# Patient Record
Sex: FEMALE | Race: WHITE | Employment: OTHER | URBAN - METROPOLITAN AREA
[De-identification: names, ages, dates, MRNs, and addresses within clinical notes are randomized per-mention and may not be internally consistent; named-entity substitution may affect disease eponyms.]

---

## 2017-01-09 ENCOUNTER — PROCEDURE VISIT (OUTPATIENT)
Dept: CARDIOLOGY CLINIC | Age: 72
End: 2017-01-09

## 2017-01-09 ENCOUNTER — INITIAL CONSULT (OUTPATIENT)
Dept: CARDIOLOGY CLINIC | Age: 72
End: 2017-01-09

## 2017-01-09 ENCOUNTER — TELEPHONE (OUTPATIENT)
Dept: CARDIOLOGY CLINIC | Age: 72
End: 2017-01-09

## 2017-01-09 VITALS
SYSTOLIC BLOOD PRESSURE: 136 MMHG | BODY MASS INDEX: 32.46 KG/M2 | HEART RATE: 60 BPM | WEIGHT: 161 LBS | HEIGHT: 59 IN | DIASTOLIC BLOOD PRESSURE: 70 MMHG

## 2017-01-09 DIAGNOSIS — R07.9 CHEST PAIN, UNSPECIFIED TYPE: Primary | ICD-10-CM

## 2017-01-09 DIAGNOSIS — I65.29 STENOSIS OF CAROTID ARTERY, UNSPECIFIED LATERALITY: Primary | ICD-10-CM

## 2017-01-09 DIAGNOSIS — I65.23 BILATERAL CAROTID ARTERY STENOSIS: ICD-10-CM

## 2017-01-09 PROCEDURE — 93000 ELECTROCARDIOGRAM COMPLETE: CPT | Performed by: INTERNAL MEDICINE

## 2017-01-09 PROCEDURE — 93880 EXTRACRANIAL BILAT STUDY: CPT | Performed by: INTERNAL MEDICINE

## 2017-01-09 PROCEDURE — 99203 OFFICE O/P NEW LOW 30 MIN: CPT | Performed by: INTERNAL MEDICINE

## 2017-01-24 ENCOUNTER — HOSPITAL ENCOUNTER (OUTPATIENT)
Dept: GENERAL RADIOLOGY | Age: 72
Discharge: HOME | End: 2017-01-24

## 2017-01-24 DIAGNOSIS — Z01.811 PRE-OP CHEST EXAM: ICD-10-CM

## 2017-01-24 LAB
ANION GAP SERPL CALCULATED.3IONS-SCNC: 13 MMOL/L (ref 4–16)
APTT: 33.7 SECONDS (ref 21.2–33)
BUN BLDV-MCNC: 17 MG/DL (ref 6–23)
CALCIUM SERPL-MCNC: 11.5 MG/DL (ref 8.3–10.6)
CHLORIDE BLD-SCNC: 96 MMOL/L (ref 99–110)
CO2: 27 MMOL/L (ref 21–32)
CREAT SERPL-MCNC: 0.9 MG/DL (ref 0.6–1.1)
GFR AFRICAN AMERICAN: >60 ML/MIN/1.73M2
GFR NON-AFRICAN AMERICAN: >60 ML/MIN/1.73M2
GLUCOSE BLD-MCNC: 106 MG/DL (ref 70–140)
HCT VFR BLD CALC: 41.9 % (ref 37–47)
HEMOGLOBIN: 13.7 GM/DL (ref 12.5–16)
INR BLD: 0.97 INDEX
MCH RBC QN AUTO: 32 PG (ref 27–31)
MCHC RBC AUTO-ENTMCNC: 32.7 % (ref 32–36)
MCV RBC AUTO: 97.9 FL (ref 78–100)
PDW BLD-RTO: 13.2 % (ref 11.7–14.9)
PLATELET # BLD: 298 K/CU MM (ref 140–440)
PMV BLD AUTO: 11.5 FL (ref 7.5–11.1)
POTASSIUM SERPL-SCNC: 5 MMOL/L (ref 3.5–5.1)
PROTHROMBIN TIME: 11 SECONDS
RBC # BLD: 4.28 M/CU MM (ref 4.2–5.4)
SODIUM BLD-SCNC: 136 MMOL/L (ref 135–145)
WBC # BLD: 7.8 K/CU MM (ref 4–10.5)

## 2017-01-26 PROBLEM — I65.22 STENOSIS OF LEFT CAROTID ARTERY: Status: ACTIVE | Noted: 2017-01-09

## 2017-01-30 ENCOUNTER — HOSPITAL ENCOUNTER (OUTPATIENT)
Dept: PREADMISSION TESTING | Age: 72
Discharge: HOME | End: 2017-01-30
Attending: SURGERY | Admitting: SURGERY

## 2017-01-30 VITALS
HEIGHT: 59 IN | DIASTOLIC BLOOD PRESSURE: 63 MMHG | SYSTOLIC BLOOD PRESSURE: 135 MMHG | BODY MASS INDEX: 32.68 KG/M2 | OXYGEN SATURATION: 99 % | TEMPERATURE: 97.9 F | RESPIRATION RATE: 16 BRPM | HEART RATE: 58 BPM | WEIGHT: 162.13 LBS

## 2017-01-30 LAB
ANION GAP SERPL CALCULATED.3IONS-SCNC: 10 MMOL/L (ref 4–16)
APTT: 31.4 SECONDS (ref 21.2–33)
BACTERIA: ABNORMAL /HPF
BILIRUBIN URINE: NEGATIVE MG/DL
BLOOD, URINE: NEGATIVE
BUN BLDV-MCNC: 18 MG/DL (ref 6–23)
CHLORIDE BLD-SCNC: 98 MMOL/L (ref 99–110)
CLARITY: CLEAR
CO2: 27 MMOL/L (ref 21–32)
COLOR: ABNORMAL
CREAT SERPL-MCNC: 0.8 MG/DL (ref 0.6–1.1)
GFR AFRICAN AMERICAN: >60 ML/MIN/1.73M2
GFR NON-AFRICAN AMERICAN: >60 ML/MIN/1.73M2
GLUCOSE FASTING: 102 MG/DL (ref 70–99)
GLUCOSE, URINE: NEGATIVE MG/DL
HCT VFR BLD CALC: 41.1 % (ref 37–47)
HEMOGLOBIN: 13.1 GM/DL (ref 12.5–16)
INR BLD: 0.93 INDEX
KETONES, URINE: NEGATIVE MG/DL
LEUKOCYTE ESTERASE, URINE: ABNORMAL
MCH RBC QN AUTO: 31.3 PG (ref 27–31)
MCHC RBC AUTO-ENTMCNC: 31.9 % (ref 32–36)
MCV RBC AUTO: 98.3 FL (ref 78–100)
NITRITE URINE, QUANTITATIVE: NEGATIVE
PDW BLD-RTO: 13.2 % (ref 11.7–14.9)
PH, URINE: 7 (ref 5–8)
PLATELET # BLD: 292 K/CU MM (ref 140–440)
PMV BLD AUTO: 11.2 FL (ref 7.5–11.1)
POTASSIUM SERPL-SCNC: 4.9 MMOL/L (ref 3.5–5.1)
PROTEIN UA: NEGATIVE MG/DL
PROTHROMBIN TIME: 10.6 SECONDS (ref 9.12–12.5)
RBC # BLD: 4.18 M/CU MM (ref 4.2–5.4)
RBC URINE: <1 /HPF (ref 0–6)
SODIUM BLD-SCNC: 135 MMOL/L (ref 135–145)
SPECIFIC GRAVITY UA: 1.01 (ref 1–1.03)
SQUAMOUS EPITHELIAL: 4 /HPF
TRANSITIONAL EPITHELIAL: <1 /HPF
UROBILINOGEN, URINE: NORMAL EU/DL (ref 0.2–1)
WBC # BLD: 7.8 K/CU MM (ref 4–10.5)
WBC UA: 4 /HPF (ref 0–5)

## 2017-01-30 ASSESSMENT — PAIN SCALES - GENERAL: PAINLEVEL_OUTOF10: 0

## 2017-02-10 ENCOUNTER — OFFICE VISIT (OUTPATIENT)
Dept: INTERNAL MEDICINE CLINIC | Age: 72
End: 2017-02-10

## 2017-02-10 VITALS
HEART RATE: 68 BPM | BODY MASS INDEX: 32.24 KG/M2 | WEIGHT: 159.6 LBS | RESPIRATION RATE: 12 BRPM | SYSTOLIC BLOOD PRESSURE: 122 MMHG | OXYGEN SATURATION: 97 % | DIASTOLIC BLOOD PRESSURE: 68 MMHG

## 2017-02-10 DIAGNOSIS — I65.22 STENOSIS OF LEFT CAROTID ARTERY: Primary | ICD-10-CM

## 2017-02-10 DIAGNOSIS — I10 ESSENTIAL HYPERTENSION: ICD-10-CM

## 2017-02-10 DIAGNOSIS — E55.9 VITAMIN D DEFICIENCY: ICD-10-CM

## 2017-02-10 DIAGNOSIS — I25.10 CORONARY ARTERY DISEASE INVOLVING NATIVE CORONARY ARTERY OF NATIVE HEART WITHOUT ANGINA PECTORIS: ICD-10-CM

## 2017-02-10 LAB
A/G RATIO: 2 (ref 1.1–2.2)
ALBUMIN SERPL-MCNC: 4.7 G/DL (ref 3.4–5)
ALP BLD-CCNC: 80 U/L (ref 40–129)
ALT SERPL-CCNC: 20 U/L (ref 10–40)
ANION GAP SERPL CALCULATED.3IONS-SCNC: 14 MMOL/L (ref 3–16)
AST SERPL-CCNC: 15 U/L (ref 15–37)
BASOPHILS ABSOLUTE: 0 K/UL (ref 0–0.2)
BASOPHILS RELATIVE PERCENT: 0.5 %
BILIRUB SERPL-MCNC: 0.3 MG/DL (ref 0–1)
BUN BLDV-MCNC: 20 MG/DL (ref 7–20)
CALCIUM SERPL-MCNC: 10.4 MG/DL (ref 8.3–10.6)
CHLORIDE BLD-SCNC: 94 MMOL/L (ref 99–110)
CHOLESTEROL, TOTAL: 114 MG/DL (ref 0–199)
CO2: 26 MMOL/L (ref 21–32)
CREAT SERPL-MCNC: 0.9 MG/DL (ref 0.6–1.2)
EOSINOPHILS ABSOLUTE: 0.2 K/UL (ref 0–0.6)
EOSINOPHILS RELATIVE PERCENT: 2.3 %
GFR AFRICAN AMERICAN: >60
GFR NON-AFRICAN AMERICAN: >60
GLOBULIN: 2.4 G/DL
GLUCOSE BLD-MCNC: 94 MG/DL (ref 70–99)
HCT VFR BLD CALC: 39.1 % (ref 36–48)
HDLC SERPL-MCNC: 49 MG/DL (ref 40–60)
HEMOGLOBIN: 12.9 G/DL (ref 12–16)
LDL CHOLESTEROL CALCULATED: 46 MG/DL
LYMPHOCYTES ABSOLUTE: 1.4 K/UL (ref 1–5.1)
LYMPHOCYTES RELATIVE PERCENT: 18.7 %
MAGNESIUM: 2 MG/DL (ref 1.8–2.4)
MCH RBC QN AUTO: 31.6 PG (ref 26–34)
MCHC RBC AUTO-ENTMCNC: 33.1 G/DL (ref 31–36)
MCV RBC AUTO: 95.4 FL (ref 80–100)
MONOCYTES ABSOLUTE: 0.9 K/UL (ref 0–1.3)
MONOCYTES RELATIVE PERCENT: 12 %
NEUTROPHILS ABSOLUTE: 4.9 K/UL (ref 1.7–7.7)
NEUTROPHILS RELATIVE PERCENT: 66.5 %
PDW BLD-RTO: 13.9 % (ref 12.4–15.4)
PLATELET # BLD: 225 K/UL (ref 135–450)
PMV BLD AUTO: 9.9 FL (ref 5–10.5)
POTASSIUM SERPL-SCNC: 4.7 MMOL/L (ref 3.5–5.1)
RBC # BLD: 4.09 M/UL (ref 4–5.2)
SODIUM BLD-SCNC: 134 MMOL/L (ref 136–145)
TOTAL PROTEIN: 7.1 G/DL (ref 6.4–8.2)
TRIGL SERPL-MCNC: 94 MG/DL (ref 0–150)
VLDLC SERPL CALC-MCNC: 19 MG/DL
WBC # BLD: 7.4 K/UL (ref 4–11)

## 2017-02-10 PROCEDURE — 99213 OFFICE O/P EST LOW 20 MIN: CPT | Performed by: INTERNAL MEDICINE

## 2017-02-11 LAB — VITAMIN D 25-HYDROXY: 42.9 NG/ML

## 2017-03-15 ENCOUNTER — OFFICE VISIT (OUTPATIENT)
Dept: INTERNAL MEDICINE CLINIC | Age: 72
End: 2017-03-15

## 2017-03-15 ENCOUNTER — HOSPITAL ENCOUNTER (OUTPATIENT)
Dept: GENERAL RADIOLOGY | Age: 72
Discharge: HOME | End: 2017-03-15
Attending: INTERNAL MEDICINE | Admitting: INTERNAL MEDICINE

## 2017-03-15 VITALS
SYSTOLIC BLOOD PRESSURE: 122 MMHG | RESPIRATION RATE: 14 BRPM | WEIGHT: 157 LBS | DIASTOLIC BLOOD PRESSURE: 78 MMHG | HEART RATE: 69 BPM | BODY MASS INDEX: 31.71 KG/M2 | OXYGEN SATURATION: 98 %

## 2017-03-15 DIAGNOSIS — M25.512 ACUTE PAIN OF LEFT SHOULDER: Primary | ICD-10-CM

## 2017-03-15 DIAGNOSIS — M75.82 ROTATOR CUFF TENDINITIS, LEFT: ICD-10-CM

## 2017-03-15 DIAGNOSIS — M25.512 ACUTE PAIN OF LEFT SHOULDER: ICD-10-CM

## 2017-03-15 PROCEDURE — 99213 OFFICE O/P EST LOW 20 MIN: CPT | Performed by: INTERNAL MEDICINE

## 2017-03-15 PROCEDURE — G8510 SCR DEP NEG, NO PLAN REQD: HCPCS | Performed by: INTERNAL MEDICINE

## 2017-03-15 PROCEDURE — 3288F FALL RISK ASSESSMENT DOCD: CPT | Performed by: INTERNAL MEDICINE

## 2017-03-15 RX ORDER — PREDNISONE 1 MG/1
TABLET ORAL
Qty: 36 TABLET | Refills: 0 | Status: SHIPPED | OUTPATIENT
Start: 2017-03-15 | End: 2017-04-05 | Stop reason: ALTCHOICE

## 2017-03-15 ASSESSMENT — PATIENT HEALTH QUESTIONNAIRE - PHQ9
SUM OF ALL RESPONSES TO PHQ9 QUESTIONS 1 & 2: 2
1. LITTLE INTEREST OR PLEASURE IN DOING THINGS: 1
2. FEELING DOWN, DEPRESSED OR HOPELESS: 1
SUM OF ALL RESPONSES TO PHQ QUESTIONS 1-9: 2

## 2017-03-22 ENCOUNTER — OFFICE VISIT (OUTPATIENT)
Dept: ORTHOPEDIC SURGERY | Age: 72
End: 2017-03-22

## 2017-03-22 VITALS — RESPIRATION RATE: 16 BRPM | WEIGHT: 157 LBS | HEIGHT: 59 IN | BODY MASS INDEX: 31.65 KG/M2

## 2017-03-22 DIAGNOSIS — M75.82 ROTATOR CUFF TENDINITIS, LEFT: Primary | ICD-10-CM

## 2017-03-22 DIAGNOSIS — R52 PAIN: ICD-10-CM

## 2017-03-22 PROCEDURE — 99204 OFFICE O/P NEW MOD 45 MIN: CPT | Performed by: ORTHOPAEDIC SURGERY

## 2017-03-22 PROCEDURE — 20610 DRAIN/INJ JOINT/BURSA W/O US: CPT | Performed by: ORTHOPAEDIC SURGERY

## 2017-03-22 ASSESSMENT — ENCOUNTER SYMPTOMS
GASTROINTESTINAL NEGATIVE: 1
RESPIRATORY NEGATIVE: 1
EYES NEGATIVE: 1

## 2017-03-27 ENCOUNTER — HOSPITAL ENCOUNTER (OUTPATIENT)
Dept: PHYSICAL THERAPY | Age: 72
Discharge: HOME | End: 2017-03-31
Attending: ORTHOPAEDIC SURGERY | Admitting: ORTHOPAEDIC SURGERY

## 2017-03-27 ASSESSMENT — PAIN DESCRIPTION - ORIENTATION: ORIENTATION: LEFT

## 2017-03-27 ASSESSMENT — PAIN SCALES - GENERAL: PAINLEVEL_OUTOF10: 0

## 2017-03-27 ASSESSMENT — PAIN DESCRIPTION - FREQUENCY: FREQUENCY: INTERMITTENT

## 2017-03-27 ASSESSMENT — PAIN DESCRIPTION - ONSET: ONSET: GRADUAL

## 2017-03-27 ASSESSMENT — PAIN DESCRIPTION - PAIN TYPE: TYPE: CHRONIC PAIN

## 2017-03-27 ASSESSMENT — PAIN DESCRIPTION - LOCATION: LOCATION: SHOULDER

## 2017-03-27 ASSESSMENT — PAIN DESCRIPTION - DESCRIPTORS: DESCRIPTORS: SHARP;DULL;ACHING

## 2017-03-27 ASSESSMENT — PAIN DESCRIPTION - DIRECTION: RADIATING_TOWARDS: DENIES RADIATING

## 2017-03-27 ASSESSMENT — PAIN DESCRIPTION - PROGRESSION: CLINICAL_PROGRESSION: GRADUALLY IMPROVING

## 2017-04-01 ENCOUNTER — HOSPITAL ENCOUNTER (OUTPATIENT)
Dept: OTHER | Age: 72
Discharge: HOME | End: 2017-04-30
Attending: ORTHOPAEDIC SURGERY | Admitting: ORTHOPAEDIC SURGERY

## 2017-04-05 ENCOUNTER — OFFICE VISIT (OUTPATIENT)
Dept: INTERNAL MEDICINE CLINIC | Age: 72
End: 2017-04-05

## 2017-04-05 VITALS
BODY MASS INDEX: 31.91 KG/M2 | SYSTOLIC BLOOD PRESSURE: 148 MMHG | RESPIRATION RATE: 16 BRPM | OXYGEN SATURATION: 98 % | WEIGHT: 158 LBS | HEART RATE: 63 BPM | DIASTOLIC BLOOD PRESSURE: 80 MMHG

## 2017-04-05 DIAGNOSIS — I10 ESSENTIAL HYPERTENSION: Primary | ICD-10-CM

## 2017-04-05 DIAGNOSIS — F41.9 ANXIETY: ICD-10-CM

## 2017-04-05 DIAGNOSIS — R07.89 ATYPICAL CHEST PAIN: ICD-10-CM

## 2017-04-05 DIAGNOSIS — E87.1 HYPONATREMIA: ICD-10-CM

## 2017-04-05 PROCEDURE — 99213 OFFICE O/P EST LOW 20 MIN: CPT | Performed by: INTERNAL MEDICINE

## 2017-04-05 RX ORDER — HYDRALAZINE HYDROCHLORIDE 10 MG/1
10 TABLET, FILM COATED ORAL 2 TIMES DAILY
Qty: 60 TABLET | Refills: 2 | Status: SHIPPED | OUTPATIENT
Start: 2017-04-05 | End: 2017-04-20 | Stop reason: SDUPTHER

## 2017-04-05 RX ORDER — SPIRONOLACTONE 25 MG/1
12.5 TABLET ORAL DAILY
Qty: 45 TABLET | Refills: 1 | Status: SHIPPED | OUTPATIENT
Start: 2017-04-05 | End: 2017-05-03 | Stop reason: SINTOL

## 2017-04-05 RX ORDER — LOSARTAN POTASSIUM 100 MG/1
100 TABLET ORAL DAILY
Qty: 90 TABLET | Refills: 1 | Status: SHIPPED | OUTPATIENT
Start: 2017-04-05 | End: 2017-04-11 | Stop reason: SDUPTHER

## 2017-04-06 ENCOUNTER — TELEPHONE (OUTPATIENT)
Dept: INTERNAL MEDICINE CLINIC | Age: 72
End: 2017-04-06

## 2017-04-10 ENCOUNTER — HOSPITAL ENCOUNTER (OUTPATIENT)
Dept: GENERAL RADIOLOGY | Age: 72
Discharge: HOME | End: 2017-04-10
Attending: INTERNAL MEDICINE | Admitting: INTERNAL MEDICINE

## 2017-04-10 LAB
ANION GAP SERPL CALCULATED.3IONS-SCNC: 12 MMOL/L (ref 4–16)
BUN BLDV-MCNC: 18 MG/DL (ref 6–23)
CALCIUM SERPL-MCNC: 10.3 MG/DL (ref 8.3–10.6)
CHLORIDE BLD-SCNC: 95 MMOL/L (ref 99–110)
CO2: 27 MMOL/L (ref 21–32)
CREAT SERPL-MCNC: 0.8 MG/DL (ref 0.6–1.1)
GFR AFRICAN AMERICAN: >60 ML/MIN/1.73M2
GFR NON-AFRICAN AMERICAN: >60 ML/MIN/1.73M2
GLUCOSE BLD-MCNC: 88 MG/DL (ref 70–140)
POTASSIUM SERPL-SCNC: 5.1 MMOL/L (ref 3.5–5.1)
SODIUM BLD-SCNC: 134 MMOL/L (ref 135–145)

## 2017-04-11 ENCOUNTER — OFFICE VISIT (OUTPATIENT)
Dept: INTERNAL MEDICINE CLINIC | Age: 72
End: 2017-04-11

## 2017-04-11 VITALS
RESPIRATION RATE: 16 BRPM | DIASTOLIC BLOOD PRESSURE: 60 MMHG | SYSTOLIC BLOOD PRESSURE: 110 MMHG | BODY MASS INDEX: 31.87 KG/M2 | OXYGEN SATURATION: 96 % | WEIGHT: 157.8 LBS | HEART RATE: 69 BPM

## 2017-04-11 DIAGNOSIS — I10 ESSENTIAL HYPERTENSION: Primary | ICD-10-CM

## 2017-04-11 PROCEDURE — 99213 OFFICE O/P EST LOW 20 MIN: CPT | Performed by: INTERNAL MEDICINE

## 2017-04-11 RX ORDER — LOSARTAN POTASSIUM 25 MG/1
75 TABLET ORAL DAILY
Qty: 270 TABLET | Refills: 1 | Status: SHIPPED | OUTPATIENT
Start: 2017-04-11 | End: 2017-05-01

## 2017-04-20 ENCOUNTER — TELEPHONE (OUTPATIENT)
Dept: INTERNAL MEDICINE CLINIC | Age: 72
End: 2017-04-20

## 2017-04-20 DIAGNOSIS — I10 ESSENTIAL HYPERTENSION: ICD-10-CM

## 2017-04-20 RX ORDER — HYDRALAZINE HYDROCHLORIDE 25 MG/1
25 TABLET, FILM COATED ORAL 2 TIMES DAILY
Qty: 60 TABLET | Refills: 0 | Status: SHIPPED | OUTPATIENT
Start: 2017-04-20 | End: 2017-05-17 | Stop reason: SDUPTHER

## 2017-04-21 ENCOUNTER — TELEPHONE (OUTPATIENT)
Dept: INTERNAL MEDICINE CLINIC | Age: 72
End: 2017-04-21

## 2017-05-01 ENCOUNTER — OFFICE VISIT (OUTPATIENT)
Dept: INTERNAL MEDICINE CLINIC | Age: 72
End: 2017-05-01

## 2017-05-01 ENCOUNTER — HOSPITAL ENCOUNTER (OUTPATIENT)
Dept: OTHER | Age: 72
Discharge: HOME | End: 2017-05-31
Attending: ORTHOPAEDIC SURGERY | Admitting: ORTHOPAEDIC SURGERY

## 2017-05-01 VITALS
SYSTOLIC BLOOD PRESSURE: 135 MMHG | BODY MASS INDEX: 32.72 KG/M2 | OXYGEN SATURATION: 99 % | RESPIRATION RATE: 16 BRPM | DIASTOLIC BLOOD PRESSURE: 65 MMHG | HEART RATE: 79 BPM | WEIGHT: 162 LBS

## 2017-05-01 DIAGNOSIS — E83.52 HYPERCALCEMIA: ICD-10-CM

## 2017-05-01 DIAGNOSIS — I10 ESSENTIAL HYPERTENSION: Primary | ICD-10-CM

## 2017-05-01 DIAGNOSIS — I25.10 CORONARY ARTERY DISEASE INVOLVING NATIVE CORONARY ARTERY OF NATIVE HEART WITHOUT ANGINA PECTORIS: ICD-10-CM

## 2017-05-01 DIAGNOSIS — R09.89 LABILE HYPERTENSION: ICD-10-CM

## 2017-05-01 PROCEDURE — G8510 SCR DEP NEG, NO PLAN REQD: HCPCS | Performed by: INTERNAL MEDICINE

## 2017-05-01 PROCEDURE — 3288F FALL RISK ASSESSMENT DOCD: CPT | Performed by: INTERNAL MEDICINE

## 2017-05-01 PROCEDURE — 99214 OFFICE O/P EST MOD 30 MIN: CPT | Performed by: INTERNAL MEDICINE

## 2017-05-01 RX ORDER — AMLODIPINE BESYLATE 10 MG/1
10 TABLET ORAL DAILY
Qty: 90 TABLET | Refills: 1 | Status: SHIPPED | OUTPATIENT
Start: 2017-05-01 | End: 2017-05-03 | Stop reason: DRUGHIGH

## 2017-05-01 RX ORDER — METOPROLOL SUCCINATE 25 MG/1
TABLET, EXTENDED RELEASE ORAL
Qty: 90 TABLET | Refills: 1 | Status: SHIPPED | OUTPATIENT
Start: 2017-05-01 | End: 2017-10-25 | Stop reason: SDUPTHER

## 2017-05-01 RX ORDER — VALSARTAN 320 MG/1
320 TABLET ORAL DAILY
Qty: 90 TABLET | Refills: 1 | Status: SHIPPED | OUTPATIENT
Start: 2017-05-01 | End: 2017-05-02

## 2017-05-01 ASSESSMENT — PATIENT HEALTH QUESTIONNAIRE - PHQ9
SUM OF ALL RESPONSES TO PHQ9 QUESTIONS 1 & 2: 0
1. LITTLE INTEREST OR PLEASURE IN DOING THINGS: 0
2. FEELING DOWN, DEPRESSED OR HOPELESS: 0
SUM OF ALL RESPONSES TO PHQ QUESTIONS 1-9: 0

## 2017-05-02 ENCOUNTER — HOSPITAL ENCOUNTER (OUTPATIENT)
Dept: GENERAL RADIOLOGY | Age: 72
Discharge: HOME | End: 2017-05-02
Attending: INTERNAL MEDICINE | Admitting: INTERNAL MEDICINE

## 2017-05-02 DIAGNOSIS — I10 ESSENTIAL HYPERTENSION: ICD-10-CM

## 2017-05-02 DIAGNOSIS — I10 ESSENTIAL HYPERTENSION: Primary | ICD-10-CM

## 2017-05-02 LAB
ANION GAP SERPL CALCULATED.3IONS-SCNC: 14 MMOL/L (ref 4–16)
BUN BLDV-MCNC: 18 MG/DL (ref 6–23)
CALCIUM IONIZED: 4.84 MG/DL (ref 4.48–5.28)
CALCIUM SERPL-MCNC: 10.4 MG/DL (ref 8.3–10.6)
CHLORIDE BLD-SCNC: 93 MMOL/L (ref 99–110)
CO2: 24 MMOL/L (ref 21–32)
CREAT SERPL-MCNC: 0.9 MG/DL (ref 0.6–1.1)
GFR AFRICAN AMERICAN: >60 ML/MIN/1.73M2
GFR NON-AFRICAN AMERICAN: >60 ML/MIN/1.73M2
GLUCOSE BLD-MCNC: 102 MG/DL (ref 70–140)
IONIZED CA: 1.21 MMOL/L (ref 1.12–1.32)
POTASSIUM SERPL-SCNC: 5.5 MMOL/L (ref 3.5–5.1)
SODIUM BLD-SCNC: 131 MMOL/L (ref 135–145)

## 2017-05-02 RX ORDER — LOSARTAN POTASSIUM 100 MG/1
100 TABLET ORAL DAILY
Qty: 90 TABLET | Refills: 1
Start: 2017-05-02 | End: 2017-05-03

## 2017-05-03 PROBLEM — E87.5 HYPERKALEMIA: Status: ACTIVE | Noted: 2017-05-03

## 2017-05-08 ENCOUNTER — TELEPHONE (OUTPATIENT)
Dept: INTERNAL MEDICINE CLINIC | Age: 72
End: 2017-05-08

## 2017-05-08 DIAGNOSIS — I10 ESSENTIAL HYPERTENSION: ICD-10-CM

## 2017-05-08 DIAGNOSIS — I25.10 CORONARY ARTERY DISEASE INVOLVING NATIVE CORONARY ARTERY OF NATIVE HEART WITHOUT ANGINA PECTORIS: ICD-10-CM

## 2017-05-08 RX ORDER — AMLODIPINE BESYLATE 5 MG/1
5 TABLET ORAL DAILY
Qty: 90 TABLET | Refills: 1 | Status: SHIPPED | OUTPATIENT
Start: 2017-05-08 | End: 2017-05-09 | Stop reason: SDUPTHER

## 2017-05-08 RX ORDER — SIMVASTATIN 20 MG
TABLET ORAL
Qty: 90 TABLET | Refills: 1
Start: 2017-05-08 | End: 2018-11-12 | Stop reason: SDUPTHER

## 2017-05-09 ENCOUNTER — OFFICE VISIT (OUTPATIENT)
Dept: INTERNAL MEDICINE CLINIC | Age: 72
End: 2017-05-09

## 2017-05-09 VITALS
SYSTOLIC BLOOD PRESSURE: 124 MMHG | OXYGEN SATURATION: 98 % | DIASTOLIC BLOOD PRESSURE: 80 MMHG | HEART RATE: 68 BPM | RESPIRATION RATE: 16 BRPM

## 2017-05-09 DIAGNOSIS — I10 ESSENTIAL HYPERTENSION: Primary | ICD-10-CM

## 2017-05-09 PROCEDURE — 99213 OFFICE O/P EST LOW 20 MIN: CPT | Performed by: INTERNAL MEDICINE

## 2017-05-09 RX ORDER — AMLODIPINE BESYLATE 5 MG/1
5 TABLET ORAL DAILY
Qty: 90 TABLET | Refills: 1 | Status: SHIPPED | OUTPATIENT
Start: 2017-05-09 | End: 2017-07-17 | Stop reason: ALTCHOICE

## 2017-05-09 RX ORDER — LOSARTAN POTASSIUM 100 MG/1
100 TABLET ORAL DAILY
Qty: 90 TABLET | Refills: 1 | Status: SHIPPED | OUTPATIENT
Start: 2017-05-09 | End: 2017-10-25 | Stop reason: SDUPTHER

## 2017-05-10 ENCOUNTER — HOSPITAL ENCOUNTER (OUTPATIENT)
Dept: CT IMAGING | Age: 72
Discharge: HOME | End: 2017-06-08
Attending: INTERNAL MEDICINE | Admitting: INTERNAL MEDICINE

## 2017-05-10 ENCOUNTER — HOSPITAL ENCOUNTER (OUTPATIENT)
Dept: INFUSION THERAPY | Age: 72
Discharge: HOME | End: 2017-05-10
Attending: INTERNAL MEDICINE | Admitting: INTERNAL MEDICINE

## 2017-05-10 VITALS
DIASTOLIC BLOOD PRESSURE: 66 MMHG | HEART RATE: 73 BPM | RESPIRATION RATE: 16 BRPM | SYSTOLIC BLOOD PRESSURE: 140 MMHG | OXYGEN SATURATION: 100 %

## 2017-05-10 DIAGNOSIS — E87.5 HYPERKALEMIA: ICD-10-CM

## 2017-05-10 DIAGNOSIS — I10 ESSENTIAL HYPERTENSION: ICD-10-CM

## 2017-05-10 DIAGNOSIS — I25.10 CORONARY ARTERY DISEASE INVOLVING NATIVE CORONARY ARTERY OF NATIVE HEART WITHOUT ANGINA PECTORIS: ICD-10-CM

## 2017-05-10 DIAGNOSIS — E83.52 HYPERCALCEMIA: ICD-10-CM

## 2017-05-10 RX ORDER — SODIUM CHLORIDE 9 MG/ML
INJECTION, SOLUTION INTRAVENOUS ONCE
Status: COMPLETED | OUTPATIENT
Start: 2017-05-10 | End: 2017-05-10

## 2017-05-10 RX ORDER — SODIUM CHLORIDE 0.9 % (FLUSH) 0.9 %
10 SYRINGE (ML) INJECTION PRN
Status: DISCONTINUED | OUTPATIENT
Start: 2017-05-10 | End: 2017-05-11 | Stop reason: HOSPADM

## 2017-05-10 RX ADMIN — SODIUM CHLORIDE: 9 INJECTION, SOLUTION INTRAVENOUS at 10:28

## 2017-05-10 RX ADMIN — SODIUM CHLORIDE: 9 INJECTION, SOLUTION INTRAVENOUS at 12:15

## 2017-05-10 RX ADMIN — Medication 10 ML: at 10:28

## 2017-05-10 ASSESSMENT — PAIN SCALES - GENERAL: PAINLEVEL_OUTOF10: 0

## 2017-05-11 ENCOUNTER — OFFICE VISIT (OUTPATIENT)
Dept: ORTHOPEDIC SURGERY | Age: 72
End: 2017-05-11

## 2017-05-11 VITALS — BODY MASS INDEX: 30.04 KG/M2 | HEIGHT: 59 IN | RESPIRATION RATE: 16 BRPM | WEIGHT: 149 LBS

## 2017-05-11 DIAGNOSIS — M75.82 ROTATOR CUFF TENDINITIS, LEFT: Primary | ICD-10-CM

## 2017-05-11 PROCEDURE — 99213 OFFICE O/P EST LOW 20 MIN: CPT | Performed by: ORTHOPAEDIC SURGERY

## 2017-05-11 ASSESSMENT — ENCOUNTER SYMPTOMS
GASTROINTESTINAL NEGATIVE: 1
EYES NEGATIVE: 1
RESPIRATORY NEGATIVE: 1

## 2017-05-17 DIAGNOSIS — I10 ESSENTIAL HYPERTENSION: ICD-10-CM

## 2017-05-17 RX ORDER — HYDRALAZINE HYDROCHLORIDE 25 MG/1
TABLET, FILM COATED ORAL
Qty: 60 TABLET | Refills: 0 | Status: SHIPPED | OUTPATIENT
Start: 2017-05-17 | End: 2017-05-30 | Stop reason: SDUPTHER

## 2017-05-22 ENCOUNTER — HOSPITAL ENCOUNTER (OUTPATIENT)
Dept: GENERAL RADIOLOGY | Age: 72
Discharge: HOME | End: 2017-05-22
Attending: INTERNAL MEDICINE | Admitting: INTERNAL MEDICINE

## 2017-05-22 LAB
ALBUMIN SERPL-MCNC: 4.2 GM/DL (ref 3.4–5)
ANION GAP SERPL CALCULATED.3IONS-SCNC: 13 MMOL/L (ref 4–16)
BACTERIA: ABNORMAL /HPF
BILIRUBIN URINE: NEGATIVE MG/DL
BLOOD, URINE: NEGATIVE
BUN BLDV-MCNC: 12 MG/DL (ref 6–23)
CALCIUM SERPL-MCNC: 10.1 MG/DL (ref 8.3–10.6)
CHLORIDE BLD-SCNC: 93 MMOL/L (ref 99–110)
CLARITY: CLEAR
CO2: 25 MMOL/L (ref 21–32)
COLOR: ABNORMAL
CREAT SERPL-MCNC: 0.7 MG/DL (ref 0.6–1.1)
GFR AFRICAN AMERICAN: >60 ML/MIN/1.73M2
GFR NON-AFRICAN AMERICAN: >60 ML/MIN/1.73M2
GLUCOSE BLD-MCNC: 95 MG/DL (ref 70–140)
GLUCOSE, URINE: NEGATIVE MG/DL
KETONES, URINE: NEGATIVE MG/DL
LEUKOCYTE ESTERASE, URINE: ABNORMAL
NITRITE URINE, QUANTITATIVE: NEGATIVE
PH, URINE: 6 (ref 5–8)
POTASSIUM SERPL-SCNC: 4.3 MMOL/L (ref 3.5–5.1)
PROTEIN UA: NEGATIVE MG/DL
RBC URINE: <1 /HPF (ref 0–6)
SODIUM BLD-SCNC: 131 MMOL/L (ref 135–145)
SPECIFIC GRAVITY UA: 1.01 (ref 1–1.03)
SQUAMOUS EPITHELIAL: 1 /HPF
TRANSITIONAL EPITHELIAL: <1 /HPF
UROBILINOGEN, URINE: NORMAL MG/DL (ref 0.2–1)
WBC UA: 10 /HPF (ref 0–5)

## 2017-05-26 PROBLEM — F41.9 ANXIETY: Status: ACTIVE | Noted: 2017-05-26

## 2017-06-06 ENCOUNTER — OFFICE VISIT (OUTPATIENT)
Dept: INTERNAL MEDICINE CLINIC | Age: 72
End: 2017-06-06

## 2017-06-06 VITALS
OXYGEN SATURATION: 98 % | SYSTOLIC BLOOD PRESSURE: 112 MMHG | HEART RATE: 66 BPM | DIASTOLIC BLOOD PRESSURE: 62 MMHG | HEIGHT: 59 IN | WEIGHT: 158.6 LBS | BODY MASS INDEX: 31.97 KG/M2

## 2017-06-06 DIAGNOSIS — F41.9 ANXIETY: ICD-10-CM

## 2017-06-06 DIAGNOSIS — I10 ESSENTIAL HYPERTENSION: Primary | ICD-10-CM

## 2017-06-06 PROCEDURE — 99213 OFFICE O/P EST LOW 20 MIN: CPT | Performed by: INTERNAL MEDICINE

## 2017-06-06 RX ORDER — LORAZEPAM 0.5 MG/1
0.5 TABLET ORAL 2 TIMES DAILY PRN
Qty: 30 TABLET | Refills: 1 | Status: SHIPPED | OUTPATIENT
Start: 2017-06-06 | End: 2017-07-06

## 2017-06-06 RX ORDER — SERTRALINE HYDROCHLORIDE 25 MG/1
TABLET, FILM COATED ORAL
Qty: 30 TABLET | Refills: 2 | Status: SHIPPED | OUTPATIENT
Start: 2017-06-06 | End: 2017-07-19 | Stop reason: SDUPTHER

## 2017-06-06 RX ORDER — HYDRALAZINE HYDROCHLORIDE 10 MG/1
20 TABLET, FILM COATED ORAL 2 TIMES DAILY
Qty: 120 TABLET | Refills: 3 | Status: SHIPPED | OUTPATIENT
Start: 2017-06-06 | End: 2017-06-15 | Stop reason: SDUPTHER

## 2017-06-15 ENCOUNTER — OFFICE VISIT (OUTPATIENT)
Dept: INTERNAL MEDICINE CLINIC | Age: 72
End: 2017-06-15

## 2017-06-15 VITALS
HEART RATE: 74 BPM | WEIGHT: 154 LBS | BODY MASS INDEX: 31.04 KG/M2 | DIASTOLIC BLOOD PRESSURE: 62 MMHG | HEIGHT: 59 IN | SYSTOLIC BLOOD PRESSURE: 138 MMHG | OXYGEN SATURATION: 97 %

## 2017-06-15 DIAGNOSIS — I10 ESSENTIAL HYPERTENSION: Primary | ICD-10-CM

## 2017-06-15 PROBLEM — E87.5 HYPERKALEMIA: Status: RESOLVED | Noted: 2017-05-03 | Resolved: 2017-06-15

## 2017-06-15 PROCEDURE — 99213 OFFICE O/P EST LOW 20 MIN: CPT | Performed by: INTERNAL MEDICINE

## 2017-06-15 RX ORDER — HYDROCHLOROTHIAZIDE 12.5 MG/1
12.5 TABLET ORAL DAILY
Qty: 30 TABLET | Refills: 3 | Status: SHIPPED | OUTPATIENT
Start: 2017-06-15 | End: 2017-09-12 | Stop reason: SDUPTHER

## 2017-06-15 RX ORDER — HYDRALAZINE HYDROCHLORIDE 25 MG/1
25 TABLET, FILM COATED ORAL 2 TIMES DAILY
Qty: 60 TABLET | Refills: 3
Start: 2017-06-15 | End: 2017-09-25 | Stop reason: SDUPTHER

## 2017-06-22 ENCOUNTER — HOSPITAL ENCOUNTER (OUTPATIENT)
Dept: GENERAL RADIOLOGY | Age: 72
Discharge: HOME | End: 2017-06-22
Attending: INTERNAL MEDICINE | Admitting: INTERNAL MEDICINE

## 2017-06-22 DIAGNOSIS — I10 ESSENTIAL HYPERTENSION: ICD-10-CM

## 2017-06-22 LAB
ANION GAP SERPL CALCULATED.3IONS-SCNC: 13 MMOL/L (ref 4–16)
BUN BLDV-MCNC: 12 MG/DL (ref 6–23)
CALCIUM SERPL-MCNC: 9.9 MG/DL (ref 8.3–10.6)
CHLORIDE BLD-SCNC: 88 MMOL/L (ref 99–110)
CO2: 27 MMOL/L (ref 21–32)
CREAT SERPL-MCNC: 0.8 MG/DL (ref 0.6–1.1)
GFR AFRICAN AMERICAN: >60 ML/MIN/1.73M2
GFR NON-AFRICAN AMERICAN: >60 ML/MIN/1.73M2
GLUCOSE BLD-MCNC: 92 MG/DL (ref 70–140)
POTASSIUM SERPL-SCNC: 3.8 MMOL/L (ref 3.5–5.1)
SODIUM BLD-SCNC: 128 MMOL/L (ref 135–145)

## 2017-06-26 ENCOUNTER — OFFICE VISIT (OUTPATIENT)
Dept: INTERNAL MEDICINE CLINIC | Age: 72
End: 2017-06-26

## 2017-06-26 VITALS
BODY MASS INDEX: 31.31 KG/M2 | HEART RATE: 59 BPM | WEIGHT: 155 LBS | RESPIRATION RATE: 16 BRPM | DIASTOLIC BLOOD PRESSURE: 79 MMHG | SYSTOLIC BLOOD PRESSURE: 128 MMHG

## 2017-06-26 DIAGNOSIS — E87.1 HYPONATREMIA: Primary | ICD-10-CM

## 2017-06-26 DIAGNOSIS — I10 ESSENTIAL HYPERTENSION: ICD-10-CM

## 2017-06-26 LAB
ANION GAP SERPL CALCULATED.3IONS-SCNC: 14 MMOL/L (ref 3–16)
BUN BLDV-MCNC: 11 MG/DL (ref 7–20)
CALCIUM SERPL-MCNC: 10.3 MG/DL (ref 8.3–10.6)
CHLORIDE BLD-SCNC: 89 MMOL/L (ref 99–110)
CO2: 26 MMOL/L (ref 21–32)
CREAT SERPL-MCNC: 0.6 MG/DL (ref 0.6–1.2)
GFR AFRICAN AMERICAN: >60
GFR NON-AFRICAN AMERICAN: >60
GLUCOSE BLD-MCNC: 105 MG/DL (ref 70–99)
POTASSIUM SERPL-SCNC: 3.8 MMOL/L (ref 3.5–5.1)
SODIUM BLD-SCNC: 129 MMOL/L (ref 136–145)

## 2017-06-26 PROCEDURE — 99213 OFFICE O/P EST LOW 20 MIN: CPT | Performed by: INTERNAL MEDICINE

## 2017-07-17 ENCOUNTER — OFFICE VISIT (OUTPATIENT)
Dept: CARDIOLOGY CLINIC | Age: 72
End: 2017-07-17

## 2017-07-17 VITALS
SYSTOLIC BLOOD PRESSURE: 138 MMHG | BODY MASS INDEX: 31.73 KG/M2 | HEART RATE: 64 BPM | WEIGHT: 157.4 LBS | HEIGHT: 59 IN | DIASTOLIC BLOOD PRESSURE: 68 MMHG

## 2017-07-17 DIAGNOSIS — I65.29 STENOSIS OF CAROTID ARTERY, UNSPECIFIED LATERALITY: Primary | ICD-10-CM

## 2017-07-17 PROCEDURE — 99214 OFFICE O/P EST MOD 30 MIN: CPT | Performed by: INTERNAL MEDICINE

## 2017-07-17 RX ORDER — LORAZEPAM 0.5 MG/1
0.5 TABLET ORAL EVERY 6 HOURS PRN
COMMUNITY
End: 2019-04-23 | Stop reason: ALTCHOICE

## 2017-07-19 ENCOUNTER — OFFICE VISIT (OUTPATIENT)
Dept: INTERNAL MEDICINE CLINIC | Age: 72
End: 2017-07-19

## 2017-07-19 VITALS
HEART RATE: 65 BPM | BODY MASS INDEX: 31.87 KG/M2 | DIASTOLIC BLOOD PRESSURE: 62 MMHG | OXYGEN SATURATION: 97 % | SYSTOLIC BLOOD PRESSURE: 118 MMHG | WEIGHT: 157.8 LBS | RESPIRATION RATE: 16 BRPM

## 2017-07-19 DIAGNOSIS — F41.9 ANXIETY: ICD-10-CM

## 2017-07-19 DIAGNOSIS — I10 ESSENTIAL HYPERTENSION: Primary | ICD-10-CM

## 2017-07-19 PROCEDURE — 99213 OFFICE O/P EST LOW 20 MIN: CPT | Performed by: INTERNAL MEDICINE

## 2017-07-27 DIAGNOSIS — I25.10 CORONARY ARTERY DISEASE INVOLVING NATIVE CORONARY ARTERY OF NATIVE HEART WITHOUT ANGINA PECTORIS: ICD-10-CM

## 2017-07-28 RX ORDER — SIMVASTATIN 40 MG
TABLET ORAL
Qty: 90 TABLET | Refills: 2 | Status: SHIPPED | OUTPATIENT
Start: 2017-07-28 | End: 2018-02-10 | Stop reason: DRUGHIGH

## 2017-09-12 ENCOUNTER — OFFICE VISIT (OUTPATIENT)
Dept: INTERNAL MEDICINE CLINIC | Age: 72
End: 2017-09-12

## 2017-09-12 VITALS
BODY MASS INDEX: 31.35 KG/M2 | RESPIRATION RATE: 16 BRPM | HEART RATE: 67 BPM | DIASTOLIC BLOOD PRESSURE: 86 MMHG | WEIGHT: 155.2 LBS | SYSTOLIC BLOOD PRESSURE: 138 MMHG | OXYGEN SATURATION: 98 %

## 2017-09-12 DIAGNOSIS — E83.52 HYPERCALCEMIA: ICD-10-CM

## 2017-09-12 DIAGNOSIS — N30.90 CYSTITIS: ICD-10-CM

## 2017-09-12 DIAGNOSIS — I10 ESSENTIAL HYPERTENSION: Primary | ICD-10-CM

## 2017-09-12 PROCEDURE — 99213 OFFICE O/P EST LOW 20 MIN: CPT | Performed by: INTERNAL MEDICINE

## 2017-09-12 RX ORDER — HYDROCHLOROTHIAZIDE 25 MG/1
25 TABLET ORAL DAILY
Qty: 90 TABLET | Refills: 1 | Status: SHIPPED | OUTPATIENT
Start: 2017-09-12 | End: 2018-03-23 | Stop reason: SDUPTHER

## 2017-09-18 ENCOUNTER — HOSPITAL ENCOUNTER (OUTPATIENT)
Dept: GENERAL RADIOLOGY | Age: 72
Discharge: HOME | End: 2017-09-18
Attending: INTERNAL MEDICINE | Admitting: INTERNAL MEDICINE

## 2017-09-18 LAB
ALBUMIN SERPL-MCNC: 4.3 GM/DL (ref 3.4–5)
ANION GAP SERPL CALCULATED.3IONS-SCNC: 11 MMOL/L (ref 4–16)
BACTERIA: ABNORMAL /HPF
BILIRUBIN URINE: NEGATIVE MG/DL
BLOOD, URINE: NEGATIVE
BUN BLDV-MCNC: 16 MG/DL (ref 6–23)
CALCIUM SERPL-MCNC: 9.5 MG/DL (ref 8.3–10.6)
CHLORIDE BLD-SCNC: 86 MMOL/L (ref 99–110)
CLARITY: CLEAR
CO2: 29 MMOL/L (ref 21–32)
COLOR: ABNORMAL
CREAT SERPL-MCNC: 0.9 MG/DL (ref 0.6–1.1)
GFR AFRICAN AMERICAN: >60 ML/MIN/1.73M2
GFR NON-AFRICAN AMERICAN: >60 ML/MIN/1.73M2
GLUCOSE BLD-MCNC: 92 MG/DL (ref 70–140)
GLUCOSE, URINE: NEGATIVE MG/DL
KETONES, URINE: NEGATIVE MG/DL
LEUKOCYTE ESTERASE, URINE: ABNORMAL
MAGNESIUM: 2 MG/DL (ref 1.8–2.4)
NITRITE URINE, QUANTITATIVE: NEGATIVE
PH, URINE: 7 (ref 5–8)
POTASSIUM SERPL-SCNC: 3.6 MMOL/L (ref 3.5–5.1)
PROTEIN UA: NEGATIVE MG/DL
RBC URINE: 3 /HPF (ref 0–6)
SODIUM BLD-SCNC: 126 MMOL/L (ref 135–145)
SPECIFIC GRAVITY UA: 1.01 (ref 1–1.03)
SQUAMOUS EPITHELIAL: 2 /HPF
TRICHOMONAS: ABNORMAL /HPF
UROBILINOGEN, URINE: NORMAL MG/DL (ref 0.2–1)
WBC UA: 11 /HPF (ref 0–5)

## 2017-09-19 ENCOUNTER — OFFICE VISIT (OUTPATIENT)
Dept: INTERNAL MEDICINE CLINIC | Age: 72
End: 2017-09-19

## 2017-09-19 VITALS
WEIGHT: 158 LBS | HEART RATE: 64 BPM | OXYGEN SATURATION: 95 % | SYSTOLIC BLOOD PRESSURE: 128 MMHG | BODY MASS INDEX: 31.91 KG/M2 | RESPIRATION RATE: 16 BRPM | DIASTOLIC BLOOD PRESSURE: 65 MMHG

## 2017-09-19 DIAGNOSIS — N30.90 CYSTITIS: ICD-10-CM

## 2017-09-19 DIAGNOSIS — I10 ESSENTIAL HYPERTENSION: Primary | ICD-10-CM

## 2017-09-19 PROCEDURE — 99213 OFFICE O/P EST LOW 20 MIN: CPT | Performed by: INTERNAL MEDICINE

## 2017-09-19 RX ORDER — NITROFURANTOIN 25; 75 MG/1; MG/1
100 CAPSULE ORAL 2 TIMES DAILY
Qty: 14 CAPSULE | Refills: 0 | Status: SHIPPED | OUTPATIENT
Start: 2017-09-19 | End: 2017-09-27 | Stop reason: SDUPTHER

## 2017-09-25 ENCOUNTER — TELEPHONE (OUTPATIENT)
Dept: INTERNAL MEDICINE CLINIC | Age: 72
End: 2017-09-25

## 2017-09-25 DIAGNOSIS — I10 ESSENTIAL HYPERTENSION: ICD-10-CM

## 2017-09-25 DIAGNOSIS — I25.10 CORONARY ARTERY DISEASE INVOLVING NATIVE CORONARY ARTERY OF NATIVE HEART WITHOUT ANGINA PECTORIS: ICD-10-CM

## 2017-09-25 RX ORDER — CLOPIDOGREL BISULFATE 75 MG/1
TABLET ORAL
Qty: 90 TABLET | Refills: 1 | Status: SHIPPED | OUTPATIENT
Start: 2017-09-25 | End: 2018-03-23 | Stop reason: SDUPTHER

## 2017-09-25 RX ORDER — HYDRALAZINE HYDROCHLORIDE 25 MG/1
25 TABLET, FILM COATED ORAL 2 TIMES DAILY
Qty: 60 TABLET | Refills: 3 | Status: SHIPPED | OUTPATIENT
Start: 2017-09-25 | End: 2018-01-22 | Stop reason: SDUPTHER

## 2017-09-26 ENCOUNTER — HOSPITAL ENCOUNTER (OUTPATIENT)
Dept: GENERAL RADIOLOGY | Age: 72
Discharge: HOME | End: 2017-09-26
Attending: INTERNAL MEDICINE | Admitting: INTERNAL MEDICINE

## 2017-09-26 LAB
ANION GAP SERPL CALCULATED.3IONS-SCNC: 12 MMOL/L (ref 4–16)
BACTERIA: NEGATIVE /HPF
BILIRUBIN URINE: NEGATIVE MG/DL
BLOOD, URINE: NEGATIVE
BUN BLDV-MCNC: 16 MG/DL (ref 6–23)
CALCIUM SERPL-MCNC: 10.2 MG/DL (ref 8.3–10.6)
CHLORIDE BLD-SCNC: 89 MMOL/L (ref 99–110)
CLARITY: CLEAR
CO2: 30 MMOL/L (ref 21–32)
COLOR: YELLOW
CREAT SERPL-MCNC: 0.9 MG/DL (ref 0.6–1.1)
GFR AFRICAN AMERICAN: >60 ML/MIN/1.73M2
GFR NON-AFRICAN AMERICAN: >60 ML/MIN/1.73M2
GLUCOSE BLD-MCNC: 98 MG/DL (ref 70–140)
GLUCOSE, URINE: NEGATIVE MG/DL
KETONES, URINE: NEGATIVE MG/DL
LEUKOCYTE ESTERASE, URINE: ABNORMAL
MUCUS: ABNORMAL HPF
NITRITE URINE, QUANTITATIVE: NEGATIVE
PH, URINE: 7 (ref 5–8)
POTASSIUM SERPL-SCNC: 4.2 MMOL/L (ref 3.5–5.1)
PROTEIN UA: NEGATIVE MG/DL
RBC URINE: ABNORMAL /HPF (ref 0–6)
SODIUM BLD-SCNC: 131 MMOL/L (ref 135–145)
SPECIFIC GRAVITY UA: 1.01 (ref 1–1.03)
SQUAMOUS EPITHELIAL: 1 /HPF
TRICHOMONAS: ABNORMAL /HPF
UROBILINOGEN, URINE: NORMAL MG/DL (ref 0.2–1)
WBC UA: 11 /HPF (ref 0–5)

## 2017-09-27 DIAGNOSIS — N30.90 CYSTITIS: ICD-10-CM

## 2017-09-27 DIAGNOSIS — N39.0 URINARY TRACT INFECTION WITHOUT HEMATURIA, SITE UNSPECIFIED: Primary | ICD-10-CM

## 2017-09-27 DIAGNOSIS — I25.10 CORONARY ARTERY DISEASE INVOLVING NATIVE CORONARY ARTERY OF NATIVE HEART WITHOUT ANGINA PECTORIS: ICD-10-CM

## 2017-09-27 RX ORDER — NITROFURANTOIN 25; 75 MG/1; MG/1
100 CAPSULE ORAL 2 TIMES DAILY
Qty: 20 CAPSULE | Refills: 0 | Status: SHIPPED | OUTPATIENT
Start: 2017-09-27 | End: 2017-10-07

## 2017-10-16 ENCOUNTER — HOSPITAL ENCOUNTER (OUTPATIENT)
Dept: GENERAL RADIOLOGY | Age: 72
Discharge: HOME | End: 2017-10-16
Attending: INTERNAL MEDICINE | Admitting: INTERNAL MEDICINE

## 2017-10-16 LAB
BACTERIA: NEGATIVE /HPF
BILIRUBIN URINE: NEGATIVE MG/DL
BLOOD, URINE: NEGATIVE
CLARITY: CLEAR
COLOR: ABNORMAL
GLUCOSE, URINE: NEGATIVE MG/DL
KETONES, URINE: NEGATIVE MG/DL
LEUKOCYTE ESTERASE, URINE: ABNORMAL
NITRITE URINE, QUANTITATIVE: NEGATIVE
PH, URINE: 7 (ref 5–8)
PROTEIN UA: NEGATIVE MG/DL
RBC URINE: ABNORMAL /HPF (ref 0–6)
SPECIFIC GRAVITY UA: 1.01 (ref 1–1.03)
SQUAMOUS EPITHELIAL: 2 /HPF
TRANSITIONAL EPITHELIAL: <1 /HPF
TRICHOMONAS: ABNORMAL /HPF
UROBILINOGEN, URINE: NORMAL MG/DL (ref 0.2–1)
WBC UA: 7 /HPF (ref 0–5)

## 2017-10-17 NOTE — PROGRESS NOTES
Call pt, urine indicates only trace inflammation- bladder infection may be cleared. ADD URINE CULTURE- IF NEGATIVE THEN NO FURTHER ATBS NEEDED. WILL ONLY TREAT IF UCX IS POSITIVE.

## 2017-10-18 LAB
CULTURE: NORMAL
REPORT STATUS: NORMAL
REQUEST PROBLEM: NORMAL
SPECIMEN: NORMAL
TOTAL COLONY COUNT: NORMAL

## 2017-10-18 RX ORDER — AMOXICILLIN 500 MG/1
500 CAPSULE ORAL 2 TIMES DAILY
Qty: 14 CAPSULE | Refills: 0 | Status: SHIPPED | OUTPATIENT
Start: 2017-10-18 | End: 2017-10-25

## 2017-10-25 ENCOUNTER — OFFICE VISIT (OUTPATIENT)
Dept: INTERNAL MEDICINE CLINIC | Age: 72
End: 2017-10-25

## 2017-10-25 VITALS
DIASTOLIC BLOOD PRESSURE: 72 MMHG | WEIGHT: 159 LBS | SYSTOLIC BLOOD PRESSURE: 136 MMHG | HEART RATE: 67 BPM | BODY MASS INDEX: 32.11 KG/M2 | OXYGEN SATURATION: 98 %

## 2017-10-25 DIAGNOSIS — I25.10 CORONARY ARTERY DISEASE INVOLVING NATIVE CORONARY ARTERY OF NATIVE HEART WITHOUT ANGINA PECTORIS: ICD-10-CM

## 2017-10-25 DIAGNOSIS — Z23 NEED FOR PROPHYLACTIC VACCINATION AGAINST STREPTOCOCCUS PNEUMONIAE (PNEUMOCOCCUS): ICD-10-CM

## 2017-10-25 DIAGNOSIS — N30.90 CYSTITIS: ICD-10-CM

## 2017-10-25 DIAGNOSIS — Z12.31 VISIT FOR SCREENING MAMMOGRAM: ICD-10-CM

## 2017-10-25 DIAGNOSIS — I10 ESSENTIAL HYPERTENSION: Primary | ICD-10-CM

## 2017-10-25 DIAGNOSIS — M81.0 AGE-RELATED OSTEOPOROSIS WITHOUT CURRENT PATHOLOGICAL FRACTURE: ICD-10-CM

## 2017-10-25 DIAGNOSIS — Z23 NEED FOR IMMUNIZATION AGAINST INFLUENZA: ICD-10-CM

## 2017-10-25 PROCEDURE — 90688 IIV4 VACCINE SPLT 0.5 ML IM: CPT | Performed by: INTERNAL MEDICINE

## 2017-10-25 PROCEDURE — G0009 ADMIN PNEUMOCOCCAL VACCINE: HCPCS | Performed by: INTERNAL MEDICINE

## 2017-10-25 PROCEDURE — G0008 ADMIN INFLUENZA VIRUS VAC: HCPCS | Performed by: INTERNAL MEDICINE

## 2017-10-25 PROCEDURE — 90670 PCV13 VACCINE IM: CPT | Performed by: INTERNAL MEDICINE

## 2017-10-25 PROCEDURE — 99213 OFFICE O/P EST LOW 20 MIN: CPT | Performed by: INTERNAL MEDICINE

## 2017-10-25 RX ORDER — LOSARTAN POTASSIUM 100 MG/1
100 TABLET ORAL DAILY
Qty: 90 TABLET | Refills: 1 | Status: SHIPPED | OUTPATIENT
Start: 2017-10-25 | End: 2018-04-18 | Stop reason: SDUPTHER

## 2017-10-25 RX ORDER — METOPROLOL SUCCINATE 25 MG/1
TABLET, EXTENDED RELEASE ORAL
Qty: 90 TABLET | Refills: 1 | Status: SHIPPED | OUTPATIENT
Start: 2017-10-25 | End: 2018-05-10 | Stop reason: SDUPTHER

## 2017-10-25 NOTE — PROGRESS NOTES
extended release tablet; TAKE 1 TABLET BY MOUTH EVERY DAY  -     COMPREHENSIVE METABOLIC PANEL  -     Lipid Panel    Need for prophylactic vaccination against Streptococcus pneumoniae (pneumococcus)  -     Pneumococcal conjugate vaccine 13-valent IM (PREVNAR 13)    Need for immunization against influenza  -     INFLUENZA, QUADV, 3 YRS AND OLDER, IM, MDV, 0.5ML ([facility])    Visit for screening mammogram  -     [facility] Screening Bilateral    Age-related osteoporosis without current pathological fracture  -     DEXA Bone Density Axial    Coronary artery disease involving native coronary artery of native heart without angina pectoris- Coronary artery disease stable and asymptomatic, will continue to monitor for any signs of instability. Will periodically monitor lipid profile and liver function, cardiac risk factors. Continue current medical regimen.       -     metoprolol succinate (TOPROL XL) 25 MG extended release tablet; TAKE 1 TABLET BY MOUTH EVERY DAY  -     COMPREHENSIVE METABOLIC PANEL  -     Lipid Panel    Cystitis- for f/u UA to see if clears after amox  -     Urinalysis

## 2017-11-03 ENCOUNTER — HOSPITAL ENCOUNTER (OUTPATIENT)
Dept: WOMENS IMAGING | Age: 72
Discharge: HOME | End: 2017-11-03
Attending: INTERNAL MEDICINE | Admitting: INTERNAL MEDICINE

## 2017-11-03 DIAGNOSIS — Z12.31 VISIT FOR SCREENING MAMMOGRAM: ICD-10-CM

## 2017-11-03 DIAGNOSIS — M81.0 AGE RELATED OSTEOPOROSIS, UNSPECIFIED PATHOLOGICAL FRACTURE PRESENCE: ICD-10-CM

## 2017-11-03 LAB
ALBUMIN SERPL-MCNC: 4.8 GM/DL (ref 3.4–5)
ALP BLD-CCNC: 78 IU/L (ref 40–128)
ALT SERPL-CCNC: 54 U/L (ref 10–40)
ANION GAP SERPL CALCULATED.3IONS-SCNC: 12 MMOL/L (ref 4–16)
AST SERPL-CCNC: 33 IU/L (ref 15–37)
BACTERIA: NEGATIVE /HPF
BILIRUB SERPL-MCNC: 0.3 MG/DL (ref 0–1)
BILIRUBIN URINE: NEGATIVE MG/DL
BLOOD, URINE: NEGATIVE
BUN BLDV-MCNC: 14 MG/DL (ref 6–23)
CALCIUM SERPL-MCNC: 10.1 MG/DL (ref 8.3–10.6)
CHLORIDE BLD-SCNC: 87 MMOL/L (ref 99–110)
CHOLESTEROL: 152 MG/DL
CLARITY: CLEAR
CO2: 28 MMOL/L (ref 21–32)
COLOR: ABNORMAL
CREAT SERPL-MCNC: 0.8 MG/DL (ref 0.6–1.1)
GFR AFRICAN AMERICAN: >60 ML/MIN/1.73M2
GFR NON-AFRICAN AMERICAN: >60 ML/MIN/1.73M2
GLUCOSE FASTING: 100 MG/DL (ref 70–99)
GLUCOSE, URINE: NEGATIVE MG/DL
HDLC SERPL-MCNC: 66 MG/DL
KETONES, URINE: NEGATIVE MG/DL
LDL CHOLESTEROL DIRECT: 72 MG/DL
LEUKOCYTE ESTERASE, URINE: ABNORMAL
MUCUS: ABNORMAL HPF
NITRITE URINE, QUANTITATIVE: NEGATIVE
NON SQUAM EPI CELLS: 1 /HPF
PH, URINE: 7 (ref 5–8)
POTASSIUM SERPL-SCNC: 4.1 MMOL/L (ref 3.5–5.1)
PROTEIN UA: NEGATIVE MG/DL
RBC URINE: <1 /HPF (ref 0–6)
RENAL EPITHELIAL, UA: <1 /HPF
SODIUM BLD-SCNC: 127 MMOL/L (ref 135–145)
SPECIFIC GRAVITY UA: 1.01 (ref 1–1.03)
SQUAMOUS EPITHELIAL: 2 /HPF
TOTAL PROTEIN: 7.6 GM/DL (ref 6.4–8.2)
TRICHOMONAS: ABNORMAL /HPF
TRIGL SERPL-MCNC: 174 MG/DL
UROBILINOGEN, URINE: NORMAL MG/DL (ref 0.2–1)
WBC UA: 5 /HPF (ref 0–5)

## 2017-11-03 NOTE — PROGRESS NOTES
Pls call pt, her bone density indicates mild thinning, osteopenia.   rec should be on a calcium and vit D supplement daily

## 2017-11-06 DIAGNOSIS — E87.1 HYPONATREMIA: Primary | ICD-10-CM

## 2017-11-06 DIAGNOSIS — R74.01 ELEVATED ALT MEASUREMENT: ICD-10-CM

## 2017-11-15 DIAGNOSIS — R92.8 ABNORMAL MAMMOGRAM: Primary | ICD-10-CM

## 2017-11-22 ENCOUNTER — HOSPITAL ENCOUNTER (OUTPATIENT)
Dept: ULTRASOUND IMAGING | Age: 72
Discharge: HOME | End: 2017-11-22
Attending: INTERNAL MEDICINE | Admitting: INTERNAL MEDICINE

## 2017-11-22 DIAGNOSIS — N64.89 BREAST ASYMMETRY: ICD-10-CM

## 2017-11-22 DIAGNOSIS — R92.8 ABNORMAL MAMMOGRAM: ICD-10-CM

## 2017-11-22 NOTE — PROGRESS NOTES
Call pt, mammogram and u/s indicate a cyst which radiology states is probably benign but did rec recheck u/s in 6mo to follow up closely. pls order/schedule L breast u/s now- dx L breast nodule.

## 2017-11-29 ENCOUNTER — OFFICE VISIT (OUTPATIENT)
Dept: INTERNAL MEDICINE CLINIC | Age: 72
End: 2017-11-29

## 2017-11-29 VITALS
DIASTOLIC BLOOD PRESSURE: 80 MMHG | HEART RATE: 65 BPM | SYSTOLIC BLOOD PRESSURE: 140 MMHG | OXYGEN SATURATION: 97 % | RESPIRATION RATE: 16 BRPM

## 2017-11-29 DIAGNOSIS — J01.00 ACUTE NON-RECURRENT MAXILLARY SINUSITIS: Primary | ICD-10-CM

## 2017-11-29 DIAGNOSIS — I10 ESSENTIAL HYPERTENSION: ICD-10-CM

## 2017-11-29 PROCEDURE — 99213 OFFICE O/P EST LOW 20 MIN: CPT | Performed by: INTERNAL MEDICINE

## 2017-11-29 RX ORDER — PREDNISONE 1 MG/1
TABLET ORAL
Qty: 21 TABLET | Refills: 0 | Status: SHIPPED | OUTPATIENT
Start: 2017-11-29 | End: 2018-02-10 | Stop reason: ALTCHOICE

## 2017-11-29 RX ORDER — CODEINE PHOSPHATE AND GUAIFENESIN 10; 100 MG/5ML; MG/5ML
5 SOLUTION ORAL 3 TIMES DAILY PRN
Qty: 150 ML | Refills: 0 | Status: SHIPPED | OUTPATIENT
Start: 2017-11-29 | End: 2018-02-10 | Stop reason: ALTCHOICE

## 2017-11-29 RX ORDER — HYDRALAZINE HYDROCHLORIDE 10 MG/1
10 TABLET, FILM COATED ORAL 2 TIMES DAILY PRN
Qty: 60 TABLET | Refills: 3 | Status: SHIPPED | OUTPATIENT
Start: 2017-11-29 | End: 2018-03-20 | Stop reason: CLARIF

## 2017-11-29 RX ORDER — AZITHROMYCIN 250 MG/1
TABLET, FILM COATED ORAL
Qty: 6 TABLET | Refills: 0 | Status: SHIPPED | OUTPATIENT
Start: 2017-11-29 | End: 2018-02-10 | Stop reason: ALTCHOICE

## 2017-11-29 NOTE — PROGRESS NOTES
LuluRIGID  1945 11/29/17    SUBJECTIVE:    The past week w worsening cough and chest congestion, sinus congestion. Denies fever, chills. Drainage clear. Denies n/v/diarrhea. Sporadic wheezing noted. HTN- bp stable on current meds. Can fluct fr 130, rarely to 180 if stressed and then takes an additional hydralazine- 10mg sporadically. Is on sched 25mg BID. Using ativan prn for stress, Controlled substances monitoring: possible medication side effects, risk of tolerance and/or dependence, and alternative treatments discussed, no signs of potential drug abuse or diversion identified and [place]AR[place] report reviewed today- activity consistent with treatment plan. OBJECTIVE:    BP (!) 140/80   Pulse 65   Resp 16   SpO2 97%     Physical Exam   Constitutional: She appears well-developed and well-nourished. No distress. HENT:   Head: Normocephalic and atraumatic. Nose: Mucosal edema and rhinorrhea present. No nasal deformity. No epistaxis. Mouth/Throat: Oropharynx is clear and moist. No oropharyngeal exudate. Bilateral nasal congestion with clear discharge noted, no bilateral maxillary sinus tenderness   Eyes: Conjunctivae are normal. No scleral icterus. Neck: Neck supple. Cardiovascular: Normal rate, regular rhythm and normal heart sounds. No murmur heard. Pulmonary/Chest: Effort normal and breath sounds normal. No respiratory distress. She has no wheezes. She has no rales. Abdominal: Soft. Bowel sounds are normal. She exhibits no distension. There is no tenderness. Lymphadenopathy:     She has no cervical adenopathy. Vitals reviewed. ASSESSMENT:    1. Acute non-recurrent maxillary sinusitis    2. Essential hypertension        PLAN:    Rosa Guillory was seen today for cough and congestion.     Diagnoses and all orders for this visit:    Acute non-recurrent maxillary sinusitis - infection suspected, rec rx as below and fluids, rest.  Pt to call back one week if not improving.      - predniSONE (DELTASONE) 5 MG tablet; Take 6 tablets by mouth on day 1, 5 on day 2, 4 on day 3, 3 on day 4, 2 on day 5, 1 on day 6.  -     azithromycin (ZITHROMAX Z-NIMISHA) 250 MG tablet; Take two tabs once then one tab daily till completed  -     guaiFENesin-codeine (GUAIFENESIN AC) 100-10 MG/5ML liquid; Take 5 mLs by mouth 3 times daily as needed for Cough . Essential hypertension - BP STABLE BUT W SPORADIC FLARES/LABILITY. VERY RESPONSIVE TO PRN HYDRAL, WILL CONT W RF SENT  -     hydrALAZINE (APRESOLINE) 10 MG tablet;  Take 1 tablet by mouth 2 times daily as needed (if systolic blood pressure >120.)

## 2018-01-18 ENCOUNTER — TELEPHONE (OUTPATIENT)
Dept: INTERNAL MEDICINE CLINIC | Age: 73
End: 2018-01-18

## 2018-01-22 DIAGNOSIS — I10 ESSENTIAL HYPERTENSION: ICD-10-CM

## 2018-01-22 RX ORDER — HYDRALAZINE HYDROCHLORIDE 25 MG/1
TABLET, FILM COATED ORAL
Qty: 240 TABLET | Refills: 10 | Status: SHIPPED | OUTPATIENT
Start: 2018-01-22 | End: 2018-02-10 | Stop reason: SDUPTHER

## 2018-02-10 ENCOUNTER — OFFICE VISIT (OUTPATIENT)
Dept: CARDIOLOGY CLINIC | Age: 73
End: 2018-02-10

## 2018-02-10 VITALS
SYSTOLIC BLOOD PRESSURE: 130 MMHG | DIASTOLIC BLOOD PRESSURE: 80 MMHG | BODY MASS INDEX: 32.7 KG/M2 | HEART RATE: 64 BPM | WEIGHT: 162.2 LBS | HEIGHT: 59 IN

## 2018-02-10 DIAGNOSIS — I65.22 STENOSIS OF LEFT CAROTID ARTERY: ICD-10-CM

## 2018-02-10 DIAGNOSIS — I25.10 CORONARY ARTERY DISEASE INVOLVING NATIVE CORONARY ARTERY OF NATIVE HEART WITHOUT ANGINA PECTORIS: Primary | ICD-10-CM

## 2018-02-10 DIAGNOSIS — I10 ESSENTIAL HYPERTENSION: ICD-10-CM

## 2018-02-10 PROCEDURE — 99214 OFFICE O/P EST MOD 30 MIN: CPT | Performed by: INTERNAL MEDICINE

## 2018-02-10 NOTE — PROGRESS NOTES
Arthritis     \"Hands\"    CAD (coronary artery disease) 2006    Dr Dominic Willis H/O Doppler ultrasound 01/09/2017    carotid doppler - severe degree stenosis LICA    H/O echocardiogram 06/06/2016    ef 56% mild to mod. mr and tr    Hypercalcemia     ? possible hyperparathyroidism/saw Dr Jac Moralez previously    Hyperlipidemia     Hypertension 1978    Insomnia     Menopause     s/p JAMIE    Osteopenia     has hx of rib fractures after a fall    Osteopenia     S/P colonoscopic polypectomy 9/25/13    Dr Petra Awan, hyperplastic, recheck 5 yrs    Shortness of breath on exertion     Vitamin D deficiency      Past Surgical History:   Procedure Laterality Date    APPENDECTOMY      CARDIAC SURGERY      stents x 3 Feb 2007    CAROTID ENDARTERECTOMY Left 02/01/2017    with patch     COLONOSCOPY      CORONARY ANGIOPLASTY WITH STENT PLACEMENT  2006, 2007    X 2 in LAD and one in ? CIRC w Dr Vish Cardona Bilateral 2000's    Cataracts With Lens Implants    HYSTERECTOMY, TOTAL ABDOMINAL  1981    ROTATOR CUFF REPAIR Right 2003    TONSILLECTOMY  1954     Family History   Problem Relation Age of Onset    High Blood Pressure Mother     Migraines Mother     Heart Disease Father     High Blood Pressure Father     Other Father      Ulcer    Cancer Paternal Aunt     Diabetes Brother     Cancer Paternal Aunt     Diabetes Grandchild     Asthma Grandchild     Heart Disease Maternal Aunt     Colon Cancer Maternal Aunt     Diabetes Paternal Grandfather      Social History   Substance Use Topics    Smoking status: Never Smoker    Smokeless tobacco: Never Used    Alcohol use No          Review of systems:  HEENT: Neg  Card:luh   GI;Neg  : Neg  Neuro: Neg  Psych: Neg  Derm: Neg  MS; Neg  All: Documented  Constitutional: Neg    Objective:      Physical Exam:  /80   Pulse 64   Ht 4' 11\" (1.499 m)   Wt 162 lb 3.2 oz (73.6 kg)   BMI 32.76 kg/m²   Wt Readings from Last 3 Encounters:   02/10/18 162 lb 3.2 oz

## 2018-02-15 ENCOUNTER — PROCEDURE VISIT (OUTPATIENT)
Dept: CARDIOLOGY CLINIC | Age: 73
End: 2018-02-15

## 2018-02-15 ENCOUNTER — TELEPHONE (OUTPATIENT)
Dept: CARDIOLOGY CLINIC | Age: 73
End: 2018-02-15

## 2018-02-15 DIAGNOSIS — I25.10 CORONARY ARTERY DISEASE INVOLVING NATIVE CORONARY ARTERY OF NATIVE HEART WITHOUT ANGINA PECTORIS: ICD-10-CM

## 2018-02-15 DIAGNOSIS — I65.22 STENOSIS OF LEFT CAROTID ARTERY: Primary | ICD-10-CM

## 2018-02-15 DIAGNOSIS — I10 ESSENTIAL HYPERTENSION: ICD-10-CM

## 2018-02-15 LAB
LV EF: 60 %
LVEF MODALITY: NORMAL

## 2018-02-15 PROCEDURE — 93880 EXTRACRANIAL BILAT STUDY: CPT | Performed by: INTERNAL MEDICINE

## 2018-02-15 PROCEDURE — A9500 TC99M SESTAMIBI: HCPCS | Performed by: INTERNAL MEDICINE

## 2018-02-15 PROCEDURE — 78452 HT MUSCLE IMAGE SPECT MULT: CPT | Performed by: INTERNAL MEDICINE

## 2018-02-15 PROCEDURE — 93015 CV STRESS TEST SUPVJ I&R: CPT | Performed by: INTERNAL MEDICINE

## 2018-02-16 ENCOUNTER — HOSPITAL ENCOUNTER (OUTPATIENT)
Dept: GENERAL RADIOLOGY | Age: 73
Discharge: HOME | End: 2018-02-16
Attending: INTERNAL MEDICINE | Admitting: INTERNAL MEDICINE

## 2018-02-16 ENCOUNTER — TELEPHONE (OUTPATIENT)
Dept: CARDIOLOGY CLINIC | Age: 73
End: 2018-02-16

## 2018-02-16 DIAGNOSIS — Z01.818 PRE-OP EXAM: ICD-10-CM

## 2018-02-16 LAB
ANION GAP SERPL CALCULATED.3IONS-SCNC: 14 MMOL/L (ref 4–16)
APTT: 36.5 SECONDS (ref 21.2–33)
BUN BLDV-MCNC: 12 MG/DL (ref 6–23)
CALCIUM SERPL-MCNC: 10.1 MG/DL (ref 8.3–10.6)
CHLORIDE BLD-SCNC: 91 MMOL/L (ref 99–110)
CO2: 26 MMOL/L (ref 21–32)
CREAT SERPL-MCNC: 0.7 MG/DL (ref 0.6–1.1)
GFR AFRICAN AMERICAN: >60 ML/MIN/1.73M2
GFR NON-AFRICAN AMERICAN: >60 ML/MIN/1.73M2
GLUCOSE BLD-MCNC: 103 MG/DL (ref 70–99)
HCT VFR BLD CALC: 39 % (ref 37–47)
HEMOGLOBIN: 12.6 GM/DL (ref 12.5–16)
INR BLD: 0.97 INDEX
MCH RBC QN AUTO: 30.6 PG (ref 27–31)
MCHC RBC AUTO-ENTMCNC: 32.3 % (ref 32–36)
MCV RBC AUTO: 94.7 FL (ref 78–100)
PDW BLD-RTO: 13.1 % (ref 11.7–14.9)
PLATELET # BLD: 254 K/CU MM (ref 140–440)
PMV BLD AUTO: 11 FL (ref 7.5–11.1)
POTASSIUM SERPL-SCNC: 4.1 MMOL/L (ref 3.5–5.1)
PROTHROMBIN TIME: 11.2 SECONDS
RBC # BLD: 4.12 M/CU MM (ref 4.2–5.4)
SODIUM BLD-SCNC: 131 MMOL/L (ref 135–145)
WBC # BLD: 7.7 K/CU MM (ref 4–10.5)

## 2018-03-07 ENCOUNTER — OFFICE VISIT (OUTPATIENT)
Dept: CARDIOLOGY CLINIC | Age: 73
End: 2018-03-07

## 2018-03-07 VITALS
BODY MASS INDEX: 33.47 KG/M2 | HEART RATE: 64 BPM | WEIGHT: 166 LBS | DIASTOLIC BLOOD PRESSURE: 70 MMHG | HEIGHT: 59 IN | SYSTOLIC BLOOD PRESSURE: 130 MMHG

## 2018-03-07 DIAGNOSIS — I25.10 CORONARY ARTERY DISEASE INVOLVING NATIVE CORONARY ARTERY OF NATIVE HEART WITHOUT ANGINA PECTORIS: Primary | ICD-10-CM

## 2018-03-07 DIAGNOSIS — I10 ESSENTIAL HYPERTENSION: ICD-10-CM

## 2018-03-07 PROCEDURE — 99214 OFFICE O/P EST MOD 30 MIN: CPT | Performed by: INTERNAL MEDICINE

## 2018-03-07 NOTE — PATIENT INSTRUCTIONS
Please remember to bring all medication bottles or a medication list with you to your appointment. If you have any questions, please call our office at [phone].

## 2018-03-07 NOTE — PROGRESS NOTES
CAD (coronary artery disease) 2006    Dr Jessica Torre H/O cardiovascular stress test 02/15/2018    EF60% mod ischemia ant wall    H/O Doppler ultrasound 01/09/2017    carotid doppler - severe degree stenosis LICA    H/O echocardiogram 06/06/2016    ef 56% mild to mod. mr and tr    History of cardiac cath 02/19/2018    patent stents, nml EF, abn stress sec to jailed diag    Hypercalcemia     ? possible hyperparathyroidism/saw Dr Vin Romeo previously    Hyperlipidemia     Hypertension 1978    Insomnia     Menopause     s/p JAMIE    Osteopenia     has hx of rib fractures after a fall    Osteopenia     S/P colonoscopic polypectomy 9/25/13    Dr Gee Goetz, hyperplastic, recheck 5 yrs    Shortness of breath on exertion     Vitamin D deficiency      Past Surgical History:   Procedure Laterality Date    APPENDECTOMY      CARDIAC SURGERY      stents x 3 Feb 2007    CAROTID ENDARTERECTOMY Left 02/01/2017    with patch     COLONOSCOPY      CORONARY ANGIOPLASTY WITH STENT PLACEMENT  2006, 2007    X 2 in LAD and one in ? CIRC w Dr Andra Villegas Bilateral 2000's    Cataracts With Lens Implants    HYSTERECTOMY, TOTAL ABDOMINAL  1981    ROTATOR CUFF REPAIR Right 2003    TONSILLECTOMY  1954     Family History   Problem Relation Age of Onset    High Blood Pressure Mother     Migraines Mother     Heart Disease Father     High Blood Pressure Father     Other Father      Ulcer    Cancer Paternal Aunt     Diabetes Brother     Cancer Paternal Aunt     Diabetes Grandchild     Asthma Grandchild     Heart Disease Maternal Aunt     Colon Cancer Maternal Aunt     Diabetes Paternal Grandfather      Social History   Substance Use Topics    Smoking status: Never Smoker    Smokeless tobacco: Never Used    Alcohol use No          Review of systems:  HEENT: Neg  Card:luh   GI;Neg  : Neg  Neuro: Neg  Psych: Neg  Derm: Neg  MS; Neg  All: Documented  Constitutional: Neg    Objective:      Physical Exam:  /70 Pulse 64   Ht 4' 11\" (1.499 m)   Wt 166 lb (75.3 kg)   BMI 33.53 kg/m²   Wt Readings from Last 3 Encounters:   03/07/18 166 lb (75.3 kg)   02/19/18 162 lb (73.5 kg)   02/10/18 162 lb 3.2 oz (73.6 kg)     Body mass index is 33.53 kg/m². GENERAL - Alert, oriented, pleasant, in no apparent distress. Head unremarkable  Eyes  Not injected conjunctiva  ENT  normal mucosa  Neck - Supple. No jugular venous distention noted. No carotid bruits. Cardiovascular  Normal S1 and S2 without obvious murmur or gallop. Extremities - No cyanosis, clubbing, or significant edema. Pulmonary  No respiratory distress. No wheezes or rales. Pulses: Bilateral radial and pedal pulses normal  Abdomen  no tenderness  Musculoskeletal  normal strength  Neurologic    There are  no gross focal neurologic abnormalities. Skin-  No rash  Affect; normal mood    DATA:  Lab Results   Component Value Date    CKTOTAL 71 04/02/2017     BNP:  No results found for: BNP  PT/INR:  No results found for: PTINR  No results found for: LABA1C  Lab Results   Component Value Date    CHOL 152 11/03/2017    TRIG 174 (H) 11/03/2017    HDL 66 11/03/2017    LDLCALC 46 02/10/2017    LDLDIRECT 72 11/03/2017     Lab Results   Component Value Date    ALT 54 (H) 11/03/2017    AST 33 11/03/2017     TSH:    Lab Results   Component Value Date    TSH 3.3 11/10/2014         QUALITY MEASURES:  CAD:  Yes   CHOL LOWERING:  Yes- if No Why  ANTIPLATELET:  Yes - if No why  BETA BLOCKER    yes,   IF  NO WHY  SMOKING HISTORY no COUNSELLED no  ATRIAL FIBRILLATIONno ANTICOAG: no    Assessment/ Plan:     Patient seen , interviewed and examined      - [facility] rev with pt, abn stress from jailed diag    -     CORONARY ARTERY DISEASE: Patient is currently  asymptomatic from CAD. - changes in  treatment:   no             -  LIPID MANAGEMENT:  Available lipid  lab data reviewed  and patient was given dietary advice. -   Changes  in medicines made:  No -  Hypertension: Patients blood pressure is stable. Patient is advised about low sodium diet. Present medical regimen will not be changed.    Still concerned about BP   Clonidine patch DW pt  Currently on cozaar, additional hydrallazine     - SP  CEA stable

## 2018-03-13 ENCOUNTER — TELEPHONE (OUTPATIENT)
Dept: CARDIOLOGY CLINIC | Age: 73
End: 2018-03-13

## 2018-04-18 DIAGNOSIS — I10 ESSENTIAL HYPERTENSION: ICD-10-CM

## 2018-04-18 RX ORDER — LOSARTAN POTASSIUM 100 MG/1
100 TABLET ORAL DAILY
Qty: 30 TABLET | Refills: 0 | Status: SHIPPED | OUTPATIENT
Start: 2018-04-18 | End: 2018-05-10 | Stop reason: SDUPTHER

## 2018-05-10 ENCOUNTER — OFFICE VISIT (OUTPATIENT)
Dept: INTERNAL MEDICINE CLINIC | Age: 73
End: 2018-05-10

## 2018-05-10 VITALS
BODY MASS INDEX: 32.32 KG/M2 | SYSTOLIC BLOOD PRESSURE: 112 MMHG | OXYGEN SATURATION: 97 % | RESPIRATION RATE: 16 BRPM | HEART RATE: 64 BPM | DIASTOLIC BLOOD PRESSURE: 68 MMHG | WEIGHT: 160 LBS

## 2018-05-10 DIAGNOSIS — M54.50 LOW BACK PAIN WITHOUT SCIATICA, UNSPECIFIED BACK PAIN LATERALITY, UNSPECIFIED CHRONICITY: ICD-10-CM

## 2018-05-10 DIAGNOSIS — I25.10 CORONARY ARTERY DISEASE INVOLVING NATIVE CORONARY ARTERY OF NATIVE HEART WITHOUT ANGINA PECTORIS: ICD-10-CM

## 2018-05-10 DIAGNOSIS — I10 ESSENTIAL HYPERTENSION: Primary | ICD-10-CM

## 2018-05-10 DIAGNOSIS — M79.10 MYALGIA: ICD-10-CM

## 2018-05-10 DIAGNOSIS — E55.9 VITAMIN D DEFICIENCY: ICD-10-CM

## 2018-05-10 DIAGNOSIS — K63.5 HYPERPLASTIC COLONIC POLYP, UNSPECIFIED PART OF COLON: ICD-10-CM

## 2018-05-10 PROCEDURE — 99214 OFFICE O/P EST MOD 30 MIN: CPT | Performed by: INTERNAL MEDICINE

## 2018-05-10 RX ORDER — LOSARTAN POTASSIUM 100 MG/1
100 TABLET ORAL DAILY
Qty: 90 TABLET | Refills: 1 | Status: SHIPPED | OUTPATIENT
Start: 2018-05-10 | End: 2018-11-12 | Stop reason: SDUPTHER

## 2018-05-10 RX ORDER — CLONIDINE HYDROCHLORIDE 0.1 MG/1
0.1 TABLET ORAL 2 TIMES DAILY PRN
Qty: 60 TABLET | Refills: 1 | Status: SHIPPED | OUTPATIENT
Start: 2018-05-10 | End: 2021-05-20

## 2018-05-10 RX ORDER — HYDROCHLOROTHIAZIDE 25 MG/1
25 TABLET ORAL DAILY
Qty: 90 TABLET | Refills: 1 | Status: SHIPPED | OUTPATIENT
Start: 2018-05-10 | End: 2018-11-12 | Stop reason: SDUPTHER

## 2018-05-10 RX ORDER — CLOPIDOGREL BISULFATE 75 MG/1
TABLET ORAL
Qty: 90 TABLET | Refills: 1 | Status: SHIPPED | OUTPATIENT
Start: 2018-05-10 | End: 2018-11-12 | Stop reason: SDUPTHER

## 2018-05-10 RX ORDER — METOPROLOL SUCCINATE 25 MG/1
TABLET, EXTENDED RELEASE ORAL
Qty: 90 TABLET | Refills: 1 | Status: SHIPPED | OUTPATIENT
Start: 2018-05-10 | End: 2018-11-12 | Stop reason: SDUPTHER

## 2018-05-10 ASSESSMENT — PATIENT HEALTH QUESTIONNAIRE - PHQ9
2. FEELING DOWN, DEPRESSED OR HOPELESS: 0
SUM OF ALL RESPONSES TO PHQ QUESTIONS 1-9: 0
1. LITTLE INTEREST OR PLEASURE IN DOING THINGS: 0
SUM OF ALL RESPONSES TO PHQ9 QUESTIONS 1 & 2: 0

## 2018-05-14 ENCOUNTER — TELEPHONE (OUTPATIENT)
Dept: INTERNAL MEDICINE CLINIC | Age: 73
End: 2018-05-14

## 2018-05-23 ENCOUNTER — HOSPITAL ENCOUNTER (OUTPATIENT)
Dept: ULTRASOUND IMAGING | Age: 73
Discharge: HOME | End: 2018-05-25
Attending: INTERNAL MEDICINE | Admitting: INTERNAL MEDICINE

## 2018-05-23 DIAGNOSIS — R92.8 ABNORMAL ULTRASOUND OF BREAST: ICD-10-CM

## 2018-07-20 ENCOUNTER — TELEPHONE (OUTPATIENT)
Dept: ORTHOPEDIC SURGERY | Age: 73
End: 2018-07-20

## 2018-07-20 NOTE — TELEPHONE ENCOUNTER
Need to see today and get an xray, would prefer if she came to [facility] office by no later than 11:15

## 2018-07-20 NOTE — TELEPHONE ENCOUNTER
That is fine, but lets have her see me Tuesday morning    Continue splint and antibiotics until then

## 2018-07-23 DIAGNOSIS — F41.9 ANXIETY: ICD-10-CM

## 2018-07-24 ENCOUNTER — OFFICE VISIT (OUTPATIENT)
Dept: ORTHOPEDIC SURGERY | Age: 73
End: 2018-07-24

## 2018-07-24 VITALS — RESPIRATION RATE: 14 BRPM | BODY MASS INDEX: 31.85 KG/M2 | HEIGHT: 59 IN | WEIGHT: 158 LBS

## 2018-07-24 DIAGNOSIS — S62.525B OPEN NONDISPLACED FRACTURE OF DISTAL PHALANX OF LEFT THUMB, INITIAL ENCOUNTER: Primary | ICD-10-CM

## 2018-07-24 DIAGNOSIS — R52 PAIN: ICD-10-CM

## 2018-07-24 PROCEDURE — 26750 TREAT FINGER FRACTURE EACH: CPT | Performed by: PHYSICIAN ASSISTANT

## 2018-07-24 PROCEDURE — 99214 OFFICE O/P EST MOD 30 MIN: CPT | Performed by: PHYSICIAN ASSISTANT

## 2018-07-24 RX ORDER — CEPHALEXIN 500 MG/1
CAPSULE ORAL
COMMUNITY
Start: 2018-07-19 | End: 2018-09-04

## 2018-07-24 ASSESSMENT — ENCOUNTER SYMPTOMS
EYES NEGATIVE: 1
GASTROINTESTINAL NEGATIVE: 1
RESPIRATORY NEGATIVE: 1

## 2018-07-24 NOTE — PROGRESS NOTES
Scribe Authentication Statement  Reynaldo Kaye, scribed portions of this documentation for and in the presence of Dr. Jesse Watkins on 7/24/18 at 1:31 PM.  Provider Ingrid Weiss, Dr. Hu Young MD personally performed the services described in this documentation and they were scribed in my presence by the above listed scribe. The documentation is both accurate and complete. Review of Systems   Constitutional: Negative. HENT: Negative. Eyes: Negative. Respiratory: Negative. Cardiovascular: Negative. Gastrointestinal: Negative. Genitourinary: Negative. Musculoskeletal: Positive for joint pain and myalgias. Skin: Negative. Neurological: Negative. Endo/Heme/Allergies: Positive for environmental allergies. Bruises/bleeds easily. Psychiatric/Behavioral: The patient is nervous/anxious and has insomnia. HPI:  Terence Jackson is a 67y.o. year old female who is seen regarding an injury occurring on July 19th. She reports having slammed hand in  door and injuring the Left thumb  . She was seen for Emergency evaluation at [address] [place] [address] and she was treated with wound cleanse, antibiotics, sutures and brace. Symptoms stable  over time. The Left  hand pain assessment is:   Intensity: 6/10   Location:        thumb   Description:    aching, numbing, pressure, sharp and throbbing    She denies any numbness or tingling in the injured extremity. Disorders to the injured extremity prior to this injury: negative for prior surgery, trauma, arthritis or disorders    Referred by Renetta MARTINO for the above. Past Medical History:   Diagnosis Date    Arthritis     \"Hands\"    CAD (coronary artery disease) 2006    Dr Rowdy Lincoln H/O cardiovascular stress test 02/15/2018    EF60% mod ischemia ant wall    H/O Doppler ultrasound 01/09/2017    carotid doppler - severe degree stenosis LICA    H/O echocardiogram 06/06/2016    ef 56% mild to mod.  mr and tr    History by mouth daily 90 tablet 1    losartan (COZAAR) 100 MG tablet Take 1 tablet by mouth daily 90 tablet 1    clopidogrel (PLAVIX) 75 MG tablet TAKE 1 TABLET BY MOUTH DAILY 90 tablet 1    hydrochlorothiazide (HYDRODIURIL) 25 MG tablet Take 1 tablet by mouth daily 90 tablet 1    metoprolol succinate (TOPROL XL) 25 MG extended release tablet TAKE 1 TABLET BY MOUTH EVERY DAY 90 tablet 1    cloNIDine (CATAPRES) 0.1 MG tablet Take 1 tablet by mouth 2 times daily as needed for High Blood Pressure (if systolic BP >711, or diastolic >795.) 60 tablet 1    hydrALAZINE (APRESOLINE) 50 MG tablet Take 1 tablet by mouth 2 times daily 180 tablet 3    Multiple Vitamins-Minerals (THERAPEUTIC MULTIVITAMIN-MINERALS) tablet Take 1 tablet by mouth daily      LORazepam (ATIVAN) 0.5 MG tablet Take 0.5 mg by mouth every 6 hours as needed for Anxiety      simvastatin (ZOCOR) 20 MG tablet TAKE 1 TABLET BY MOUTH NIGHTLY (Patient taking differently: 20 mg TAKE 1 TABLET BY MOUTH NIGHTLY) 90 tablet 1    Acetaminophen (TYLENOL ARTHRITIS PAIN PO) Take by mouth as needed      melatonin 3 MG TABS tablet Take 3 mg by mouth nightly      aspirin 81 MG tablet Take 81 mg by mouth nightly        No current facility-administered medications for this visit. Allergies   Allergen Reactions    Celexa [Citalopram] Nausea And Vomiting    Poison Ivy Extract [Poison Ivy Extract] Rash    Septra [Sulfamethoxazole-Trimethoprim] Nausea And Vomiting    Ultram [Tramadol] Nausea And Vomiting    Valsartan Other (See Comments)     Increased K    Xanax Xr [Alprazolam Er]      NOT ALLERGY, FAMILY STRONGLY OPPOSED TO BENZODIAZEPINES       Review of Systems:  See above      Physical Exam:  Ms. Jacqueline Kelly's most recent vitals:  Vitals  Resp: 14  Height: 4' 11\" (149.9 cm)  Weight: 158 lb (71.7 kg)    Gait is normal    Gen/Psych: Examination reveals a pleasant individual in no acute distress. The patient is oriented to time, place and person.   The patient's mood and affect are appropriate.     Lymph: The lymphatic examination bilaterally reveals all areas to be without enlargement or induration.      Skin: Laceration over the left thumb IP joint is approximated with nylon suture and is healing with no signs of infection at this time. There is a blood-filled blister distal to the laceration repair.     Vascular: There is intact, symmetric circulation in both upper extremities. left Arm exam:   Edema is mild- moderate in the thumb. There is not clinical evidence of skeletal deformity or mal-alignment  Maximal pain is elicited with palpation of the thumb. Sensation is intact to the thumb distally on both the ulnar and radial digital nerves  There is no evidence of gross joint instability. Muscular strength is clinically appropriate, mildly limited by pain in the injured extremity  There is no erythema and no purulence coming from the laceration    Xray review:  taken and reviewed  3 views of the left hand show fracture of Base of the distal phalanx of the left thumb with no intra-articular extension, no significant displacement/angulation      Impression:  left distal phalanx fracture     Plan:  I discussed with the patient the need to continue to monitor the thumb for any signs of infection and call the office with any concerns. I explained that this is a nonsurgical fracture and could be treated in the brace.   Continue taking the antibiotics prescribed from the urgent care  Monitor for any signs of infection  Continue thumb brace   Follow up Monday for suture removal and xrays

## 2018-07-30 ENCOUNTER — OFFICE VISIT (OUTPATIENT)
Dept: ORTHOPEDIC SURGERY | Age: 73
End: 2018-07-30

## 2018-07-30 VITALS — HEIGHT: 59 IN | WEIGHT: 158 LBS | RESPIRATION RATE: 16 BRPM | BODY MASS INDEX: 31.85 KG/M2

## 2018-07-30 DIAGNOSIS — S62.525B OPEN NONDISPLACED FRACTURE OF DISTAL PHALANX OF LEFT THUMB, INITIAL ENCOUNTER: Primary | ICD-10-CM

## 2018-07-30 DIAGNOSIS — R52 PAIN: ICD-10-CM

## 2018-07-30 PROCEDURE — 73130 X-RAY EXAM OF HAND: CPT | Performed by: PHYSICIAN ASSISTANT

## 2018-07-30 PROCEDURE — 99024 POSTOP FOLLOW-UP VISIT: CPT | Performed by: PHYSICIAN ASSISTANT

## 2018-07-30 ASSESSMENT — ENCOUNTER SYMPTOMS
EYES NEGATIVE: 1
RESPIRATORY NEGATIVE: 1
GASTROINTESTINAL NEGATIVE: 1

## 2018-07-30 NOTE — PROGRESS NOTES
Scribe Authentication Statement  Felicita Buchanan, scribed portions of this documentation for and in the presence of Cely Macdonald PA-C on 7/30/18 at 3:39 PM.  Provider Authentication Statement  I, Cely Macdonald PA-C, personally performed the services described in this documentation and they were scribed in my presence by the above listed scribe. The documentation is both accurate and complete. Review of Systems   Constitutional: Negative. HENT: Negative. Eyes: Negative. Respiratory: Negative. Cardiovascular: Negative. Gastrointestinal: Negative. Genitourinary: Negative. Musculoskeletal: Positive for joint pain and myalgias. Skin: Negative. Neurological: Negative. Endo/Heme/Allergies: Positive for environmental allergies. Bruises/bleeds easily. Psychiatric/Behavioral: The patient is nervous/anxious and has insomnia. HPI: [place] Sever is a 74 yo female who is here to follow up on her left thumb fracture and suture removal.  She is having 4/10 pain today. She is still wearing the thumb brace. She has finished her antibiotics. Review of previous history:    HPI:  [place] Sever is a 75y.o. year old female who is seen regarding an injury occurring on July 19th. She reports having slammed hand in  door and injuring the Left thumb  . She was seen for Emergency evaluation at [address] [place] [address] and she was treated with wound cleanse, antibiotics, sutures and brace. Symptoms stable  over time. The Left  hand pain assessment is:   Intensity: 6/10   Location:        thumb   Description:    aching, numbing, pressure, sharp and throbbing    She denies any numbness or tingling in the injured extremity. Disorders to the injured extremity prior to this injury: negative for prior surgery, trauma, arthritis or disorders    Referred by Claude MARTINO for the above.       Past Medical History:   Diagnosis Date    Arthritis     \"Hands\"    CAD (coronary artery disease) 2006    Dr Yessica Zafar recent vitals:  Vitals  Resp: 16  Height: 4' 11\" (149.9 cm)  Weight: 158 lb (71.7 kg)    Gait is normal    Gen/Psych: Examination reveals a pleasant individual in no acute distress. The patient is oriented to time, place and person. The patient's mood and affect are appropriate.     Lymph: The lymphatic examination bilaterally reveals all areas to be without enlargement or induration.      Skin: Laceration over the left thumb IP joint is approximated with nylon suture and is healing with no signs of infection at this time. There is a blood-filled blister distal to the laceration repair.     Vascular: There is intact, symmetric circulation in both upper extremities. left Arm exam:   Edema is mild- moderate in the thumb. There is not clinical evidence of skeletal deformity or mal-alignment  Maximal pain is elicited with palpation of the thumb. Sensation is intact to the thumb distally on both the ulnar and radial digital nerves  There is no evidence of gross joint instability. Muscular strength is clinically appropriate, mildly limited by pain in the injured extremity  There is no erythema and no purulence coming from the laceration    Xray review:  taken and reviewed  3 views of the left hand show fracture of Base of the distal phalanx of the left thumb with no intra-articular extension, no significant displacement/angulation      Impression:  left distal phalanx fracture     Plan:  I discussed with the patient the need to continue to monitor the thumb for any signs of infection and call the office with any concerns.    Sutures removed    Follow up in early September   Remove brace for hygiene  Start gentel ROM out of brace

## 2018-09-05 ENCOUNTER — OFFICE VISIT (OUTPATIENT)
Dept: ORTHOPEDIC SURGERY | Age: 73
End: 2018-09-05

## 2018-09-05 VITALS — WEIGHT: 163 LBS | BODY MASS INDEX: 32.86 KG/M2 | HEIGHT: 59 IN | RESPIRATION RATE: 14 BRPM

## 2018-09-05 DIAGNOSIS — S62.525B OPEN NONDISPLACED FRACTURE OF DISTAL PHALANX OF LEFT THUMB, INITIAL ENCOUNTER: Primary | ICD-10-CM

## 2018-09-05 PROCEDURE — 99024 POSTOP FOLLOW-UP VISIT: CPT | Performed by: PHYSICIAN ASSISTANT

## 2018-09-05 ASSESSMENT — ENCOUNTER SYMPTOMS
GASTROINTESTINAL NEGATIVE: 1
RESPIRATORY NEGATIVE: 1
EYES NEGATIVE: 1

## 2018-09-26 ENCOUNTER — OFFICE VISIT (OUTPATIENT)
Dept: CARDIOLOGY CLINIC | Age: 73
End: 2018-09-26
Payer: COMMERCIAL

## 2018-09-26 VITALS
HEIGHT: 59 IN | DIASTOLIC BLOOD PRESSURE: 76 MMHG | HEART RATE: 68 BPM | WEIGHT: 160 LBS | SYSTOLIC BLOOD PRESSURE: 132 MMHG | BODY MASS INDEX: 32.25 KG/M2

## 2018-09-26 DIAGNOSIS — I25.10 CORONARY ARTERY DISEASE INVOLVING NATIVE CORONARY ARTERY OF NATIVE HEART WITHOUT ANGINA PECTORIS: Primary | ICD-10-CM

## 2018-09-26 DIAGNOSIS — E78.2 MIXED HYPERLIPIDEMIA: ICD-10-CM

## 2018-09-26 PROCEDURE — 99214 OFFICE O/P EST MOD 30 MIN: CPT | Performed by: INTERNAL MEDICINE

## 2018-09-26 NOTE — PROGRESS NOTES
CAROTID ENDARTERECTOMY Left 02/01/2017    with patch     COLONOSCOPY      CORONARY ANGIOPLASTY WITH STENT PLACEMENT  2006, 2007    X 2 in LAD and one in ? CIRC w Dr Karla Aguirre Bilateral 2000's    Cataracts With Lens Implants    HYSTERECTOMY, TOTAL ABDOMINAL  1981    ROTATOR CUFF REPAIR Right 2003   [id number] [facility]      As reviewed   Family History   Problem Relation Age of Onset    High Blood Pressure Mother     Migraines Mother     Heart Disease Father     High Blood Pressure Father     Other Father         Ulcer    Cancer Paternal Aunt     Diabetes Brother     Cancer Paternal Aunt     Diabetes Grandchild     Asthma Grandchild     Heart Disease Maternal Aunt     Colon Cancer Maternal Aunt     Diabetes Paternal Grandfather      Social History   Substance Use Topics    Smoking status: Never Smoker    Smokeless tobacco: Never Used    Alcohol use No      Review of Systems:    Constitutional: Negative for diaphoresis and fatigue  Psychological:Negative for anxiety or depression  HENT: Negative for headaches, nasal congestion, sinus pain or vertigo  Eyes: Negative for visual disturbance.    Endocrine: Negative for polydipsia/polyuria  Respiratory: Negative for shortness of breath  Cardiovascular: Negative for chest pain, dyspnea on exertion, claudication, edema, irregular heartbeat, murmur, palpitations or shortness of breath  Gastrointestinal: Negative for abdominal pain or heartburn  Genito-Urinary: Negative for urinary frequency/urgency  Musculoskeletal: Negative for muscle pain, muscular weakness, negative for pain in arm and leg or swelling in foot and leg  Neurological: Negative for dizziness, headaches, memory loss, numbness/tingling, visual changes, syncope  Dermatological: Negative for rash    Objective:  /76   Pulse 68   Ht 4' 11\" (1.499 m)   Wt 160 lb (72.6 kg)   LMP  (LMP Unknown)   BMI 32.32 kg/m²   Wt Readings from Last 3 Encounters:   09/26/18 160 lb (72.6 kg) Code    Hypertension I10    CAD (coronary artery disease) I25.10    Menopause Z78.0    Osteopenia M85.80    Insomnia G47.00    Vitamin D deficiency E55.9    Hypercalcemia E83.52    Screening for malignant neoplasm of skin Z12.83    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 Management discussed . Testing ordered  no                               -  LIPID MANAGEMENT:  Available lipid  lab data reviewed  and patient was given dietary advice. NCEP- ATP III guidelines reviewed with patient. -   Changes  in medicines made: No                         -  Hypertension: Patients blood pressure is normal. Patient is advised about low sodium diet. Present medical regimen will not be changed.        - CEA  Yearly carotid       - KYMBERLY  Check echo                      Wendy Love MA  [facility] [place]

## 2018-10-17 ENCOUNTER — PROCEDURE VISIT (OUTPATIENT)
Dept: CARDIOLOGY CLINIC | Age: 73
End: 2018-10-17
Payer: COMMERCIAL

## 2018-10-17 DIAGNOSIS — I25.10 CORONARY ARTERY DISEASE INVOLVING NATIVE CORONARY ARTERY OF NATIVE HEART WITHOUT ANGINA PECTORIS: Primary | ICD-10-CM

## 2018-10-17 DIAGNOSIS — E78.2 MIXED HYPERLIPIDEMIA: ICD-10-CM

## 2018-10-17 LAB
LV EF: 58 %
LVEF MODALITY: NORMAL

## 2018-10-17 PROCEDURE — 93306 TTE W/DOPPLER COMPLETE: CPT | Performed by: INTERNAL MEDICINE

## 2018-10-19 ENCOUNTER — TELEPHONE (OUTPATIENT)
Dept: CARDIOLOGY CLINIC | Age: 73
End: 2018-10-19

## 2018-10-19 NOTE — TELEPHONE ENCOUNTER
LV function and size are normal, Ejection Fraction 55-60 %.   Mild asymetric left ventricular hypertrophy.   No regional wall motion abnormalities were detected.  E/A flow reversal noted.  Suggestive of diastolic dysfunction.   All chamber dimensions are within normal limits.   Mildly stenotic aortic valve.   The aortic valve area is 1.82 cm2 with a mean gradient of 16 mmHg.   Mild aortic regurgitation is noted with a pressure half time of 576 msec.   Mild to moderate mitral regurgitation present.   Mild tricuspid regurgitation.   Right ventricular systolic pressure of 35 mm Hg consistent with mild   pulmonary hypertension.   No evidence of pericardial effusion.     Results to patient

## 2018-11-09 LAB
ALBUMIN SERPL-MCNC: 4.5 G/DL
ALP BLD-CCNC: 91 U/L
ALT SERPL-CCNC: 33 U/L
ANION GAP SERPL CALCULATED.3IONS-SCNC: 1.7 MMOL/L
AST SERPL-CCNC: 26 U/L
BASOPHILS ABSOLUTE: 0 /ΜL
BASOPHILS RELATIVE PERCENT: 0.3 %
BILIRUB SERPL-MCNC: 0.4 MG/DL (ref 0.1–1.4)
BUN BLDV-MCNC: 16 MG/DL
CALCIUM SERPL-MCNC: 10.5 MG/DL
CHLORIDE BLD-SCNC: 91 MMOL/L
CHOLESTEROL, TOTAL: 124 MG/DL
CHOLESTEROL/HDL RATIO: NORMAL
CO2: 28 MMOL/L
CREAT SERPL-MCNC: 0.7 MG/DL
EOSINOPHILS ABSOLUTE: 0.1 /ΜL
EOSINOPHILS RELATIVE PERCENT: 1.7 %
GFR CALCULATED: 86
GLUCOSE BLD-MCNC: 104 MG/DL
HCT VFR BLD CALC: 36.6 % (ref 36–46)
HDLC SERPL-MCNC: 47 MG/DL (ref 35–70)
HEMOGLOBIN: 12.4 G/DL (ref 12–16)
LDL CHOLESTEROL CALCULATED: 50 MG/DL (ref 0–160)
LYMPHOCYTES ABSOLUTE: 1.4 /ΜL
LYMPHOCYTES RELATIVE PERCENT: 18.5 %
MCH RBC QN AUTO: 31 PG
MCHC RBC AUTO-ENTMCNC: 34 G/DL
MCV RBC AUTO: 91.3 FL
MONOCYTES ABSOLUTE: 1 /ΜL
MONOCYTES RELATIVE PERCENT: 14.1 %
NEUTROPHILS ABSOLUTE: 4.8 /ΜL
NEUTROPHILS RELATIVE PERCENT: 65.4 %
PDW BLD-RTO: 13.8 %
PLATELET # BLD: 255 K/ΜL
PMV BLD AUTO: NORMAL FL
POTASSIUM SERPL-SCNC: 4.1 MMOL/L
RBC # BLD: 4.01 10^6/ΜL
SODIUM BLD-SCNC: 130 MMOL/L
TOTAL PROTEIN: 7.1
TRIGL SERPL-MCNC: 135 MG/DL
VITAMIN D 25-HYDROXY: 54
VITAMIN D2, 25 HYDROXY: NORMAL
VITAMIN D3,25 HYDROXY: NORMAL
VLDLC SERPL CALC-MCNC: 27 MG/DL
WBC # BLD: 7.3 10^3/ML

## 2018-11-12 ENCOUNTER — OFFICE VISIT (OUTPATIENT)
Dept: INTERNAL MEDICINE CLINIC | Age: 73
End: 2018-11-12
Payer: COMMERCIAL

## 2018-11-12 VITALS
BODY MASS INDEX: 31.25 KG/M2 | DIASTOLIC BLOOD PRESSURE: 68 MMHG | HEIGHT: 59 IN | SYSTOLIC BLOOD PRESSURE: 130 MMHG | WEIGHT: 155 LBS | HEART RATE: 72 BPM | RESPIRATION RATE: 16 BRPM | OXYGEN SATURATION: 97 %

## 2018-11-12 DIAGNOSIS — Z00.00 ROUTINE GENERAL MEDICAL EXAMINATION AT A HEALTH CARE FACILITY: Primary | ICD-10-CM

## 2018-11-12 DIAGNOSIS — I25.10 CORONARY ARTERY DISEASE INVOLVING NATIVE CORONARY ARTERY OF NATIVE HEART WITHOUT ANGINA PECTORIS: ICD-10-CM

## 2018-11-12 DIAGNOSIS — I10 ESSENTIAL HYPERTENSION: ICD-10-CM

## 2018-11-12 DIAGNOSIS — E78.2 HYPERLIPIDEMIA, MIXED: ICD-10-CM

## 2018-11-12 PROCEDURE — G0439 PPPS, SUBSEQ VISIT: HCPCS | Performed by: INTERNAL MEDICINE

## 2018-11-12 RX ORDER — LOSARTAN POTASSIUM 100 MG/1
100 TABLET ORAL DAILY
Qty: 90 TABLET | Refills: 1 | Status: SHIPPED | OUTPATIENT
Start: 2018-11-12 | End: 2019-05-14 | Stop reason: SDUPTHER

## 2018-11-12 RX ORDER — CLOPIDOGREL BISULFATE 75 MG/1
TABLET ORAL
Qty: 90 TABLET | Refills: 1 | Status: SHIPPED | OUTPATIENT
Start: 2018-11-12 | End: 2019-05-14 | Stop reason: SDUPTHER

## 2018-11-12 RX ORDER — METOPROLOL SUCCINATE 25 MG/1
TABLET, EXTENDED RELEASE ORAL
Qty: 90 TABLET | Refills: 1 | Status: SHIPPED | OUTPATIENT
Start: 2018-11-12 | End: 2019-02-12 | Stop reason: SDUPTHER

## 2018-11-12 RX ORDER — HYDROCHLOROTHIAZIDE 25 MG/1
25 TABLET ORAL DAILY
Qty: 90 TABLET | Refills: 1 | Status: SHIPPED | OUTPATIENT
Start: 2018-11-12 | End: 2019-05-14 | Stop reason: SDUPTHER

## 2018-11-12 RX ORDER — SIMVASTATIN 20 MG
TABLET ORAL
Qty: 90 TABLET | Refills: 1 | Status: SHIPPED | OUTPATIENT
Start: 2018-11-12 | End: 2019-02-12 | Stop reason: SDUPTHER

## 2018-11-12 ASSESSMENT — ANXIETY QUESTIONNAIRES: GAD7 TOTAL SCORE: 0

## 2018-11-12 ASSESSMENT — LIFESTYLE VARIABLES: HOW OFTEN DO YOU HAVE A DRINK CONTAINING ALCOHOL: 0

## 2018-11-12 ASSESSMENT — PATIENT HEALTH QUESTIONNAIRE - PHQ9
SUM OF ALL RESPONSES TO PHQ QUESTIONS 1-9: 0
SUM OF ALL RESPONSES TO PHQ QUESTIONS 1-9: 0

## 2018-11-12 NOTE — PROGRESS NOTES
>373, or diastolic >214.) Yes Tha Busby MD   hydrALAZINE (APRESOLINE) 50 MG tablet Take 1 tablet by mouth 2 times daily  Patient taking differently: Take 50 mg by mouth 2 times daily Take 25 mg in am and 50 mg in PM Yes Yared Oconnell MD   Multiple Vitamins-Minerals (THERAPEUTIC MULTIVITAMIN-MINERALS) tablet Take 1 tablet by mouth daily Yes Historical Provider, MD   LORazepam (ATIVAN) 0.5 MG tablet Take 0.5 mg by mouth every 6 hours as needed for Anxiety Yes Historical Provider, MD   simvastatin (ZOCOR) 20 MG tablet TAKE 1 TABLET BY MOUTH NIGHTLY  Patient taking differently: 20 mg TAKE 1 TABLET BY MOUTH NIGHTLY Yes Tha Busby MD   Acetaminophen (TYLENOL ARTHRITIS PAIN PO) Take by mouth as needed Yes Historical Provider, MD   melatonin 3 MG TABS tablet Take 3 mg by mouth nightly Yes Historical Provider, MD   aspirin 81 MG tablet Take 81 mg by mouth nightly  Yes Historical Provider, MD   losartan (COZAAR) 100 MG tablet Take 1 tablet by mouth daily  Tha Busby MD       Past Medical History:   Diagnosis Date    Arthritis     \"Hands\"    CAD (coronary artery disease) 2006    Dr Eugune Schlatter H/O cardiovascular stress test 02/15/2018    EF60% mod ischemia ant wall    H/O Doppler ultrasound 01/09/2017    carotid doppler - severe degree stenosis LICA    H/O echocardiogram 06/06/2016    ef 56% mild to mod. mr and tr    H/O echocardiogram 10/16/2018    EF55-60% mild AS, mild AR, mild-mod MR, mild TR, mild phtn    History of cardiac cath 02/19/2018    patent stents, nml EF, abn stress sec to jailed diag    Hypercalcemia     ? possible hyperparathyroidism/saw Dr Gifty Wharton previously    Hyperlipidemia     Hypertension 1978    Insomnia     Menopause     s/p JAMIE    Osteopenia     has hx of rib fractures after a fall    Osteopenia     S/P colonoscopic polypectomy 07/19/2018    Dr Angie Roger, recheck 5 yrs    Shortness of breath on exertion     Vitamin D deficiency      Past Surgical History:

## 2018-11-27 ENCOUNTER — OFFICE VISIT (OUTPATIENT)
Dept: INTERNAL MEDICINE CLINIC | Age: 73
End: 2018-11-27
Payer: COMMERCIAL

## 2018-11-27 VITALS
DIASTOLIC BLOOD PRESSURE: 60 MMHG | OXYGEN SATURATION: 98 % | SYSTOLIC BLOOD PRESSURE: 123 MMHG | TEMPERATURE: 98.7 F | HEART RATE: 72 BPM | RESPIRATION RATE: 15 BRPM

## 2018-11-27 DIAGNOSIS — J01.00 ACUTE NON-RECURRENT MAXILLARY SINUSITIS: Primary | ICD-10-CM

## 2018-11-27 PROCEDURE — 99213 OFFICE O/P EST LOW 20 MIN: CPT | Performed by: INTERNAL MEDICINE

## 2018-11-27 RX ORDER — PREDNISONE 1 MG/1
TABLET ORAL
Qty: 21 TABLET | Refills: 0 | Status: SHIPPED | OUTPATIENT
Start: 2018-11-27 | End: 2018-12-19

## 2018-11-27 RX ORDER — GUAIFENESIN AND CODEINE PHOSPHATE 100; 10 MG/5ML; MG/5ML
5 SOLUTION ORAL 3 TIMES DAILY PRN
Qty: 120 ML | Refills: 0 | Status: SHIPPED | OUTPATIENT
Start: 2018-11-27 | End: 2018-12-04

## 2018-12-19 PROBLEM — E87.1 HYPONATREMIA: Status: ACTIVE | Noted: 2018-12-19

## 2019-02-12 ENCOUNTER — OFFICE VISIT (OUTPATIENT)
Dept: INTERNAL MEDICINE CLINIC | Age: 74
End: 2019-02-12
Payer: COMMERCIAL

## 2019-02-12 VITALS
HEART RATE: 71 BPM | WEIGHT: 155 LBS | BODY MASS INDEX: 31.31 KG/M2 | OXYGEN SATURATION: 98 % | RESPIRATION RATE: 15 BRPM | DIASTOLIC BLOOD PRESSURE: 83 MMHG | SYSTOLIC BLOOD PRESSURE: 139 MMHG

## 2019-02-12 DIAGNOSIS — I25.10 CORONARY ARTERY DISEASE INVOLVING NATIVE CORONARY ARTERY OF NATIVE HEART WITHOUT ANGINA PECTORIS: ICD-10-CM

## 2019-02-12 DIAGNOSIS — I10 ESSENTIAL HYPERTENSION: ICD-10-CM

## 2019-02-12 DIAGNOSIS — J01.00 ACUTE NON-RECURRENT MAXILLARY SINUSITIS: ICD-10-CM

## 2019-02-12 DIAGNOSIS — J11.1 INFLUENZA: Primary | ICD-10-CM

## 2019-02-12 PROCEDURE — 99213 OFFICE O/P EST LOW 20 MIN: CPT | Performed by: INTERNAL MEDICINE

## 2019-02-12 RX ORDER — METOPROLOL SUCCINATE 25 MG/1
TABLET, EXTENDED RELEASE ORAL
Qty: 90 TABLET | Refills: 1 | Status: SHIPPED | OUTPATIENT
Start: 2019-02-12 | End: 2019-08-09 | Stop reason: SDUPTHER

## 2019-02-12 RX ORDER — DEXTROMETHORPHAN HBR AND PYRILAMINE MALEATE 7.5; 7.5 MG/5ML; MG/5ML
LIQUID ORAL
Refills: 0 | COMMUNITY
Start: 2019-02-10 | End: 2019-04-23 | Stop reason: ALTCHOICE

## 2019-02-12 RX ORDER — METHYLPREDNISOLONE 4 MG/1
TABLET ORAL
Refills: 0 | COMMUNITY
Start: 2019-02-10 | End: 2019-04-23 | Stop reason: ALTCHOICE

## 2019-02-12 RX ORDER — GUAIFENESIN AND CODEINE PHOSPHATE 100; 10 MG/5ML; MG/5ML
5 SOLUTION ORAL 4 TIMES DAILY PRN
Qty: 120 ML | Refills: 0 | Status: SHIPPED | OUTPATIENT
Start: 2019-02-12 | End: 2019-02-19

## 2019-02-12 RX ORDER — SIMVASTATIN 20 MG
TABLET ORAL
Qty: 90 TABLET | Refills: 1 | Status: SHIPPED | OUTPATIENT
Start: 2019-02-12 | End: 2019-08-09 | Stop reason: SDUPTHER

## 2019-02-12 RX ORDER — PREDNISONE 1 MG/1
TABLET ORAL
Qty: 21 TABLET | Refills: 0 | Status: SHIPPED | OUTPATIENT
Start: 2019-02-12 | End: 2019-04-23 | Stop reason: ALTCHOICE

## 2019-02-12 RX ORDER — LEVOFLOXACIN 250 MG/1
250 TABLET ORAL DAILY
Qty: 7 TABLET | Refills: 0 | Status: SHIPPED | OUTPATIENT
Start: 2019-02-12 | End: 2019-02-19

## 2019-02-12 RX ORDER — AZITHROMYCIN 250 MG/1
TABLET, FILM COATED ORAL
Refills: 0 | COMMUNITY
Start: 2019-02-10 | End: 2019-02-12

## 2019-02-25 ENCOUNTER — HOSPITAL ENCOUNTER (OUTPATIENT)
Dept: WOMENS IMAGING | Age: 74
Discharge: HOME | End: 2019-02-25
Payer: COMMERCIAL

## 2019-02-25 DIAGNOSIS — Z12.31 VISIT FOR SCREENING MAMMOGRAM: ICD-10-CM

## 2019-02-25 DIAGNOSIS — R92.8 ABNORMAL MAMMOGRAM: Primary | ICD-10-CM

## 2019-02-25 PROCEDURE — 77067 SCR MAMMO BI INCL CAD: CPT

## 2019-03-05 ENCOUNTER — HOSPITAL ENCOUNTER (OUTPATIENT)
Dept: WOMENS IMAGING | Age: 74
Discharge: HOME | End: 2019-03-05
Payer: COMMERCIAL

## 2019-03-05 DIAGNOSIS — R92.8 ABNORMAL MAMMOGRAM: ICD-10-CM

## 2019-03-05 DIAGNOSIS — R92.1 BREAST CALCIFICATIONS: ICD-10-CM

## 2019-03-05 PROCEDURE — 77065 DX MAMMO INCL CAD UNI: CPT

## 2019-03-20 ENCOUNTER — TELEPHONE (OUTPATIENT)
Dept: CARDIOLOGY CLINIC | Age: 74
End: 2019-03-20

## 2019-05-14 DIAGNOSIS — I10 ESSENTIAL HYPERTENSION: ICD-10-CM

## 2019-05-14 DIAGNOSIS — I25.10 CORONARY ARTERY DISEASE INVOLVING NATIVE CORONARY ARTERY OF NATIVE HEART WITHOUT ANGINA PECTORIS: ICD-10-CM

## 2019-05-14 LAB
CHOLESTEROL, TOTAL: 131 MG/DL
CHOLESTEROL/HDL RATIO: NORMAL
HDLC SERPL-MCNC: 55 MG/DL (ref 35–70)
LDL CHOLESTEROL CALCULATED: 57 MG/DL (ref 0–160)
TRIGL SERPL-MCNC: 94 MG/DL
VLDLC SERPL CALC-MCNC: 19 MG/DL

## 2019-05-14 RX ORDER — HYDROCHLOROTHIAZIDE 25 MG/1
25 TABLET ORAL DAILY
Qty: 90 TABLET | Refills: 10 | Status: SHIPPED | OUTPATIENT
Start: 2019-05-14 | End: 2019-05-14 | Stop reason: SDUPTHER

## 2019-05-14 RX ORDER — CLOPIDOGREL BISULFATE 75 MG/1
TABLET ORAL
Qty: 90 TABLET | Refills: 10 | Status: SHIPPED | OUTPATIENT
Start: 2019-05-14 | End: 2020-05-15 | Stop reason: SDUPTHER

## 2019-05-14 RX ORDER — LOSARTAN POTASSIUM 100 MG/1
100 TABLET ORAL DAILY
Qty: 90 TABLET | Refills: 10 | Status: SHIPPED | OUTPATIENT
Start: 2019-05-14 | End: 2020-05-15 | Stop reason: SDUPTHER

## 2019-05-14 RX ORDER — HYDROCHLOROTHIAZIDE 25 MG/1
25 TABLET ORAL DAILY
Qty: 90 TABLET | Refills: 10 | Status: SHIPPED | OUTPATIENT
Start: 2019-05-14 | End: 2019-10-16 | Stop reason: DRUGHIGH

## 2019-05-15 ENCOUNTER — OFFICE VISIT (OUTPATIENT)
Dept: INTERNAL MEDICINE CLINIC | Age: 74
End: 2019-05-15
Payer: COMMERCIAL

## 2019-05-15 VITALS
HEART RATE: 63 BPM | WEIGHT: 156.2 LBS | DIASTOLIC BLOOD PRESSURE: 68 MMHG | SYSTOLIC BLOOD PRESSURE: 128 MMHG | OXYGEN SATURATION: 98 % | BODY MASS INDEX: 31.55 KG/M2

## 2019-05-15 DIAGNOSIS — I25.10 CORONARY ARTERY DISEASE INVOLVING NATIVE CORONARY ARTERY OF NATIVE HEART WITHOUT ANGINA PECTORIS: Primary | ICD-10-CM

## 2019-05-15 DIAGNOSIS — I10 ESSENTIAL HYPERTENSION: ICD-10-CM

## 2019-05-15 DIAGNOSIS — Z23 NEED FOR PROPHYLACTIC VACCINATION AGAINST STREPTOCOCCUS PNEUMONIAE (PNEUMOCOCCUS): ICD-10-CM

## 2019-05-15 DIAGNOSIS — F41.9 ANXIETY: ICD-10-CM

## 2019-05-15 DIAGNOSIS — E78.2 HYPERLIPEMIA, MIXED: ICD-10-CM

## 2019-05-15 LAB
A/G RATIO: 1.5 (CALC) (ref 0.8–2.6)
ALBUMIN SERPL-MCNC: 4.5 G/DL
ALBUMIN SERPL-MCNC: 4.5 GM/DL (ref 3.5–5.2)
ALP BLD-CCNC: 74 U/L
ALP BLD-CCNC: 74 U/L (ref 23–144)
ALT SERPL-CCNC: 36 U/L
ALT SERPL-CCNC: 36 U/L (ref 0–60)
ANION GAP SERPL CALCULATED.3IONS-SCNC: NORMAL MMOL/L
AST SERPL-CCNC: 29 U/L
AST SERPL-CCNC: 29 U/L (ref 0–55)
BASOPHILS ABSOLUTE: 0.1 K/MM3 (ref 0–0.3)
BASOPHILS ABSOLUTE: 0.9 /ΜL
BASOPHILS RELATIVE PERCENT: 0.1 %
BASOPHILS RELATIVE PERCENT: 0.9 % (ref 0–2)
BILIRUB SERPL-MCNC: 0.3 MG/DL (ref 0.1–1.4)
BILIRUB SERPL-MCNC: 0.3 MG/DL (ref 0–1.2)
BUN / CREAT RATIO: 19 (CALC) (ref 7–25)
BUN BLDV-MCNC: 15 MG/DL
BUN BLDV-MCNC: 15 MG/DL (ref 3–29)
CALCIUM SERPL-MCNC: 10.4 MG/DL
CALCIUM SERPL-MCNC: 10.4 MG/DL (ref 8.5–10.5)
CHLORIDE BLD-SCNC: 24 MMOL/L
CHLORIDE BLD-SCNC: 92 MEQ/L (ref 96–110)
CHOLESTEROL, TOTAL: 131 MG/DL
CO2: 24 MEQ/L (ref 19–32)
CO2: 24 MMOL/L
COPY(IES) SENT TO:: NORMAL
CREAT SERPL-MCNC: 0.8 MG/DL
CREAT SERPL-MCNC: 0.8 MG/DL
EOSINOPHILS ABSOLUTE: 0.1 K/MM3 (ref 0–0.6)
EOSINOPHILS ABSOLUTE: 2.1 /ΜL
EOSINOPHILS RELATIVE PERCENT: 0.1 %
EOSINOPHILS RELATIVE PERCENT: 2.1 % (ref 0–7)
GFR CALCULATED: 73
GFR SERPL CREATININE-BSD FRML MDRD: 73 ML/MIN/1.73M2
GLOBULIN: 3.1 GM/DL (CALC) (ref 1.9–3.6)
GLUCOSE BLD-MCNC: 94 MG/DL
GLUCOSE BLD-MCNC: 94 MG/DL
HCT VFR BLD CALC: 36.4 % (ref 35–46)
HCT VFR BLD CALC: 36.4 % (ref 36–46)
HDLC SERPL-MCNC: 55 MG/DL
HEMOGLOBIN: 12.4 G/DL (ref 12–15.6)
HEMOGLOBIN: 12.4 G/DL (ref 12–16)
LDL CHOLESTEROL: 57 MG/DL (CALC)
LEUKOCYTES, UA: 6.8 K/MM3 (ref 3.8–10.8)
LYMPHOCYTES ABSOLUTE: 1.2 K/MM3 (ref 0.9–4.1)
LYMPHOCYTES ABSOLUTE: 18.2 /ΜL
LYMPHOCYTES RELATIVE PERCENT: 1.2 %
LYMPHOCYTES RELATIVE PERCENT: 18.2 % (ref 14–51)
MCH RBC QN AUTO: 30.9 PG
MCH RBC QN AUTO: 30.9 PG (ref 27–33)
MCHC RBC AUTO-ENTMCNC: 34 G/DL
MCHC RBC AUTO-ENTMCNC: 34 G/DL (ref 32–36)
MCV RBC AUTO: 90.8 FL
MCV RBC AUTO: 90.8 FL (ref 80–100)
MONOCYTES ABSOLUTE: 0.7 K/MM3 (ref 0.2–1.1)
MONOCYTES ABSOLUTE: 10.7 /ΜL
MONOCYTES RELATIVE PERCENT: 0.7 %
MONOCYTES RELATIVE PERCENT: 10.7 % (ref 0–14)
NEUTROPHILS ABSOLUTE: 4.6 K/MM3 (ref 1.5–7.8)
NEUTROPHILS ABSOLUTE: 68.1 /ΜL
NEUTROPHILS RELATIVE PERCENT: 4.6 %
PDW BLD-RTO: 14.2 % (ref 9–15)
PDW BLD-RTO: NORMAL %
PLATELET # BLD: 275 K/MM3 (ref 130–400)
PLATELET # BLD: 275 K/ΜL
PMV BLD AUTO: NORMAL FL
POTASSIUM SERPL-SCNC: 4.1 MEQ/L (ref 3.4–5.3)
POTASSIUM SERPL-SCNC: 92 MMOL/L
RBC # BLD: 4.01 10^6/ΜL
RBC # BLD: 4.01 M/MM3 (ref 3.9–5.2)
SEGMENTED NEUTROPHILS RELATIVE PERCENT: 68.1 % (ref 40–76)
SODIUM BLD-SCNC: 129 MEQ/L (ref 135–148)
SODIUM BLD-SCNC: 4.1 MMOL/L
TOTAL PROTEIN: 7.6
TOTAL PROTEIN: 7.6 GM/DL (ref 6–8.3)
TRIGL SERPL-MCNC: 94 MG/DL
TSH SERPL DL<=0.05 MIU/L-ACNC: 4.36 MICRO IU/ML (ref 0.4–4.5)
VLDLC SERPL CALC-MCNC: 19 MG/DL (CALC) (ref 4–38)
WBC # BLD: 6.8 10^3/ML

## 2019-05-15 PROCEDURE — G0009 ADMIN PNEUMOCOCCAL VACCINE: HCPCS | Performed by: INTERNAL MEDICINE

## 2019-05-15 PROCEDURE — 99214 OFFICE O/P EST MOD 30 MIN: CPT | Performed by: INTERNAL MEDICINE

## 2019-05-15 PROCEDURE — 90732 PPSV23 VACC 2 YRS+ SUBQ/IM: CPT | Performed by: INTERNAL MEDICINE

## 2019-05-15 ASSESSMENT — PATIENT HEALTH QUESTIONNAIRE - PHQ9
1. LITTLE INTEREST OR PLEASURE IN DOING THINGS: 0
2. FEELING DOWN, DEPRESSED OR HOPELESS: 0
SUM OF ALL RESPONSES TO PHQ QUESTIONS 1-9: 0
SUM OF ALL RESPONSES TO PHQ9 QUESTIONS 1 & 2: 0
SUM OF ALL RESPONSES TO PHQ QUESTIONS 1-9: 0

## 2019-05-15 NOTE — PROGRESS NOTES
Nolia Peabody  1945  05/15/19    SUBJECTIVE:    Hypertension: Stable. Denies CP, SOB, cough, visual changes, dizziness, palpitations or REYES. Also seeing Dr Abigail Miranda. Last Na sl low 133 in Dec.    Had question about losartan if any recall. She will check w [address]    Lipids:  Is continuing statin therapy and low fat diet. Tolerating medications w/o myalgias or GI upset. Mood stable on zoloft w/o current anxiety, depression or panic attacks. She is debating to possibly wean off. At times lonely, she will always miss Lucrecia Arm. Due for pneumovax. CAD_ no sx cp and also seeing Dr Daren ram. OBJECTIVE:    /68   Pulse 63   Wt 156 lb 3.2 oz (70.9 kg)   LMP  (LMP Unknown)   SpO2 98%   BMI 31.55 kg/m²     Physical Exam   Constitutional: She appears well-developed and well-nourished. No distress. HENT:   Head: Normocephalic and atraumatic. Nose: Nose normal.   Mouth/Throat: Oropharynx is clear and moist. No oropharyngeal exudate. Eyes: Pupils are equal, round, and reactive to light. Conjunctivae and EOM are normal. Right eye exhibits no discharge. Left eye exhibits no discharge. No scleral icterus. Neck: Neck supple. No tracheal deviation present. Cardiovascular: Normal rate, regular rhythm, normal heart sounds and intact distal pulses. Exam reveals no gallop and no friction rub. No murmur heard. Pulmonary/Chest: Effort normal and breath sounds normal. No respiratory distress. She has no wheezes. She has no rales. Abdominal: Soft. Bowel sounds are normal. She exhibits no distension and no mass. There is no tenderness. There is no rebound and no guarding. Musculoskeletal: She exhibits no edema. Lymphadenopathy:     She has no cervical adenopathy. Neurological: She is alert. She has normal reflexes. No cranial nerve deficit. Skin: Skin is warm and dry. Psychiatric: She has a normal mood and affect. Vitals reviewed. ASSESSMENT:    1.  Coronary artery disease involving native coronary artery of native heart without angina pectoris    2. Essential hypertension    3. Anxiety    4. Hyperlipemia, mixed    5. Need for prophylactic vaccination against Streptococcus pneumoniae (pneumococcus)        PLAN:    Sayda Rice was seen today for 6 month follow-up, medication problem and other. Diagnoses and all orders for this visit:    Coronary artery disease involving native coronary artery of native heart without angina pectoris - Coronary artery disease stable and asymptomatic, will continue to monitor for any signs of instability. Will periodically monitor lipid profile and liver function, cardiac risk factors. Continue current medical regimen. Essential hypertension - Blood pressure stable and will continue current regimen. Will plan periodic monitoring of renal function, electrolytes, lipid profile. CONT F/U DR Helen Walton AS WELL  -     PHOSPHORUS; Future  -     MAGNESIUM; Future  -     COMPREHENSIVE METABOLIC PANEL; Future  -     CBC; Future    Anxiety- TO TRY TO WEAN OFF GRADUALLY BUT IF MOOD WORSENS ALSO OK TO STAY ON THIS INDEFINITELY  -     sertraline (ZOLOFT) 50 MG tablet; Take 1 tablet by mouth daily    Hyperlipemia, mixed - Pt will continue to work on a low fat diet and also exercise, wt loss as appropriate. Will continue periodic monitoring of fasting lipid profile, glucose, liver function.    -     COMPREHENSIVE METABOLIC PANEL; Future  -     CBC; Future  -     Lipid Panel;  Future    Need for prophylactic vaccination against Streptococcus pneumoniae (pneumococcus)  -     PNEUMOVAX 23 subcutaneous/IM (Pneumococcal polysaccharide vaccine 23-valent >= 3yo)

## 2019-05-15 NOTE — PATIENT INSTRUCTIONS
If you want to come off zoloft, try 1/2 tab daily for a month, then 1/2 tab every other day for two weeks, then 2x/week for two weeks, then stop.     If mood is worse off med can always restart

## 2019-06-05 ENCOUNTER — OFFICE VISIT (OUTPATIENT)
Dept: CARDIOLOGY CLINIC | Age: 74
End: 2019-06-05
Payer: COMMERCIAL

## 2019-06-05 VITALS
HEART RATE: 64 BPM | WEIGHT: 155.6 LBS | HEIGHT: 59 IN | BODY MASS INDEX: 31.37 KG/M2 | SYSTOLIC BLOOD PRESSURE: 130 MMHG | DIASTOLIC BLOOD PRESSURE: 70 MMHG

## 2019-06-05 DIAGNOSIS — I65.22 STENOSIS OF LEFT CAROTID ARTERY: Primary | ICD-10-CM

## 2019-06-05 PROCEDURE — 99214 OFFICE O/P EST MOD 30 MIN: CPT | Performed by: INTERNAL MEDICINE

## 2019-06-05 NOTE — PROGRESS NOTES
CARDIOLOGY NOTE      6/5/2019    RE: Kanika Vogt  ([id number])                               TO:  Dr. Gali Matta MD            CHIEF COMPLAINT   Mercedes Gerardo is a 75 y.o. female who was seen today for management of  CAD                                    HPI:   Patient is here for    - Coronary artery disease, does not have chest pain. Patient is  compliant with prescribed medicines. - Hyperlipidimea, lipids are in acceptable range.  Pt  is  compliant with medicines  - Hypertension,is  well controlled, pt is  compliant with medicines  - CEA  stable                  The patient does not have cardiac complaints    Kanika Vogt has the following history recorded in care path:  Patient Active Problem List    Diagnosis Date Noted    Stenosis of left carotid artery 01/09/2017     Priority: Low    Nevus of multiple sites 08/26/2015     Priority: Low    Hypertension      Priority: Low    CAD (coronary artery disease)      Priority: Low    Menopause      Priority: Low    Osteopenia      Priority: Low    Insomnia      Priority: Low    Hyponatremia 12/19/2018    Anxiety 05/26/2017     Current Outpatient Medications   Medication Sig Dispense Refill    sertraline (ZOLOFT) 50 MG tablet Take 1 tablet by mouth daily 90 tablet 1    losartan (COZAAR) 100 MG tablet TAKE 1 TABLET BY MOUTH DAILY 90 tablet 10    clopidogrel (PLAVIX) 75 MG tablet TAKE 1 TABLET BY MOUTH DAILY 90 tablet 10    hydrochlorothiazide (HYDRODIURIL) 25 MG tablet Take 1 tablet by mouth daily 90 tablet 10    hydrALAZINE (APRESOLINE) 50 MG tablet TAKE 1 TABLET BY MOUTH TWICE A  tablet 3    simvastatin (ZOCOR) 20 MG tablet TAKE 1 TABLET BY MOUTH NIGHTLY 90 tablet 1    metoprolol succinate (TOPROL XL) 25 MG extended release tablet TAKE 1 TABLET BY MOUTH DAILY 90 tablet 1    cloNIDine (CATAPRES) 0.1 MG tablet Take 1 tablet by mouth 2 times daily as needed for High Blood Pressure (if systolic BP >656, or diastolic >955.) 60 tablet 1    Multiple Vitamins-Minerals (THERAPEUTIC MULTIVITAMIN-MINERALS) tablet Take 1 tablet by mouth daily      Acetaminophen (TYLENOL ARTHRITIS PAIN PO) Take by mouth as needed      melatonin 3 MG TABS tablet Take 3 mg by mouth nightly      aspirin 81 MG tablet Take 81 mg by mouth nightly        No current facility-administered medications for this visit. Allergies: Celexa [citalopram]; Poison ivy extract [poison ivy extract]; Septra [sulfamethoxazole-trimethoprim]; Ultram [tramadol]; Valsartan; and Xanax xr [alprazolam er]  Past Medical History:   Diagnosis Date    Arthritis     \"Hands\"    CAD (coronary artery disease) 2006    Dr Janice Mcghee H/O cardiovascular stress test 02/15/2018    EF60% mod ischemia ant wall    H/O Doppler ultrasound 01/09/2017    carotid doppler - severe degree stenosis LICA    H/O echocardiogram 06/06/2016    ef 56% mild to mod. mr and tr    H/O echocardiogram 10/16/2018    EF55-60% mild AS, mild AR, mild-mod MR, mild TR, mild phtn    History of cardiac cath 02/19/2018    patent stents, nml EF, abn stress sec to jailed diag    Hypercalcemia     ? possible hyperparathyroidism/saw Dr Candido Gonzales previously    Hyperlipidemia     Hypertension 1978    Insomnia     Menopause     s/p JAMIE    Osteopenia     has hx of rib fractures after a fall    Osteopenia     S/P colonoscopic polypectomy 07/19/2018    Dr Jasmine Calabrese, recheck 5 yrs    Shortness of breath on exertion     Vitamin D deficiency      Past Surgical History:   Procedure Laterality Date    APPENDECTOMY      CARDIAC SURGERY      stents x 3 Feb 2007    CAROTID ENDARTERECTOMY Left 02/01/2017    with patch     COLONOSCOPY      CORONARY ANGIOPLASTY WITH STENT PLACEMENT  2006, 2007    X 2 in LAD and one in ? CIRC w Dr Shan Vanegas Bilateral 2000's    Cataracts With Lens Implants    HYSTERECTOMY, TOTAL ABDOMINAL  1981    ROTATOR CUFF REPAIR Right 2003   [id number] [facility]      As reviewed   Family History jugular venous distention noted. No carotid bruits. Cardiovascular - Normal S1 and S2 without obvious murmur or gallop. Extremities - No cyanosis, clubbing, or significant edema. Pulmonary - No respiratory distress. No wheezes or rales. Abdomen - No masses, tenderness, or organomegaly. Musculoskeletal - No significant edema. No joint deformities. No muscle wasting. Neurologic - Cranial nerves II through XII are grossly intact. There were no gross focal neurologic abnormalities. Lab Review   Lab Results   Component Value Date    CKTOTAL 71 04/02/2017    TROPONINT <0.010 04/02/2017     BNP:  No results found for: BNP  PT/INR:    Lab Results   Component Value Date    INR 0.97 02/16/2018     No results found for: LABA1C  Lab Results   Component Value Date    WBC 6.8 05/14/2019    HCT 36.4 05/14/2019    MCV 90.8 05/14/2019     05/14/2019     Lab Results   Component Value Date    CHOL 131 05/14/2019    TRIG 94 05/14/2019    HDL 55 05/14/2019    LDLCALC 57 05/14/2019    LDLDIRECT 72 11/03/2017     Lab Results   Component Value Date    ALT 36 05/14/2019    AST 29 05/14/2019     BMP:    Lab Results   Component Value Date    NA 4.1 05/14/2019    K 92 05/14/2019    CL 24 05/14/2019    CO2 24 05/14/2019    BUN 15 05/14/2019    CREATININE 0.8 05/14/2019     CMP:   Lab Results   Component Value Date    NA 4.1 05/14/2019    K 92 05/14/2019    CL 24 05/14/2019    CO2 24 05/14/2019    BUN 15 05/14/2019    PROT 7.6 11/03/2017    PROT 7.4 01/04/2013     TSH:    Lab Results   Component Value Date    TSH 3.3 11/10/2014       Impression:    No diagnosis found.    Patient Active Problem List   Diagnosis Code    Hypertension I10    CAD (coronary artery disease) I25.10    Menopause Z78.0    Osteopenia M85.80    Insomnia G47.00    Nevus of multiple sites D22.9    Stenosis of left carotid artery I65.22    Anxiety F41.9    Hyponatremia E87.1       Assessment & Plan:               -     CORONARY ARTERY DISEASE: asymptomatic     All available  tests in chart reviewed. Management discussed . Testing ordered  no                                 -  Hypertension: Patients blood pressure is normal. Patient is advised about low sodium diet. Present medical regimen will not be changed. - cea  Carotid US    -  LIPID MANAGEMENT:  Available lipid  lab data reviewed  and patient was given dietary advice. NCEP- ATP III guidelines reviewed with patient. -   Changes  in medicines made:  Yoli Maloney MA  [facility] [place]

## 2019-06-20 ENCOUNTER — TELEPHONE (OUTPATIENT)
Dept: CARDIOLOGY CLINIC | Age: 74
End: 2019-06-20

## 2019-07-03 ENCOUNTER — TELEPHONE (OUTPATIENT)
Dept: CARDIOLOGY CLINIC | Age: 74
End: 2019-07-03

## 2019-07-08 ENCOUNTER — PROCEDURE VISIT (OUTPATIENT)
Dept: CARDIOLOGY CLINIC | Age: 74
End: 2019-07-08
Payer: COMMERCIAL

## 2019-07-08 DIAGNOSIS — I65.22 STENOSIS OF LEFT CAROTID ARTERY: ICD-10-CM

## 2019-07-08 PROCEDURE — 93880 EXTRACRANIAL BILAT STUDY: CPT | Performed by: INTERNAL MEDICINE

## 2019-07-09 ENCOUNTER — TELEPHONE (OUTPATIENT)
Dept: CARDIOLOGY CLINIC | Age: 74
End: 2019-07-09

## 2019-07-09 NOTE — TELEPHONE ENCOUNTER
Advised patient of carotid results. Patient voiced understanding. S/P Left CEA.  Mild (0-49%) disease of the Bilateral Internal carotid artery.    Dampened left vertebral flow.    Normal left subclavian flow.

## 2019-08-28 ENCOUNTER — OFFICE VISIT (OUTPATIENT)
Dept: INTERNAL MEDICINE CLINIC | Age: 74
End: 2019-08-28
Payer: COMMERCIAL

## 2019-08-28 VITALS
SYSTOLIC BLOOD PRESSURE: 124 MMHG | HEART RATE: 78 BPM | RESPIRATION RATE: 16 BRPM | OXYGEN SATURATION: 98 % | DIASTOLIC BLOOD PRESSURE: 74 MMHG

## 2019-08-28 DIAGNOSIS — Z23 NEED FOR VACCINE FOR DT (DIPHTHERIA-TETANUS): ICD-10-CM

## 2019-08-28 DIAGNOSIS — L03.116 CELLULITIS OF LEFT ANKLE: Primary | ICD-10-CM

## 2019-08-28 PROCEDURE — 90471 IMMUNIZATION ADMIN: CPT | Performed by: INTERNAL MEDICINE

## 2019-08-28 PROCEDURE — 90715 TDAP VACCINE 7 YRS/> IM: CPT | Performed by: INTERNAL MEDICINE

## 2019-08-28 PROCEDURE — 99213 OFFICE O/P EST LOW 20 MIN: CPT | Performed by: INTERNAL MEDICINE

## 2019-08-28 RX ORDER — CLINDAMYCIN HYDROCHLORIDE 300 MG/1
300 CAPSULE ORAL 3 TIMES DAILY
Qty: 30 CAPSULE | Refills: 0 | Status: SHIPPED | OUTPATIENT
Start: 2019-08-28 | End: 2019-09-07

## 2019-08-28 RX ORDER — AMOXICILLIN AND CLAVULANATE POTASSIUM 875; 125 MG/1; MG/1
1 TABLET, FILM COATED ORAL 2 TIMES DAILY
Qty: 20 TABLET | Refills: 0 | Status: SHIPPED | OUTPATIENT
Start: 2019-08-28 | End: 2019-09-07

## 2019-08-28 NOTE — PROGRESS NOTES
Moe Poll  1945 08/28/19    SUBJECTIVE:  The past two and 1/2 weeks w slowly enlarging red and tender area L ankle, occurred after a fall in her son's kitchen. No drainage, fever, chills, bleeding. Denies insect bite. Trying otc polysporin w/o benefit. OBJECTIVE:    /74   Pulse 78   Resp 16   LMP  (LMP Unknown)   SpO2 98%     Physical Exam   Musculoskeletal:        Feet:    approx 1.5cm area of erythema and swelling, tenderness, no fluctuance or drainage, bleeding   Skin: There is erythema. ASSESSMENT:    1. Cellulitis of left ankle    2. Need for vaccine for DT (diphtheria-tetanus)        PLAN:  w hx of injury will update tetanus. Also empirically rx for persistent and slowly worsening cellulitis on augmentin and clinda. Laurence Mckeon to call us back FRI how she's doing. If no improvement may then need to consider imaging and perhaps surgical eval/plastics, r/o osteomyelitis and/or abscess. Val Taqueria was seen today for wound infection. Diagnoses and all orders for this visit:    Cellulitis of left ankle  -     amoxicillin-clavulanate (AUGMENTIN) 875-125 MG per tablet; Take 1 tablet by mouth 2 times daily for 10 days  -     clindamycin (CLEOCIN) 300 MG capsule;  Take 1 capsule by mouth 3 times daily for 10 days    Need for vaccine for DT (diphtheria-tetanus)  -     Tdap (age 6y and older) IM ([address])

## 2019-09-06 ENCOUNTER — OFFICE VISIT (OUTPATIENT)
Dept: INTERNAL MEDICINE CLINIC | Age: 74
End: 2019-09-06
Payer: COMMERCIAL

## 2019-09-06 ENCOUNTER — HOSPITAL ENCOUNTER (OUTPATIENT)
Dept: MRI IMAGING | Age: 74
Discharge: HOME | End: 2019-09-06
Payer: COMMERCIAL

## 2019-09-06 VITALS
RESPIRATION RATE: 14 BRPM | OXYGEN SATURATION: 97 % | DIASTOLIC BLOOD PRESSURE: 78 MMHG | SYSTOLIC BLOOD PRESSURE: 124 MMHG | HEART RATE: 64 BPM

## 2019-09-06 DIAGNOSIS — M86.9 OSTEOMYELITIS OF LEFT ANKLE, UNSPECIFIED TYPE (HCC): ICD-10-CM

## 2019-09-06 DIAGNOSIS — M86.9 OSTEOMYELITIS OF LEFT ANKLE, UNSPECIFIED TYPE (HCC): Primary | ICD-10-CM

## 2019-09-06 PROCEDURE — 99213 OFFICE O/P EST LOW 20 MIN: CPT | Performed by: INTERNAL MEDICINE

## 2019-09-06 PROCEDURE — 73721 MRI JNT OF LWR EXTRE W/O DYE: CPT

## 2019-09-06 NOTE — PROGRESS NOTES
Charla Diaz  1945 09/06/19    SUBJECTIVE:    L ankle still swollen and tender, red w/oimprovement the past 3.5 weeks now despite aggressive abx on clinda and augmentin. We discussed possibilities of abscess and also osteomyelitis. Denies fever, drainage ,bleeding. Been elevating at night, I ALSO REC HOT COMPRESSES 2-3X/DAY. OBJECTIVE:    /78   Pulse 64   Resp 14   LMP  (LMP Unknown)   SpO2 97%     Physical Exam   Musculoskeletal:        Feet:    approx 1.5cm area of erythema and swelling, tenderness, no fluctuance or drainage, bleeding   Skin: There is erythema. Pallor: w trace fluctuance noted laterally, no bleeding or drainage. ASSESSMENT:    1. Osteomyelitis of left ankle, unspecified type ([address]        PLAN:    Dayton Bowles was seen today for cellulitis. Diagnoses and all orders for this visit:    Osteomyelitis of left ankle, unspecified type ([address]- w failure to respond to aggressive oral abx, am concerned about possible osteomyelitis vs abscess. Check MRI, refer podiatry eval.  -     MRI ANKLE LEFT WO CONTRAST;  Future  -     Amb External Referral To Podiatry

## 2019-10-16 PROBLEM — N18.1 CHRONIC KIDNEY DISEASE, STAGE I: Status: ACTIVE | Noted: 2019-10-16

## 2019-10-21 DIAGNOSIS — F41.9 ANXIETY: ICD-10-CM

## 2019-11-08 DIAGNOSIS — I25.10 CORONARY ARTERY DISEASE INVOLVING NATIVE CORONARY ARTERY OF NATIVE HEART WITHOUT ANGINA PECTORIS: ICD-10-CM

## 2019-11-08 DIAGNOSIS — E87.1 HYPONATREMIA: ICD-10-CM

## 2019-11-08 DIAGNOSIS — F51.01 PRIMARY INSOMNIA: ICD-10-CM

## 2019-11-08 DIAGNOSIS — I10 ESSENTIAL HYPERTENSION: ICD-10-CM

## 2019-11-08 DIAGNOSIS — F41.9 ANXIETY: ICD-10-CM

## 2019-11-08 DIAGNOSIS — N18.1 CHRONIC KIDNEY DISEASE, STAGE I: ICD-10-CM

## 2019-11-08 DIAGNOSIS — E78.2 HYPERLIPEMIA, MIXED: ICD-10-CM

## 2019-11-08 LAB
ALBUMIN SERPL-MCNC: 4.6 G/DL
ALP BLD-CCNC: 79 U/L
ALT SERPL-CCNC: 31 U/L
ANION GAP SERPL CALCULATED.3IONS-SCNC: 1.5 MMOL/L
AST SERPL-CCNC: 27 U/L
BASOPHILS ABSOLUTE: 0.1 /ΜL
BASOPHILS RELATIVE PERCENT: 0.8 %
BILIRUB SERPL-MCNC: 0.5 MG/DL (ref 0.1–1.4)
BUN BLDV-MCNC: 15 MG/DL
CALCIUM SERPL-MCNC: 10.8 MG/DL
CHLORIDE BLD-SCNC: 96 MMOL/L
CHOLESTEROL, TOTAL: 134 MG/DL
CHOLESTEROL/HDL RATIO: NORMAL
CO2: 26 MMOL/L
CREAT SERPL-MCNC: 0.8 MG/DL
EOSINOPHILS ABSOLUTE: 0.2 /ΜL
EOSINOPHILS RELATIVE PERCENT: 2.1 %
GFR CALCULATED: 72
GLUCOSE BLD-MCNC: 104 MG/DL
HCT VFR BLD CALC: 37.5 % (ref 36–46)
HDLC SERPL-MCNC: 59 MG/DL (ref 35–70)
HEMOGLOBIN: 13 G/DL (ref 12–16)
LDL CHOLESTEROL CALCULATED: 57 MG/DL (ref 0–160)
LYMPHOCYTES ABSOLUTE: 1.2 /ΜL
LYMPHOCYTES RELATIVE PERCENT: 12.5 %
MAGNESIUM: 1.9 MG/DL
MCH RBC QN AUTO: 31.3 PG
MCHC RBC AUTO-ENTMCNC: 34.7 G/DL
MCV RBC AUTO: 90.1 FL
MONOCYTES ABSOLUTE: 1 /ΜL
MONOCYTES RELATIVE PERCENT: 10 %
NEUTROPHILS ABSOLUTE: 7.3 /ΜL
NEUTROPHILS RELATIVE PERCENT: 74.6 %
PDW BLD-RTO: 14.3 %
PHOSPHORUS: 3.4 MG/DL
PLATELET # BLD: 246 K/ΜL
PMV BLD AUTO: NORMAL FL
POTASSIUM SERPL-SCNC: 4.2 MMOL/L
RBC # BLD: 4.16 10^6/ΜL
SODIUM BLD-SCNC: 136 MMOL/L
TOTAL PROTEIN: 7.6
TRIGL SERPL-MCNC: 88 MG/DL
VLDLC SERPL CALC-MCNC: 18 MG/DL
WBC # BLD: 9.8 10^3/ML

## 2019-11-11 ENCOUNTER — OFFICE VISIT (OUTPATIENT)
Dept: INTERNAL MEDICINE CLINIC | Age: 74
End: 2019-11-11
Payer: COMMERCIAL

## 2019-11-11 VITALS
HEART RATE: 72 BPM | TEMPERATURE: 98.7 F | RESPIRATION RATE: 14 BRPM | SYSTOLIC BLOOD PRESSURE: 143 MMHG | WEIGHT: 153 LBS | BODY MASS INDEX: 30.9 KG/M2 | OXYGEN SATURATION: 98 % | DIASTOLIC BLOOD PRESSURE: 64 MMHG

## 2019-11-11 DIAGNOSIS — J01.00 ACUTE NON-RECURRENT MAXILLARY SINUSITIS: Primary | ICD-10-CM

## 2019-11-11 DIAGNOSIS — L97.322 SKIN ULCER OF LEFT ANKLE WITH FAT LAYER EXPOSED (HCC): ICD-10-CM

## 2019-11-11 PROCEDURE — 99213 OFFICE O/P EST LOW 20 MIN: CPT | Performed by: INTERNAL MEDICINE

## 2019-11-11 RX ORDER — GUAIFENESIN AND CODEINE PHOSPHATE 100; 10 MG/5ML; MG/5ML
5 SOLUTION ORAL 4 TIMES DAILY PRN
Qty: 120 ML | Refills: 0 | Status: SHIPPED | OUTPATIENT
Start: 2019-11-11 | End: 2019-11-18

## 2019-11-11 RX ORDER — AZITHROMYCIN 250 MG/1
250 TABLET, FILM COATED ORAL SEE ADMIN INSTRUCTIONS
Qty: 6 TABLET | Refills: 0 | Status: SHIPPED | OUTPATIENT
Start: 2019-11-11 | End: 2019-11-16

## 2019-11-15 ENCOUNTER — OFFICE VISIT (OUTPATIENT)
Dept: INTERNAL MEDICINE CLINIC | Age: 74
End: 2019-11-15
Payer: COMMERCIAL

## 2019-11-15 VITALS
HEIGHT: 59 IN | RESPIRATION RATE: 16 BRPM | BODY MASS INDEX: 30.84 KG/M2 | OXYGEN SATURATION: 98 % | HEART RATE: 64 BPM | SYSTOLIC BLOOD PRESSURE: 124 MMHG | WEIGHT: 153 LBS | DIASTOLIC BLOOD PRESSURE: 78 MMHG

## 2019-11-15 DIAGNOSIS — Z00.00 ROUTINE GENERAL MEDICAL EXAMINATION AT A HEALTH CARE FACILITY: Primary | ICD-10-CM

## 2019-11-15 DIAGNOSIS — E78.2 MIXED HYPERLIPIDEMIA: ICD-10-CM

## 2019-11-15 DIAGNOSIS — I25.10 CORONARY ARTERY DISEASE INVOLVING NATIVE CORONARY ARTERY OF NATIVE HEART WITHOUT ANGINA PECTORIS: ICD-10-CM

## 2019-11-15 DIAGNOSIS — N18.1 CHRONIC KIDNEY DISEASE, STAGE I: ICD-10-CM

## 2019-11-15 DIAGNOSIS — I10 ESSENTIAL HYPERTENSION: ICD-10-CM

## 2019-11-15 DIAGNOSIS — L97.322 SKIN ULCER OF LEFT ANKLE WITH FAT LAYER EXPOSED (HCC): ICD-10-CM

## 2019-11-15 PROCEDURE — G0439 PPPS, SUBSEQ VISIT: HCPCS | Performed by: INTERNAL MEDICINE

## 2019-11-15 ASSESSMENT — PATIENT HEALTH QUESTIONNAIRE - PHQ9
SUM OF ALL RESPONSES TO PHQ QUESTIONS 1-9: 0
SUM OF ALL RESPONSES TO PHQ QUESTIONS 1-9: 0

## 2019-11-15 ASSESSMENT — LIFESTYLE VARIABLES: HOW OFTEN DO YOU HAVE A DRINK CONTAINING ALCOHOL: 0

## 2019-12-09 ENCOUNTER — TELEPHONE (OUTPATIENT)
Dept: INTERNAL MEDICINE CLINIC | Age: 74
End: 2019-12-09

## 2020-02-17 RX ORDER — SIMVASTATIN 20 MG
TABLET ORAL
Qty: 90 TABLET | Refills: 10 | Status: SHIPPED | OUTPATIENT
Start: 2020-02-17 | End: 2020-11-19 | Stop reason: SDUPTHER

## 2020-03-07 ENCOUNTER — OFFICE VISIT (OUTPATIENT)
Dept: CARDIOLOGY CLINIC | Age: 75
End: 2020-03-07
Payer: COMMERCIAL

## 2020-03-07 VITALS
SYSTOLIC BLOOD PRESSURE: 146 MMHG | HEIGHT: 59 IN | BODY MASS INDEX: 30.96 KG/M2 | DIASTOLIC BLOOD PRESSURE: 74 MMHG | WEIGHT: 153.6 LBS | HEART RATE: 64 BPM

## 2020-03-07 PROCEDURE — 99214 OFFICE O/P EST MOD 30 MIN: CPT | Performed by: INTERNAL MEDICINE

## 2020-03-07 PROCEDURE — 93000 ELECTROCARDIOGRAM COMPLETE: CPT | Performed by: INTERNAL MEDICINE

## 2020-03-07 RX ORDER — HYDRALAZINE HYDROCHLORIDE 10 MG/1
10 TABLET, FILM COATED ORAL 3 TIMES DAILY
COMMUNITY
End: 2021-05-20

## 2020-03-19 ENCOUNTER — TELEPHONE (OUTPATIENT)
Dept: INTERNAL MEDICINE CLINIC | Age: 75
End: 2020-03-19

## 2020-04-23 ENCOUNTER — TELEPHONE (OUTPATIENT)
Dept: CARDIOLOGY CLINIC | Age: 75
End: 2020-04-23

## 2020-04-23 NOTE — TELEPHONE ENCOUNTER
Called patient to schedule carotid US that was canceled due to COVID 19 Restrictions. Left message for patient to return my call. Authorization not required for [facility]

## 2020-05-06 ENCOUNTER — PROCEDURE VISIT (OUTPATIENT)
Dept: CARDIOLOGY CLINIC | Age: 75
End: 2020-05-06
Payer: COMMERCIAL

## 2020-05-06 PROCEDURE — 93880 EXTRACRANIAL BILAT STUDY: CPT | Performed by: INTERNAL MEDICINE

## 2020-05-08 ENCOUNTER — TELEPHONE (OUTPATIENT)
Dept: CARDIOLOGY CLINIC | Age: 75
End: 2020-05-08

## 2020-05-11 RX ORDER — METOPROLOL SUCCINATE 25 MG/1
TABLET, EXTENDED RELEASE ORAL
Qty: 90 TABLET | Refills: 11 | Status: SHIPPED | OUTPATIENT
Start: 2020-05-11 | End: 2020-11-19 | Stop reason: SDUPTHER

## 2020-05-12 RX ORDER — LEVOTHYROXINE SODIUM 0.03 MG/1
25 TABLET ORAL DAILY
Qty: 30 TABLET | Refills: 1 | Status: SHIPPED | OUTPATIENT
Start: 2020-05-12 | End: 2020-06-12

## 2020-05-15 ENCOUNTER — VIRTUAL VISIT (OUTPATIENT)
Dept: INTERNAL MEDICINE CLINIC | Age: 75
End: 2020-05-15
Payer: COMMERCIAL

## 2020-05-15 PROCEDURE — G8510 SCR DEP NEG, NO PLAN REQD: HCPCS | Performed by: INTERNAL MEDICINE

## 2020-05-15 PROCEDURE — 99214 OFFICE O/P EST MOD 30 MIN: CPT | Performed by: INTERNAL MEDICINE

## 2020-05-15 RX ORDER — LOSARTAN POTASSIUM 100 MG/1
100 TABLET ORAL DAILY
Qty: 90 TABLET | Refills: 1 | Status: SHIPPED | OUTPATIENT
Start: 2020-05-15 | End: 2020-11-09

## 2020-05-15 RX ORDER — CLOPIDOGREL BISULFATE 75 MG/1
75 TABLET ORAL DAILY
Qty: 90 TABLET | Refills: 1 | Status: SHIPPED | OUTPATIENT
Start: 2020-05-15 | End: 2020-11-09

## 2020-05-15 ASSESSMENT — PATIENT HEALTH QUESTIONNAIRE - PHQ9
1. LITTLE INTEREST OR PLEASURE IN DOING THINGS: 0
SUM OF ALL RESPONSES TO PHQ9 QUESTIONS 1 & 2: 0
SUM OF ALL RESPONSES TO PHQ QUESTIONS 1-9: 0
SUM OF ALL RESPONSES TO PHQ QUESTIONS 1-9: 0
2. FEELING DOWN, DEPRESSED OR HOPELESS: 0

## 2020-05-15 NOTE — PROGRESS NOTES
Yes Historical Provider, MD   losartan (COZAAR) 100 MG tablet TAKE 1 TABLET BY MOUTH DAILY  Severino Villa MD       Social History     Tobacco Use    Smoking status: Never Smoker    Smokeless tobacco: Never Used   Substance Use Topics    Alcohol use: No     Comment: caffeine 1-2 cups of coffee a day 2 pops a day    Drug use: No             PHYSICAL EXAMINATION:  [ INSTRUCTIONS:  \"[x]\" Indicates a positive item  \"[]\" Indicates a negative item  -- DELETE ALL ITEMS NOT EXAMINED]  Vital Signs: (As obtained by patient/caregiver or practitioner observation)    Blood pressure-  Heart rate-    Respiratory rate-    Temperature-  Pulse oximetry-     Constitutional: [x] Appears well-developed and well-nourished [] No apparent distress      [] Abnormal-   Mental status  [] Alert and awake  [] Oriented to person/place/time []Able to follow commands      Eyes:  EOM    []  Normal  [] Abnormal-  Sclera  []  Normal  [] Abnormal -         Discharge []  None visible  [] Abnormal -    HENT:   [] Normocephalic, atraumatic. [] Abnormal   [] Mouth/Throat: Mucous membranes are moist.     External Ears [] Normal  [] Abnormal-     Neck: [] No visualized mass     Pulmonary/Chest: [x] Respiratory effort normal.  [] No visualized signs of difficulty breathing or respiratory distress        [] Abnormal-      Musculoskeletal:   [] Normal gait with no signs of ataxia         [] Normal range of motion of neck        [] Abnormal-       Neurological:        [] No Facial Asymmetry (Cranial nerve 7 motor function) (limited exam to video visit)          [] No gaze palsy        [] Abnormal-         Skin:        [] No significant exanthematous lesions or discoloration noted on facial skin         [] Abnormal-            Psychiatric:       [x] Normal Affect [] No Hallucinations        [] Abnormal-     Other pertinent observable physical exam findings-     ASSESSMENT/PLAN:  1.  Essential hypertension  Blood pressure stable and will continue current

## 2020-06-12 LAB
BUN / CREAT RATIO: 14 (CALC) (ref 7–25)
BUN BLDV-MCNC: 13 MG/DL (ref 3–29)
CALCIUM SERPL-MCNC: 10.6 MG/DL (ref 8.5–10.5)
CHLORIDE BLD-SCNC: 93 MEQ/L (ref 96–110)
CO2: 24 MEQ/L (ref 19–32)
CREAT SERPL-MCNC: 0.9 MG/DL
GFR SERPL CREATININE-BSD FRML MDRD: 63 ML/MIN/1.73M2
GLUCOSE BLD-MCNC: 101 MG/DL
POTASSIUM SERPL-SCNC: 4.1 MEQ/L (ref 3.4–5.3)
SODIUM BLD-SCNC: 131 MEQ/L (ref 135–148)
T4 FREE: 1.29 NG/DL (ref 0.8–1.8)
TSH SERPL DL<=0.05 MIU/L-ACNC: 3.32 MICRO IU/ML (ref 0.4–4.5)

## 2020-06-12 NOTE — RESULT ENCOUNTER NOTE
Call pt, labs INDICATE STABLE SODIUM AND KIDNEY FXN, HER SODIUM REMAINS SL LOW , SAME AS A MONTH AGO. THYROID FXN IMPROVED AND IS NL RANGE.   SENDING COPY TO DR Verna Wang TOO

## 2020-07-22 ENCOUNTER — HOSPITAL ENCOUNTER (OUTPATIENT)
Dept: WOMENS IMAGING | Age: 75
Discharge: HOME | End: 2020-07-22
Payer: COMMERCIAL

## 2020-07-22 PROCEDURE — G0279 TOMOSYNTHESIS, MAMMO: HCPCS

## 2020-11-19 ENCOUNTER — OFFICE VISIT (OUTPATIENT)
Dept: INTERNAL MEDICINE CLINIC | Age: 75
End: 2020-11-19
Payer: COMMERCIAL

## 2020-11-19 VITALS
WEIGHT: 159 LBS | SYSTOLIC BLOOD PRESSURE: 124 MMHG | DIASTOLIC BLOOD PRESSURE: 72 MMHG | BODY MASS INDEX: 32.05 KG/M2 | RESPIRATION RATE: 16 BRPM | HEIGHT: 59 IN | OXYGEN SATURATION: 98 % | HEART RATE: 68 BPM

## 2020-11-19 PROCEDURE — G0439 PPPS, SUBSEQ VISIT: HCPCS | Performed by: INTERNAL MEDICINE

## 2020-11-19 RX ORDER — SIMVASTATIN 20 MG
20 TABLET ORAL NIGHTLY
Qty: 90 TABLET | Refills: 1 | Status: SHIPPED | OUTPATIENT
Start: 2020-11-19 | End: 2021-08-09 | Stop reason: SDUPTHER

## 2020-11-19 RX ORDER — CLOPIDOGREL BISULFATE 75 MG/1
75 TABLET ORAL DAILY
Qty: 90 TABLET | Refills: 1 | Status: SHIPPED | OUTPATIENT
Start: 2020-11-19 | End: 2021-05-20 | Stop reason: SDUPTHER

## 2020-11-19 RX ORDER — FLUTICASONE PROPIONATE 50 MCG
2 SPRAY, SUSPENSION (ML) NASAL DAILY
Qty: 1 BOTTLE | Refills: 5 | Status: SHIPPED | OUTPATIENT
Start: 2020-11-19 | End: 2021-06-25

## 2020-11-19 RX ORDER — LEVOTHYROXINE SODIUM 0.03 MG/1
25 TABLET ORAL DAILY
Qty: 90 TABLET | Refills: 1 | Status: SHIPPED | OUTPATIENT
Start: 2020-11-19 | End: 2021-05-20 | Stop reason: SDUPTHER

## 2020-11-19 RX ORDER — METOPROLOL SUCCINATE 25 MG/1
25 TABLET, EXTENDED RELEASE ORAL DAILY
Qty: 90 TABLET | Refills: 1 | Status: SHIPPED | OUTPATIENT
Start: 2020-11-19 | End: 2021-08-09 | Stop reason: SDUPTHER

## 2020-11-19 RX ORDER — LOSARTAN POTASSIUM 100 MG/1
100 TABLET ORAL DAILY
Qty: 90 TABLET | Refills: 1 | Status: SHIPPED | OUTPATIENT
Start: 2020-11-19 | End: 2021-05-20 | Stop reason: SDUPTHER

## 2020-11-19 ASSESSMENT — PATIENT HEALTH QUESTIONNAIRE - PHQ9
SUM OF ALL RESPONSES TO PHQ QUESTIONS 1-9: 0
2. FEELING DOWN, DEPRESSED OR HOPELESS: 0
SUM OF ALL RESPONSES TO PHQ QUESTIONS 1-9: 0
1. LITTLE INTEREST OR PLEASURE IN DOING THINGS: 0
SUM OF ALL RESPONSES TO PHQ9 QUESTIONS 1 & 2: 0
SUM OF ALL RESPONSES TO PHQ QUESTIONS 1-9: 0

## 2020-11-19 ASSESSMENT — LIFESTYLE VARIABLES: HOW OFTEN DO YOU HAVE A DRINK CONTAINING ALCOHOL: 0

## 2020-11-19 NOTE — PROGRESS NOTES
Medicare Annual Wellness Visit  Name: Sayda De La Cruz Date: 2020   MRN: [id number] Sex: Female   Age: 67 y.o. Ethnicity: Non-/Non    : 1945 Race: Gabby Santillan is here for Medicare AWV    Screenings for behavioral, psychosocial and functional/safety risks, and cognitive dysfunction are all negative except as indicated below. These results, as well as other patient data from the [address] form, are documented in Flowsheets linked to this Encounter. Hypertension: Stable. Denies CP, SOB, cough, visual changes, dizziness, palpitations or REYES. Also seeing Dr Darin Esparza    Hypothyroid has been asymptomatic w/o sx of fatigue, constipation, cold intolerance, depression. Mood stable on zoloft w/o current anxiety, depression or panic attacks. Lipids:  Is continuing statin therapy and low fat diet. Tolerating medications w/o myalgias or GI upset. Coronary artery disease has been asymptomatic w/o any ongoing sx of CP, SOB/RODRIGEZ, palpitations, lt headedness, diaphoresis. Is continuing medications regularly. SEEING DR Divina Veliz, HAD 3 STENTS FR     SOME ISSUES W SNORING AND SOME FATIGUE IN AM.  DEFERS SLEEP EVAL, FOR NOW ADVISED TO WORK ON DIET, EXERCISE AND WT LOSS TO IMPROVE THIS. Allergies   Allergen Reactions    Celexa [Citalopram] Nausea And Vomiting    Poison Ivy Extract [Poison Ivy Extract] Rash    Septra [Sulfamethoxazole-Trimethoprim] Nausea And Vomiting    Ultram [Tramadol] Nausea And Vomiting    Valsartan Other (See Comments)     Increased K    Xanax Xr [Alprazolam Er]      NOT ALLERGY, FAMILY STRONGLY OPPOSED TO BENZODIAZEPINES       Prior to Visit Medications    Medication Sig Taking?  Authorizing Provider   losartan (COZAAR) 100 MG tablet TAKE 1 TABLET BY MOUTH DAILY Yes Rosita Zhang MD   sertraline (ZOLOFT) 50 MG tablet TAKE 1 TABLET BY MOUTH DAILY Yes Rosita Zhang MD   clopidogrel (PLAVIX) 75 MG tablet TAKE 1 TABLET BY MOUTH DAILY Yes Carol Coulter MD   levothyroxine (SYNTHROID) 25 MCG tablet TAKE 1 TABLET BY MOUTH DAILY Yes Carol Coulter MD   metoprolol succinate (TOPROL XL) 25 MG extended release tablet TAKE 1 TABLET BY MOUTH DAILY Yes Carol Coulter MD   hydrALAZINE (APRESOLINE) 50 MG tablet TAKE 1 TABLET BY MOUTH TWICE A DAY Yes Juve Liang MD   hydrochlorothiazide (MICROZIDE) 12.5 MG capsule TAKE 1 CAPSULE BY MOUTH EVERY MORNING Yes Juve Liang MD   hydrALAZINE (APRESOLINE) 10 MG tablet Take 10 mg by mouth 3 times daily WN>004 systolic Yes Historical Provider, MD   simvastatin (ZOCOR) 20 MG tablet TAKE 1 TABLET BY MOUTH NIGHTLY Yes Carol Coulter MD   cloNIDine (CATAPRES) 0.1 MG tablet Take 1 tablet by mouth 2 times daily as needed for High Blood Pressure (if systolic BP >799, or diastolic >438.) Yes Carol Coulter MD   Multiple Vitamins-Minerals (THERAPEUTIC MULTIVITAMIN-MINERALS) tablet Take 1 tablet by mouth daily Yes Historical Provider, MD   Acetaminophen (TYLENOL ARTHRITIS PAIN PO) Take by mouth as needed Yes Historical Provider, MD   melatonin 3 MG TABS tablet Take 10 mg by mouth nightly  Yes Historical Provider, MD   aspirin 81 MG tablet Take 81 mg by mouth nightly  Yes Historical Provider, MD       Past Medical History:   Diagnosis Date    Arthritis     \"Hands\"    CAD (coronary artery disease) 2006    Dr Foley Grade Chronic kidney disease, stage I 10/16/2019    H/O cardiovascular stress test 02/15/2018    EF60% mod ischemia ant wall    H/O Doppler ultrasound 01/09/2017    carotid doppler - severe degree stenosis LICA    H/O echocardiogram 06/06/2016    ef 56% mild to mod.  mr and tr    H/O echocardiogram 10/16/2018    EF55-60% mild AS, mild AR, mild-mod MR, mild TR, mild phtn    History of cardiac cath 02/19/2018    patent stents, nml EF, abn stress sec to jailed diag    History of echocardiogram 07/08/2019    s/p Left CEA, Mild 0-49% disease of the bilateral ICA, dampened left vertebral flow, normal left subclavian flow.  Hx of Carotid Doppler ultrasound 05/06/2020    Mild 0-49% disease of the bilateral ICA, normal vertebral flow    Hypercalcemia     ? possible hyperparathyroidism/saw Dr Heather Garcia previously    Hyperlipidemia     Hypertension 1978    Hypothyroidism due to acquired atrophy of thyroid     antiperoxidase ab tested negative    Insomnia     Menopause     s/p JAMIE    Osteopenia     has hx of rib fractures after a fall    Osteopenia     S/P colonoscopic polypectomy 07/19/2018    Dr Jeannene Goldberg, recheck 5 yrs    Shortness of breath on exertion     Vitamin D deficiency        Past Surgical History:   Procedure Laterality Date    APPENDECTOMY      CARDIAC SURGERY      stents x 3 Feb 2007    CAROTID ENDARTERECTOMY Left 02/01/2017    with patch     COLONOSCOPY      CORONARY ANGIOPLASTY WITH STENT PLACEMENT  2006, 2007    X 2 in LAD and one in ? CIRC w Dr Jones Share Bilateral 2000's    Cataracts With Lens Implants    HYSTERECTOMY, TOTAL ABDOMINAL  1981    ROTATOR CUFF REPAIR Right 2003    TONSILLECTOMY  1954       Family History   Problem Relation Age of Onset    High Blood Pressure Mother     Migraines Mother     Heart Disease Father     High Blood Pressure Father     Other Father         Ulcer    Cancer Paternal Aunt     Diabetes Brother     Cancer Paternal Aunt     Diabetes Grandchild     Asthma Grandchild     Heart Disease Maternal Aunt     Colon Cancer Maternal Aunt     Diabetes Paternal Grandfather        CareTeam (Including outside providers/suppliers regularly involved in providing care):   Patient Care Team:  Kuldeep Warren MD as PCP - General (Internal Medicine)  Kuldeep Warren MD as PCP - [facility] Empaneled Provider    Wt Readings from Last 3 Encounters:   11/19/20 159 lb (72.1 kg)   03/07/20 153 lb 9.6 oz (69.7 kg)   11/15/19 153 lb (69.4 kg)     Vitals:    11/19/20 1008   BP: 124/72   Pulse: 68   Resp: 16   SpO2: 98% Weight: 159 lb (72.1 kg)   Height: 4' 11\" (1.499 m)     Body mass index is 32.11 kg/m². Based upon direct observation of the patient, evaluation of cognition reveals recent and remote memory intact. General Appearance: alert and oriented to person, place and time, well developed and well- nourished, in no acute distress  Skin: warm and dry, no rash or erythema  Head: normocephalic and atraumatic  Eyes: pupils equal, round, and reactive to light, extraocular eye movements intact, conjunctivae normal  Neck: supple and non-tender without mass, no thyromegaly or thyroid nodules, no cervical lymphadenopathy  Pulmonary/Chest: clear to auscultation bilaterally- no wheezes, rales or rhonchi, normal air movement, no respiratory distress  Cardiovascular: normal rate, regular rhythm, normal S1 and S2, no murmurs, rubs, clicks, or gallops, distal pulses intact, no carotid bruits  Abdomen: soft, non-tender, non-distended, normal bowel sounds, no masses or organomegaly  Extremities: no cyanosis, clubbing or edema  Musculoskeletal: normal range of motion, no joint swelling, deformity or tenderness  Neurologic:  no cranial nerve deficit, gait, coordination and speech normal    Patient's complete Health Risk Assessment and screening values have been reviewed and are found in Flowsheets. The following problems were reviewed today and where indicated follow up appointments were made and/or referrals ordered. Positive Risk Factor Screenings with Interventions:     Health Habits/Nutrition:  Health Habits/Nutrition  Do you exercise for at least 20 minutes 2-3 times per week?: (!) No  Have you lost any weight without trying in the past 3 months?: No  Do you eat fewer than 2 meals per day?: No  Have you seen a dentist within the past year?: Yes  Body mass index: (!) 32.11  Health Habits/Nutrition Interventions:  · encouraged work w diet, exercise, wt loss.     Personalized Preventive Plan   Current Health Maintenance Status  Immunization History   Administered Date(s) Administered    Influenza Virus Vaccine 08/01/2016    Influenza, High Dose (Fluzone 65 yrs and older) 10/17/2018, 10/04/2019    Influenza, Quadv, IM, (6 mo and older Fluzone, Flulaval, Fluarix and 3 yrs and older Afluria) 10/25/2017, 09/09/2020    Pneumococcal Conjugate 13-valent ([id number]) 10/25/2017    Pneumococcal Polysaccharide ([id number]) 05/15/2019    Tdap (Boostrix, Adacel) 08/28/2019    Tetanus 09/23/2015    Zoster Live (Zostavax) 08/10/2015        Health Maintenance   Topic Date Due    Shingles Vaccine (2 of 3) 10/05/2015    Annual Wellness Visit (AWV)  05/29/2019    Lipid screen  05/08/2021    Potassium monitoring  11/12/2021    Creatinine monitoring  11/12/2021    DTaP/Tdap/Td vaccine (2 - Td) 08/28/2029    DEXA (modify frequency per FRAX score)  Completed    Flu vaccine  Completed    Pneumococcal 65+ years Vaccine  Completed    Hepatitis A vaccine  Aged Out    Hepatitis B vaccine  Aged Out    Hib vaccine  Aged Out    Meningococcal (ACWY) vaccine  Aged Out     Recommendations for [place] Due: see orders and patient instructions/AVS.  . Recommended screening schedule for the next 5-10 years is provided to the patient in written form: see Patient Bright Fletcher was seen today for medicare awv. Diagnoses and all orders for this visit:    Routine general medical examination at a health care facility - remains independent, functional and active, no indications/needs for other interventions noted at this time- except as noted below and also findings noted on screening medicare questions. Did ADVISE DAILY EXERCISE, AT MINIMUM TO CONSIDER MARCHING IN FRONT OF TV 20MIN/DAY    Essential hypertension - Blood pressure stable and will continue current regimen. Will plan periodic monitoring of renal function, electrolytes, lipid profile. ALSO CONT F/U DR MARCELINO BROCK  -     losartan (COZAAR) 100 MG tablet;  Take 1 tablet by mouth daily  -     metoprolol succinate (TOPROL XL) 25 MG extended release tablet; Take 1 tablet by mouth daily    Coronary artery disease involving native coronary artery of native heart without angina pectoris - Coronary artery disease stable and asymptomatic, will continue to monitor for any signs of instability. Will periodically monitor lipid profile and liver function, cardiac risk factors. Continue current medical regimen. -     clopidogrel (PLAVIX) 75 MG tablet; Take 1 tablet by mouth daily  -     metoprolol succinate (TOPROL XL) 25 MG extended release tablet; Take 1 tablet by mouth daily  -     simvastatin (ZOCOR) 20 MG tablet; Take 1 tablet by mouth nightly  -     Comprehensive Metabolic Panel; Future  -     CBC Auto Differential; Future  -     Lipid Panel; Future    Anxiety - Mood stable on current regimen w/o any significant manifestations of severe depression or anxiety noted at this time. Cont current meds. -     sertraline (ZOLOFT) 50 MG tablet; Take 1 tablet by mouth daily    Hypothyroidism due to acquired atrophy of thyroid  - Clinically hypothyroidism appears stable and will continue current dosing, also periodic monitoring of TFTs. -     levothyroxine (SYNTHROID) 25 MCG tablet; Take 1 tablet by mouth daily  -     TSH without Reflex;  Future  -     T4, Free; Future    Snoring- REC STRONGLY FOR WT GAIN, CONT SLEEP ON SIDE, TRIAL FLONASE, IF SX PERSIST THEN LATER CONSIDER SLEEP EVAL- SHE DEFERS FOR NOW  -     fluticasone (FLONASE) 50 MCG/ACT nasal spray; 2 sprays by Each Nostril route daily

## 2020-11-19 NOTE — PATIENT INSTRUCTIONS
Personalized Preventive Plan for Stephen Polanco - 11/19/2020  Medicare offers a range of preventive health benefits. Some of the tests and screenings are paid in full while other may be subject to a deductible, co-insurance, and/or copay. Some of these benefits include a comprehensive review of your medical history including lifestyle, illnesses that may run in your family, and various assessments and screenings as appropriate. After reviewing your medical record and screening and assessments performed today your provider may have ordered immunizations, labs, imaging, and/or referrals for you. A list of these orders (if applicable) as well as your Preventive Care list are included within your After Visit Summary for your review. Other Preventive Recommendations:    · A preventive eye exam performed by an eye specialist is recommended every 1-2 years to screen for glaucoma; cataracts, macular degeneration, and other eye disorders. · A preventive dental visit is recommended every 6 months. · Try to get at least 150 minutes of exercise per week or 10,000 steps per day on a pedometer . · Order or download the FREE \"Exercise & Physical Activity: Your Everyday Guide\" from [employer]. Call 6-520.419.8403 or search [employer] online. · You need 3310-4924 mg of calcium and 7038-7367 IU of vitamin D per day. It is possible to meet your calcium requirement with diet alone, but a vitamin D supplement is usually necessary to meet this goal.  · When exposed to the sun, use a sunscreen that protects against both UVA and UVB radiation with an SPF of 30 or greater. Reapply every 2 to 3 hours or after sweating, drying off with a towel, or swimming. · Always wear a seat belt when traveling in a car. Always wear a helmet when riding a bicycle or motorcycle.

## 2020-12-09 ENCOUNTER — OFFICE VISIT (OUTPATIENT)
Dept: CARDIOLOGY CLINIC | Age: 75
End: 2020-12-09
Payer: COMMERCIAL

## 2020-12-09 VITALS
BODY MASS INDEX: 32.46 KG/M2 | WEIGHT: 161 LBS | HEART RATE: 80 BPM | HEIGHT: 59 IN | DIASTOLIC BLOOD PRESSURE: 64 MMHG | SYSTOLIC BLOOD PRESSURE: 116 MMHG

## 2020-12-09 PROCEDURE — 99214 OFFICE O/P EST MOD 30 MIN: CPT | Performed by: INTERNAL MEDICINE

## 2020-12-09 PROCEDURE — 93000 ELECTROCARDIOGRAM COMPLETE: CPT | Performed by: INTERNAL MEDICINE

## 2020-12-09 NOTE — LETTER
[address]  1945  [id number]    Have you had any Chest Pain that is not new? - Yes  If Yes DO EKG - How does it feel - Dull Ache   How long does the pain last - 30 minutes    How long have you been having the pain - Months   Did you take a ASA 325mg   And did it relieve the pain - pt not sure    ? DO EKG IF: Patient has a Heart Rate above 100 or below 40     CAD (Coronary Artery Disease) patient should have one on file every 6 months        Have you had any Shortness of Breath - Yes  If Yes - When on exertion    Have you had any dizziness - Yes, ongoing, occasionally, unchanged  If Yes DO ORTHOSTATIC BP - when do you feel dizzy with position change   How long does it last .3  minutes     ? Sitting wait 5 minutes do supine (laying down) wait 5 minutes then do standing - log each in \"vitals\" area in Epic  ? Be sure to ask what symptoms they are having if they get dizzy while completing ortho stats such as room spinning, nausea, etc.    Have you had any palpitations that are not new? - No     Is the patient on any of the following medications - NONE  If Yes DO EKG - Needs done every 6 months    Do you have any edema - swelling in NONE      Do you have a surgery or procedure scheduled in the near future - No  ? If Yes- DO EKG  ?   ? Ask patient [facility] they [facility] if they are not already signed [facility]    ? Check to see if we have an E-MAIL on file for the patient    ? Check medication list thoroughly!!! AND RECONCILE OUTSIDE MEDICATIONS  If dose has changed change the entire order not just the MG  BE SURE TO ASK PATIENT IF THEY NEED MEDICATION REFILLS    ?  At check out add to every patient's \"wrap up\" the following dot phrase AFTERHOURSEDUCATION and ensure we explain this to our patients

## 2020-12-09 NOTE — PATIENT INSTRUCTIONS
Please be informed that if you contact our office outside of normal business hours the physician on call cannot help with any scheduling or rescheduling issues, procedure instruction questions or any type of medication issue. We advise you for any urgent/emergency that you go to the nearest emergency room!     PLEASE CALL OUR OFFICE DURING NORMAL BUSINESS HOURS    Monday - Friday   8 am to 5 pm    [place]: [phone]    [place]:  [phone]

## 2020-12-09 NOTE — PROGRESS NOTES
CARDIOLOGY NOTE      12/9/2020    RE: Shavon Cain  ([id number])                               TO:  Dr. Yoly Tompkins MD            CHIEF COMPLAINT   Joy Hernandez is a 67 y.o. female who was seen today for management of  cad                                    HPI:                   The patient does not have cardiac complaints  Patient also seen  for    - Coronary artery disease, does not have chest pain. Patient is  compliant with prescribed medicines. - Hypertension,is  well controlled, pt is  compliant with medicines  - Hyperlipidimea, importance of hyperlipidimea discussed with pt.    - CEA  stable    Shavon Cain has the following history recorded in care path:  Patient Active Problem List    Diagnosis Date Noted    Stenosis of left carotid artery 01/09/2017     Priority: Low    Nevus of multiple sites 08/26/2015     Priority: Low    Hypertension      Priority: Low    CAD (coronary artery disease)      Priority: Low    Menopause      Priority: Low    Osteopenia      Priority: Low    Insomnia      Priority: Low    Hypothyroidism due to acquired atrophy of thyroid     Chronic kidney disease, stage I 10/16/2019    Hyponatremia 12/19/2018    Anxiety 05/26/2017     Current Outpatient Medications   Medication Sig Dispense Refill    losartan (COZAAR) 100 MG tablet Take 1 tablet by mouth daily 90 tablet 1    sertraline (ZOLOFT) 50 MG tablet Take 1 tablet by mouth daily 90 tablet 1    clopidogrel (PLAVIX) 75 MG tablet Take 1 tablet by mouth daily 90 tablet 1    levothyroxine (SYNTHROID) 25 MCG tablet Take 1 tablet by mouth daily 90 tablet 1    metoprolol succinate (TOPROL XL) 25 MG extended release tablet Take 1 tablet by mouth daily 90 tablet 1    simvastatin (ZOCOR) 20 MG tablet Take 1 tablet by mouth nightly 90 tablet 1    fluticasone (FLONASE) 50 MCG/ACT nasal spray 2 sprays by Each Nostril route daily 1 Bottle 5    hydrALAZINE (APRESOLINE) 50 MG tablet TAKE 1 TABLET BY MOUTH TWICE A DAY (Patient taking differently: Take 50 mg by mouth Indications: Pt takes 25 mg in am and 50 mg in pm ) 180 tablet 3    hydrochlorothiazide (MICROZIDE) 12.5 MG capsule TAKE 1 CAPSULE BY MOUTH EVERY MORNING 90 capsule 3    Multiple Vitamins-Minerals (THERAPEUTIC MULTIVITAMIN-MINERALS) tablet Take 1 tablet by mouth daily      Acetaminophen (TYLENOL ARTHRITIS PAIN PO) Take by mouth as needed      melatonin 3 MG TABS tablet Take 10 mg by mouth nightly       aspirin 81 MG tablet Take 81 mg by mouth nightly       hydrALAZINE (APRESOLINE) 10 MG tablet Take 10 mg by mouth 3 times daily JR>817 systolic      cloNIDine (CATAPRES) 0.1 MG tablet Take 1 tablet by mouth 2 times daily as needed for High Blood Pressure (if systolic BP >834, or diastolic >137.) (Patient not taking: Reported on 12/9/2020) 60 tablet 1     No current facility-administered medications for this visit. Allergies: Celexa [citalopram]; Poison ivy extract [poison ivy extract]; Septra [sulfamethoxazole-trimethoprim]; Ultram [tramadol]; Valsartan; and Xanax xr [alprazolam er]  Past Medical History:   Diagnosis Date    Arthritis     \"Hands\"    CAD (coronary artery disease) 2006    Dr Eitan Hernandez kidney disease, stage I 10/16/2019    H/O cardiovascular stress test 02/15/2018    EF60% mod ischemia ant wall    H/O Doppler ultrasound 01/09/2017    carotid doppler - severe degree stenosis LICA    H/O echocardiogram 06/06/2016    ef 56% mild to mod. mr and tr    H/O echocardiogram 10/16/2018    EF55-60% mild AS, mild AR, mild-mod MR, mild TR, mild phtn    History of cardiac cath 02/19/2018    patent stents, nml EF, abn stress sec to jailed diag    History of echocardiogram 07/08/2019    s/p Left CEA, Mild 0-49% disease of the bilateral ICA, dampened left vertebral flow, normal left subclavian flow.     Hx of Carotid Doppler ultrasound 05/06/2020    Mild 0-49% disease of the bilateral ICA, normal vertebral flow    Hypercalcemia ?possible hyperparathyroidism/saw Dr Lewis Gonsalez previously    Hyperlipidemia     Hypertension 1978    Hypothyroidism due to acquired atrophy of thyroid     antiperoxidase ab tested negative    Insomnia     Menopause     s/p JAMIE    Osteopenia     has hx of rib fractures after a fall    Osteopenia     S/P colonoscopic polypectomy 07/19/2018    Dr Leticia Zamora, recheck 5 yrs    Shortness of breath on exertion     Vitamin D deficiency      Past Surgical History:   Procedure Laterality Date    APPENDECTOMY      CARDIAC SURGERY      stents x 3 Feb 2007    CAROTID ENDARTERECTOMY Left 02/01/2017    with patch     COLONOSCOPY      CORONARY ANGIOPLASTY WITH STENT PLACEMENT  2006, 2007    X 2 in LAD and one in ? CIRC w Dr Marietta Dupont Bilateral 2000's    Cataracts With Lens Implants    HYSTERECTOMY, TOTAL ABDOMINAL  1981    ROTATOR CUFF REPAIR Right 2003    TONSILLECTOMY  1954      As reviewed   Family History   Problem Relation Age of Onset    High Blood Pressure Mother     Migraines Mother     Heart Disease Father     High Blood Pressure Father     Other Father         Ulcer    Cancer Paternal Aunt     Diabetes Brother     Cancer Paternal Aunt     Diabetes Grandchild     Asthma Grandchild     Heart Disease Maternal Aunt     Colon Cancer Maternal Aunt     Diabetes Paternal Grandfather      Social History     Tobacco Use    Smoking status: Never Smoker    Smokeless tobacco: Never Used   Substance Use Topics    Alcohol use: No     Comment: caffeine 1-2 cups of coffee a day 2 pops a day      Review of Systems:    Constitutional: Negative for diaphoresis and fatigue  Psychological:Negative for anxiety or depression  HENT: Negative for headaches, nasal congestion, sinus pain or vertigo  Eyes: Negative for visual disturbance.    Endocrine: Negative for polydipsia/polyuria  Respiratory: Negative for shortness of breath  Cardiovascular: Negative for chest pain, dyspnea on exertion, claudication, edema, irregular heartbeat, murmur, palpitations or shortness of breath  Gastrointestinal: Negative for abdominal pain or heartburn  Genito-Urinary: Negative for urinary frequency/urgency  Musculoskeletal: Negative for muscle pain, muscular weakness, negative for pain in arm and leg or swelling in foot and leg  Neurological: Negative for dizziness, headaches, memory loss, numbness/tingling, visual changes, syncope  Dermatological: Negative for rash    Objective:    Vitals:    12/09/20 1640   BP: 116/64   Site: Right Upper Arm   Position: Sitting   Cuff Size: Large Adult   Pulse: 80   Weight: 161 lb (73 kg)   Height: 4' 11\" (1.499 m)     /64 (Site: Right Upper Arm, Position: Sitting, Cuff Size: Large Adult)   Pulse 80   Ht 4' 11\" (1.499 m)   Wt 161 lb (73 kg)   LMP  (LMP Unknown)   BMI 32.52 kg/m²     No flowsheet data found. Wt Readings from Last 3 Encounters:   12/09/20 161 lb (73 kg)   11/19/20 159 lb (72.1 kg)   03/07/20 153 lb 9.6 oz (69.7 kg)     Body mass index is 32.52 kg/m². GENERAL - Alert, oriented, pleasant, in no apparent distress. EYES: No jaundice, no conjunctival pallor. SKIN: It is warm & dry. No rashes. No Echhymosis    HEENT - No clinically significant abnormalities seen. Neck - Supple. No jugular venous distention noted. No carotid bruits. Cardiovascular - Normal S1 and S2 with3/6 KYMBERLY. Extremities - No cyanosis, clubbing, or significant edema. Pulmonary - No respiratory distress. No wheezes or rales. Abdomen - No masses, tenderness, or organomegaly. Musculoskeletal - No significant edema. No joint deformities. No muscle wasting. Neurologic - Cranial nerves II through XII are grossly intact. There were no gross focal neurologic abnormalities.     Lab Review   Lab Results   Component Value Date    CKTOTAL 71 04/02/2017    TROPONINT <0.010 04/02/2017     BNP:  No results found for: BNP  PT/INR:    Lab Results   Component Value Date    INR 0.97 02/16/2018     No results

## 2020-12-29 ENCOUNTER — PROCEDURE VISIT (OUTPATIENT)
Dept: CARDIOLOGY CLINIC | Age: 75
End: 2020-12-29
Payer: COMMERCIAL

## 2020-12-29 LAB
LV EF: 58 %
LVEF MODALITY: NORMAL

## 2020-12-29 PROCEDURE — 93306 TTE W/DOPPLER COMPLETE: CPT | Performed by: INTERNAL MEDICINE

## 2020-12-30 ENCOUNTER — TELEPHONE (OUTPATIENT)
Dept: CARDIOLOGY CLINIC | Age: 75
End: 2020-12-30

## 2020-12-30 NOTE — TELEPHONE ENCOUNTER
Patient notified of results. Conclusions      Summary   Left ventricular function is normal, EF is estimated at 55-60%. Mild left ventricular hypertrophy. Normal diastolic filling pattern for age. No regional wall motion abnormalities were detected. Mildly dilated left atrium. Sclerotic aortic valve. Mild aortic stenosis with a mean gradient of 13mmHg. Mild mitral , aortic and tricuspid regurgitation is present. RVSP is 32 mmHg. No evidence of pericardial effusion.    LORRAINE IN 6 MOS      Signature

## 2021-05-03 RX ORDER — TIZANIDINE 2 MG/1
2 TABLET ORAL 3 TIMES DAILY PRN
Qty: 30 TABLET | Refills: 0 | Status: SHIPPED | OUTPATIENT
Start: 2021-05-03 | End: 2021-05-20

## 2021-05-20 ENCOUNTER — OFFICE VISIT (OUTPATIENT)
Dept: INTERNAL MEDICINE CLINIC | Age: 76
End: 2021-05-20
Payer: COMMERCIAL

## 2021-05-20 VITALS
DIASTOLIC BLOOD PRESSURE: 70 MMHG | HEART RATE: 65 BPM | BODY MASS INDEX: 31.39 KG/M2 | OXYGEN SATURATION: 96 % | RESPIRATION RATE: 18 BRPM | SYSTOLIC BLOOD PRESSURE: 130 MMHG | WEIGHT: 155.4 LBS

## 2021-05-20 DIAGNOSIS — E03.4 HYPOTHYROIDISM DUE TO ACQUIRED ATROPHY OF THYROID: ICD-10-CM

## 2021-05-20 DIAGNOSIS — E87.1 HYPONATREMIA: ICD-10-CM

## 2021-05-20 DIAGNOSIS — F41.9 ANXIETY: ICD-10-CM

## 2021-05-20 DIAGNOSIS — I25.10 CORONARY ARTERY DISEASE INVOLVING NATIVE CORONARY ARTERY OF NATIVE HEART WITHOUT ANGINA PECTORIS: ICD-10-CM

## 2021-05-20 DIAGNOSIS — I10 ESSENTIAL HYPERTENSION: Primary | ICD-10-CM

## 2021-05-20 PROCEDURE — 99214 OFFICE O/P EST MOD 30 MIN: CPT | Performed by: INTERNAL MEDICINE

## 2021-05-20 RX ORDER — HYDROCHLOROTHIAZIDE 12.5 MG/1
12.5 CAPSULE, GELATIN COATED ORAL EVERY MORNING
Qty: 90 CAPSULE | Refills: 3 | Status: SHIPPED | OUTPATIENT
Start: 2021-05-20 | End: 2021-11-22 | Stop reason: SDUPTHER

## 2021-05-20 RX ORDER — HYDRALAZINE HYDROCHLORIDE 50 MG/1
TABLET, FILM COATED ORAL
Qty: 135 TABLET | Refills: 1 | Status: SHIPPED | OUTPATIENT
Start: 2021-05-20 | End: 2021-11-08

## 2021-05-20 RX ORDER — LEVOTHYROXINE SODIUM 0.03 MG/1
25 TABLET ORAL DAILY
Qty: 90 TABLET | Refills: 1 | Status: SHIPPED | OUTPATIENT
Start: 2021-05-20 | End: 2021-11-22 | Stop reason: SDUPTHER

## 2021-05-20 RX ORDER — CLOPIDOGREL BISULFATE 75 MG/1
75 TABLET ORAL DAILY
Qty: 90 TABLET | Refills: 1 | Status: SHIPPED | OUTPATIENT
Start: 2021-05-20 | End: 2021-11-22 | Stop reason: SDUPTHER

## 2021-05-20 RX ORDER — LOSARTAN POTASSIUM 100 MG/1
100 TABLET ORAL DAILY
Qty: 90 TABLET | Refills: 1 | Status: SHIPPED | OUTPATIENT
Start: 2021-05-20 | End: 2021-11-22 | Stop reason: SDUPTHER

## 2021-05-20 ASSESSMENT — PATIENT HEALTH QUESTIONNAIRE - PHQ9
SUM OF ALL RESPONSES TO PHQ QUESTIONS 1-9: 0
2. FEELING DOWN, DEPRESSED OR HOPELESS: 0
1. LITTLE INTEREST OR PLEASURE IN DOING THINGS: 0

## 2021-05-20 NOTE — PROGRESS NOTES
Kathryn Mcleansujata  1945 05/20/21    SUBJECTIVE:    Coronary artery disease has been asymptomatic w/o any ongoing sx of CP, SOB/RODRIGEZ, palpitations, lt headedness, diaphoresis. Is continuing medications regularly. Seeing dr Richard Clark also. Hypertension: Stable. Denies CP, SOB, cough, visual changes, dizziness, palpitations or HA. Hypothyroid has been asymptomatic w/o sx of fatigue, constipation, cold intolerance, depression. Chr mild low sodium 130-131, on hctz. Monitoring has been steady    Mood stable on ZOLOFT w/o current anxiety, depression or panic attacks. \    OBJECTIVE:    /70   Pulse 65   Resp 18   Wt 155 lb 6.4 oz (70.5 kg)   LMP  (LMP Unknown)   SpO2 96%   BMI 31.39 kg/m²     Physical Exam  Vitals reviewed. Constitutional:       Appearance: She is well-developed. HENT:      Head: Normocephalic and atraumatic. Eyes:      General: No scleral icterus. Right eye: No discharge. Left eye: No discharge. Conjunctiva/sclera: Conjunctivae normal.      Pupils: Pupils are equal, round, and reactive to light. Neck:      Thyroid: No thyromegaly. Vascular: No JVD. Trachea: No tracheal deviation. Cardiovascular:      Rate and Rhythm: Normal rate and regular rhythm. Heart sounds: Normal heart sounds. No murmur heard. No friction rub. No gallop. Pulmonary:      Effort: Pulmonary effort is normal. No respiratory distress. Breath sounds: Normal breath sounds. No wheezing or rales. Abdominal:      General: Bowel sounds are normal. There is no distension. Palpations: Abdomen is soft. There is no mass. Tenderness: There is no abdominal tenderness. There is no guarding or rebound. Hernia: No hernia is present. Musculoskeletal:      Cervical back: Neck supple. Lymphadenopathy:      Cervical: No cervical adenopathy. Skin:     General: Skin is warm and dry. Neurological:      Mental Status: She is alert.    Psychiatric: Mood and Affect: Mood normal.         ASSESSMENT:    1. Essential hypertension    2. Coronary artery disease involving native coronary artery of native heart without angina pectoris    3. Hypothyroidism due to acquired atrophy of thyroid    4. Anxiety    5. Hyponatremia        PLAN:    Elif Hurtado was seen today for 6 month follow-up and medication refill. Diagnoses and all orders for this visit:    Essential hypertension  - Blood pressure stable and will continue current regimen. Will plan periodic monitoring of renal function, electrolytes, lipid profile. STATES SEEING DR VINOD WOLFE AT THIS TIME  -     hydrALAZINE (APRESOLINE) 50 MG tablet; Indications: Pt takes 25 mg in am and 50 mg in pm 1/2 tab in am and 1 tab in pm  -     hydroCHLOROthiazide (MICROZIDE) 12.5 MG capsule; Take 1 capsule by mouth every morning  -     losartan (COZAAR) 100 MG tablet; Take 1 tablet by mouth daily  -     Comprehensive Metabolic Panel; Future  -     CBC Auto Differential; Future    Coronary artery disease involving native coronary artery of native heart without angina pectoris  - Coronary artery disease stable and asymptomatic, will continue to monitor for any signs of instability. Will periodically monitor lipid profile and liver function, cardiac risk factors. Continue current medical regimen. -     clopidogrel (PLAVIX) 75 MG tablet; Take 1 tablet by mouth daily  -     Comprehensive Metabolic Panel; Future  -     CBC Auto Differential; Future  -     Lipid Panel; Future    Hypothyroidism due to acquired atrophy of thyroid - Clinically hypothyroidism appears stable and will continue current dosing, also periodic monitoring of TFTs. -     levothyroxine (SYNTHROID) 25 MCG tablet; Take 1 tablet by mouth daily  -     TSH without Reflex; Future  -     T4, Free; Future    Anxiety - Mood stable on current regimen w/o any significant manifestations of severe depression or anxiety noted at this time. Cont current meds.     - sertraline (ZOLOFT) 50 MG tablet; Take 1 tablet by mouth daily    Hyponatremia- STABLE AND LIKELY FR HCTZ, CONT MONITOR PERIODIC LAB  -     Comprehensive Metabolic Panel;  Future

## 2021-06-25 ENCOUNTER — OFFICE VISIT (OUTPATIENT)
Dept: CARDIOLOGY CLINIC | Age: 76
End: 2021-06-25
Payer: COMMERCIAL

## 2021-06-25 VITALS
DIASTOLIC BLOOD PRESSURE: 74 MMHG | HEIGHT: 59 IN | BODY MASS INDEX: 31.29 KG/M2 | HEART RATE: 68 BPM | WEIGHT: 155.2 LBS | SYSTOLIC BLOOD PRESSURE: 126 MMHG

## 2021-06-25 DIAGNOSIS — I25.10 CORONARY ARTERY DISEASE INVOLVING NATIVE CORONARY ARTERY OF NATIVE HEART WITHOUT ANGINA PECTORIS: ICD-10-CM

## 2021-06-25 DIAGNOSIS — I10 HYPERTENSION, UNSPECIFIED TYPE: Primary | ICD-10-CM

## 2021-06-25 DIAGNOSIS — Z98.890 HISTORY OF CEA (CAROTID ENDARTERECTOMY): ICD-10-CM

## 2021-06-25 DIAGNOSIS — I38 VHD (VALVULAR HEART DISEASE): ICD-10-CM

## 2021-06-25 DIAGNOSIS — I65.29 STENOSIS OF CAROTID ARTERY, UNSPECIFIED LATERALITY: ICD-10-CM

## 2021-06-25 DIAGNOSIS — R09.89 BRUIT: ICD-10-CM

## 2021-06-25 PROCEDURE — 99214 OFFICE O/P EST MOD 30 MIN: CPT | Performed by: INTERNAL MEDICINE

## 2021-06-25 NOTE — PATIENT INSTRUCTIONS
Please be informed that if you contact our office outside of normal business hours the physician on call cannot help with any scheduling or rescheduling issues, procedure instruction questions or any type of medication issue. We advise you for any urgent/emergency that you go to the nearest emergency room! PLEASE CALL OUR OFFICE DURING NORMAL BUSINESS HOURS    Monday - Friday   8 am to 5 pm    [address] [address]: [phone]    [place]:  [phone]      **It is YOUR responsibilty to bring medication bottles and/or updated medication list to [address] [place] [address].  This will allow us to better serve you and all your healthcare needs**

## 2021-06-25 NOTE — LETTER
[address]sh[address]  1945  [id number]    Have you had any Chest Pain that is not new? - No  If Yes DO EKG - How does it feel -    How long does the pain last -      How long have you been having the pain -    Did you take a    And did it relieve the pain -      DO EKG IF: Patient has a Heart Rate above 100 or below 40     CAD (Coronary Artery Disease) patient should have one on file every 6 months        Have you had any Shortness of Breath - No  If Yes - When     Have you had any dizziness - No  If Yes DO ORTHOSTATIC BP - when do you feel dizzy    How long does it last .        Sitting wait 5 minutes do supine (laying down) wait 5 minutes then do standing - log each in \"vitals\" area in Epic   Be sure to ask what symptoms they are having if they get dizzy while completing ortho stats such as: room spinning, nausea, etc.    Have you had any palpitations that are not new? - No  If Yes DO EKG - Do you feel your heart   How long does it last - . Is the patient on any of the following medications -   If Yes DO EKG - Needs done every 6 months    Do you have any edema - swelling in No    If Yes - CHECK TO SEE IF THE EDEMA IS PITTING  How long have they been having edema -   If Yes - Have they worn compression stockings   If they have worn compression stockings      Vein \"LEG PROBLEM Questionnaire\"  1. Do you have prominent leg veins? Yes   2. Do you have any skin discoloration? No  3. Do you have any healed or active sores? No  4. Do you have swelling of the legs? No  5. Do you have a family history of varicose veins? Yes  6. Does your profession involve pro-longed        standing or heavy lifting? No  7. Have you been fighting overweight problems? Yes  8. Do you have restless legs? No  9. Do you have any night time cramps? No  10.  Do you have any of the following in your legs:        Tiredness and weakness     When did you have your last labs drawn

## 2021-06-25 NOTE — PROGRESS NOTES
Vanda Yarbrough  is a  Established patient  ,67 y.o.   female here for evaluation of the following chief complaint(s):    Here for fu        SUBJECTIVE/OBJECTIVE:  HPI : h/o  Cad, htn, hyperlipidimea, CEA now here  Has no cardiac complains    Review of Systems    neg    Vitals:    06/25/21 1052   BP: 126/74   Pulse: 68   Weight: 155 lb 3.2 oz (70.4 kg)   Height: 4' 11\" (1.499 m)     /74   Pulse 68   Ht 4' 11\" (1.499 m)   Wt 155 lb 3.2 oz (70.4 kg)   LMP  (LMP Unknown)   BMI 31.35 kg/m²   No flowsheet data found. Wt Readings from Last 3 Encounters:   06/25/21 155 lb 3.2 oz (70.4 kg)   05/20/21 155 lb 6.4 oz (70.5 kg)   12/09/20 161 lb (73 kg)     Body mass index is 31.35 kg/m². Physical Exam     Neck: JVD  no    Lungs : clear    Cardio : Si and S2 audilble      Ext: edema  no    All pertinent data reviewed  y    Meds : reviewed   y      Tests ordered    n    ASSESSMENT/PLAN:               -     CORONARY ARTERY DISEASE:  asymptomatic     All available  tests in chart reviewed. Management discussed . Testing ordered  no       On plavix                          -  Hypertension: Patients blood pressure is normal. Patient is advised about low sodium diet. Present medical regimen will not be changed. on metoprolol                    -  LIPID MANAGEMENT:  Importance of lipid levels discussed with patient   and patient was given dietary advice. NCEP- ATP III guidelines reviewed with patient. -   Changes  in medicines made: No                     On zocor           - CEA  Stable, car US    An electronic signature was used to authenticate this note.     --Terrance Saravia MD

## 2021-07-23 ENCOUNTER — PROCEDURE VISIT (OUTPATIENT)
Dept: CARDIOLOGY CLINIC | Age: 76
End: 2021-07-23
Payer: COMMERCIAL

## 2021-07-23 DIAGNOSIS — I25.10 CORONARY ARTERY DISEASE INVOLVING NATIVE CORONARY ARTERY OF NATIVE HEART WITHOUT ANGINA PECTORIS: ICD-10-CM

## 2021-07-23 DIAGNOSIS — Z98.890 HISTORY OF CEA (CAROTID ENDARTERECTOMY): ICD-10-CM

## 2021-07-23 DIAGNOSIS — I38 VHD (VALVULAR HEART DISEASE): ICD-10-CM

## 2021-07-23 DIAGNOSIS — I10 HYPERTENSION, UNSPECIFIED TYPE: ICD-10-CM

## 2021-07-23 DIAGNOSIS — I65.29 STENOSIS OF CAROTID ARTERY, UNSPECIFIED LATERALITY: Primary | ICD-10-CM

## 2021-07-23 DIAGNOSIS — R09.89 BRUIT: ICD-10-CM

## 2021-07-23 PROCEDURE — 93880 EXTRACRANIAL BILAT STUDY: CPT | Performed by: INTERNAL MEDICINE

## 2021-08-09 DIAGNOSIS — I25.10 CORONARY ARTERY DISEASE INVOLVING NATIVE CORONARY ARTERY OF NATIVE HEART WITHOUT ANGINA PECTORIS: ICD-10-CM

## 2021-08-09 DIAGNOSIS — I10 ESSENTIAL HYPERTENSION: ICD-10-CM

## 2021-08-09 RX ORDER — SIMVASTATIN 20 MG
20 TABLET ORAL NIGHTLY
Qty: 90 TABLET | Refills: 1 | Status: SHIPPED | OUTPATIENT
Start: 2021-08-09 | End: 2021-11-22 | Stop reason: SDUPTHER

## 2021-08-09 RX ORDER — METOPROLOL SUCCINATE 25 MG/1
25 TABLET, EXTENDED RELEASE ORAL DAILY
Qty: 90 TABLET | Refills: 1 | Status: SHIPPED | OUTPATIENT
Start: 2021-08-09 | End: 2021-11-22 | Stop reason: SDUPTHER

## 2021-09-28 ENCOUNTER — TELEPHONE (OUTPATIENT)
Dept: INTERNAL MEDICINE CLINIC | Age: 76
End: 2021-09-28

## 2021-09-28 ENCOUNTER — NURSE TRIAGE (OUTPATIENT)
Dept: OTHER | Facility: CLINIC | Age: 76
End: 2021-09-28

## 2021-09-28 ENCOUNTER — TELEMEDICINE (OUTPATIENT)
Dept: INTERNAL MEDICINE CLINIC | Age: 76
End: 2021-09-28
Payer: COMMERCIAL

## 2021-09-28 DIAGNOSIS — R68.89 FLU-LIKE SYMPTOMS: ICD-10-CM

## 2021-09-28 DIAGNOSIS — J01.00 ACUTE NON-RECURRENT MAXILLARY SINUSITIS: Primary | ICD-10-CM

## 2021-09-28 PROCEDURE — 99213 OFFICE O/P EST LOW 20 MIN: CPT | Performed by: INTERNAL MEDICINE

## 2021-09-28 RX ORDER — PREDNISONE 1 MG/1
TABLET ORAL
Qty: 21 TABLET | Refills: 0 | Status: SHIPPED | OUTPATIENT
Start: 2021-09-28 | End: 2021-11-22 | Stop reason: ALTCHOICE

## 2021-09-28 RX ORDER — BENZONATATE 100 MG/1
100 CAPSULE ORAL 3 TIMES DAILY PRN
Qty: 30 CAPSULE | Refills: 0 | Status: SHIPPED | OUTPATIENT
Start: 2021-09-28 | End: 2021-12-17 | Stop reason: SDUPTHER

## 2021-09-28 RX ORDER — AZITHROMYCIN 250 MG/1
250 TABLET, FILM COATED ORAL SEE ADMIN INSTRUCTIONS
Qty: 6 TABLET | Refills: 0 | Status: SHIPPED | OUTPATIENT
Start: 2021-09-28 | End: 2021-12-17 | Stop reason: SDUPTHER

## 2021-09-28 NOTE — TELEPHONE ENCOUNTER
Patient calls- she states she went to [facility] to be swabbed for COVID. The person who swabbed her told her that her results could take up to 3 days. Patient then went and purchased an in home rapid test and it was positive. Dr. Paige Orourke did sign order for [facility]. I [facility] to [facility] and no one was available to ask if that would be acceptable. Order faxed to [address] [place] [address] for [address]

## 2021-09-28 NOTE — TELEPHONE ENCOUNTER
Call came in from [place] at the [facility]    Duplicate call:  Patient previously evaluated by her physician and advised to be covid tested and to let him know the results of the test so that they could provide and infusion. Patient is now calling back with a positive result. Warm transfer to the [facility] for physician contact.

## 2021-09-28 NOTE — PROGRESS NOTES
2021    TELEHEALTH EVALUATION -- Audio/Visual (During [facility] public health emergency)    HPI:    Stephen Polanco (:  1945) has requested an audio/video evaluation for the following concern(s):    Shelli Rose has had some recurring cough the past week. Gradually worse and w fever. Has had covid pfizer vaccine  and Feb.  No loss of taste or smell. POx at home on RA 97%    Review of Systems    Prior to Visit Medications    Medication Sig Taking? Authorizing Provider   metoprolol succinate (TOPROL XL) 25 MG extended release tablet Take 1 tablet by mouth daily  Dominique Bianchi MD   simvastatin (ZOCOR) 20 MG tablet Take 1 tablet by mouth nightly  Dominique Bianchi MD   sertraline (ZOLOFT) 50 MG tablet Take 1 tablet by mouth daily  Dominique Bianchi MD   clopidogrel (PLAVIX) 75 MG tablet Take 1 tablet by mouth daily  Dominique Bianchi MD   hydrALAZINE (APRESOLINE) 50 MG tablet Indications: Pt takes 25 mg in am and 50 mg in pm 1/2 tab in am and 1 tab in pm  Dominique Bianchi MD   levothyroxine (SYNTHROID) 25 MCG tablet Take 1 tablet by mouth daily  Dominique Bianchi MD   hydroCHLOROthiazide (MICROZIDE) 12.5 MG capsule Take 1 capsule by mouth every morning  Dominique Bianchi MD   losartan (COZAAR) 100 MG tablet Take 1 tablet by mouth daily  Dominique Bianchi MD   Multiple Vitamins-Minerals (THERAPEUTIC MULTIVITAMIN-MINERALS) tablet Take 1 tablet by mouth daily  Historical Provider, MD   Acetaminophen (TYLENOL ARTHRITIS PAIN PO) Take by mouth as needed  Historical Provider, MD   melatonin 3 MG TABS tablet Take 10 mg by mouth nightly   Historical Provider, MD   aspirin 81 MG tablet Take 81 mg by mouth nightly   Historical Provider, MD       Social History     Tobacco Use    Smoking status: Never Smoker    Smokeless tobacco: Never Used   Vaping Use    Vaping Use: Never used   Substance Use Topics    Alcohol use: No     Comment: caffeine 1-2 cups of coffee a day 2 pops a day    Drug use:  No PHYSICAL EXAMINATION:  [ INSTRUCTIONS:  \"[x]\" Indicates a positive item  \"[]\" Indicates a negative item  -- DELETE ALL ITEMS NOT EXAMINED]  Vital Signs: (As obtained by patient/caregiver or practitioner observation)    Blood pressure-  Heart rate-    Respiratory rate-    Temperature-  Pulse oximetry-     Constitutional: [x] Appears well-developed and well-nourished [] No apparent distress      [] Abnormal-   Mental status  [] Alert and awake  [] Oriented to person/place/time []Able to follow commands      Eyes:  EOM    []  Normal  [] Abnormal-  Sclera  []  Normal  [] Abnormal -         Discharge []  None visible  [] Abnormal -    HENT:   [] Normocephalic, atraumatic. [] Abnormal   [] Mouth/Throat: Mucous membranes are moist.     External Ears [] Normal  [] Abnormal-     Neck: [] No visualized mass     Pulmonary/Chest: [x] Respiratory effort normal.  [] No visualized signs of difficulty breathing or respiratory distress        [x] Abnormal- w some coughing. Musculoskeletal:   [] Normal gait with no signs of ataxia         [] Normal range of motion of neck        [] Abnormal-       Neurological:        [] No Facial Asymmetry (Cranial nerve 7 motor function) (limited exam to video visit)          [] No gaze palsy        [] Abnormal-         Skin:        [] No significant exanthematous lesions or discoloration noted on facial skin         [] Abnormal-            Psychiatric:       [] Normal Affect [] No Hallucinations        [] Abnormal-     Other pertinent observable physical exam findings-     ASSESSMENT/PLAN:  1. Acute non-recurrent maxillary sinusitis  Has tested now this afternoon for COVID after I'd also rec testing, swab done for PCR at [facility] but this result is pending. I'e rec regeneron. Dtr called in and had wanted this given ASAP but we don;'t have control of when, and I'm also unsure if the home test is adequate or if needs the PCR confirmation.   We discussed this w family and have already sent orders for regeneron infusion but if cannot be done tomorrow also gave them the option to have her evaluated ASAP at [facility] ED which was one option the dtr had mentioned. Prior to dx of COVID I'd  Prescribed also rx as below. - predniSONE (DELTASONE) 5 MG tablet; Take 6 tablets by mouth on day 1, 5 on day 2, 4 on day 3, 3 on day 4, 2 on day 5, 1 on day 6. Dispense: 21 tablet; Refill: 0  - azithromycin (ZITHROMAX) 250 MG tablet; Take 1 tablet by mouth See Admin Instructions for 5 days 500mg on day 1 followed by 250mg on days 2 - 5  Dispense: 6 tablet; Refill: 0  - benzonatate (TESSALON) 100 MG capsule; Take 1 capsule by mouth 3 times daily as needed for Cough  Dispense: 30 capsule; Refill: 0    2. Flu-like symptoms  Plan as noted  - predniSONE (DELTASONE) 5 MG tablet; Take 6 tablets by mouth on day 1, 5 on day 2, 4 on day 3, 3 on day 4, 2 on day 5, 1 on day 6. Dispense: 21 tablet; Refill: 0  - benzonatate (TESSALON) 100 MG capsule; Take 1 capsule by mouth 3 times daily as needed for Cough  Dispense: 30 capsule; Refill: 0  - Covid-19 Ambulatory; Future      Return if symptoms worsen or fail to improve. Shayne Dahl, was evaluated through a synchronous (real-time) audio-video encounter. The patient (or guardian if applicable) is aware that this is a billable service. Verbal consent to proceed has been obtained within the past 12 months. The visit was conducted pursuant to the emergency declaration under the [facility], [address] [place] [address] authority and the [place] and [place] General Act. Patient identification was verified, and a caregiver was present when appropriate. The patient was located in a state where the provider was credentialed to provide care. Total time spent on this encounter: Not billed by time    --Radha Carrera MD on 9/28/2021 at 11:23 AM    An electronic signature was used to authenticate this note.

## 2021-09-29 ENCOUNTER — HOSPITAL ENCOUNTER (OUTPATIENT)
Dept: INFUSION THERAPY | Age: 76
Setting detail: INFUSION SERIES
Discharge: HOME | End: 2021-09-29
Payer: COMMERCIAL

## 2021-09-29 VITALS
DIASTOLIC BLOOD PRESSURE: 68 MMHG | TEMPERATURE: 97.7 F | RESPIRATION RATE: 20 BRPM | HEART RATE: 74 BPM | SYSTOLIC BLOOD PRESSURE: 161 MMHG | OXYGEN SATURATION: 96 %

## 2021-09-29 LAB
COMMENT: ABNORMAL
SARS-COV-2 RNA, RT PCR: DETECTED
SOURCE: ABNORMAL

## 2021-09-29 PROCEDURE — 6360000002 HC RX W HCPCS: Performed by: INTERNAL MEDICINE

## 2021-09-29 PROCEDURE — 2580000003 HC RX 258: Performed by: INTERNAL MEDICINE

## 2021-09-29 PROCEDURE — M0243 CASIRIVI AND IMDEVI INFUSION: HCPCS

## 2021-09-29 RX ORDER — SODIUM CHLORIDE 0.9 % (FLUSH) 0.9 %
5-40 SYRINGE (ML) INJECTION EVERY 12 HOURS SCHEDULED
Status: DISCONTINUED | OUTPATIENT
Start: 2021-09-29 | End: 2021-09-30 | Stop reason: HOSPADM

## 2021-09-29 RX ORDER — SODIUM CHLORIDE 0.9 % (FLUSH) 0.9 %
5-40 SYRINGE (ML) INJECTION PRN
Status: DISCONTINUED | OUTPATIENT
Start: 2021-09-29 | End: 2021-09-30 | Stop reason: HOSPADM

## 2021-09-29 RX ORDER — SODIUM CHLORIDE 9 MG/ML
25 INJECTION, SOLUTION INTRAVENOUS PRN
Status: DISCONTINUED | OUTPATIENT
Start: 2021-09-29 | End: 2021-09-30 | Stop reason: HOSPADM

## 2021-09-29 RX ADMIN — CASIRIVIMAB AND IMDEVIMAB: 600; 600 INJECTION, SOLUTION, CONCENTRATE INTRAVENOUS at 14:11

## 2021-09-29 NOTE — RESULT ENCOUNTER NOTE
Call pt, HER PCR TEST CONFIRMS COVID INFECTION. PLS CHECK W AMBROSIO ON STATUS-- IS SHE SCHED FOR REGENERON INFUSION AND HOW IS SHE DOING?   PLS LET ME KNOW IF IS WORSENING

## 2021-09-29 NOTE — PROGRESS NOTES
Tolerated Regeneron infusion well. Reviewed discharge instruction, voiced understanding. Copies of AVS given. Pt discharged home. Pt to exit via ambulation with nurse at side. Orders Placed This Encounter   Medications    casirivimab-imdevimab 1,200 mg in sodium chloride 0.9 % 110 mL IVPB     Order Specific Question:   Does this patient qualify for COVID-19 antibody therapy based on criteria for treatment? Answer:    Yes    sodium chloride flush 0.9 % injection 5-40 mL    sodium chloride flush 0.9 % injection 5-40 mL    0.9 % sodium chloride infusion

## 2021-10-06 ENCOUNTER — TELEPHONE (OUTPATIENT)
Dept: INTERNAL MEDICINE CLINIC | Age: 76
End: 2021-10-06

## 2021-10-06 NOTE — TELEPHONE ENCOUNTER
Patient called asking when or if she needed to be re tested for COVID. She was + on 9/28/21 and had Regeneron on 9/29/21. She reports she is feeling fine. Please advise.

## 2021-10-06 NOTE — TELEPHONE ENCOUNTER
Since now feeling clinically better, does not need to retest.  Some folks can stay positive on nasal swab but have difficulty clearing infection completely, so have a positive test for weeks or months and even if positive we do not treat this. So whether tests positive or negative would not change our care, especially since is now better.

## 2021-10-11 DIAGNOSIS — R68.89 FLU-LIKE SYMPTOMS: Primary | ICD-10-CM

## 2021-10-12 LAB
COMMENT: NORMAL
SARS-COV-2 RNA, RT PCR: NOT DETECTED
SOURCE: NORMAL

## 2021-11-15 LAB
ABSOLUTE IMMATURE GRANULOCYTE: 0 K/UL (ref 0–0.1)
ALBUMIN/GLOBULIN RATIO: 1.8 RATIO (ref 0.8–2.6)
ALBUMIN: 4.6 G/DL (ref 3.5–5.2)
ALP BLD-CCNC: 79 U/L (ref 23–144)
ALT SERPL-CCNC: 30 U/L (ref 0–60)
AST SERPL-CCNC: 23 U/L (ref 0–55)
BASOPHILS ABSOLUTE: 0.1 K/UL (ref 0–0.3)
BASOPHILS RELATIVE PERCENT: 0.8 % (ref 0–2)
BILIRUB SERPL-MCNC: 0.3 MG/DL (ref 0–1.2)
BUN BLDV-MCNC: 19 MG/DL (ref 3–29)
BUN/CREAT BLD: 21 (ref 7–25)
CALCIUM SERPL-MCNC: 10.8 MG/DL (ref 8.5–10.5)
CHLORIDE BLD-SCNC: 95 MEQ/L (ref 96–110)
CHOLESTEROL: 158 MG/DL
CO2: 28 MEQ/L (ref 19–32)
CREAT SERPL-MCNC: 0.9 MG/DL (ref 0.5–1.2)
DIFFERENTIAL TYPE: NORMAL
EOSINOPHILS ABSOLUTE: 0.3 K/UL (ref 0–0.5)
EOSINOPHILS RELATIVE PERCENT: 3.2 % (ref 0–5)
GLOBULIN: 2.6 G/DL (ref 1.9–3.6)
GLOMERULAR FILTRATION RATE: 62 MLS/MIN/1.73M2
GLUCOSE BLD-MCNC: 119 MG/DL (ref 70–99)
HCT VFR BLD CALC: 36.5 % (ref 34–49)
HDLC SERPL-MCNC: 55 MG/DL
HEMOGLOBIN: 12.6 G/DL (ref 11.2–15.7)
IMMATURE GRANULOCYTES: 0.4 %
LDL CHOLESTEROL CALCULATED: 75 MG/DL
LYMPHOCYTES ABSOLUTE: 1.5 K/UL (ref 0.9–4.1)
LYMPHOCYTES RELATIVE PERCENT: 19.8 % (ref 14–51)
MCH RBC QN AUTO: 30.2 PG (ref 26–34)
MCHC RBC AUTO-ENTMCNC: 34.5 G/DL (ref 30.7–35.5)
MCV RBC AUTO: 87.5 FL (ref 80–100)
MONOCYTES ABSOLUTE: 0.9 K/UL (ref 0.2–1)
MONOCYTES RELATIVE PERCENT: 11.5 % (ref 4–12)
NEUTROPHILS ABSOLUTE: 5 K/UL (ref 1.8–7.5)
NEUTROPHILS RELATIVE PERCENT: 64.3 % (ref 42–80)
NUCLEATED RBCS: 0 /100 WBC
PDW BLD-RTO: 13.1 %
PLATELET # BLD: 264 K/UL (ref 140–400)
PMV BLD AUTO: 10.8 FL (ref 7.2–11.7)
POTASSIUM SERPL-SCNC: 4 MEQ/L (ref 3.4–5.3)
RBC # BLD: 4.17 M/UL (ref 3.95–5.26)
SODIUM BLD-SCNC: 132 MEQ/L (ref 135–148)
STATUS: ABNORMAL
T4 FREE: 1.3 NG/DL (ref 0.8–1.8)
TOTAL PROTEIN: 7.2 G/DL (ref 6–8.3)
TRIGL SERPL-MCNC: 140 MG/DL
TSH SERPL DL<=0.05 MIU/L-ACNC: 2.63 MCIU/ML (ref 0.4–4.5)
VLDLC SERPL CALC-MCNC: 28 MG/DL (ref 4–38)
WBC: 7.7 K/UL (ref 3.5–10.9)

## 2021-11-22 ENCOUNTER — OFFICE VISIT (OUTPATIENT)
Dept: INTERNAL MEDICINE CLINIC | Age: 76
End: 2021-11-22
Payer: COMMERCIAL

## 2021-11-22 VITALS
HEART RATE: 67 BPM | OXYGEN SATURATION: 97 % | RESPIRATION RATE: 18 BRPM | WEIGHT: 154 LBS | BODY MASS INDEX: 31.04 KG/M2 | SYSTOLIC BLOOD PRESSURE: 122 MMHG | HEIGHT: 59 IN | DIASTOLIC BLOOD PRESSURE: 72 MMHG

## 2021-11-22 DIAGNOSIS — Z00.00 ROUTINE GENERAL MEDICAL EXAMINATION AT A HEALTH CARE FACILITY: Primary | ICD-10-CM

## 2021-11-22 DIAGNOSIS — F41.9 ANXIETY: ICD-10-CM

## 2021-11-22 DIAGNOSIS — I25.10 CORONARY ARTERY DISEASE INVOLVING NATIVE CORONARY ARTERY OF NATIVE HEART WITHOUT ANGINA PECTORIS: ICD-10-CM

## 2021-11-22 DIAGNOSIS — E03.4 HYPOTHYROIDISM DUE TO ACQUIRED ATROPHY OF THYROID: ICD-10-CM

## 2021-11-22 DIAGNOSIS — I10 ESSENTIAL HYPERTENSION: ICD-10-CM

## 2021-11-22 PROCEDURE — G0439 PPPS, SUBSEQ VISIT: HCPCS | Performed by: INTERNAL MEDICINE

## 2021-11-22 PROCEDURE — 99214 OFFICE O/P EST MOD 30 MIN: CPT | Performed by: INTERNAL MEDICINE

## 2021-11-22 RX ORDER — HYDROCHLOROTHIAZIDE 12.5 MG/1
12.5 CAPSULE, GELATIN COATED ORAL EVERY MORNING
Qty: 90 CAPSULE | Refills: 1 | Status: SHIPPED | OUTPATIENT
Start: 2021-11-22 | End: 2022-03-02

## 2021-11-22 RX ORDER — LOSARTAN POTASSIUM 100 MG/1
100 TABLET ORAL DAILY
Qty: 90 TABLET | Refills: 1 | Status: SHIPPED | OUTPATIENT
Start: 2021-11-22 | End: 2022-06-08

## 2021-11-22 RX ORDER — SIMVASTATIN 20 MG
20 TABLET ORAL NIGHTLY
Qty: 90 TABLET | Refills: 1 | Status: SHIPPED | OUTPATIENT
Start: 2021-11-22 | End: 2022-05-23 | Stop reason: SDUPTHER

## 2021-11-22 RX ORDER — LEVOTHYROXINE SODIUM 0.03 MG/1
25 TABLET ORAL DAILY
Qty: 90 TABLET | Refills: 1 | Status: SHIPPED | OUTPATIENT
Start: 2021-11-22 | End: 2022-03-02

## 2021-11-22 RX ORDER — CLOPIDOGREL BISULFATE 75 MG/1
75 TABLET ORAL DAILY
Qty: 90 TABLET | Refills: 1 | Status: SHIPPED | OUTPATIENT
Start: 2021-11-22 | End: 2022-03-02

## 2021-11-22 RX ORDER — HYDRALAZINE HYDROCHLORIDE 50 MG/1
TABLET, FILM COATED ORAL
Qty: 135 TABLET | Refills: 1 | Status: SHIPPED | OUTPATIENT
Start: 2021-11-22 | End: 2022-03-02

## 2021-11-22 RX ORDER — METOPROLOL SUCCINATE 25 MG/1
25 TABLET, EXTENDED RELEASE ORAL DAILY
Qty: 90 TABLET | Refills: 1 | Status: SHIPPED | OUTPATIENT
Start: 2021-11-22 | End: 2022-03-02 | Stop reason: SDUPTHER

## 2021-11-22 ASSESSMENT — PATIENT HEALTH QUESTIONNAIRE - PHQ9
SUM OF ALL RESPONSES TO PHQ QUESTIONS 1-9: 0
1. LITTLE INTEREST OR PLEASURE IN DOING THINGS: 0
SUM OF ALL RESPONSES TO PHQ QUESTIONS 1-9: 0
SUM OF ALL RESPONSES TO PHQ QUESTIONS 1-9: 0
2. FEELING DOWN, DEPRESSED OR HOPELESS: 0
SUM OF ALL RESPONSES TO PHQ9 QUESTIONS 1 & 2: 0

## 2021-11-22 ASSESSMENT — LIFESTYLE VARIABLES: HOW OFTEN DO YOU HAVE A DRINK CONTAINING ALCOHOL: 0

## 2021-11-22 NOTE — PATIENT INSTRUCTIONS
Please consider your Diantroberto Poli covid booster 12/29 or later. Please also consider an exercise peddler working up to 2 hrs/day. Personalized Preventive Plan for Carlos Gasca - 11/22/2021  Medicare offers a range of preventive health benefits. Some of the tests and screenings are paid in full while other may be subject to a deductible, co-insurance, and/or copay. Some of these benefits include a comprehensive review of your medical history including lifestyle, illnesses that may run in your family, and various assessments and screenings as appropriate. After reviewing your medical record and screening and assessments performed today your provider may have ordered immunizations, labs, imaging, and/or referrals for you. A list of these orders (if applicable) as well as your Preventive Care list are included within your After Visit Summary for your review. Other Preventive Recommendations:    · A preventive eye exam performed by an eye specialist is recommended every 1-2 years to screen for glaucoma; cataracts, macular degeneration, and other eye disorders. · A preventive dental visit is recommended every 6 months. · Try to get at least 150 minutes of exercise per week or 10,000 steps per day on a pedometer . · Order or download the FREE \"Exercise & Physical Activity: Your Everyday Guide\" from [employer]. Call 3-977.280.1374 or search [employer] online. · You need 2142-3508 mg of calcium and 9333-6861 IU of vitamin D per day. It is possible to meet your calcium requirement with diet alone, but a vitamin D supplement is usually necessary to meet this goal.  · When exposed to the sun, use a sunscreen that protects against both UVA and UVB radiation with an SPF of 30 or greater. Reapply every 2 to 3 hours or after sweating, drying off with a towel, or swimming. · Always wear a seat belt when traveling in a car.  Always wear a helmet when riding a bicycle or motorcycle.

## 2021-11-22 NOTE — PROGRESS NOTES
Medicare Annual Wellness Visit  Name: Sabrina Jon Date: 2021   MRN: [id number] Sex: Female   Age: 67 y.o. Ethnicity: Non- / Non    : 1945 Race: White (non-)      Lore Banegas is here for Medicare AWV    Screenings for behavioral, psychosocial and functional/safety risks, and cognitive dysfunction are all negative except as indicated below. These results, as well as other patient data from the [address] form, are documented in Flowsheets linked to this Encounter. Hypertension: Stable. Denies CP, SOB, cough, visual changes, dizziness, palpitations or HA. Lipids:  Is continuing statin therapy and low fat diet. Tolerating medications w/o myalgias or GI upset. Hypothyroid has been asymptomatic w/o sx of fatigue, constipation, cold intolerance, depression. Coronary artery disease has been asymptomatic w/o any ongoing sx of CP, SOB/RODRIGEZ, palpitations, lt headedness, diaphoresis. Is continuing medications regularly. Anxiety, some stress w sister Larry Rutherforder having had a stroke this summer, had fallen forward, but now no resid deficits. Has had prior covid, regeneron was . Allergies   Allergen Reactions    Celexa [Citalopram] Nausea And Vomiting    Poison Ivy Extract [Poison Ivy Extract] Rash    Septra [Sulfamethoxazole-Trimethoprim] Nausea And Vomiting    Ultram [Tramadol] Nausea And Vomiting    Valsartan Other (See Comments)     Increased K    Xanax Xr [Alprazolam Er]      NOT ALLERGY, FAMILY STRONGLY OPPOSED TO BENZODIAZEPINES       Prior to Visit Medications    Medication Sig Taking?  Authorizing Provider   hydrALAZINE (APRESOLINE) 50 MG tablet TAKE 1/2 TABLET BY MOUTH IN THE MORNING AND TAKE 1 TABLET BY MOUTH IN THE EVENING Yes Terrence Oliva MD   metoprolol succinate (TOPROL XL) 25 MG extended release tablet Take 1 tablet by mouth daily Yes Terrence Oliva MD   simvastatin (ZOCOR) 20 MG tablet Take 1 tablet by mouth nightly Yes Elena Francis MD   sertraline (ZOLOFT) 50 MG tablet Take 1 tablet by mouth daily Yes Elena Francis MD   clopidogrel (PLAVIX) 75 MG tablet Take 1 tablet by mouth daily Yes Eelna Francis MD   levothyroxine (SYNTHROID) 25 MCG tablet Take 1 tablet by mouth daily Yes Elena Francis MD   hydroCHLOROthiazide (MICROZIDE) 12.5 MG capsule Take 1 capsule by mouth every morning Yes Elean Francis MD   Multiple Vitamins-Minerals (THERAPEUTIC MULTIVITAMIN-MINERALS) tablet Take 1 tablet by mouth daily Yes Historical Provider, MD   Acetaminophen (TYLENOL ARTHRITIS PAIN PO) Take by mouth as needed Yes Historical Provider, MD   melatonin 3 MG TABS tablet Take 10 mg by mouth nightly  Yes Historical Provider, MD   aspirin 81 MG tablet Take 81 mg by mouth nightly  Yes Historical Provider, MD   losartan (COZAAR) 100 MG tablet Take 1 tablet by mouth daily  Elena Francis MD       Past Medical History:   Diagnosis Date    Arthritis     \"Hands\"    CAD (coronary artery disease) 2006    Dr Jaida Hdz kidney disease, stage I 10/16/2019    COVID-19 09/28/2021    H/O cardiovascular stress test 02/15/2018    EF60% mod ischemia ant wall    H/O Doppler ultrasound 01/09/2017    carotid doppler - severe degree stenosis LICA    H/O echocardiogram 06/06/2016    ef 56% mild to mod. mr and tr    H/O echocardiogram 10/16/2018    EF55-60% mild AS, mild AR, mild-mod MR, mild TR, mild phtn    H/O echocardiogram 12/29/2020    EF 55-60% mild aortic stenosis mild mitral aortic and tricuspid regurg  no evidence of pericardial effusion    History of cardiac cath 02/19/2018    patent stents, nml EF, abn stress sec to jailed diag    History of echocardiogram 07/08/2019    s/p Left CEA, Mild 0-49% disease of the bilateral ICA, dampened left vertebral flow, normal left subclavian flow.     Hx of Carotid Doppler ultrasound 05/06/2020    Mild 0-49% disease of the bilateral ICA, normal vertebral flow    Hypercalcemia     ? possible hyperparathyroidism/saw Dr Keisha Turner previously    Hyperlipidemia     Hypertension 1978    Hyponatremia     chronic ~130-131 range.  Hypothyroidism due to acquired atrophy of thyroid     antiperoxidase ab tested negative    Insomnia     Menopause     s/p JAMIE    Osteopenia     has hx of rib fractures after a fall    Osteopenia     S/P colonoscopic polypectomy 07/19/2018    Dr Ester Gomez, recheck 5 yrs    Shortness of breath on exertion     Vitamin D deficiency        Past Surgical History:   Procedure Laterality Date    APPENDECTOMY      CARDIAC SURGERY      stents x 3 Feb 2007    CAROTID ENDARTERECTOMY Left 02/01/2017    with patch     COLONOSCOPY      CORONARY ANGIOPLASTY WITH STENT PLACEMENT  2006, 2007    X 2 in LAD and one in ? CIRC w Dr Sheridan Martinez Bilateral 2000's    Cataracts With Lens Implants    HYSTERECTOMY, TOTAL ABDOMINAL  1981    ROTATOR CUFF REPAIR Right 2003    TONSILLECTOMY  1954       Family History   Problem Relation Age of Onset    High Blood Pressure Mother     Migraines Mother     Heart Disease Father     High Blood Pressure Father     Other Father         Ulcer    Cancer Paternal Aunt     Diabetes Brother     Cancer Paternal Aunt     Diabetes Grandchild     Asthma Grandchild     Heart Disease Maternal Aunt     Colon Cancer Maternal Aunt     Diabetes Paternal Grandfather        CareTeam (Including outside providers/suppliers regularly involved in providing care):   Patient Care Team:  Paul Cruz MD as PCP - General (Internal Medicine)  Paul Cruz MD as PCP - [facility] Empaneled Provider    Wt Readings from Last 3 Encounters:   11/22/21 154 lb (69.9 kg)   06/25/21 155 lb 3.2 oz (70.4 kg)   05/20/21 155 lb 6.4 oz (70.5 kg)     Vitals:    11/22/21 1010   BP: 122/72   Pulse: 67   Resp: 18   SpO2: 97%   Weight: 154 lb (69.9 kg)   Height: 4' 11\" (1.499 m)     Body mass index is 31.1 kg/m².     Based upon direct observation of the patient, evaluation of cognition reveals recent and remote memory intact. General Appearance: alert and oriented to person, place and time, well developed and well- nourished, in no acute distress  Skin: warm and dry, no rash or erythema  Head: normocephalic and atraumatic  Eyes: pupils equal, round, and reactive to light, extraocular eye movements intact, conjunctivae normal  Neck: supple and non-tender without mass, no thyromegaly or thyroid nodules, no cervical lymphadenopathy  Pulmonary/Chest: clear to auscultation bilaterally- no wheezes, rales or rhonchi, normal air movement, no respiratory distress  Cardiovascular: normal rate, regular rhythm, normal S1 and S2, no murmurs, rubs, clicks, or gallops, distal pulses intact, no carotid bruits  Abdomen: soft, non-tender, non-distended, normal bowel sounds, no masses or organomegaly  Extremities: no cyanosis, clubbing or edema  Musculoskeletal: normal range of motion, no joint swelling, deformity or tenderness  Neurologic:  no cranial nerve deficit, gait, coordination and speech normal    Patient's complete Health Risk Assessment and screening values have been reviewed and are found in Flowsheets. The following problems were reviewed today and where indicated follow up appointments were made and/or referrals ordered.     Positive Risk Factor Screenings with Interventions:           Health Habits/Nutrition:  Health Habits/Nutrition  Do you exercise for at least 20 minutes 2-3 times per week?: (!) No  Have you lost any weight without trying in the past 3 months?: No  Do you eat only one meal per day?: No  Body mass index: (!) 31.1  Health Habits/Nutrition Interventions:  · encouraged also working on diet, exercise, wt loss       Personalized Preventive Plan   Current Health Maintenance Status  Immunization History   Administered Date(s) Administered    COVID-19, Sandoval Peter, PF, 30mcg/0.3mL 01/26/2021, 02/16/2021    Influenza Virus Vaccine 08/01/2016    Influenza, High Dose (Fluzone 65 yrs and older) 10/17/2018, 10/04/2019    Influenza, Shelia Horn, IM, (6 mo and older Fluzone, Flulaval, Fluarix and 3 yrs and older Afluria) 10/25/2017, 09/09/2020    Pneumococcal Conjugate 13-valent ([id number]) 10/25/2017    Pneumococcal Polysaccharide ([id number]) 05/15/2019    Tdap (Boostrix, Adacel) 08/28/2019    Tetanus 09/23/2015    Zoster Live (Zostavax) 08/10/2015        Health Maintenance   Topic Date Due    Hepatitis C screen  Never done    Shingles Vaccine (2 of 3) 10/05/2015    COVID-19 Vaccine (3 - Booster for Pfizer series) 08/16/2021    Flu vaccine (1) 09/01/2021    Annual Wellness Visit (AWV)  11/20/2021    Lipid screen  11/15/2022    Potassium monitoring  11/15/2022    Creatinine monitoring  11/15/2022    DTaP/Tdap/Td vaccine (2 - Td or Tdap) 08/28/2029    DEXA (modify frequency per FRAX score)  Completed    Pneumococcal 65+ years Vaccine  Completed    Hepatitis A vaccine  Aged Out    Hepatitis B vaccine  Aged Out    Hib vaccine  Aged Out    Meningococcal (ACWY) vaccine  Aged Out     Recommendations for [place] Due: see orders and patient instructions/AVS.  . Recommended screening schedule for the next 5-10 years is provided to the patient in written form: see Patient German Kirkland was seen today for medicare awv. Diagnoses and all orders for this visit:    Routine general medical examination at a health care facility- hopefully her sister will have no further recurrent CNS issues. remains independent, functional and active, no indications/needs for other interventions noted at this time- except as noted below and also findings noted on screening medicare questions.    -     Hepatitis C Antibody; Future    Coronary artery disease involving native coronary artery of native heart without angina pectoris - Coronary artery disease stable and asymptomatic, will continue to monitor for any signs of instability.   Will periodically monitor lipid profile and liver function, cardiac risk factors. Continue current medical regimen. ADVISED ALSO TO CONSIDER EXERCISE PEDDLER DAILY FOR CONSISTENT EXERCISE    -     clopidogrel (PLAVIX) 75 MG tablet; Take 1 tablet by mouth daily  -     simvastatin (ZOCOR) 20 MG tablet; Take 1 tablet by mouth nightly  -     metoprolol succinate (TOPROL XL) 25 MG extended release tablet; Take 1 tablet by mouth daily  -     Comprehensive Metabolic Panel; Future  -     CBC Auto Differential; Future  -     Lipid Panel; Future    Essential hypertension  - Blood pressure stable and will continue current regimen. Will plan periodic monitoring of renal function, electrolytes, lipid profile.     -     metoprolol succinate (TOPROL XL) 25 MG extended release tablet; Take 1 tablet by mouth daily  -     hydrALAZINE (APRESOLINE) 50 MG tablet; TAKE 1/2 TABLET BY MOUTH IN THE MORNING AND TAKE 1 TABLET BY MOUTH IN THE EVENING  -     hydroCHLOROthiazide (MICROZIDE) 12.5 MG capsule; Take 1 capsule by mouth every morning  -     losartan (COZAAR) 100 MG tablet; Take 1 tablet by mouth daily  -     Comprehensive Metabolic Panel; Future  -     CBC Auto Differential; Future  -     Lipid Panel; Future    Hypothyroidism due to acquired atrophy of thyroid - Clinically hypothyroidism appears stable and will continue current dosing, also periodic monitoring of TFTs. -     levothyroxine (SYNTHROID) 25 MCG tablet; Take 1 tablet by mouth daily  -     T4, Free; Future  -     TSH without Reflex; Future    Anxiety - Mood stable on current regimen w/o any significant manifestations of severe depression or anxiety noted at this time. Cont current meds. -     sertraline (ZOLOFT) 50 MG tablet;  Take 1 tablet by mouth daily

## 2021-12-17 ENCOUNTER — VIRTUAL VISIT (OUTPATIENT)
Dept: INTERNAL MEDICINE CLINIC | Age: 76
End: 2021-12-17
Payer: COMMERCIAL

## 2021-12-17 DIAGNOSIS — J01.00 ACUTE NON-RECURRENT MAXILLARY SINUSITIS: Primary | ICD-10-CM

## 2021-12-17 PROCEDURE — 99213 OFFICE O/P EST LOW 20 MIN: CPT | Performed by: INTERNAL MEDICINE

## 2021-12-17 RX ORDER — PREDNISONE 1 MG/1
TABLET ORAL
Qty: 21 TABLET | Refills: 0 | Status: SHIPPED | OUTPATIENT
Start: 2021-12-17 | End: 2022-03-29 | Stop reason: ALTCHOICE

## 2021-12-17 RX ORDER — GUAIFENESIN AND CODEINE PHOSPHATE 100; 10 MG/5ML; MG/5ML
5 SOLUTION ORAL 4 TIMES DAILY PRN
Qty: 120 ML | Refills: 0 | Status: SHIPPED | OUTPATIENT
Start: 2021-12-17 | End: 2021-12-24

## 2021-12-17 RX ORDER — BENZONATATE 100 MG/1
100 CAPSULE ORAL 3 TIMES DAILY PRN
Qty: 30 CAPSULE | Refills: 0 | Status: SHIPPED | OUTPATIENT
Start: 2021-12-17 | End: 2021-12-27

## 2021-12-17 RX ORDER — AZITHROMYCIN 250 MG/1
250 TABLET, FILM COATED ORAL SEE ADMIN INSTRUCTIONS
Qty: 6 TABLET | Refills: 0 | Status: SHIPPED | OUTPATIENT
Start: 2021-12-17 | End: 2021-12-22

## 2021-12-17 NOTE — PROGRESS NOTES
2021    TELEHEALTH EVALUATION -- Audio/Visual (During ERRUM-49 public health emergency)    HPI:    Natalya Mckeon (:  1945) has requested an audio/video evaluation for the following concern(s):    5D HX OF WORSENING COUGH PROD GREEN SPUTUM, ALSO SINUS CONGESTION. NO FEVER OR SOB, SATS 98%. HAD COVID INFECTION AND THEN REGENERON IN   NO SOB, MYALGIAS. Review of Systems    Prior to Visit Medications    Medication Sig Taking?  Authorizing Provider   levothyroxine (SYNTHROID) 25 MCG tablet Take 1 tablet by mouth daily Yes Shireen Rivera MD   clopidogrel (PLAVIX) 75 MG tablet Take 1 tablet by mouth daily Yes Shireen Rivera MD   sertraline (ZOLOFT) 50 MG tablet Take 1 tablet by mouth daily Yes Shireen Rivera MD   simvastatin (ZOCOR) 20 MG tablet Take 1 tablet by mouth nightly Yes Shireen Rivera MD   metoprolol succinate (TOPROL XL) 25 MG extended release tablet Take 1 tablet by mouth daily Yes Shireen Rivera MD   hydrALAZINE (APRESOLINE) 50 MG tablet TAKE 1/2 TABLET BY MOUTH IN THE MORNING AND TAKE 1 TABLET BY MOUTH IN THE EVENING Yes Shireen Rivera MD   hydroCHLOROthiazide (MICROZIDE) 12.5 MG capsule Take 1 capsule by mouth every morning Yes Shireen Rivera MD   losartan (COZAAR) 100 MG tablet Take 1 tablet by mouth daily Yes Shireen Rivera MD   Multiple Vitamins-Minerals (THERAPEUTIC MULTIVITAMIN-MINERALS) tablet Take 1 tablet by mouth daily Yes Historical Provider, MD   Acetaminophen (TYLENOL ARTHRITIS PAIN PO) Take by mouth as needed Yes Historical Provider, MD   melatonin 3 MG TABS tablet Take 10 mg by mouth nightly  Yes Historical Provider, MD   aspirin 81 MG tablet Take 81 mg by mouth nightly  Yes Historical Provider, MD       Social History     Tobacco Use    Smoking status: Never Smoker    Smokeless tobacco: Never Used   Vaping Use    Vaping Use: Never used   Substance Use Topics    Alcohol use: No     Comment: caffeine 1-2 cups of coffee a day 2 pops a day    Drug use: No           PHYSICAL EXAMINATION:  [ INSTRUCTIONS:  \"[x]\" Indicates a positive item  \"[]\" Indicates a negative item  -- DELETE ALL ITEMS NOT EXAMINED]  Vital Signs: (As obtained by patient/caregiver or practitioner observation)    Blood pressure-  Heart rate-    Respiratory rate-    Temperature-  Pulse oximetry-     Constitutional: [x] Appears well-developed and well-nourished [] No apparent distress      [] Abnormal-   Mental status  [] Alert and awake  [] Oriented to person/place/time []Able to follow commands      Eyes:  EOM    []  Normal  [] Abnormal-  Sclera  []  Normal  [] Abnormal -         Discharge []  None visible  [] Abnormal -    HENT:   [] Normocephalic, atraumatic. [] Abnormal   [] Mouth/Throat: Mucous membranes are moist.     External Ears [] Normal  [] Abnormal-     Neck: [] No visualized mass     Pulmonary/Chest: [x] Respiratory effort normal.  [] No visualized signs of difficulty breathing or respiratory distress        [] Abnormal-      Musculoskeletal:   [] Normal gait with no signs of ataxia         [] Normal range of motion of neck        [] Abnormal-       Neurological:        [] No Facial Asymmetry (Cranial nerve 7 motor function) (limited exam to video visit)          [] No gaze palsy        [] Abnormal-         Skin:        [] No significant exanthematous lesions or discoloration noted on facial skin         [] Abnormal-            Psychiatric:       [] Normal Affect [] No Hallucinations        [] Abnormal-     Other pertinent observable physical exam findings-     ASSESSMENT/PLAN:  1. Acute non-recurrent maxillary sinusitis  Consistent w presentation, rec rx as below and fluids, rest.  Pt to call back one week if not improving.      - azithromycin (ZITHROMAX) 250 MG tablet; Take 1 tablet by mouth See Admin Instructions for 5 days 500mg on day 1 followed by 250mg on days 2 - 5  Dispense: 6 tablet; Refill: 0  - benzonatate (TESSALON) 100 MG capsule;  Take 1 capsule by mouth 3 times daily as needed for Cough  Dispense: 30 capsule; Refill: 0  - predniSONE (DELTASONE) 5 MG tablet; Take 6 tablets by mouth on day 1, 5 on day 2, 4 on day 3, 3 on day 4, 2 on day 5, 1 on day 6. Dispense: 21 tablet; Refill: 0  - guaiFENesin-codeine (TUSSI-ORGANIDIN NR) 100-10 MG/5ML syrup; Take 5 mLs by mouth 4 times daily as needed for Cough or Congestion for up to 7 days. Dispense: 120 mL; Refill: 0      Return if symptoms worsen or fail to improve. Demar Gonzalez, was evaluated through a synchronous (real-time) audio-video encounter. The patient (or guardian if applicable) is aware that this is a billable service. Verbal consent to proceed has been obtained within the past 12 months. The visit was conducted pursuant to the emergency declaration under the [address] [place] [address] and the [employer] and [place] General Act. Patient identification was verified, and a caregiver was present when appropriate. The patient was located in a state where the provider was credentialed to provide care. Total time spent on this encounter: Not billed by time    --Zach Medeiros MD on 12/17/2021 at 8:59 AM    An electronic signature was used to authenticate this note.

## 2022-01-12 ENCOUNTER — OFFICE VISIT (OUTPATIENT)
Dept: CARDIOLOGY CLINIC | Age: 77
End: 2022-01-12
Payer: COMMERCIAL

## 2022-01-12 VITALS
DIASTOLIC BLOOD PRESSURE: 80 MMHG | HEIGHT: 59 IN | WEIGHT: 155.4 LBS | BODY MASS INDEX: 31.33 KG/M2 | SYSTOLIC BLOOD PRESSURE: 128 MMHG | HEART RATE: 62 BPM

## 2022-01-12 DIAGNOSIS — I38 VHD (VALVULAR HEART DISEASE): Primary | ICD-10-CM

## 2022-01-12 DIAGNOSIS — I25.10 CORONARY ARTERY DISEASE INVOLVING NATIVE CORONARY ARTERY OF NATIVE HEART WITHOUT ANGINA PECTORIS: ICD-10-CM

## 2022-01-12 PROCEDURE — 99214 OFFICE O/P EST MOD 30 MIN: CPT | Performed by: INTERNAL MEDICINE

## 2022-03-02 DIAGNOSIS — I10 ESSENTIAL HYPERTENSION: ICD-10-CM

## 2022-03-02 DIAGNOSIS — F41.9 ANXIETY: ICD-10-CM

## 2022-03-02 DIAGNOSIS — I25.10 CORONARY ARTERY DISEASE INVOLVING NATIVE CORONARY ARTERY OF NATIVE HEART WITHOUT ANGINA PECTORIS: ICD-10-CM

## 2022-03-02 DIAGNOSIS — E03.4 HYPOTHYROIDISM DUE TO ACQUIRED ATROPHY OF THYROID: ICD-10-CM

## 2022-03-02 RX ORDER — HYDRALAZINE HYDROCHLORIDE 50 MG/1
TABLET, FILM COATED ORAL
Qty: 135 TABLET | Refills: 0 | Status: SHIPPED | OUTPATIENT
Start: 2022-03-02 | End: 2022-05-23 | Stop reason: SDUPTHER

## 2022-03-02 RX ORDER — CLOPIDOGREL BISULFATE 75 MG/1
75 TABLET ORAL DAILY
Qty: 90 TABLET | Refills: 1 | Status: SHIPPED | OUTPATIENT
Start: 2022-03-02 | End: 2022-05-23 | Stop reason: SDUPTHER

## 2022-03-02 RX ORDER — HYDROCHLOROTHIAZIDE 12.5 MG/1
12.5 CAPSULE, GELATIN COATED ORAL EVERY MORNING
Qty: 90 CAPSULE | Refills: 3 | Status: SHIPPED | OUTPATIENT
Start: 2022-03-02 | End: 2022-05-23 | Stop reason: SDUPTHER

## 2022-03-02 RX ORDER — METOPROLOL SUCCINATE 25 MG/1
25 TABLET, EXTENDED RELEASE ORAL DAILY
Qty: 90 TABLET | Refills: 0 | Status: SHIPPED | OUTPATIENT
Start: 2022-03-02 | End: 2022-05-23 | Stop reason: SDUPTHER

## 2022-03-02 RX ORDER — LEVOTHYROXINE SODIUM 0.03 MG/1
25 TABLET ORAL DAILY
Qty: 90 TABLET | Refills: 1 | Status: SHIPPED | OUTPATIENT
Start: 2022-03-02 | End: 2022-05-23 | Stop reason: SDUPTHER

## 2022-03-08 DIAGNOSIS — I10 ESSENTIAL HYPERTENSION: ICD-10-CM

## 2022-03-09 RX ORDER — LOSARTAN POTASSIUM 100 MG/1
100 TABLET ORAL DAILY
Qty: 90 TABLET | Refills: 1 | OUTPATIENT
Start: 2022-03-09 | End: 2022-09-05

## 2022-03-29 ENCOUNTER — OFFICE VISIT (OUTPATIENT)
Dept: INTERNAL MEDICINE CLINIC | Age: 77
End: 2022-03-29
Payer: COMMERCIAL

## 2022-03-29 VITALS
WEIGHT: 155 LBS | SYSTOLIC BLOOD PRESSURE: 132 MMHG | RESPIRATION RATE: 14 BRPM | BODY MASS INDEX: 31.31 KG/M2 | HEART RATE: 64 BPM | DIASTOLIC BLOOD PRESSURE: 76 MMHG | OXYGEN SATURATION: 98 %

## 2022-03-29 DIAGNOSIS — L03.115 CELLULITIS OF RIGHT ANKLE: Primary | ICD-10-CM

## 2022-03-29 PROCEDURE — 99213 OFFICE O/P EST LOW 20 MIN: CPT | Performed by: INTERNAL MEDICINE

## 2022-03-29 RX ORDER — CLINDAMYCIN HYDROCHLORIDE 300 MG/1
300 CAPSULE ORAL 3 TIMES DAILY
Qty: 30 CAPSULE | Refills: 0 | Status: SHIPPED | OUTPATIENT
Start: 2022-03-29 | End: 2022-04-06

## 2022-03-29 NOTE — PROGRESS NOTES
Doris Blanco  1945 03/29/22    SUBJECTIVE:  The past two weeks w slowly growing R ankle redness and sore. No trauma, drainage, insect bite, fever or chills. Did have Tdap last 2019    OBJECTIVE:    /76   Pulse 64   Resp 14   Wt 155 lb (70.3 kg)   LMP  (LMP Unknown)   SpO2 98%   BMI 31.31 kg/m²     Physical Exam  Musculoskeletal:        Feet:    Feet:      Comments: approx 3x3.5cm raised and tender red area of ankle, no bleeding or fluctuance but does have central dark spot        ASSESSMENT:    1. Cellulitis of right ankle        PLAN:    Rosibel Isaac was seen today for wound check and other. Diagnoses and all orders for this visit:    Cellulitis of right ankle- CLINICALLY APPEARS C/W INFECTION, PROB BACTERIAL. RX W HEAT AND ELEVATION, ABX ON CLINDA AND IS TO CALL BACK W/IN A WEEK IF NOT IMPROVING  -     clindamycin (CLEOCIN) 300 MG capsule;  Take 1 capsule by mouth 3 times daily for 10 days

## 2022-03-29 NOTE — PATIENT INSTRUCTIONS
Please try to elevate your R foot for 20min 3x/day, should be higher than your heart level. Call us back if is no better or worsening by FRI.   A warm or hot compress also during elevation would be helpful

## 2022-04-06 ENCOUNTER — HOSPITAL ENCOUNTER (OUTPATIENT)
Age: 77
Discharge: HOME | End: 2022-04-06
Payer: COMMERCIAL

## 2022-04-06 ENCOUNTER — OFFICE VISIT (OUTPATIENT)
Dept: INTERNAL MEDICINE CLINIC | Age: 77
End: 2022-04-06
Payer: COMMERCIAL

## 2022-04-06 ENCOUNTER — HOSPITAL ENCOUNTER (OUTPATIENT)
Dept: GENERAL RADIOLOGY | Age: 77
Discharge: HOME | End: 2022-04-06
Payer: COMMERCIAL

## 2022-04-06 VITALS
RESPIRATION RATE: 14 BRPM | HEIGHT: 59 IN | DIASTOLIC BLOOD PRESSURE: 75 MMHG | OXYGEN SATURATION: 98 % | HEART RATE: 68 BPM | WEIGHT: 153.13 LBS | SYSTOLIC BLOOD PRESSURE: 126 MMHG | BODY MASS INDEX: 30.87 KG/M2

## 2022-04-06 DIAGNOSIS — E03.4 HYPOTHYROIDISM DUE TO ACQUIRED ATROPHY OF THYROID: ICD-10-CM

## 2022-04-06 DIAGNOSIS — M25.571 ACUTE RIGHT ANKLE PAIN: ICD-10-CM

## 2022-04-06 DIAGNOSIS — I10 PRIMARY HYPERTENSION: ICD-10-CM

## 2022-04-06 DIAGNOSIS — L03.115 CELLULITIS OF RIGHT ANKLE: ICD-10-CM

## 2022-04-06 DIAGNOSIS — E78.2 HYPERLIPEMIA, MIXED: ICD-10-CM

## 2022-04-06 DIAGNOSIS — L03.115 CELLULITIS OF RIGHT ANKLE: Primary | ICD-10-CM

## 2022-04-06 DIAGNOSIS — R73.9 HYPERGLYCEMIA: ICD-10-CM

## 2022-04-06 LAB
ALBUMIN SERPL-MCNC: 4.7 GM/DL (ref 3.4–5)
ALP BLD-CCNC: 86 IU/L (ref 40–128)
ALT SERPL-CCNC: 38 U/L (ref 10–40)
ANION GAP SERPL CALCULATED.3IONS-SCNC: 13 MMOL/L (ref 4–16)
AST SERPL-CCNC: 30 IU/L (ref 15–37)
BASOPHILS ABSOLUTE: 0.1 K/CU MM
BASOPHILS RELATIVE PERCENT: 0.7 % (ref 0–1)
BILIRUB SERPL-MCNC: 0.3 MG/DL (ref 0–1)
BUN BLDV-MCNC: 17 MG/DL (ref 6–23)
CALCIUM SERPL-MCNC: 10.9 MG/DL (ref 8.3–10.6)
CHLORIDE BLD-SCNC: 94 MMOL/L (ref 99–110)
CHOLESTEROL: 157 MG/DL
CO2: 25 MMOL/L (ref 21–32)
CREAT SERPL-MCNC: 0.7 MG/DL (ref 0.6–1.1)
DIFFERENTIAL TYPE: ABNORMAL
EOSINOPHILS ABSOLUTE: 0.2 K/CU MM
EOSINOPHILS RELATIVE PERCENT: 2 % (ref 0–3)
ESTIMATED AVERAGE GLUCOSE: 117 MG/DL
GFR AFRICAN AMERICAN: >60 ML/MIN/1.73M2
GFR NON-AFRICAN AMERICAN: >60 ML/MIN/1.73M2
GLUCOSE BLD-MCNC: 94 MG/DL (ref 70–99)
HBA1C MFR BLD: 5.7 % (ref 4.2–6.3)
HCT VFR BLD CALC: 41.2 % (ref 37–47)
HDLC SERPL-MCNC: 51 MG/DL
HEMOGLOBIN: 13.4 GM/DL (ref 12.5–16)
HIGH SENSITIVE C-REACTIVE PROTEIN: 1.2 MG/L
IMMATURE NEUTROPHIL %: 0.3 % (ref 0–0.43)
LDL CHOLESTEROL CALCULATED: 70 MG/DL
LYMPHOCYTES ABSOLUTE: 1.7 K/CU MM
LYMPHOCYTES RELATIVE PERCENT: 18.1 % (ref 24–44)
MCH RBC QN AUTO: 30.7 PG (ref 27–31)
MCHC RBC AUTO-ENTMCNC: 32.5 % (ref 32–36)
MCV RBC AUTO: 94.3 FL (ref 78–100)
MONOCYTES ABSOLUTE: 1.1 K/CU MM
MONOCYTES RELATIVE PERCENT: 11.9 % (ref 0–4)
NUCLEATED RBC %: 0 %
PDW BLD-RTO: 13.7 % (ref 11.7–14.9)
PLATELET # BLD: 294 K/CU MM (ref 140–440)
PMV BLD AUTO: 10.8 FL (ref 7.5–11.1)
POTASSIUM SERPL-SCNC: 5 MMOL/L (ref 3.5–5.1)
RBC # BLD: 4.37 M/CU MM (ref 4.2–5.4)
SEGMENTED NEUTROPHILS ABSOLUTE COUNT: 6.3 K/CU MM
SEGMENTED NEUTROPHILS RELATIVE PERCENT: 67 % (ref 36–66)
SODIUM BLD-SCNC: 132 MMOL/L (ref 135–145)
T4 FREE: 1.36 NG/DL (ref 0.9–1.8)
TOTAL IMMATURE NEUTOROPHIL: 0.03 K/CU MM
TOTAL NUCLEATED RBC: 0 K/CU MM
TOTAL PROTEIN: 7.7 GM/DL (ref 6.4–8.2)
TRIGL SERPL-MCNC: 181 MG/DL
TSH HIGH SENSITIVITY: 2.62 UIU/ML (ref 0.27–4.2)
WBC # BLD: 9.4 K/CU MM (ref 4–10.5)

## 2022-04-06 PROCEDURE — 86141 C-REACTIVE PROTEIN HS: CPT

## 2022-04-06 PROCEDURE — 84443 ASSAY THYROID STIM HORMONE: CPT

## 2022-04-06 PROCEDURE — 80061 LIPID PANEL: CPT

## 2022-04-06 PROCEDURE — 84439 ASSAY OF FREE THYROXINE: CPT

## 2022-04-06 PROCEDURE — 85025 COMPLETE CBC W/AUTO DIFF WBC: CPT

## 2022-04-06 PROCEDURE — 73610 X-RAY EXAM OF ANKLE: CPT

## 2022-04-06 PROCEDURE — 83036 HEMOGLOBIN GLYCOSYLATED A1C: CPT

## 2022-04-06 PROCEDURE — 36415 COLL VENOUS BLD VENIPUNCTURE: CPT

## 2022-04-06 PROCEDURE — 99214 OFFICE O/P EST MOD 30 MIN: CPT | Performed by: INTERNAL MEDICINE

## 2022-04-06 PROCEDURE — 80053 COMPREHEN METABOLIC PANEL: CPT

## 2022-04-06 RX ORDER — DOXYCYCLINE HYCLATE 100 MG
100 TABLET ORAL 2 TIMES DAILY
Qty: 20 TABLET | Refills: 0 | Status: SHIPPED | OUTPATIENT
Start: 2022-04-06 | End: 2022-04-16

## 2022-04-06 RX ORDER — MUPIROCIN CALCIUM 20 MG/G
CREAM TOPICAL
Qty: 15 G | Refills: 0 | Status: SHIPPED | OUTPATIENT
Start: 2022-04-06 | End: 2022-04-13 | Stop reason: SDUPTHER

## 2022-04-06 RX ORDER — CEPHALEXIN 500 MG/1
500 CAPSULE ORAL 2 TIMES DAILY
Qty: 20 CAPSULE | Refills: 0 | Status: SHIPPED | OUTPATIENT
Start: 2022-04-06 | End: 2022-04-16

## 2022-04-06 SDOH — ECONOMIC STABILITY: FOOD INSECURITY: WITHIN THE PAST 12 MONTHS, THE FOOD YOU BOUGHT JUST DIDN'T LAST AND YOU DIDN'T HAVE MONEY TO GET MORE.: NEVER TRUE

## 2022-04-06 SDOH — ECONOMIC STABILITY: FOOD INSECURITY: WITHIN THE PAST 12 MONTHS, YOU WORRIED THAT YOUR FOOD WOULD RUN OUT BEFORE YOU GOT MONEY TO BUY MORE.: NEVER TRUE

## 2022-04-06 SDOH — ECONOMIC STABILITY: HOUSING INSECURITY: IN THE LAST 12 MONTHS, HOW MANY PLACES HAVE YOU LIVED?: 1

## 2022-04-06 SDOH — ECONOMIC STABILITY: HOUSING INSECURITY
IN THE LAST 12 MONTHS, WAS THERE A TIME WHEN YOU DID NOT HAVE A STEADY PLACE TO SLEEP OR SLEPT IN A SHELTER (INCLUDING NOW)?: NO

## 2022-04-06 SDOH — ECONOMIC STABILITY: INCOME INSECURITY: IN THE LAST 12 MONTHS, WAS THERE A TIME WHEN YOU WERE NOT ABLE TO PAY THE MORTGAGE OR RENT ON TIME?: NO

## 2022-04-06 SDOH — ECONOMIC STABILITY: TRANSPORTATION INSECURITY
IN THE PAST 12 MONTHS, HAS LACK OF TRANSPORTATION KEPT YOU FROM MEETINGS, WORK, OR FROM GETTING THINGS NEEDED FOR DAILY LIVING?: NO

## 2022-04-06 SDOH — ECONOMIC STABILITY: TRANSPORTATION INSECURITY
IN THE PAST 12 MONTHS, HAS THE LACK OF TRANSPORTATION KEPT YOU FROM MEDICAL APPOINTMENTS OR FROM GETTING MEDICATIONS?: NO

## 2022-04-06 ASSESSMENT — SOCIAL DETERMINANTS OF HEALTH (SDOH)
WITHIN THE LAST YEAR, HAVE YOU BEEN KICKED, HIT, SLAPPED, OR OTHERWISE PHYSICALLY HURT BY YOUR PARTNER OR EX-PARTNER?: NO
HOW HARD IS IT FOR YOU TO PAY FOR THE VERY BASICS LIKE FOOD, HOUSING, MEDICAL CARE, AND HEATING?: NOT HARD AT ALL
WITHIN THE LAST YEAR, HAVE YOU BEEN HUMILIATED OR EMOTIONALLY ABUSED IN OTHER WAYS BY YOUR PARTNER OR EX-PARTNER?: NO
WITHIN THE LAST YEAR, HAVE TO BEEN RAPED OR FORCED TO HAVE ANY KIND OF SEXUAL ACTIVITY BY YOUR PARTNER OR EX-PARTNER?: NO
WITHIN THE LAST YEAR, HAVE YOU BEEN AFRAID OF YOUR PARTNER OR EX-PARTNER?: NO

## 2022-04-06 NOTE — PATIENT INSTRUCTIONS
While on antibiotic please start probiotic which is bacteria to replenish your natural gut kwabena. Can try active yogurt cultures, or a supplement otc called Align, or also a drink called Kombucha    FOR TREATING YOUR ANKLE, STOP CLINDAMYCIN, START BOTH THE KEFLEX AND DOXYCYLINE AS WELL AS TOPICAL MUPIROCIN AND CHECK XRAY TODAY    IF YOUR ANKLE IS NO BETTER BY Monday, CALL US BACK SO WE'LL ALSO ORDER AN MRI.

## 2022-04-06 NOTE — PROGRESS NOTES
Lashaun Landing  1945 04/06/22    SUBJECTIVE:  R ankle redness and swelling is no better, has small central ulceration but no drainage or fevers. Some loose stool on clinda. LAST GLC , WE'LL CHECK LAB TODAY TO BE SURE HAS NOT PROGRESSED TO DM    Hypertension: Stable. Denies CP, SOB, cough, visual changes, dizziness, palpitations or HA. Lipids:  Is continuing statin therapy and low fat diet. Tolerating medications w/o myalgias or GI upset. Hypothyroid has been asymptomatic w/o sx of fatigue, constipation, cold intolerance, depression. OBJECTIVE:    /75   Pulse 68   Resp 14   Ht 4' 11\" (1.499 m)   Wt 153 lb 2 oz (69.5 kg)   LMP  (LMP Unknown)   SpO2 98%   BMI 30.93 kg/m²     Physical Exam  Vitals reviewed. Constitutional:       Appearance: She is well-developed. HENT:      Head: Normocephalic and atraumatic. Eyes:      General: No scleral icterus. Right eye: No discharge. Left eye: No discharge. Conjunctiva/sclera: Conjunctivae normal.      Pupils: Pupils are equal, round, and reactive to light. Neck:      Thyroid: No thyromegaly. Vascular: No JVD. Trachea: No tracheal deviation. Cardiovascular:      Rate and Rhythm: Normal rate and regular rhythm. Heart sounds: Normal heart sounds. No murmur heard. No friction rub. No gallop. Pulmonary:      Effort: Pulmonary effort is normal. No respiratory distress. Breath sounds: Normal breath sounds. No wheezing or rales. Abdominal:      General: Bowel sounds are normal. There is no distension. Palpations: Abdomen is soft. There is no mass. Tenderness: There is no abdominal tenderness. There is no guarding or rebound. Hernia: No hernia is present. Musculoskeletal:      Cervical back: Neck supple.         Feet:    Feet:      Comments: approx 3x3.5cm raised and tender red area of ankle, no bleeding or fluctuance but does have central -PRIOR DARK SPOT NOW SL ULCERATED. Lymphadenopathy:      Cervical: No cervical adenopathy. Skin:     General: Skin is warm and dry. Neurological:      General: No focal deficit present. Mental Status: She is alert. Psychiatric:         Mood and Affect: Mood normal.         ASSESSMENT:    1. Cellulitis of right ankle    2. Acute right ankle pain    3. Primary hypertension    4. Hypothyroidism due to acquired atrophy of thyroid    5. Hyperglycemia    6. Hyperlipemia, mixed        PLAN:    Rashad Cabrera was seen today for wound check. Diagnoses and all orders for this visit:    Cellulitis of right ankle- WE'LL CHECK XRAY SCR FOR OSTEOMYELITIS AND SWITCH ABX TO DOXYCYLINE AND KEFLEX, F/U CLOSELY IN ONE WEEK. ALSO SCR LAB, IF CRP HIGH OR SX NOT BETTER EARLY NEXT WEEK ALSO CONSIDER MRI FOR MORE THOROUGH TESTING R/O OSTEOMYELITIS. ADDING TOPICAL ABX AS WELL  -     Cancel: XR ANKLE RIGHT LIMITED; Future  -     CBC with Auto Differential; Future  -     C-Reactive Protein; Future  -     doxycycline hyclate (VIBRA-TABS) 100 MG tablet; Take 1 tablet by mouth 2 times daily for 10 days  -     cephALEXin (KEFLEX) 500 MG capsule; Take 1 capsule by mouth 2 times daily for 10 days  -     mupirocin (BACTROBAN) 2 % cream; Apply 3 times daily. -     XR ANKLE RIGHT (MIN 3 VIEWS); Future    Acute right ankle pain- CHECK XRAY, TRIAL RX AS NOTED, CHECK LAB  -     Cancel: XR ANKLE RIGHT LIMITED; Future  -     CBC with Auto Differential; Future  -     C-Reactive Protein; Future  -     mupirocin (BACTROBAN) 2 % cream; Apply 3 times daily. -     XR ANKLE RIGHT (MIN 3 VIEWS); Future    Primary hypertension  - Blood pressure stable and will continue current regimen. Will plan periodic monitoring of renal function, electrolytes, lipid profile.        Hypothyroidism due to acquired atrophy of thyroid  -Clinically hypothyroidism appears stable and will continue current dosing, also periodic monitoring of TFTs.    -     T4, Free; Future  -     TSH; Future    Hyperglycemia- ALSO CHECK FOR PROGRESSION TO DM WHICH COULD ADVERSELY AFFECT ABILITY TO HEAL ESPEC W ANY FOOT INFECTIONS  -     Comprehensive Metabolic Panel; Future  -     Hemoglobin A1C; Future    Hyperlipemia, mixed - Pt will continue to work on a low fat diet and also exercise, wt loss as appropriate. Will continue periodic monitoring of fasting lipid profile, glucose, liver function. -     Comprehensive Metabolic Panel; Future  -     Lipid Panel;  Future

## 2022-04-06 NOTE — RESULT ENCOUNTER NOTE
Call pt, labs ok/chol ok AND THYROID FXN IS NORMAL. IMPORTANTLY SUGARS ARE IMPROVED AND NOW NL RANGE SO NO EVIDENCE OF DM PRESENT. HER INFLAMMATION LEVEL IS MINIMALLY HIGH BUT WHITE COUNT ON BLOOD COUNTS IS NORMAL SO A BONE INFECTION IS LESS LIKELY. WE'LL AWAIT HER XRAY OF HER FOOT AND ANKLE AND REASSESS IN A WEEK, BUT IF INFECTION WORSENS THROUGH THE WEEKEND TO PLEASE CALL US BACK MON AND WE'D THEN CONSIDER MRI IF NEEDED.

## 2022-04-13 ENCOUNTER — OFFICE VISIT (OUTPATIENT)
Dept: INTERNAL MEDICINE CLINIC | Age: 77
End: 2022-04-13
Payer: COMMERCIAL

## 2022-04-13 ENCOUNTER — OFFICE VISIT (OUTPATIENT)
Dept: SURGERY | Age: 77
End: 2022-04-13
Payer: COMMERCIAL

## 2022-04-13 VITALS
DIASTOLIC BLOOD PRESSURE: 68 MMHG | WEIGHT: 153.4 LBS | OXYGEN SATURATION: 99 % | HEIGHT: 59 IN | HEART RATE: 72 BPM | SYSTOLIC BLOOD PRESSURE: 122 MMHG | BODY MASS INDEX: 30.92 KG/M2

## 2022-04-13 VITALS
SYSTOLIC BLOOD PRESSURE: 124 MMHG | BODY MASS INDEX: 30.74 KG/M2 | HEIGHT: 59 IN | OXYGEN SATURATION: 95 % | RESPIRATION RATE: 16 BRPM | HEART RATE: 65 BPM | DIASTOLIC BLOOD PRESSURE: 76 MMHG | WEIGHT: 152.5 LBS

## 2022-04-13 DIAGNOSIS — L97.519 ULCER OF RIGHT FOOT, UNSPECIFIED ULCER STAGE (HCC): Primary | ICD-10-CM

## 2022-04-13 DIAGNOSIS — L03.115 CELLULITIS OF RIGHT ANKLE: Primary | ICD-10-CM

## 2022-04-13 DIAGNOSIS — S91.331A PENETRATING FOOT WOUND, RIGHT, INITIAL ENCOUNTER: Primary | ICD-10-CM

## 2022-04-13 PROCEDURE — 99202 OFFICE O/P NEW SF 15 MIN: CPT | Performed by: SURGERY

## 2022-04-13 PROCEDURE — 99213 OFFICE O/P EST LOW 20 MIN: CPT | Performed by: INTERNAL MEDICINE

## 2022-04-13 NOTE — PROGRESS NOTES
Yumi Lombardi  1945 04/13/22    SUBJECTIVE:  R lateral cellulitis and ulceration is neither improved nor worse. Trying abx and topical.  No bleeding or drainage, fever, chills. Area is sl tender. OBJECTIVE:    /76   Pulse 65   Resp 16   Ht 4' 11\" (1.499 m)   Wt 152 lb 8 oz (69.2 kg)   LMP  (LMP Unknown)   SpO2 95%   BMI 30.80 kg/m²     Physical Exam  Vitals reviewed. Constitutional:       Appearance: Normal appearance. She is well-developed. HENT:      Head: Normocephalic and atraumatic. Neck:      Thyroid: No thyromegaly. Vascular: No JVD. Trachea: No tracheal deviation. Musculoskeletal:        Feet:    Feet:      Comments: approx 3x3.5cm raised and tender red area of ankle, no bleeding or fluctuance but does have central -PRIOR DARK SPOT NOW SL ULCERATED. Skin:     General: Skin is warm and dry. Neurological:      Mental Status: She is alert. Psychiatric:         Mood and Affect: Mood normal.         ASSESSMENT:    1. Cellulitis of right ankle        PLAN:    Yana Cobian was seen today for follow-up and cellulitis. Diagnoses and all orders for this visit:    Cellulitis of right ankle- REVIEWED DR SORENSEN'S NOTE, SURGERY SAW HER LATER TODAY AND MRI RECOMMENDED- R/O OSTEOMYELITIS- AS WELL AS REFERRAL TO WOUND CARE.   -     [address] [facility], General Surgery, [facility]

## 2022-04-13 NOTE — PROGRESS NOTES
acquired atrophy of thyroid     antiperoxidase ab tested negative    Insomnia     Menopause     s/p JAMIE    Osteopenia     has hx of rib fractures after a fall    Osteopenia     S/P colonoscopic polypectomy 07/19/2018    Dr Gustavo Saez, recheck 5 yrs    Shortness of breath on exertion     Vitamin D deficiency      Family History   Problem Relation Age of Onset    High Blood Pressure Mother     Migraines Mother     Heart Disease Father     High Blood Pressure Father     Other Father         Ulcer    Cancer Paternal Aunt     Diabetes Brother     Cancer Paternal Aunt     Diabetes Grandchild     Asthma Grandchild     Heart Disease Maternal Aunt     Colon Cancer Maternal Aunt     Diabetes Paternal Grandfather      Social History     Socioeconomic History    Marital status:      Spouse name: Not on file    Number of children: Not on file    Years of education: Not on file    Highest education level: Not on file   Occupational History    Occupation: Teacher Retired     Comment: as noted   Tobacco Use    Smoking status: Never Smoker    Smokeless tobacco: Never Used   Vaping Use    Vaping Use: Never used   Substance and Sexual Activity    Alcohol use: No     Comment: caffeine 1-2 cups of coffee a day 2 pops a day    Drug use: No    Sexual activity: Never     Comment:    Other Topics Concern    Not on file   Social History Narrative    Not on file     Social Determinants of Health     Financial Resource Strain: Low Risk     Difficulty of Paying Living Expenses: Not hard at all   Food Insecurity: No Food Insecurity    Worried About Running Out of Food in the Last Year: Never true    [address] in the Last Year: Never true   Transportation Needs: No Transportation Needs    Lack of Transportation (Medical): No    Lack of Transportation (Non-Medical):  No   Physical Activity:     Days of Exercise per Week: Not on file    Minutes of Exercise per Session: Not on file   Stress:     Feeling of Stress : Not on file   Social Connections:     Frequency of Communication with Friends and Family: Not on file    Frequency of Social Gatherings with Friends and Family: Not on file    Attends Sikhism Services: Not on file    Active Member of Clubs or Organizations: Not on file    Attends Club or Organization Meetings: Not on file    Marital Status: Not on file   Intimate Partner Violence: Not At Risk    Fear of Current or Ex-Partner: No    Emotionally Abused: No    Physically Abused: No    Sexually Abused: No   Housing Stability: Low Risk     Unable to Pay for Housing in the Last Year: No    Number of Places Lived in the Last Year: 1    Unstable Housing in the Last Year: No       Current Outpatient Medications   Medication Sig Dispense Refill    mupirocin (BACTROBAN) 2 % ointment APPLY TO AFFECTED AREA(S) THREE TIMES A DAY      doxycycline hyclate (VIBRA-TABS) 100 MG tablet Take 1 tablet by mouth 2 times daily for 10 days 20 tablet 0    cephALEXin (KEFLEX) 500 MG capsule Take 1 capsule by mouth 2 times daily for 10 days 20 capsule 0    hydroCHLOROthiazide (MICROZIDE) 12.5 MG capsule TAKE 1 CAPSULE BY MOUTH EVERY MORNING 90 capsule 3    sertraline (ZOLOFT) 50 MG tablet TAKE 1 TABLET BY MOUTH DAILY 90 tablet 1    clopidogrel (PLAVIX) 75 MG tablet TAKE 1 TABLET BY MOUTH DAILY 90 tablet 1    levothyroxine (SYNTHROID) 25 MCG tablet TAKE 1 TABLET BY MOUTH DAILY 90 tablet 1    hydrALAZINE (APRESOLINE) 50 MG tablet TAKE 1/2 TABLET BY MOUTH IN THE MORNING AND TAKE 1 TABLET BY MOUTH IN THE EVENING 135 tablet 0    metoprolol succinate (TOPROL XL) 25 MG extended release tablet Take 1 tablet by mouth daily 90 tablet 0    simvastatin (ZOCOR) 20 MG tablet Take 1 tablet by mouth nightly 90 tablet 1    losartan (COZAAR) 100 MG tablet Take 1 tablet by mouth daily 90 tablet 1    Multiple Vitamins-Minerals (THERAPEUTIC MULTIVITAMIN-MINERALS) tablet Take 1 tablet by mouth daily      Acetaminophen (TYLENOL ARTHRITIS PAIN PO) Take by mouth as needed      Melatonin 5 MG CAPS Take 5 mg by mouth nightly       aspirin 81 MG tablet Take 81 mg by mouth nightly        No current facility-administered medications for this visit. Allergies   Allergen Reactions    Celexa [Citalopram] Nausea And Vomiting    Poison Ivy Extract [Poison Ivy Extract] Rash    Septra [Sulfamethoxazole-Trimethoprim] Nausea And Vomiting    Ultram [Tramadol] Nausea And Vomiting    Valsartan Other (See Comments)     Increased K    Xanax Xr [Alprazolam Er]      NOT ALLERGY, FAMILY STRONGLY OPPOSED TO BENZODIAZEPINES       Review of Systems:         Review of Systems      OBJECTIVE:  Physical Exam:    /68 (Site: Right Upper Arm, Position: Sitting, Cuff Size: Medium Adult)   Pulse 72   Ht 4' 11\" (1.499 m)   Wt 153 lb 6.4 oz (69.6 kg)   LMP  (LMP Unknown)   SpO2 99%   BMI 30.98 kg/m²      Physical Exam  General: No acute distress  Neck: No JVD, supple  Lungs: Chest rise equal bilaterally  Cardiac: Appears well perfused   Abdomen: Soft, nontender, nondistended, no rebound or guarding  Extremities, no edema, cyanosis, or clubbing. Lateral right malleolus small 1 cm area of ulcerated skin with reactive changes. No surrounding erythema. No fluctuance or induration. No drainage. Tenderness over area. DP/PT pulses +2/4 bilaterally  Skin: No rashes or breakdown  Neurologic: cranial nerves II - XII grossly intact      ASSESSMENT:  1. Ulcer of right foot, unspecified ulcer stage ([address]          PLAN:    We will obtain MRI to rule out osteomyelitis  Follow-up with wound care    Orders Placed This Encounter   Procedures    MRI FOOT RIGHT WO CONTRAST        No orders of the defined types were placed in this encounter. Follow Up:  Return if symptoms worsen or fail to improve.       Any Query, DO

## 2022-04-16 ENCOUNTER — HOSPITAL ENCOUNTER (OUTPATIENT)
Dept: MRI IMAGING | Age: 77
Discharge: HOME | End: 2022-04-16
Payer: COMMERCIAL

## 2022-04-16 DIAGNOSIS — L97.519 ULCER OF RIGHT FOOT, UNSPECIFIED ULCER STAGE (HCC): ICD-10-CM

## 2022-04-16 PROCEDURE — 73718 MRI LOWER EXTREMITY W/O DYE: CPT

## 2022-04-18 ENCOUNTER — HOSPITAL ENCOUNTER (OUTPATIENT)
Dept: WOUND CARE | Age: 77
Discharge: HOME | End: 2022-04-18
Payer: COMMERCIAL

## 2022-04-18 VITALS
HEART RATE: 62 BPM | DIASTOLIC BLOOD PRESSURE: 75 MMHG | SYSTOLIC BLOOD PRESSURE: 127 MMHG | TEMPERATURE: 98.1 F | RESPIRATION RATE: 16 BRPM

## 2022-04-18 DIAGNOSIS — L97.912 CHRONIC ULCER OF RIGHT LEG WITH FAT LAYER EXPOSED (HCC): ICD-10-CM

## 2022-04-18 DIAGNOSIS — M79.604 LEG PAIN, LATERAL, RIGHT: ICD-10-CM

## 2022-04-18 PROCEDURE — 87075 CULTR BACTERIA EXCEPT BLOOD: CPT

## 2022-04-18 PROCEDURE — 11042 DBRDMT SUBQ TIS 1ST 20SQCM/<: CPT

## 2022-04-18 PROCEDURE — 99213 OFFICE O/P EST LOW 20 MIN: CPT

## 2022-04-18 PROCEDURE — 87070 CULTURE OTHR SPECIMN AEROBIC: CPT

## 2022-04-18 ASSESSMENT — PAIN DESCRIPTION - PAIN TYPE: TYPE: ACUTE PAIN

## 2022-04-18 ASSESSMENT — PAIN DESCRIPTION - ONSET: ONSET: ON-GOING

## 2022-04-18 ASSESSMENT — PAIN DESCRIPTION - DESCRIPTORS: DESCRIPTORS: SORE

## 2022-04-18 ASSESSMENT — PAIN DESCRIPTION - LOCATION: LOCATION: ANKLE

## 2022-04-18 ASSESSMENT — PAIN DESCRIPTION - ORIENTATION: ORIENTATION: RIGHT

## 2022-04-18 ASSESSMENT — PAIN DESCRIPTION - PROGRESSION: CLINICAL_PROGRESSION: NOT CHANGED

## 2022-04-18 ASSESSMENT — PAIN SCALES - GENERAL: PAINLEVEL_OUTOF10: 5

## 2022-04-18 ASSESSMENT — PAIN DESCRIPTION - FREQUENCY: FREQUENCY: CONTINUOUS

## 2022-04-18 ASSESSMENT — PAIN - FUNCTIONAL ASSESSMENT: PAIN_FUNCTIONAL_ASSESSMENT: PREVENTS OR INTERFERES SOME ACTIVE ACTIVITIES AND ADLS

## 2022-04-18 NOTE — PROGRESS NOTES
Multilayer Compression Wrap   (Not Unna) Below the Knee    NAME:  Theo Corcoran OF BIRTH:  1945  MEDICAL RECORD NUMBER:  [id number]  DATE:  4/18/2022    Multilayer compression wrap: Removed old Multilayer wrap if indicated and wash leg with mild soap/water. Applied moisturizing agent to dry skin as needed. Applied primary and secondary dressing as ordered. Applied multilayered dressing below the knee to right lower leg. Instructed patient/caregiver not to remove dressing and to keep it clean and dry. Instructed patient/caregiver on complications to report to provider, such as pain, numbness in toes, heavy drainage, and slippage of dressing. Instructed patient on purpose of compression dressing and on activity and exercise recommendations.       Electronically signed by Nino Shields RN on 4/18/2022 at 10:23 AM

## 2022-04-18 NOTE — PROGRESS NOTES
Wound Care Center Progress Note With Procedure    Luis Manuel Kelly  AGE: 67 y.o. GENDER: female  : 1945  EPISODE DATE:  2022     Subjective:     Chief Complaint   Patient presents with    Wound Check         HISTORY of PRESENT ILLNESS      Josefa Shah is a 67 y.o. female who presents today for wound evaluation of Chronic arterial ulcer(s) of the right lateral ankle. The ulcer is of mild severity. The underlying cause of the wound is arterial vs neuropathic. Been present for a month. Has been on abx's. MRI with no OM. Wound cx taken. Wound Pain Timing/Severity: waxing and waning, moderate  Quality of pain: aching, burning, tender  Severity of pain:  4 / 10   Modifying Factors: none  Associated Signs/Symptoms: drainage and pain        PAST MEDICAL HISTORY        Diagnosis Date    Arthritis     \"Hands\"    CAD (coronary artery disease)     Dr Marino Haskins Chronic kidney disease, stage I 10/16/2019    Chronic ulcer of right leg with fat layer exposed ([address] 2022    COVID-19 2021    H/O cardiovascular stress test 02/15/2018    EF60% mod ischemia ant wall    H/O Doppler ultrasound 2017    carotid doppler - severe degree stenosis LICA    H/O echocardiogram 2016    ef 56% mild to mod. mr and tr    H/O echocardiogram 10/16/2018    EF55-60% mild AS, mild AR, mild-mod MR, mild TR, mild phtn    H/O echocardiogram 2020    EF 55-60% mild aortic stenosis mild mitral aortic and tricuspid regurg  no evidence of pericardial effusion    History of cardiac cath 2018    patent stents, nml EF, abn stress sec to jailed diag    History of echocardiogram 2019    s/p Left CEA, Mild 0-49% disease of the bilateral ICA, dampened left vertebral flow, normal left subclavian flow.  Hx of Carotid Doppler ultrasound 2020    Mild 0-49% disease of the bilateral ICA, normal vertebral flow    Hypercalcemia     ? possible hyperparathyroidism/saw Dr Romeo Cantrell previously   [facility] Hyperlipidemia     Hypertension 1978    Hyponatremia     chronic ~130-131 range.  Hypothyroidism due to acquired atrophy of thyroid     antiperoxidase ab tested negative    Insomnia     Leg pain, lateral, right 4/18/2022    Menopause     s/p JAMIE    Osteopenia     has hx of rib fractures after a fall    Osteopenia     S/P colonoscopic polypectomy 07/19/2018    Dr Yanira Baker, recheck 5 yrs    Shortness of breath on exertion     Vitamin D deficiency        PAST SURGICAL HISTORY    Past Surgical History:   Procedure Laterality Date    APPENDECTOMY      CARDIAC SURGERY      stents x 3 Feb 2007    CAROTID ENDARTERECTOMY Left 02/01/2017    with patch     COLONOSCOPY      CORONARY ANGIOPLASTY WITH STENT PLACEMENT  2006, 2007    X 2 in LAD and one in ? CIRC w Dr Edwige Mcdonough Bilateral 2000's    Cataracts With Lens Implants    HYSTERECTOMY, TOTAL ABDOMINAL  1981    ROTATOR CUFF REPAIR Right 2003    TONSILLECTOMY  1954       FAMILY HISTORY    Family History   Problem Relation Age of Onset    High Blood Pressure Mother     Migraines Mother     Heart Disease Father     High Blood Pressure Father     Other Father         Ulcer    Cancer Paternal Aunt     Diabetes Brother     Cancer Paternal Aunt     Diabetes Grandchild     Asthma Grandchild     Heart Disease Maternal Aunt     Colon Cancer Maternal Aunt     Diabetes Paternal Grandfather        SOCIAL HISTORY    Social History     Tobacco Use    Smoking status: Never Smoker    Smokeless tobacco: Never Used   Vaping Use    Vaping Use: Never used   Substance Use Topics    Alcohol use: No     Comment: caffeine 1-2 cups of coffee a day 2 pops a day    Drug use: No       ALLERGIES    Allergies   Allergen Reactions    Celexa [Citalopram] Nausea And Vomiting    Poison Ivy Extract [Poison Ivy Extract] Rash    Septra [Sulfamethoxazole-Trimethoprim] Nausea And Vomiting    Ultram [Tramadol] Nausea And Vomiting    Valsartan Other (See Comments) Increased K    Xanax Xr [Alprazolam Er]      NOT ALLERGY, FAMILY STRONGLY OPPOSED TO BENZODIAZEPINES       MEDICATIONS    Current Outpatient Medications on File Prior to Encounter   Medication Sig Dispense Refill    mupirocin (BACTROBAN) 2 % ointment APPLY TO AFFECTED AREA(S) THREE TIMES A DAY      hydroCHLOROthiazide (MICROZIDE) 12.5 MG capsule TAKE 1 CAPSULE BY MOUTH EVERY MORNING 90 capsule 3    sertraline (ZOLOFT) 50 MG tablet TAKE 1 TABLET BY MOUTH DAILY 90 tablet 1    clopidogrel (PLAVIX) 75 MG tablet TAKE 1 TABLET BY MOUTH DAILY 90 tablet 1    levothyroxine (SYNTHROID) 25 MCG tablet TAKE 1 TABLET BY MOUTH DAILY 90 tablet 1    hydrALAZINE (APRESOLINE) 50 MG tablet TAKE 1/2 TABLET BY MOUTH IN THE MORNING AND TAKE 1 TABLET BY MOUTH IN THE EVENING 135 tablet 0    metoprolol succinate (TOPROL XL) 25 MG extended release tablet Take 1 tablet by mouth daily 90 tablet 0    simvastatin (ZOCOR) 20 MG tablet Take 1 tablet by mouth nightly 90 tablet 1    losartan (COZAAR) 100 MG tablet Take 1 tablet by mouth daily 90 tablet 1    Multiple Vitamins-Minerals (THERAPEUTIC MULTIVITAMIN-MINERALS) tablet Take 1 tablet by mouth daily      Acetaminophen (TYLENOL ARTHRITIS PAIN PO) Take by mouth as needed      Melatonin 5 MG CAPS Take 5 mg by mouth nightly       aspirin 81 MG tablet Take 81 mg by mouth nightly        No current facility-administered medications on file prior to encounter. REVIEW OF SYSTEMS    Pertinent items are noted in HPI. Constitutional: Negative for systemic symptoms including fever, chills and malaise. Objective:      /75   Pulse 62   Temp 98.1 °F (36.7 °C)   Resp 16   LMP  (LMP Unknown)     PHYSICAL EXAM      General: The patient is in no acute distress. Mental status:  Patient is appropriate, is  oriented to place and plan of care.   Dermatologic exam: Visual inspection of the periwound reveals the skin to be moist  Wound exam: see wound description below in procedure note      Assessment:     Problem List Items Addressed This Visit     Chronic ulcer of right leg with fat layer exposed ([address]    Leg pain, lateral, right        Procedure Note    Indications:  Based on my examination of this patient's wound(s) today, sharp excision into necrotic subcutaneous tissue is required to promote healing and evaluate the extent of previous healing. Performed by: Rojas Brooks MD    Consent obtained: Yes    Time out taken:  Yes    Pain Control:       Debridement:Excisional Debridement    Using #15 blade scalpel the wound(s) was/were sharply debrided down through and including the removal of subcutaneous tissue.         Devitalized Tissue Debrided:  slough and exudate    Pre Debridement Measurements:  Are located in the Wound Documentation Flow Sheet    All active wounds listed below with today's date are evaluated  Wound(s)    debrided this date include # : 1     Post  Debridement Measurements:  Wound 04/18/22 Ankle Anterior;Right #1 Right Lateral Ankle (Active)   Wound Image   04/18/22 0945   Wound Etiology Arterial 04/18/22 1006   Dressing Status New dressing applied 04/18/22 1022   Wound Cleansed Wound cleanser 04/18/22 0945   Offloading for Diabetic Foot Ulcers Offloading not required 04/18/22 0945   Wound Length (cm) 0.3 cm 04/18/22 0945   Wound Width (cm) 0.2 cm 04/18/22 0945   Wound Depth (cm) 0.1 cm 04/18/22 0945   Wound Surface Area (cm^2) 0.06 cm^2 04/18/22 0945   Wound Volume (cm^3) 0.006 cm^3 04/18/22 0945   Post-Procedure Length (cm) 0.3 cm 04/18/22 1006   Post-Procedure Width (cm) 0.2 cm 04/18/22 1006   Post-Procedure Depth (cm) 0.1 cm 04/18/22 1006   Post-Procedure Surface Area (cm^2) 0.06 cm^2 04/18/22 1006   Post-Procedure Volume (cm^3) 0.006 cm^3 04/18/22 1006   Distance Tunneling (cm) 0 cm 04/18/22 0945   Tunneling Position ___ O'Clock 0 04/18/22 0945   Undermining Starts ___ O'Clock 0 04/18/22 0945   Undermining Ends___ O'Clock 0 04/18/22 0945 Undermining Maxium Distance (cm) 0 04/18/22 0945   Wound Assessment [facility] 04/18/22 0945   Drainage Amount Moderate 04/18/22 0945   Drainage Description Serosanguinous 04/18/22 0945   Odor None 04/18/22 0945   Gabriella-wound Assessment Intact; Maceration 04/18/22 0945   Margins Attached edges 04/18/22 0945   Wound Thickness Description not for Pressure Injury Full thickness 04/18/22 0945   Number of days: 0       Percent of Wound(s) Debrided: approximately 100%    Total  Area  Debrided:  0.1 sq cm     Bleeding:  None    Hemostasis Achieved:  not needed    Procedural Pain:  4  / 10     Post Procedural Pain:  0 / 10     Response to treatment:  Well tolerated by patient. Status of wound progress and description from last visit:   Stable, decreased redness per pt. Plan:       Discharge Instructions       PHYSICIAN ORDERS AND DISCHARGE INSTRUCTIONS    NOTE: Upon discharge from the [address], you will receive a patient experience survey. We would be grateful if you would take the time to fill this survey out. Wound care order history:     PAWAN's   Right       Left    Date:   Cultures:     Grafts:     Antibiotics:        Continuing wound care orders and information:                Residence:                Continue home health care with:    Your wound-care supplies will be provided by: Wound cleansing:     Do not scrub or use excessive force. Wash hands with soap and water before and after dressing changes. Prior to applying a clean dressing, cleanse wound with normal saline, wound cleanser, or mild soap and water. Ask the physician or nurse before getting the wound(s) wet in a shower    Daily Wound management:   Keep weight off wounds and reposition every 2 hours. Avoid standing for long periods of time. Apply wraps/stockings in AM and remove at bedtime. If swelling is present, elevate legs to the level of the heart or above for 30 minutes 4-5 times a day and/or when sitting.       When taking antibiotics take entire prescription as ordered by physician do not stop taking until medicine is all gone. Orders for this week:  04/18/22       R Lateral Ankle -- Clean with soap and water, pat dry. Apply Gentamicin and Stimulen Powder to wound bed. Cover with Ca Alginate. Wrap with Coban 2 Lite. Leave in place for 1 week. Follow up with Nafisa Powell CNP in 1 week in the wound care center. Call [phone] for any questions or concerns.   Date__________   Time____________        Treatment Note Wound 04/18/22 Ankle Anterior;Right #1 Right Lateral Ankle-Dressing/Treatment:  (Gentamicin, Stimulen, ca alginate, Coban 2 Lite)    Written Patient Dismissal Instructions Given            Electronically signed by Liliana Garcia MD on 4/18/2022 at 11:09 AM

## 2022-04-23 LAB
CULTURE: NORMAL
Lab: NORMAL
SPECIMEN: NORMAL

## 2022-04-25 ENCOUNTER — HOSPITAL ENCOUNTER (OUTPATIENT)
Dept: WOUND CARE | Age: 77
Discharge: HOME | End: 2022-04-25
Payer: COMMERCIAL

## 2022-04-25 VITALS
TEMPERATURE: 97.7 F | DIASTOLIC BLOOD PRESSURE: 63 MMHG | RESPIRATION RATE: 18 BRPM | HEART RATE: 64 BPM | SYSTOLIC BLOOD PRESSURE: 132 MMHG

## 2022-04-25 DIAGNOSIS — L97.912 CHRONIC ULCER OF RIGHT LEG WITH FAT LAYER EXPOSED (HCC): Primary | ICD-10-CM

## 2022-04-25 PROCEDURE — 11042 DBRDMT SUBQ TIS 1ST 20SQCM/<: CPT

## 2022-04-25 RX ORDER — GINSENG 100 MG
CAPSULE ORAL ONCE
Status: CANCELLED | OUTPATIENT
Start: 2022-04-25 | End: 2022-04-25

## 2022-04-25 RX ORDER — LIDOCAINE HYDROCHLORIDE 20 MG/ML
JELLY TOPICAL ONCE
Status: CANCELLED | OUTPATIENT
Start: 2022-04-25 | End: 2022-04-25

## 2022-04-25 RX ORDER — BACITRACIN ZINC AND POLYMYXIN B SULFATE 500; 1000 [USP'U]/G; [USP'U]/G
OINTMENT TOPICAL ONCE
Status: CANCELLED | OUTPATIENT
Start: 2022-04-25 | End: 2022-04-25

## 2022-04-25 RX ORDER — LIDOCAINE HYDROCHLORIDE 40 MG/ML
SOLUTION TOPICAL ONCE
Status: CANCELLED | OUTPATIENT
Start: 2022-04-25 | End: 2022-04-25

## 2022-04-25 RX ORDER — BETAMETHASONE DIPROPIONATE 0.05 %
OINTMENT (GRAM) TOPICAL ONCE
Status: CANCELLED | OUTPATIENT
Start: 2022-04-25 | End: 2022-04-25

## 2022-04-25 RX ORDER — GENTAMICIN SULFATE 1 MG/G
OINTMENT TOPICAL
Qty: 15 G | Refills: 1 | Status: SHIPPED | OUTPATIENT
Start: 2022-04-25 | End: 2022-05-02

## 2022-04-25 RX ORDER — LIDOCAINE 50 MG/G
OINTMENT TOPICAL ONCE
Status: CANCELLED | OUTPATIENT
Start: 2022-04-25 | End: 2022-04-25

## 2022-04-25 RX ORDER — BACITRACIN, NEOMYCIN, POLYMYXIN B 400; 3.5; 5 [USP'U]/G; MG/G; [USP'U]/G
OINTMENT TOPICAL ONCE
Status: CANCELLED | OUTPATIENT
Start: 2022-04-25 | End: 2022-04-25

## 2022-04-25 RX ORDER — LIDOCAINE 40 MG/G
CREAM TOPICAL ONCE
Status: CANCELLED | OUTPATIENT
Start: 2022-04-25 | End: 2022-04-25

## 2022-04-25 RX ORDER — CLOBETASOL PROPIONATE 0.5 MG/G
OINTMENT TOPICAL ONCE
Status: CANCELLED | OUTPATIENT
Start: 2022-04-25 | End: 2022-04-25

## 2022-04-25 RX ORDER — GENTAMICIN SULFATE 1 MG/G
OINTMENT TOPICAL ONCE
Status: CANCELLED | OUTPATIENT
Start: 2022-04-25 | End: 2022-04-25

## 2022-04-25 ASSESSMENT — PAIN SCALES - GENERAL: PAINLEVEL_OUTOF10: 0

## 2022-04-25 NOTE — PLAN OF CARE
Problem: Wound:  Goal: Will show signs of wound healing; wound closure and no evidence of infection  Description: Will show signs of wound healing; wound closure and no evidence of infection  Outcome: Progressing

## 2022-04-25 NOTE — PROGRESS NOTES
[facility] Center Progress Note With Procedure    Maryam Kelly  AGE: 67 y.o. GENDER: female  : 1945  EPISODE DATE:  2022     Subjective:     Chief Complaint   Patient presents with    Wound Check     right leg          HISTORY of PRESENT ILLNESS      Yaw Soler is a 67 y.o. female who presents today for wound evaluation of Chronic arterial and pressure ulcer(s) of the right lateral malleolar area. The ulcer is of mild severity. The underlying cause of the wound is arterial and pressure. Moderately improved. Wound cx was NGTD. Wound Pain Timing/Severity: waxing and waning, mild  Quality of pain: shooting, tender  Severity of pain:  4 / 10   Modifying Factors: shear force and arterial insufficiency  Associated Signs/Symptoms: drainage and pain        PAST MEDICAL HISTORY        Diagnosis Date    Arthritis     \"Hands\"    CAD (coronary artery disease)     Dr Rossy oJe Chronic kidney disease, stage I 10/16/2019    Chronic ulcer of right leg with fat layer exposed ([address] 2022    COVID-19 2021    H/O cardiovascular stress test 02/15/2018    EF60% mod ischemia ant wall    H/O Doppler ultrasound 2017    carotid doppler - severe degree stenosis LICA    H/O echocardiogram 2016    ef 56% mild to mod. mr and tr    H/O echocardiogram 10/16/2018    EF55-60% mild AS, mild AR, mild-mod MR, mild TR, mild phtn    H/O echocardiogram 2020    EF 55-60% mild aortic stenosis mild mitral aortic and tricuspid regurg  no evidence of pericardial effusion    History of cardiac cath 2018    patent stents, nml EF, abn stress sec to jailed diag    History of echocardiogram 2019    s/p Left CEA, Mild 0-49% disease of the bilateral ICA, dampened left vertebral flow, normal left subclavian flow.  Hx of Carotid Doppler ultrasound 2020    Mild 0-49% disease of the bilateral ICA, normal vertebral flow    Hypercalcemia     ? possible hyperparathyroidism/saw  Teddy Merida previously    Hyperlipidemia     Hypertension 1978    Hyponatremia     chronic ~130-131 range.  Hypothyroidism due to acquired atrophy of thyroid     antiperoxidase ab tested negative    Insomnia     Leg pain, lateral, right 4/18/2022    Menopause     s/p JAMIE    Osteopenia     has hx of rib fractures after a fall    Osteopenia     S/P colonoscopic polypectomy 07/19/2018    Dr Tameka Mar, recheck 5 yrs    Shortness of breath on exertion     Vitamin D deficiency        PAST SURGICAL HISTORY    Past Surgical History:   Procedure Laterality Date    APPENDECTOMY      CARDIAC SURGERY      stents x 3 Feb 2007    CAROTID ENDARTERECTOMY Left 02/01/2017    with patch     COLONOSCOPY      CORONARY ANGIOPLASTY WITH STENT PLACEMENT  2006, 2007    X 2 in LAD and one in ? CIRC w Dr Domenica Downs Bilateral 2000's    Cataracts With Lens Implants    HYSTERECTOMY, TOTAL ABDOMINAL  1981    ROTATOR CUFF REPAIR Right 2003    TONSILLECTOMY  1954       FAMILY HISTORY    Family History   Problem Relation Age of Onset    High Blood Pressure Mother     Migraines Mother     Heart Disease Father     High Blood Pressure Father     Other Father         Ulcer    Cancer Paternal Aunt     Diabetes Brother     Cancer Paternal Aunt     Diabetes Grandchild     Asthma Grandchild     Heart Disease Maternal Aunt     Colon Cancer Maternal Aunt     Diabetes Paternal Grandfather        SOCIAL HISTORY    Social History     Tobacco Use    Smoking status: Never Smoker    Smokeless tobacco: Never Used   Vaping Use    Vaping Use: Never used   Substance Use Topics    Alcohol use: No     Comment: caffeine 1-2 cups of coffee a day 2 pops a day    Drug use: No       ALLERGIES    Allergies   Allergen Reactions    Celexa [Citalopram] Nausea And Vomiting    Poison Ivy Extract [Poison Ivy Extract] Rash    Septra [Sulfamethoxazole-Trimethoprim] Nausea And Vomiting    Ultram [Tramadol] Nausea And Vomiting    Valsartan Other (See Comments)     Increased K    Xanax Xr [Alprazolam Er]      NOT ALLERGY, FAMILY STRONGLY OPPOSED TO BENZODIAZEPINES       MEDICATIONS    Current Outpatient Medications on File Prior to Encounter   Medication Sig Dispense Refill    mupirocin (BACTROBAN) 2 % ointment APPLY TO AFFECTED AREA(S) THREE TIMES A DAY      hydroCHLOROthiazide (MICROZIDE) 12.5 MG capsule TAKE 1 CAPSULE BY MOUTH EVERY MORNING 90 capsule 3    sertraline (ZOLOFT) 50 MG tablet TAKE 1 TABLET BY MOUTH DAILY 90 tablet 1    clopidogrel (PLAVIX) 75 MG tablet TAKE 1 TABLET BY MOUTH DAILY 90 tablet 1    levothyroxine (SYNTHROID) 25 MCG tablet TAKE 1 TABLET BY MOUTH DAILY 90 tablet 1    hydrALAZINE (APRESOLINE) 50 MG tablet TAKE 1/2 TABLET BY MOUTH IN THE MORNING AND TAKE 1 TABLET BY MOUTH IN THE EVENING 135 tablet 0    metoprolol succinate (TOPROL XL) 25 MG extended release tablet Take 1 tablet by mouth daily 90 tablet 0    simvastatin (ZOCOR) 20 MG tablet Take 1 tablet by mouth nightly 90 tablet 1    losartan (COZAAR) 100 MG tablet Take 1 tablet by mouth daily 90 tablet 1    Multiple Vitamins-Minerals (THERAPEUTIC MULTIVITAMIN-MINERALS) tablet Take 1 tablet by mouth daily      Acetaminophen (TYLENOL ARTHRITIS PAIN PO) Take by mouth as needed      Melatonin 5 MG CAPS Take 5 mg by mouth nightly       aspirin 81 MG tablet Take 81 mg by mouth nightly        No current facility-administered medications on file prior to encounter. REVIEW OF SYSTEMS    Pertinent items are noted in HPI. Constitutional: Negative for systemic symptoms including fever, chills and malaise. Objective:      /63   Pulse 64   Temp 97.7 °F (36.5 °C) (Temporal)   Resp 18   LMP  (LMP Unknown)     PHYSICAL EXAM      General: The patient is in no acute distress. Mental status:  Patient is appropriate, is  oriented to place and plan of care.   Dermatologic exam: Visual inspection of the periwound reveals the skin to be normal in turgor and texture  Wound exam: see wound description below in procedure note      Assessment:     Problem List Items Addressed This Visit     Chronic ulcer of right leg with fat layer exposed ([address] - Primary        Procedure Note    Indications:  Based on my examination of this patient's wound(s) today, sharp excision into necrotic subcutaneous tissue is required to promote healing and evaluate the extent of previous healing. Performed by: Rupesh Manjarrez MD    Consent obtained: Yes    Time out taken:  Yes    Pain Control:       Debridement:Excisional Debridement    Using #15 blade scalpel the wound(s) was/were sharply debrided down through and including the removal of subcutaneous tissue.         Devitalized Tissue Debrided:  slough and exudate    Pre Debridement Measurements:  Are located in the Wound Documentation Flow Sheet    All active wounds listed below with today's date are evaluated  Wound(s)    debrided this date include # : 1     Post  Debridement Measurements:  Wound 04/18/22 Ankle Anterior;Right #1 Right Lateral Ankle (Active)   Wound Image   04/18/22 0945   Wound Etiology Arterial 04/25/22 1034   Dressing Status New dressing applied 04/18/22 1022   Wound Cleansed Wound cleanser 04/25/22 1034   Offloading for Diabetic Foot Ulcers Offloading not required 04/25/22 1034   Wound Length (cm) 0.2 cm 04/25/22 1034   Wound Width (cm) 0.2 cm 04/25/22 1034   Wound Depth (cm) 0.1 cm 04/25/22 1034   Wound Surface Area (cm^2) 0.04 cm^2 04/25/22 1034   Change in Wound Size % (l*w) 33.33 04/25/22 1034   Wound Volume (cm^3) 0.004 cm^3 04/25/22 1034   Wound Healing % 33 04/25/22 1034   Post-Procedure Length (cm) 0.2 cm 04/25/22 1053   Post-Procedure Width (cm) 0.2 cm 04/25/22 1053   Post-Procedure Depth (cm) 0.1 cm 04/25/22 1053   Post-Procedure Surface Area (cm^2) 0.04 cm^2 04/25/22 1053   Post-Procedure Volume (cm^3) 0.004 cm^3 04/25/22 1053   Distance Tunneling (cm) 0 cm 04/25/22 1034   Tunneling Position ___ O'Clock 0 04/25/22 1034   Undermining Starts ___ O'Clock 0 04/25/22 1034   Undermining Ends___ O'Clock 0 04/25/22 1034   Undermining Maxium Distance (cm) 0 04/25/22 1034   Wound Assessment Slough 04/25/22 1034   Drainage Amount Moderate 04/25/22 1034   Drainage Description Serosanguinous 04/25/22 1034   Odor None 04/25/22 1034   Gabriella-wound Assessment Intact; Maceration 04/25/22 1034   Margins Attached edges 04/25/22 1034   Wound Thickness Description not for Pressure Injury Full thickness 04/25/22 1034   Number of days: 7       Percent of Wound(s) Debrided: approximately 100%    Total  Area  Debrided:  0.1 sq cm     Bleeding:  None    Hemostasis Achieved:  not needed    Procedural Pain:  3  / 10     Post Procedural Pain:  0 / 10     Response to treatment:  Well tolerated by patient. Status of wound progress and description from last visit:   Improving. Plan:       Discharge Instructions       PHYSICIAN ORDERS AND DISCHARGE INSTRUCTIONS     NOTE: Upon discharge from the [address], you will receive a patient experience survey. We would be grateful if you would take the time to fill this survey out.     Wound care order history:                 PAWAN's   Right       Left               Date:              Cultures:                Grafts:                Antibiotics:           Continuing wound care orders and information:                 Residence:                Continue home health care with:               Your wound-care supplies will be provided by:      Wound cleansing:               Do not scrub or use excessive force. Wash hands with soap and water before and after dressing changes. Prior to applying a clean dressing, cleanse wound with normal saline, wound cleanser, or mild soap and water. Ask the physician or nurse before getting the wound(s) wet in a shower     Daily Wound management:              Keep weight off wounds and reposition every 2 hours.               Avoid standing for long periods of time. Apply wraps/stockings in AM and remove at bedtime. If swelling is present, elevate legs to the level of the heart or above for 30 minutes 4-5 times a day and/or when sitting. When taking antibiotics take entire prescription as ordered by physician do not stop taking until medicine is all gone.                             Wound Care Notes:   Gentamycin ordered 04/25/22                             Orders for this week:  04/25/22                  R Lateral Ankle -- Clean with soap and water, pat dry. Apply Gentamicin and Stimulen Powder to wound bed. Cover with Ca Alginate. Cover with gentac border   Change Daily         Follow up with Richard Walker CNP in 1 week in the wound care center. Call [phone] for any questions or concerns.   Date__________   Time____________        Treatment Note      Written Patient Dismissal Instructions Given            Electronically signed by Rneee Palmer MD on 4/25/2022 at 10:55 AM

## 2022-05-02 ENCOUNTER — HOSPITAL ENCOUNTER (OUTPATIENT)
Dept: WOUND CARE | Age: 77
Discharge: HOME | End: 2022-05-02
Payer: COMMERCIAL

## 2022-05-02 VITALS
HEART RATE: 56 BPM | DIASTOLIC BLOOD PRESSURE: 66 MMHG | TEMPERATURE: 98.7 F | RESPIRATION RATE: 18 BRPM | SYSTOLIC BLOOD PRESSURE: 128 MMHG

## 2022-05-02 DIAGNOSIS — L97.912 CHRONIC ULCER OF RIGHT LEG WITH FAT LAYER EXPOSED (HCC): Primary | ICD-10-CM

## 2022-05-02 PROCEDURE — 11042 DBRDMT SUBQ TIS 1ST 20SQCM/<: CPT

## 2022-05-02 PROCEDURE — 11042 DBRDMT SUBQ TIS 1ST 20SQCM/<: CPT | Performed by: NURSE PRACTITIONER

## 2022-05-02 RX ORDER — CLOBETASOL PROPIONATE 0.5 MG/G
OINTMENT TOPICAL ONCE
Status: CANCELLED | OUTPATIENT
Start: 2022-05-02 | End: 2022-05-02

## 2022-05-02 RX ORDER — LIDOCAINE HYDROCHLORIDE 40 MG/ML
SOLUTION TOPICAL ONCE
Status: CANCELLED | OUTPATIENT
Start: 2022-05-02 | End: 2022-05-02

## 2022-05-02 RX ORDER — LIDOCAINE 40 MG/G
CREAM TOPICAL ONCE
Status: CANCELLED | OUTPATIENT
Start: 2022-05-02 | End: 2022-05-02

## 2022-05-02 RX ORDER — GINSENG 100 MG
CAPSULE ORAL ONCE
Status: CANCELLED | OUTPATIENT
Start: 2022-05-02 | End: 2022-05-02

## 2022-05-02 RX ORDER — LIDOCAINE HYDROCHLORIDE 20 MG/ML
JELLY TOPICAL ONCE
Status: CANCELLED | OUTPATIENT
Start: 2022-05-02 | End: 2022-05-02

## 2022-05-02 RX ORDER — LIDOCAINE 50 MG/G
OINTMENT TOPICAL ONCE
Status: CANCELLED | OUTPATIENT
Start: 2022-05-02 | End: 2022-05-02

## 2022-05-02 RX ORDER — BETAMETHASONE DIPROPIONATE 0.05 %
OINTMENT (GRAM) TOPICAL ONCE
Status: CANCELLED | OUTPATIENT
Start: 2022-05-02 | End: 2022-05-02

## 2022-05-02 RX ORDER — GENTAMICIN SULFATE 1 MG/G
OINTMENT TOPICAL ONCE
Status: CANCELLED | OUTPATIENT
Start: 2022-05-02 | End: 2022-05-02

## 2022-05-02 RX ORDER — BACITRACIN, NEOMYCIN, POLYMYXIN B 400; 3.5; 5 [USP'U]/G; MG/G; [USP'U]/G
OINTMENT TOPICAL ONCE
Status: CANCELLED | OUTPATIENT
Start: 2022-05-02 | End: 2022-05-02

## 2022-05-02 RX ORDER — BACITRACIN ZINC AND POLYMYXIN B SULFATE 500; 1000 [USP'U]/G; [USP'U]/G
OINTMENT TOPICAL ONCE
Status: CANCELLED | OUTPATIENT
Start: 2022-05-02 | End: 2022-05-02

## 2022-05-02 ASSESSMENT — PAIN SCALES - GENERAL: PAINLEVEL_OUTOF10: 0

## 2022-05-02 NOTE — PROGRESS NOTES
Wound Care Center Progress Note With Procedure    Andrew Kelly  AGE: 67 y.o. GENDER: female  : 1945  EPISODE DATE:  2022     Subjective:     Chief Complaint   Patient presents with    Wound Check     right ankle          HISTORY of PRESENT ILLNESS      Shayne Dahl is a 67 y.o. 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 The ulcer is of mild severity.   The underlying cause of the wound is pressure verses arterial.  Wound Pain Timing/Severity: waxing and waning, mild  Quality of pain: shooting, tender  Severity of pain:  3  10   Modifying Factors: chronic pressure, shear force and obesity  Associated Signs/Symptoms: edema, erythema, drainage and pain     Diabetes: No (HgA1c  5.7 as of 22)  Smoking: Never smoker  Obesity: Yes  Anticoagulant/Antiplatelet therapy: Plavix, low dose aspirin  Immunosuppression: No    PAWAN: Right 1.07, left 1.18 as of 22    Wound Culture 22 Final Report No growth 60 to 72 hours No anaerobes isolated     Narrative   EXAMINATION:   MRI OF THE RIGHT FOOT WITHOUT CONTRAST, 2022 10:13 am       TECHNIQUE:   Multiplanar multisequence MRI of the right foot was performed without the   administration of intravenous contrast.       COMPARISON:   Right ankle radiographs 2022.       HISTORY:   ORDERING SYSTEM PROVIDED HISTORY: Ulcer of right foot, unspecified ulcer   stage ([facility] [address]   TECHNOLOGIST PROVIDED HISTORY:   What is the sedation requirement?->None   Reason for Exam: Ulcer of right foot, unspecified ulcer stage ([facility])   Relevant Medical/Surgical History: none       FINDINGS:   PLANTAR FASCIA: Moderate enthesophyte at the plantar fascia origin.  Mild   chronic thickening of the proximal aspect of the central cord.  No acute   plantar fasciitis or plantar fascia tear.       ACHILLES TENDON: Moderate enthesophyte at the Achilles tendon insertion.  The   tendon is intact and otherwise normal in appearance.  No retrocalcaneal   bursitis.       BONE MARROW: Very mild subcortical edema in the lateral aspect of lateral   malleolus. No fracture or dislocation.  No osseous erosion or abnormal marrow   space-occupying lesion.       JOINT SPACES: The tibiotalar and subtalar joints are normal in appearance. The talonavicular and joint is within normal limits.  Mild calcaneocuboid   degenerative changes.  The midfoot is within normal limits.  No joint   effusion.       SYNDESMOTIC LIGAMENTS: Intact anterior inferior tibiofibular ligament and   posteroinferior tibiofibular ligament. No significant periligamentous edema   or interval widening.       LATERAL COLLATERAL LIGAMENT COMPLEX: Intact anterior talofibular ligament,   posterior talofibular ligament and calcaneofibular ligament.       DELTOID LIGAMENT COMPLEX: Intact superficial and deep components.       SINUS TARSI AND SPRING LIGAMENT: Visualized portions of the spring ligament   are intact including the tibiospring ligament. Normal appearance of the sinus   tarsi       MEDIAL TENDONS: Intact posterior tibial tendon, flexor digitorum and flexor   hallucis longus tendons.       LATERAL TENDONS: There is a chronic attritional longitudinal split tear of   the juxta malleolar peroneus brevis tendon with distal reconstitution.  The   peroneus longus tendon is intact.  No tenosynovitis.  The peroneal   retinaculum is intact.       ANTERIOR TENDONS: The tibialis anterior, extensor hallucis longus and   extensor digitorum longus tendons are normal in position, morphology and   signal.       TARSAL TUNNEL: There are no obstructing lesions within the tarsal tunnel.       MISCELLANEOUS: Mild lateral ankle subcutaneous edema.  No deep soft tissue   ulceration, sinus tract, or drainable fluid collection identified.  No   abnormal soft tissue mass.           Impression   1.  Mild lateral ankle subcutaneous edema compatible with cellulitis.  No deep   soft tissue ulceration or abscess. 2. Very mild subcortical edema in the lateral aspect the lateral malleolus   likely representing noninfectious reactive osteitis.  Early osteomyelitis is   less likely.  No osseous erosion. Patient educated on the 6 essential components necessary for wound healing: Circulation, Debridements, Proper Dressings and Topical Wound Products, Infection Control, Edema Control and Offloading. Patient educated on those factors that negatively effect or impact wound healing: smoking, obesity, uncontrolled diabetes, anticoagulant and immunosuppressive regimens, inadequate nutrition, untreated arterial and venous disease if applicable and measures to manage edema. PAST MEDICAL HISTORY        Diagnosis Date    Arthritis     \"Hands\"    CAD (coronary artery disease) 2006    Dr Roxanne Boucher Chronic kidney disease, stage I 10/16/2019    Chronic ulcer of right leg with fat layer exposed ([facility] [address] 4/18/2022    COVID-19 09/28/2021    H/O cardiovascular stress test 02/15/2018    EF60% mod ischemia ant wall    H/O Doppler ultrasound 01/09/2017    carotid doppler - severe degree stenosis LICA    H/O echocardiogram 06/06/2016    ef 56% mild to mod. mr and tr    H/O echocardiogram 10/16/2018    EF55-60% mild AS, mild AR, mild-mod MR, mild TR, mild phtn    H/O echocardiogram 12/29/2020    EF 55-60% mild aortic stenosis mild mitral aortic and tricuspid regurg  no evidence of pericardial effusion    History of cardiac cath 02/19/2018    patent stents, nml EF, abn stress sec to jailed diag    History of echocardiogram 07/08/2019    s/p Left CEA, Mild 0-49% disease of the bilateral ICA, dampened left vertebral flow, normal left subclavian flow.  Hx of Carotid Doppler ultrasound 05/06/2020    Mild 0-49% disease of the bilateral ICA, normal vertebral flow    Hypercalcemia     ? possible hyperparathyroidism/saw Dr Danny Fitzgerald previously    Hyperlipidemia     Hypertension 1978    Hyponatremia     chronic ~130-131 range.  Hypothyroidism due to acquired atrophy of thyroid     antiperoxidase ab tested negative    Insomnia     Leg pain, lateral, right 4/18/2022    Menopause     s/p JAMIE    Osteopenia     has hx of rib fractures after a fall    Osteopenia     S/P colonoscopic polypectomy 07/19/2018    Dr Tracey Rodriguez, recheck 5 yrs    Shortness of breath on exertion     Vitamin D deficiency        PAST SURGICAL HISTORY    Past Surgical History:   Procedure Laterality Date    APPENDECTOMY      CARDIAC SURGERY      stents x 3 Feb 2007    CAROTID ENDARTERECTOMY Left 02/01/2017    with patch     COLONOSCOPY      CORONARY ANGIOPLASTY WITH STENT PLACEMENT  2006, 2007    X 2 in LAD and one in ? CIRC w Dr Ned Mills Bilateral 2000's    Cataracts With Lens Implants    HYSTERECTOMY, TOTAL ABDOMINAL  1981    ROTATOR CUFF REPAIR Right 2003    TONSILLECTOMY  1954       FAMILY HISTORY    Family History   Problem Relation Age of Onset    High Blood Pressure Mother     Migraines Mother     Heart Disease Father     High Blood Pressure Father     Other Father         Ulcer    Cancer Paternal Aunt     Diabetes Brother     Cancer Paternal Aunt     Diabetes Grandchild     Asthma Grandchild     Heart Disease Maternal Aunt     Colon Cancer Maternal Aunt     Diabetes Paternal Grandfather        SOCIAL HISTORY    Social History     Tobacco Use    Smoking status: Never Smoker    Smokeless tobacco: Never Used   Vaping Use    Vaping Use: Never used   Substance Use Topics    Alcohol use: No     Comment: caffeine 1-2 cups of coffee a day 2 pops a day    Drug use: No       ALLERGIES    Allergies   Allergen Reactions    Celexa [Citalopram] Nausea And Vomiting    Poison Ivy Extract [Poison Ivy Extract] Rash    Septra [Sulfamethoxazole-Trimethoprim] Nausea And Vomiting    Ultram [Tramadol] Nausea And Vomiting    Valsartan Other (See Comments)     Increased K    Xanax Xr [Alprazolam Er]      NOT ALLERGY, FAMILY STRONGLY OPPOSED TO BENZODIAZEPINES       MEDICATIONS    Current Outpatient Medications on File Prior to Encounter   Medication Sig Dispense Refill    gentamicin (GARAMYCIN) 0.1 % ointment Apply topically 3 times daily. 15 g 1    mupirocin (BACTROBAN) 2 % ointment APPLY TO AFFECTED AREA(S) THREE TIMES A DAY      hydroCHLOROthiazide (MICROZIDE) 12.5 MG capsule TAKE 1 CAPSULE BY MOUTH EVERY MORNING 90 capsule 3    sertraline (ZOLOFT) 50 MG tablet TAKE 1 TABLET BY MOUTH DAILY 90 tablet 1    clopidogrel (PLAVIX) 75 MG tablet TAKE 1 TABLET BY MOUTH DAILY 90 tablet 1    levothyroxine (SYNTHROID) 25 MCG tablet TAKE 1 TABLET BY MOUTH DAILY 90 tablet 1    hydrALAZINE (APRESOLINE) 50 MG tablet TAKE 1/2 TABLET BY MOUTH IN THE MORNING AND TAKE 1 TABLET BY MOUTH IN THE EVENING 135 tablet 0    metoprolol succinate (TOPROL XL) 25 MG extended release tablet Take 1 tablet by mouth daily 90 tablet 0    simvastatin (ZOCOR) 20 MG tablet Take 1 tablet by mouth nightly 90 tablet 1    losartan (COZAAR) 100 MG tablet Take 1 tablet by mouth daily 90 tablet 1    Multiple Vitamins-Minerals (THERAPEUTIC MULTIVITAMIN-MINERALS) tablet Take 1 tablet by mouth daily      Acetaminophen (TYLENOL ARTHRITIS PAIN PO) Take by mouth as needed      Melatonin 5 MG CAPS Take 5 mg by mouth nightly       aspirin 81 MG tablet Take 81 mg by mouth nightly        No current facility-administered medications on file prior to encounter. REVIEW OF SYSTEMS    Pertinent items are noted in HPI. Constitutional: Negative for systemic symptoms including fever, chills and malaise. Objective:      /66   Pulse 56   Temp 98.7 °F (37.1 °C) (Temporal)   Resp 18   LMP  (LMP Unknown)     PHYSICAL EXAM    General: The patient is in no acute distress. Mental status:  Patient is appropriate, is  oriented to place and plan of care.   Dermatologic exam: Visual inspection of the periwound reveals the skin to be edematous  Wound exam: see wound description below in procedure note      Assessment:     Problem List Items Addressed This Visit        Other    Chronic ulcer of right leg with fat layer exposed ([address] - Primary    Relevant Orders    Initiate Outpatient Wound Care Protocol        Procedure Note    Indications:  Based on my examination of this patient's wound(s) today, sharp excision into necrotic subcutaneous tissue is required to promote healing and evaluate the extent of previous healing. Performed by: WANDY Vale CNP    Consent obtained: Yes    Time out taken:  Yes    Pain Control: Anesthetic  Anesthetic: 4% Lidocaine Liquid Topical        Debridement:Excisional Debridement    Using curette the wound(s) was/were sharply debrided down through and including the removal of subcutaneous tissue.         Devitalized Tissue Debrided:  slough and exudate    Pre Debridement Measurements:  Are located in the Wound Documentation Flow Sheet    All active wounds listed below with today's date are evaluated  Wound(s)    debrided this date include # : 1     Post  Debridement Measurements:  Wound 04/18/22 Ankle Anterior;Right #1 Right Lateral Ankle (Active)   Wound Image   05/02/22 1029   Wound Etiology Arterial 05/02/22 1029   Dressing Status Clean;Dry;New dressing applied 05/02/22 1102   Wound Cleansed Soap and water 05/02/22 1029   Offloading for Diabetic Foot Ulcers Offloading not required 05/02/22 1029   Wound Length (cm) 0.4 cm 05/02/22 1029   Wound Width (cm) 0.3 cm 05/02/22 1029   Wound Depth (cm) 0.1 cm 05/02/22 1029   Wound Surface Area (cm^2) 0.12 cm^2 05/02/22 1029   Change in Wound Size % (l*w) -100 05/02/22 1029   Wound Volume (cm^3) 0.012 cm^3 05/02/22 1029   Wound Healing % -100 05/02/22 1029   Post-Procedure Length (cm) 0.4 cm 05/02/22 1049   Post-Procedure Width (cm) 0.3 cm 05/02/22 1049   Post-Procedure Depth (cm) 0.1 cm 05/02/22 1049   Post-Procedure Surface Area (cm^2) 0.12 cm^2 05/02/22 1049   Post-Procedure Volume (cm^3) 0.012 cm^3 05/02/22 1049   Distance Tunneling (cm) 0 cm 05/02/22 1029   Tunneling Position ___ O'Clock 0 05/02/22 1029   Undermining Starts ___ O'Clock 0 05/02/22 1029   Undermining Ends___ O'Clock 0 05/02/22 1029   Undermining Maxium Distance (cm) 0 05/02/22 1029   Wound Assessment Slough 05/02/22 1029   Drainage Amount Moderate 05/02/22 1029   Drainage Description Serosanguinous 05/02/22 1029   Odor None 05/02/22 1029   Gabriella-wound Assessment Intact; Maceration 05/02/22 1029   Margins Attached edges 05/02/22 1029   Wound Thickness Description not for Pressure Injury Full thickness 05/02/22 1029   Number of days: 14       Percent of Wound(s) Debrided: approximately 100%    Total  Area  Debrided:  0.12 sq cm     Bleeding:  Minimal    Hemostasis Achieved:  by pressure    Procedural Pain:  0  / 10     Post Procedural Pain:  0 / 10     Response to treatment:  Well tolerated by patient. Status of wound progress and description from last visit: Improving, continue regimen. Plan:       Discharge Instructions       PHYSICIAN ORDERS AND DISCHARGE INSTRUCTIONS     NOTE: Upon discharge from the [address], you will receive a patient experience survey. We would be grateful if you would take the time to fill this survey out.     Wound care order history:                 PAWAN's   Right       Left               Date:              Cultures:                Grafts:                Antibiotics:           Continuing wound care orders and information:                 Residence:                Continue home health care with:               Your wound-care supplies will be provided by:      Wound cleansing:               VL not scrub or use excessive force.              Wash hands with soap and water before and after dressing changes.               ZULEU to applying a clean dressing, cleanse wound with normal saline, wound cleanser, or mild soap and water.               Ask the physician or nurse before getting the wound(s) wet in a shower     Daily Wound management:              Keep weight off wounds and reposition every 2 hours.              Avoid standing for long periods of time.              Apply wraps/stockings in AM and remove at bedtime.              If swelling is present, elevate legs to the level of the heart or above for 30 minutes 4-5 times a day and/or when sitting.                                      When taking antibiotics take entire prescription as ordered by physician do not stop taking until medicine is all gone.                             Wound Care Notes:   Gentamycin ordered 04/25/22                             Orders for this week:  05/2/22                  R Lateral Ankle -- Clean with soap and water, pat dry. Apply Gentamicin and Stimulen Powder to wound bed. Cover with Ca Alginate. Cover with gentac border   Change Daily      Wound Care Provider: Lorena Greenwood CNP  Follow up  in 1 week at the wound care center on Monday   Call [phone] for any questions or concerns.   Date__________   Time____________        Treatment Note Wound 04/18/22 Ankle Anterior;Right #1 Right Lateral Ankle-Dressing/Treatment:  (gent, stimulen, ca alg, gentac border )    Written Patient Dismissal Instructions Given            Electronically signed by WANDY Palacios CNP on 5/2/2022 at 12:32 PM

## 2022-05-09 ENCOUNTER — HOSPITAL ENCOUNTER (OUTPATIENT)
Dept: WOUND CARE | Age: 77
Discharge: HOME | End: 2022-05-09
Payer: COMMERCIAL

## 2022-05-09 VITALS — RESPIRATION RATE: 16 BRPM | TEMPERATURE: 97.5 F

## 2022-05-09 DIAGNOSIS — L97.912 CHRONIC ULCER OF RIGHT LEG WITH FAT LAYER EXPOSED (HCC): Primary | ICD-10-CM

## 2022-05-09 PROCEDURE — 6370000000 HC RX 637 (ALT 250 FOR IP): Performed by: SURGERY

## 2022-05-09 PROCEDURE — 11042 DBRDMT SUBQ TIS 1ST 20SQCM/<: CPT

## 2022-05-09 PROCEDURE — 11042 DBRDMT SUBQ TIS 1ST 20SQCM/<: CPT | Performed by: NURSE PRACTITIONER

## 2022-05-09 RX ORDER — BACITRACIN, NEOMYCIN, POLYMYXIN B 400; 3.5; 5 [USP'U]/G; MG/G; [USP'U]/G
OINTMENT TOPICAL ONCE
Status: CANCELLED | OUTPATIENT
Start: 2022-05-09 | End: 2022-05-09

## 2022-05-09 RX ORDER — LIDOCAINE HYDROCHLORIDE 20 MG/ML
JELLY TOPICAL ONCE
Status: CANCELLED | OUTPATIENT
Start: 2022-05-09 | End: 2022-05-09

## 2022-05-09 RX ORDER — BACITRACIN ZINC AND POLYMYXIN B SULFATE 500; 1000 [USP'U]/G; [USP'U]/G
OINTMENT TOPICAL ONCE
Status: CANCELLED | OUTPATIENT
Start: 2022-05-09 | End: 2022-05-09

## 2022-05-09 RX ORDER — LIDOCAINE 50 MG/G
OINTMENT TOPICAL ONCE
Status: COMPLETED | OUTPATIENT
Start: 2022-05-09 | End: 2022-05-09

## 2022-05-09 RX ORDER — LIDOCAINE 40 MG/G
CREAM TOPICAL ONCE
Status: CANCELLED | OUTPATIENT
Start: 2022-05-09 | End: 2022-05-09

## 2022-05-09 RX ORDER — GENTAMICIN SULFATE 1 MG/G
OINTMENT TOPICAL ONCE
Status: CANCELLED | OUTPATIENT
Start: 2022-05-09 | End: 2022-05-09

## 2022-05-09 RX ORDER — LIDOCAINE 50 MG/G
OINTMENT TOPICAL ONCE
Status: CANCELLED | OUTPATIENT
Start: 2022-05-09 | End: 2022-05-09

## 2022-05-09 RX ORDER — BETAMETHASONE DIPROPIONATE 0.05 %
OINTMENT (GRAM) TOPICAL ONCE
Status: CANCELLED | OUTPATIENT
Start: 2022-05-09 | End: 2022-05-09

## 2022-05-09 RX ORDER — CLOBETASOL PROPIONATE 0.5 MG/G
OINTMENT TOPICAL ONCE
Status: CANCELLED | OUTPATIENT
Start: 2022-05-09 | End: 2022-05-09

## 2022-05-09 RX ORDER — GINSENG 100 MG
CAPSULE ORAL ONCE
Status: CANCELLED | OUTPATIENT
Start: 2022-05-09 | End: 2022-05-09

## 2022-05-09 RX ORDER — LIDOCAINE HYDROCHLORIDE 40 MG/ML
SOLUTION TOPICAL ONCE
Status: CANCELLED | OUTPATIENT
Start: 2022-05-09 | End: 2022-05-09

## 2022-05-09 RX ADMIN — LIDOCAINE: 50 OINTMENT TOPICAL at 10:45

## 2022-05-09 ASSESSMENT — PAIN SCALES - GENERAL: PAINLEVEL_OUTOF10: 7

## 2022-05-09 NOTE — PROGRESS NOTES
Wound Care Center Progress Note With Procedure    Maryam Kelly  AGE: 67 y.o. GENDER: female  : 1945  EPISODE DATE:  2022     Subjective:     Chief Complaint   Patient presents with    Wound Check     right lwoer leg         HISTORY of PRESENT ILLNESS   Yaw Soler is a 67 y.o. 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 The ulcer is of mild severity.   The underlying cause of the wound is pressure verses arterial.  Wound Pain Timing/Severity: waxing and waning, mild  Quality of pain: shooting, tender  Severity of pain:  3 / 10   Modifying Factors: chronic pressure, shear force and obesity  Associated Signs/Symptoms: edema, erythema, drainage and pain      Diabetes: No (HgA1c  5.7 as of 22)  Smoking: Never smoker  Obesity: Yes  Anticoagulant/Antiplatelet therapy: Plavix, low dose aspirin  Immunosuppression: No     PAWAN: Right 1.07, left 1.18 as of 22     Wound Culture 22 Final Report No growth 60 to 72 hours No anaerobes isolated      Narrative   EXAMINATION:   MRI OF THE RIGHT FOOT WITHOUT CONTRAST, 2022 10:13 am       TECHNIQUE:   Multiplanar multisequence MRI of the right foot was performed without the   administration of intravenous contrast.       COMPARISON:   Right ankle radiographs 2022.       HISTORY:   ORDERING SYSTEM PROVIDED HISTORY: Ulcer of right foot, unspecified ulcer   stage ([facility] [address]   TECHNOLOGIST PROVIDED HISTORY:   What is the sedation requirement?->None   Reason for Exam: Ulcer of right foot, unspecified ulcer stage ([facility])   Relevant Medical/Surgical History: none       FINDINGS:   PLANTAR FASCIA: Moderate enthesophyte at the plantar fascia origin.  Mild   chronic thickening of the proximal aspect of the central cord.  No acute   plantar fasciitis or plantar fascia tear.       ACHILLES TENDON: Moderate enthesophyte at the Achilles tendon insertion.  The   tendon is intact and otherwise normal in appearance.  No retrocalcaneal   bursitis.       BONE MARROW: Very mild subcortical edema in the lateral aspect of lateral   malleolus. No fracture or dislocation.  No osseous erosion or abnormal marrow   space-occupying lesion.       JOINT SPACES: The tibiotalar and subtalar joints are normal in appearance. The talonavicular and joint is within normal limits.  Mild calcaneocuboid   degenerative changes.  The midfoot is within normal limits.  No joint   effusion.       SYNDESMOTIC LIGAMENTS: Intact anterior inferior tibiofibular ligament and   posteroinferior tibiofibular ligament. No significant periligamentous edema   or interval widening.       LATERAL COLLATERAL LIGAMENT COMPLEX: Intact anterior talofibular ligament,   posterior talofibular ligament and calcaneofibular ligament.       DELTOID LIGAMENT COMPLEX: Intact superficial and deep components.       SINUS TARSI AND SPRING LIGAMENT: Visualized portions of the spring ligament   are intact including the tibiospring ligament. Normal appearance of the sinus   tarsi       MEDIAL TENDONS: Intact posterior tibial tendon, flexor digitorum and flexor   hallucis longus tendons.       LATERAL TENDONS: There is a chronic attritional longitudinal split tear of   the juxta malleolar peroneus brevis tendon with distal reconstitution.  The   peroneus longus tendon is intact.  No tenosynovitis.  The peroneal   retinaculum is intact.       ANTERIOR TENDONS: The tibialis anterior, extensor hallucis longus and   extensor digitorum longus tendons are normal in position, morphology and   signal.       TARSAL TUNNEL: There are no obstructing lesions within the tarsal tunnel.       MISCELLANEOUS: Mild lateral ankle subcutaneous edema.  No deep soft tissue   ulceration, sinus tract, or drainable fluid collection identified.  No   abnormal soft tissue mass.           Impression   1.  Mild lateral ankle subcutaneous edema compatible with cellulitis.  No deep   soft tissue ulceration or abscess. 2. Very mild subcortical edema in the lateral aspect the lateral malleolus   likely representing noninfectious reactive osteitis.  Early osteomyelitis is   less likely.  No osseous erosion.         Patient educated on the 6 essential components necessary for wound healing: Circulation, Debridements, Proper Dressings and Topical Wound Products, Infection Control, Edema Control and Offloading.     Patient educated on those factors that negatively effect or impact wound healing: smoking, obesity, uncontrolled diabetes, anticoagulant and immunosuppressive regimens, inadequate nutrition, untreated arterial and venous disease if applicable and measures to manage edema.                PAST MEDICAL HISTORY        Diagnosis Date    Arthritis     \"Hands\"    CAD (coronary artery disease) 2006    Dr Donavon Prado Chronic kidney disease, stage I 10/16/2019    Chronic ulcer of right leg with fat layer exposed ([facility] [address] 4/18/2022    COVID-19 09/28/2021    H/O cardiovascular stress test 02/15/2018    EF60% mod ischemia ant wall    H/O Doppler ultrasound 01/09/2017    carotid doppler - severe degree stenosis LICA    H/O echocardiogram 06/06/2016    ef 56% mild to mod. mr and tr    H/O echocardiogram 10/16/2018    EF55-60% mild AS, mild AR, mild-mod MR, mild TR, mild phtn    H/O echocardiogram 12/29/2020    EF 55-60% mild aortic stenosis mild mitral aortic and tricuspid regurg  no evidence of pericardial effusion    History of cardiac cath 02/19/2018    patent stents, nml EF, abn stress sec to jailed diag    History of echocardiogram 07/08/2019    s/p Left CEA, Mild 0-49% disease of the bilateral ICA, dampened left vertebral flow, normal left subclavian flow.  Hx of Carotid Doppler ultrasound 05/06/2020    Mild 0-49% disease of the bilateral ICA, normal vertebral flow    Hypercalcemia     ? possible hyperparathyroidism/saw Dr Devon Holloway previously    Hyperlipidemia     Hypertension 1978    Hyponatremia chronic ~130-131 range.  Hypothyroidism due to acquired atrophy of thyroid     antiperoxidase ab tested negative    Insomnia     Leg pain, lateral, right 4/18/2022    Menopause     s/p JAMIE    Osteopenia     has hx of rib fractures after a fall    Osteopenia     S/P colonoscopic polypectomy 07/19/2018    Dr Luis Gillette, recheck 5 yrs    Shortness of breath on exertion     Vitamin D deficiency        PAST SURGICAL HISTORY    Past Surgical History:   Procedure Laterality Date    APPENDECTOMY      CARDIAC SURGERY      stents x 3 Feb 2007    CAROTID ENDARTERECTOMY Left 02/01/2017    with patch     COLONOSCOPY      CORONARY ANGIOPLASTY WITH STENT PLACEMENT  2006, 2007    X 2 in LAD and one in ? CIRC w Dr [facility] Bilateral 2000's    Cataracts With Lens Implants    HYSTERECTOMY, TOTAL ABDOMINAL  1981    ROTATOR CUFF REPAIR Right 2003    TONSILLECTOMY  1954       FAMILY HISTORY    Family History   Problem Relation Age of Onset    High Blood Pressure Mother     Migraines Mother     Heart Disease Father     High Blood Pressure Father     Other Father         Ulcer    Cancer Paternal Aunt     Diabetes Brother     Cancer Paternal Aunt     Diabetes Grandchild     Asthma Grandchild     Heart Disease Maternal Aunt     Colon Cancer Maternal Aunt     Diabetes Paternal Grandfather        SOCIAL HISTORY    Social History     Tobacco Use    Smoking status: Never Smoker    Smokeless tobacco: Never Used   Vaping Use    Vaping Use: Never used   Substance Use Topics    Alcohol use: No     Comment: caffeine 1-2 cups of coffee a day 2 pops a day    Drug use: No       ALLERGIES    Allergies   Allergen Reactions    Celexa [Citalopram] Nausea And Vomiting    Poison Ivy Extract [Poison Ivy Extract] Rash    Septra [Sulfamethoxazole-Trimethoprim] Nausea And Vomiting    Ultram [Tramadol] Nausea And Vomiting    Valsartan Other (See Comments)     Increased K    Xanax Xr [Alprazolam Er]      NOT ALLERGY, FAMILY STRONGLY OPPOSED TO BENZODIAZEPINES       MEDICATIONS    Current Outpatient Medications on File Prior to Encounter   Medication Sig Dispense Refill    mupirocin (BACTROBAN) 2 % ointment APPLY TO AFFECTED AREA(S) THREE TIMES A DAY      hydroCHLOROthiazide (MICROZIDE) 12.5 MG capsule TAKE 1 CAPSULE BY MOUTH EVERY MORNING 90 capsule 3    sertraline (ZOLOFT) 50 MG tablet TAKE 1 TABLET BY MOUTH DAILY 90 tablet 1    clopidogrel (PLAVIX) 75 MG tablet TAKE 1 TABLET BY MOUTH DAILY 90 tablet 1    levothyroxine (SYNTHROID) 25 MCG tablet TAKE 1 TABLET BY MOUTH DAILY 90 tablet 1    hydrALAZINE (APRESOLINE) 50 MG tablet TAKE 1/2 TABLET BY MOUTH IN THE MORNING AND TAKE 1 TABLET BY MOUTH IN THE EVENING 135 tablet 0    metoprolol succinate (TOPROL XL) 25 MG extended release tablet Take 1 tablet by mouth daily 90 tablet 0    simvastatin (ZOCOR) 20 MG tablet Take 1 tablet by mouth nightly 90 tablet 1    losartan (COZAAR) 100 MG tablet Take 1 tablet by mouth daily 90 tablet 1    Multiple Vitamins-Minerals (THERAPEUTIC MULTIVITAMIN-MINERALS) tablet Take 1 tablet by mouth daily      Acetaminophen (TYLENOL ARTHRITIS PAIN PO) Take by mouth as needed      Melatonin 5 MG CAPS Take 5 mg by mouth nightly       aspirin 81 MG tablet Take 81 mg by mouth nightly        No current facility-administered medications on file prior to encounter. REVIEW OF SYSTEMS    Pertinent items are noted in HPI. Constitutional: Negative for systemic symptoms including fever, chills and malaise. Objective:      Temp 97.5 °F (36.4 °C) (Temporal)   Resp 16   LMP  (LMP Unknown)     PHYSICAL EXAM    General: The patient is in no acute distress. Mental status:  Patient is appropriate, is  oriented to place and plan of care.   Dermatologic exam: Visual inspection of the periwound reveals the skin to be normal in turgor and texture  Wound exam: see wound description below in procedure note    Assessment:     Problem List Items Addressed This Visit        Other    Chronic ulcer of right leg with fat layer exposed ([address] - Primary    Relevant Orders    Initiate Outpatient Wound Care Protocol        Procedure Note    Indications:  Based on my examination of this patient's wound(s) today, sharp excision into necrotic subcutaneous tissue is required to promote healing and evaluate the extent of previous healing. Performed by: WANDY Donahue CNP    Consent obtained: Yes    Time out taken:  Yes    Pain Control: Anesthetic  Anesthetic: 5% Lidocaine Ointment Topical     Debridement:Excisional Debridement    Using curette the wound(s) was/were sharply debrided down through and including the removal of subcutaneous tissue.         Devitalized Tissue Debrided:  slough and exudate    Pre Debridement Measurements:  Are located in the Wound Documentation Flow Sheet    All active wounds listed below with today's date are evaluated  Wound(s)    debrided this date include # : 1     Post  Debridement Measurements:  Wound 04/18/22 Ankle Anterior;Right #1 Right Lateral Ankle (Active)   Wound Image   05/09/22 1039   Wound Etiology Arterial 05/02/22 1029   Dressing Status New dressing applied 05/09/22 1119   Wound Cleansed Soap and water 05/09/22 1039   Offloading for Diabetic Foot Ulcers Offloading not required 05/02/22 1029   Wound Length (cm) 0.4 cm 05/09/22 1039   Wound Width (cm) 0.3 cm 05/09/22 1039   Wound Depth (cm) 0.1 cm 05/09/22 1039   Wound Surface Area (cm^2) 0.12 cm^2 05/09/22 1039   Change in Wound Size % (l*w) -100 05/09/22 1039   Wound Volume (cm^3) 0.012 cm^3 05/09/22 1039   Wound Healing % -100 05/09/22 1039   Post-Procedure Length (cm) 0.4 cm 05/09/22 1102   Post-Procedure Width (cm) 0.3 cm 05/09/22 1102   Post-Procedure Depth (cm) 0.1 cm 05/09/22 1102   Post-Procedure Surface Area (cm^2) 0.12 cm^2 05/09/22 1102   Post-Procedure Volume (cm^3) 0.012 cm^3 05/09/22 1102   Distance Tunneling (cm) 0 cm 05/09/22 1039   Tunneling Position ___ O'Clock 0 05/09/22 1039   Undermining Starts ___ O'Clock 0 05/09/22 1039   Undermining Ends___ O'Clock 0 05/09/22 1039   Undermining Maxium Distance (cm) 0 05/09/22 1039   Wound Assessment Slough 05/09/22 1039   Drainage Amount Moderate 05/09/22 1039   Drainage Description Serosanguinous 05/09/22 1039   Odor None 05/09/22 1039   Gabriella-wound Assessment Intact 05/09/22 1039   Margins Defined edges 05/09/22 1039   Wound Thickness Description not for Pressure Injury Full thickness 05/09/22 1039   Number of days: 21       Percent of Wound(s) Debrided: approximately 100%    Total  Area  Debrided:  0.12 sq cm     Bleeding:  Minimal    Hemostasis Achieved:  by pressure    Procedural Pain:  0  / 10     Post Procedural Pain:  0 / 10     Response to treatment:  Well tolerated by patient. Status of wound progress and description from last visit:   Almost healed. Plan:       Discharge Instructions       PHYSICIAN ORDERS AND DISCHARGE INSTRUCTIONS     NOTE: Upon discharge from the [address], you will receive a patient experience survey. We would be grateful if you would take the time to fill this survey out.     Wound care order history:                 PAWAN's   Right       Left               Date:     Continuing wound care orders and information:                 Residence:                Continue home health care with:               Your wound-care supplies will be provided by:      Wound cleansing:               HM not scrub or use excessive force.              Wash hands with soap and water before and after dressing changes.               LDETW to applying a clean dressing, cleanse wound with normal saline, wound cleanser, or mild soap and water.               Ask the physician or nurse before getting the wound(s) wet in a shower     Daily Wound management:              Keep weight off wounds and reposition every 2 hours.              Avoid standing for long periods of time.              GCL[facility] wraps/stockings in AM and remove at bedtime.              If swelling is present, elevate legs to the level of the heart or above for 30 minutes 4-5 times a day and/or when sitting.                                      When taking antibiotics take entire prescription as ordered by physician do not stop taking until medicine is all gone.                             Wound Care Notes:   Gentamycin ordered 04/25/22                               Orders for this week:  05/9/22                  R Lateral Ankle -- Clean with soap and water, pat dry. Apply sulfamylon in clinic (gentamycin at home) and Stimulen Powder to wound bed. Cover with Ca Alginate. Cover with gentac border   Change Daily        Wound Care Provider: Nacho Laguna CNP  Follow up  in 1 week at the wound care center on Monday   Call [phone] for any questions or concerns.   Date__________   Time____________        Treatment Note Wound 04/18/22 Ankle Anterior;Right #1 Right Lateral Ankle-Dressing/Treatment:  (Sulfamylon, Stimulen, ca alginate, Gentac)    Written Patient Dismissal Instructions Given            Electronically signed by WANDY Ramachandran CNP on 5/9/2022 at 12:04 PM

## 2022-05-09 NOTE — PLAN OF CARE
Problem: Pain  Goal: Verbalizes/displays adequate comfort level or baseline comfort level  Outcome: Progressing     Problem: Wound:  Goal: Will show signs of wound healing; wound closure and no evidence of infection  Description: Will show signs of wound healing; wound closure and no evidence of infection  Outcome: Progressing

## 2022-05-16 LAB
ABSOLUTE IMMATURE GRANULOCYTE: 0 K/UL (ref 0–0.1)
ALBUMIN/GLOBULIN RATIO: 1.7 RATIO (ref 0.8–2.6)
ALBUMIN: 4.5 G/DL (ref 3.5–5.2)
ALP BLD-CCNC: 75 U/L (ref 23–144)
ALT SERPL-CCNC: 31 U/L (ref 0–60)
AST SERPL-CCNC: 22 U/L (ref 0–55)
BASOPHILS ABSOLUTE: 0.1 K/UL (ref 0–0.3)
BASOPHILS RELATIVE PERCENT: 1 % (ref 0–2)
BILIRUB SERPL-MCNC: 0.4 MG/DL (ref 0–1.2)
BUN BLDV-MCNC: 19 MG/DL (ref 3–29)
BUN/CREAT BLD: 27 (ref 7–25)
CALCIUM SERPL-MCNC: 10.3 MG/DL (ref 8.5–10.5)
CHLORIDE BLD-SCNC: 97 MEQ/L (ref 96–110)
CHOLESTEROL: 131 MG/DL
CO2: 26 MEQ/L (ref 19–32)
CREAT SERPL-MCNC: 0.7 MG/DL (ref 0.5–1.2)
DIFFERENTIAL TYPE: ABNORMAL
EOSINOPHILS ABSOLUTE: 0.2 K/UL (ref 0–0.5)
EOSINOPHILS RELATIVE PERCENT: 2.4 % (ref 0–5)
GLOBULIN: 2.6 G/DL (ref 1.9–3.6)
GLOMERULAR FILTRATION RATE: 89 MLS/MIN/1.73M2
GLUCOSE BLD-MCNC: 106 MG/DL (ref 70–99)
HCT VFR BLD CALC: 36.5 % (ref 34–49)
HDLC SERPL-MCNC: 43 MG/DL
HEMOGLOBIN: 12.8 G/DL (ref 11.2–15.7)
IMMATURE GRANULOCYTES: 0.5 %
LDL CHOLESTEROL CALCULATED: 62 MG/DL
LYMPHOCYTES ABSOLUTE: 1.1 K/UL (ref 0.9–4.1)
LYMPHOCYTES RELATIVE PERCENT: 15.4 % (ref 14–51)
MCH RBC QN AUTO: 30.9 PG (ref 26–34)
MCHC RBC AUTO-ENTMCNC: 35.1 G/DL (ref 30.7–35.5)
MCV RBC AUTO: 88.2 FL (ref 80–100)
MONOCYTES ABSOLUTE: 1 K/UL (ref 0.2–1)
MONOCYTES RELATIVE PERCENT: 12.9 % (ref 4–12)
NEUTROPHILS ABSOLUTE: 5 K/UL (ref 1.8–7.5)
NEUTROPHILS RELATIVE PERCENT: 67.8 % (ref 42–80)
NUCLEATED RBCS: 0 /100 WBC
PDW BLD-RTO: 12.6 %
PLATELET # BLD: 256 K/UL (ref 140–400)
PMV BLD AUTO: 11.1 FL (ref 7.2–11.7)
POTASSIUM SERPL-SCNC: 4.2 MEQ/L (ref 3.4–5.3)
RBC # BLD: 4.14 M/UL (ref 3.95–5.26)
SODIUM BLD-SCNC: 134 MEQ/L (ref 135–148)
STATUS: ABNORMAL
T4 FREE: 1.34 NG/DL (ref 0.8–1.8)
TOTAL PROTEIN: 7.1 G/DL (ref 6–8.3)
TRIGL SERPL-MCNC: 129 MG/DL
TSH SERPL DL<=0.05 MIU/L-ACNC: 2.85 MCIU/ML (ref 0.4–4.5)
VLDLC SERPL CALC-MCNC: 26 MG/DL (ref 4–38)
WBC: 7.4 K/UL (ref 3.5–10.9)

## 2022-05-17 ENCOUNTER — HOSPITAL ENCOUNTER (OUTPATIENT)
Dept: WOUND CARE | Age: 77
Discharge: HOME | End: 2022-05-17
Payer: COMMERCIAL

## 2022-05-17 VITALS — TEMPERATURE: 98 F | HEART RATE: 57 BPM | SYSTOLIC BLOOD PRESSURE: 142 MMHG | DIASTOLIC BLOOD PRESSURE: 62 MMHG

## 2022-05-17 DIAGNOSIS — L97.912 CHRONIC ULCER OF RIGHT LEG WITH FAT LAYER EXPOSED (HCC): Primary | ICD-10-CM

## 2022-05-17 LAB — HEPATITIS C ANTIBODY: NEGATIVE

## 2022-05-17 PROCEDURE — 11042 DBRDMT SUBQ TIS 1ST 20SQCM/<: CPT

## 2022-05-17 PROCEDURE — 11042 DBRDMT SUBQ TIS 1ST 20SQCM/<: CPT | Performed by: NURSE PRACTITIONER

## 2022-05-17 RX ORDER — CLOBETASOL PROPIONATE 0.5 MG/G
OINTMENT TOPICAL ONCE
Status: CANCELLED | OUTPATIENT
Start: 2022-05-17 | End: 2022-05-17

## 2022-05-17 RX ORDER — BACITRACIN, NEOMYCIN, POLYMYXIN B 400; 3.5; 5 [USP'U]/G; MG/G; [USP'U]/G
OINTMENT TOPICAL ONCE
Status: CANCELLED | OUTPATIENT
Start: 2022-05-17 | End: 2022-05-17

## 2022-05-17 RX ORDER — LIDOCAINE 40 MG/G
CREAM TOPICAL ONCE
Status: CANCELLED | OUTPATIENT
Start: 2022-05-17 | End: 2022-05-17

## 2022-05-17 RX ORDER — LIDOCAINE HYDROCHLORIDE 20 MG/ML
JELLY TOPICAL ONCE
Status: CANCELLED | OUTPATIENT
Start: 2022-05-17 | End: 2022-05-17

## 2022-05-17 RX ORDER — LIDOCAINE HYDROCHLORIDE 40 MG/ML
SOLUTION TOPICAL ONCE
Status: CANCELLED | OUTPATIENT
Start: 2022-05-17 | End: 2022-05-17

## 2022-05-17 RX ORDER — GINSENG 100 MG
CAPSULE ORAL ONCE
Status: CANCELLED | OUTPATIENT
Start: 2022-05-17 | End: 2022-05-17

## 2022-05-17 RX ORDER — BACITRACIN ZINC AND POLYMYXIN B SULFATE 500; 1000 [USP'U]/G; [USP'U]/G
OINTMENT TOPICAL ONCE
Status: CANCELLED | OUTPATIENT
Start: 2022-05-17 | End: 2022-05-17

## 2022-05-17 RX ORDER — LIDOCAINE 50 MG/G
OINTMENT TOPICAL ONCE
Status: DISCONTINUED | OUTPATIENT
Start: 2022-05-17 | End: 2022-05-18 | Stop reason: HOSPADM

## 2022-05-17 RX ORDER — GENTAMICIN SULFATE 1 MG/G
OINTMENT TOPICAL ONCE
Status: CANCELLED | OUTPATIENT
Start: 2022-05-17 | End: 2022-05-17

## 2022-05-17 RX ORDER — LIDOCAINE 50 MG/G
OINTMENT TOPICAL ONCE
Status: CANCELLED | OUTPATIENT
Start: 2022-05-17 | End: 2022-05-17

## 2022-05-17 RX ORDER — BETAMETHASONE DIPROPIONATE 0.05 %
OINTMENT (GRAM) TOPICAL ONCE
Status: CANCELLED | OUTPATIENT
Start: 2022-05-17 | End: 2022-05-17

## 2022-05-17 NOTE — PROGRESS NOTES
Wound Care Center Progress Note With Procedure    Stephon Kelly  AGE: 67 y.o. GENDER: female  : 1945  EPISODE DATE:  2022     Subjective:     Chief Complaint   Patient presents with    Wound Check     right ankle          HISTORY of PRESENT ILLNESS     Bertin Half a 67 y. o. 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  The ulcer is of mild severity.  The underlying cause of the wound is pressure verses arterial.  Wound Pain Timing/Severity: waxing and waning, mild  Quality of pain: shooting, tender  Severity of pain:  3 / 10   Modifying Factors: chronic pressure, shear force and obesity  Associated Signs/Symptoms: edema, erythema, drainage and pain      Diabetes: No (HgA1c  5.7 as of 22)  Smoking: Never smoker  Obesity: Yes  Anticoagulant/Antiplatelet therapy: Plavix, low dose aspirin  Immunosuppression: No     PAWAN: Right 1.07, left 1.18 as of 22     Wound Culture 22 Final Report No growth 60 to 72 hours No anaerobes isolated     Narrative   EXAMINATION:   MRI OF THE RIGHT FOOT WITHOUT CONTRAST, 2022 10:13 am       TECHNIQUE:   Multiplanar multisequence MRI of the right foot was performed without the   administration of intravenous contrast.       COMPARISON:   Right ankle radiographs 2022.       HISTORY:   ORDERING SYSTEM PROVIDED HISTORY: Ulcer of right foot, unspecified ulcer   stage ([facility] [address]   TECHNOLOGIST PROVIDED HISTORY:   What is the sedation requirement?->None   Reason for Exam: Ulcer of right foot, unspecified ulcer stage ([facility])   Relevant Medical/Surgical History: none       FINDINGS:   PLANTAR FASCIA: Moderate enthesophyte at the plantar fascia origin.  Mild   chronic thickening of the proximal aspect of the central cord.  No acute   plantar fasciitis or plantar fascia tear.       ACHILLES TENDON: Moderate enthesophyte at the Achilles tendon insertion.  The   tendon is intact and otherwise normal in appearance.  No retrocalcaneal   bursitis.       BONE MARROW: Very mild subcortical edema in the lateral aspect of lateral   malleolus. No fracture or dislocation.  No osseous erosion or abnormal marrow   space-occupying lesion.       JOINT SPACES: The tibiotalar and subtalar joints are normal in appearance. The talonavicular and joint is within normal limits.  Mild calcaneocuboid   degenerative changes.  The midfoot is within normal limits.  No joint   effusion.       SYNDESMOTIC LIGAMENTS: Intact anterior inferior tibiofibular ligament and   posteroinferior tibiofibular ligament. No significant periligamentous edema   or interval widening.       LATERAL COLLATERAL LIGAMENT COMPLEX: Intact anterior talofibular ligament,   posterior talofibular ligament and calcaneofibular ligament.       DELTOID LIGAMENT COMPLEX: Intact superficial and deep components.       SINUS TARSI AND SPRING LIGAMENT: Visualized portions of the spring ligament   are intact including the tibiospring ligament. Normal appearance of the sinus   tarsi       MEDIAL TENDONS: Intact posterior tibial tendon, flexor digitorum and flexor   hallucis longus tendons.       LATERAL TENDONS: There is a chronic attritional longitudinal split tear of   the juxta malleolar peroneus brevis tendon with distal reconstitution.  The   peroneus longus tendon is intact.  No tenosynovitis.  The peroneal   retinaculum is intact.       ANTERIOR TENDONS: The tibialis anterior, extensor hallucis longus and   extensor digitorum longus tendons are normal in position, morphology and   signal.       TARSAL TUNNEL: There are no obstructing lesions within the tarsal tunnel.       MISCELLANEOUS: Mild lateral ankle subcutaneous edema.  No deep soft tissue   ulceration, sinus tract, or drainable fluid collection identified.  No   abnormal soft tissue mass.           Impression   1.  Mild lateral ankle subcutaneous edema compatible with cellulitis.  No deep   soft tissue Hyponatremia     chronic ~130-131 range.  Hypothyroidism due to acquired atrophy of thyroid     antiperoxidase ab tested negative    Insomnia     Leg pain, lateral, right 4/18/2022    Menopause     s/p JAMIE    Osteopenia     has hx of rib fractures after a fall    Osteopenia     S/P colonoscopic polypectomy 07/19/2018    Dr Anaid Allen, recheck 5 yrs    Shortness of breath on exertion     Vitamin D deficiency        PAST SURGICAL HISTORY    Past Surgical History:   Procedure Laterality Date    APPENDECTOMY      CARDIAC SURGERY      stents x 3 Feb 2007    CAROTID ENDARTERECTOMY Left 02/01/2017    with patch     COLONOSCOPY      CORONARY ANGIOPLASTY WITH STENT PLACEMENT  2006, 2007    X 2 in LAD and one in ? CIRC w Dr Deyvi Martinez Bilateral 2000's    Cataracts With Lens Implants    HYSTERECTOMY, TOTAL ABDOMINAL  1981    ROTATOR CUFF REPAIR Right 2003    TONSILLECTOMY  1954       FAMILY HISTORY    Family History   Problem Relation Age of Onset    High Blood Pressure Mother     Migraines Mother     Heart Disease Father     High Blood Pressure Father     Other Father         Ulcer    Cancer Paternal Aunt     Diabetes Brother     Cancer Paternal Aunt     Diabetes Grandchild     Asthma Grandchild     Heart Disease Maternal Aunt     Colon Cancer Maternal Aunt     Diabetes Paternal Grandfather        SOCIAL HISTORY    Social History     Tobacco Use    Smoking status: Never Smoker    Smokeless tobacco: Never Used   Vaping Use    Vaping Use: Never used   Substance Use Topics    Alcohol use: No     Comment: caffeine 1-2 cups of coffee a day 2 pops a day    Drug use: No       ALLERGIES    Allergies   Allergen Reactions    Celexa [Citalopram] Nausea And Vomiting    Poison Ivy Extract [Poison Ivy Extract] Rash    Septra [Sulfamethoxazole-Trimethoprim] Nausea And Vomiting    Ultram [Tramadol] Nausea And Vomiting    Valsartan Other (See Comments)     Increased K    Xanax Xr [Alprazolam Er] NOT ALLERGY, FAMILY STRONGLY OPPOSED TO BENZODIAZEPINES       MEDICATIONS    Current Outpatient Medications on File Prior to Encounter   Medication Sig Dispense Refill    mupirocin (BACTROBAN) 2 % ointment APPLY TO AFFECTED AREA(S) THREE TIMES A DAY      hydroCHLOROthiazide (MICROZIDE) 12.5 MG capsule TAKE 1 CAPSULE BY MOUTH EVERY MORNING 90 capsule 3    sertraline (ZOLOFT) 50 MG tablet TAKE 1 TABLET BY MOUTH DAILY 90 tablet 1    clopidogrel (PLAVIX) 75 MG tablet TAKE 1 TABLET BY MOUTH DAILY 90 tablet 1    levothyroxine (SYNTHROID) 25 MCG tablet TAKE 1 TABLET BY MOUTH DAILY 90 tablet 1    hydrALAZINE (APRESOLINE) 50 MG tablet TAKE 1/2 TABLET BY MOUTH IN THE MORNING AND TAKE 1 TABLET BY MOUTH IN THE EVENING 135 tablet 0    metoprolol succinate (TOPROL XL) 25 MG extended release tablet Take 1 tablet by mouth daily 90 tablet 0    simvastatin (ZOCOR) 20 MG tablet Take 1 tablet by mouth nightly 90 tablet 1    losartan (COZAAR) 100 MG tablet Take 1 tablet by mouth daily 90 tablet 1    Multiple Vitamins-Minerals (THERAPEUTIC MULTIVITAMIN-MINERALS) tablet Take 1 tablet by mouth daily      Acetaminophen (TYLENOL ARTHRITIS PAIN PO) Take by mouth as needed      Melatonin 5 MG CAPS Take 5 mg by mouth nightly       aspirin 81 MG tablet Take 81 mg by mouth nightly        No current facility-administered medications on file prior to encounter. REVIEW OF SYSTEMS    Pertinent items are noted in HPI. Constitutional: Negative for systemic symptoms including fever, chills and malaise. Objective:      BP (!) 142/62   Pulse 57   Temp 98 °F (36.7 °C) (Temporal)   LMP  (LMP Unknown)     PHYSICAL EXAM    General: The patient is in no acute distress. Mental status:  Patient is appropriate, is  oriented to place and plan of care.   Dermatologic exam: Visual inspection of the periwound reveals the skin to be normal in turgor and texture  Wound exam: see wound description below in procedure note      Assessment:     Problem List Items Addressed This Visit        Other    Chronic ulcer of right leg with fat layer exposed ([address] - Primary    Relevant Medications    lidocaine (XYLOCAINE) 5 % ointment    Other Relevant Orders    Initiate Outpatient Wound Care Protocol        Procedure Note    Indications:  Based on my examination of this patient's wound(s) today, sharp excision into necrotic subcutaneous tissue is required to promote healing and evaluate the extent of previous healing. Performed by: Ventura Carrel, APRN - CNP    Consent obtained: Yes    Time out taken:  Yes    Pain Control: Anesthetic  Anesthetic: 4% Lidocaine Liquid Topical        Debridement:Excisional Debridement    Using curette the wound(s) was/were sharply debrided down through and including the removal of subcutaneous tissue.         Devitalized Tissue Debrided:  slough and exudate    Pre Debridement Measurements:  Are located in the Wound Documentation Flow Sheet    All active wounds listed below with today's date are evaluated  Wound(s)    debrided this date include # : 1     Post  Debridement Measurements:  Wound 04/18/22 Ankle Anterior;Right #1 Right Lateral Ankle (Active)   Wound Image   05/09/22 1039   Wound Etiology Arterial 05/17/22 1034   Dressing Status New dressing applied 05/17/22 1113   Wound Cleansed Soap and water 05/17/22 1034   Offloading for Diabetic Foot Ulcers Offloading not required 05/17/22 1113   Wound Length (cm) 0.4 cm 05/17/22 1034   Wound Width (cm) 0.2 cm 05/17/22 1034   Wound Depth (cm) 0.1 cm 05/17/22 1034   Wound Surface Area (cm^2) 0.08 cm^2 05/17/22 1034   Change in Wound Size % (l*w) -33.33 05/17/22 1034   Wound Volume (cm^3) 0.008 cm^3 05/17/22 1034   Wound Healing % -33 05/17/22 1034   Post-Procedure Length (cm) 0.4 cm 05/17/22 1050   Post-Procedure Width (cm) 0.2 cm 05/17/22 1050   Post-Procedure Depth (cm) 0.1 cm 05/17/22 1050   Post-Procedure Surface Area (cm^2) 0.08 cm^2 05/17/22 1050 Post-Procedure Volume (cm^3) 0.008 cm^3 05/17/22 1050   Distance Tunneling (cm) 0 cm 05/17/22 1034   Tunneling Position ___ O'Clock 0 05/17/22 1034   Undermining Starts ___ O'Clock 0 05/17/22 1034   Undermining Ends___ O'Clock 0 05/17/22 1034   Undermining Maxium Distance (cm) 0 05/17/22 1034   Wound Assessment Sage/red;Slough 05/17/22 1034   Drainage Amount Moderate 05/17/22 1034   Drainage Description Serosanguinous 05/17/22 1034   Odor None 05/17/22 1034   Gabriella-wound Assessment Intact 05/17/22 1034   Margins Defined edges 05/17/22 1034   Wound Thickness Description not for Pressure Injury Full thickness 05/17/22 1034   Number of days: 29       Percent of Wound(s) Debrided: approximately 100%    Total  Area  Debrided:  0.08 sq cm     Bleeding:  Minimal    Hemostasis Achieved:  by pressure    Procedural Pain:  0  / 10     Post Procedural Pain:  0 / 10     Response to treatment:  Well tolerated by patient. Status of wound progress and description from last visit: Improving, continue regimen. Plan:       Discharge Instructions       PHYSICIAN ORDERS AND DISCHARGE INSTRUCTIONS     NOTE: Upon discharge from the [address], you will receive a patient experience survey. We would be grateful if you would take the time to fill this survey out.     Wound care order history:                 PAWAN's   Right       Left               Date:     Continuing wound care orders and information:                 Residence:                Continue home health care with:               Your wound-care supplies will be provided by:      Wound cleansing:               BA not scrub or use excessive force.              Wash hands with soap and water before and after dressing changes.               LGBMW to applying a clean dressing, cleanse wound with normal saline, wound cleanser, or mild soap and water.               Ask the physician or nurse before getting the wound(s) wet in a shower     Daily Wound management:              Keep weight off wounds and reposition every 2 hours.              Avoid standing for long periods of time.              Apply wraps/stockings in AM and remove at bedtime.              If swelling is present, elevate legs to the level of the heart or above for 30 minutes 4-5 times a day and/or when sitting.                                      When taking antibiotics take entire prescription as ordered by physician do not stop taking until medicine is all gone.                             Wound Care Notes:   Gentamicin ordered 04/25/22                                Orders for this week:  05/17/22                  R Lateral Ankle -- Clean with soap and water, pat dry. Apply sulfamylon in clinic (gentamicin at home) and Stimulen Powder to wound bed. Cover with Ca Alginate. Cover with gentac border   Change Daily         Wound Care Provider: Sandra Lee CNP  Follow up in 1 week at the wound care center on Monday   Call [phone] for any questions or concerns.   Date__________   Time____________        Treatment Note Wound 04/18/22 Ankle Anterior;Right #1 Right Lateral Ankle-Dressing/Treatment:  (Sulfamylon, Stimulen, ca alginate, Gentac)    Written Patient Dismissal Instructions Given            Electronically signed by WANDY Lugo CNP on 5/17/2022 at 12:35 PM

## 2022-05-17 NOTE — RESULT ENCOUNTER NOTE
Call pt, labs ok/chol ok and thyroid fxn is nl range. I'm pleased to say her sodium levels are much better as well as calcium. Sodium up from 132-134 and  prior sl high calcium 10.9 is nl from 10.9 now 10. 3.  her current fluid intake and hydration levels are adequate.

## 2022-05-23 ENCOUNTER — OFFICE VISIT (OUTPATIENT)
Dept: INTERNAL MEDICINE CLINIC | Age: 77
End: 2022-05-23
Payer: COMMERCIAL

## 2022-05-23 VITALS
BODY MASS INDEX: 30.5 KG/M2 | DIASTOLIC BLOOD PRESSURE: 60 MMHG | RESPIRATION RATE: 14 BRPM | OXYGEN SATURATION: 98 % | SYSTOLIC BLOOD PRESSURE: 122 MMHG | HEART RATE: 60 BPM | WEIGHT: 151 LBS

## 2022-05-23 DIAGNOSIS — L03.115 CELLULITIS OF RIGHT ANKLE: ICD-10-CM

## 2022-05-23 DIAGNOSIS — I25.10 CORONARY ARTERY DISEASE INVOLVING NATIVE CORONARY ARTERY OF NATIVE HEART WITHOUT ANGINA PECTORIS: Primary | ICD-10-CM

## 2022-05-23 DIAGNOSIS — F41.9 ANXIETY: ICD-10-CM

## 2022-05-23 DIAGNOSIS — E03.4 HYPOTHYROIDISM DUE TO ACQUIRED ATROPHY OF THYROID: ICD-10-CM

## 2022-05-23 DIAGNOSIS — I10 ESSENTIAL HYPERTENSION: ICD-10-CM

## 2022-05-23 PROCEDURE — 99214 OFFICE O/P EST MOD 30 MIN: CPT | Performed by: INTERNAL MEDICINE

## 2022-05-23 RX ORDER — METOPROLOL SUCCINATE 25 MG/1
25 TABLET, EXTENDED RELEASE ORAL DAILY
Qty: 90 TABLET | Refills: 1 | Status: SHIPPED | OUTPATIENT
Start: 2022-05-23

## 2022-05-23 RX ORDER — CLOPIDOGREL BISULFATE 75 MG/1
75 TABLET ORAL DAILY
Qty: 90 TABLET | Refills: 1 | Status: SHIPPED | OUTPATIENT
Start: 2022-05-23

## 2022-05-23 RX ORDER — LEVOTHYROXINE SODIUM 0.03 MG/1
25 TABLET ORAL DAILY
Qty: 90 TABLET | Refills: 1 | Status: SHIPPED | OUTPATIENT
Start: 2022-05-23

## 2022-05-23 RX ORDER — HYDRALAZINE HYDROCHLORIDE 50 MG/1
TABLET, FILM COATED ORAL
Qty: 135 TABLET | Refills: 1 | Status: SHIPPED | OUTPATIENT
Start: 2022-05-23

## 2022-05-23 RX ORDER — HYDROCHLOROTHIAZIDE 12.5 MG/1
12.5 CAPSULE, GELATIN COATED ORAL EVERY MORNING
Qty: 90 CAPSULE | Refills: 3 | Status: SHIPPED | OUTPATIENT
Start: 2022-05-23

## 2022-05-23 RX ORDER — SIMVASTATIN 20 MG
20 TABLET ORAL NIGHTLY
Qty: 90 TABLET | Refills: 1 | Status: SHIPPED | OUTPATIENT
Start: 2022-05-23

## 2022-05-23 NOTE — PROGRESS NOTES
Melanie Hendrix  1945 05/23/22    SUBJECTIVE:    r lateral ankle ulceration healing well after treatment w wound clinic and MRI neg for osteomyelitis. Fr 5/17:  Wound Care Notes:   Gentamicin ordered 04/25/22                                Orders for this week:  05/17/22                  R Lateral Ankle -- Clean with soap and water, pat dry. Apply sulfamylon in clinic (gentamicin at home) and Stimulen Powder to wound bed. Cover with Ca Alginate. Cover with gentac border   Change Daily         Wound Care Provider: Gill Maguire CNP  Follow up in 1 week at the wound care center on Monday   Call [phone] for any questions or concerns. Date__________   Time____________           Treatment Note Wound 04/18/22 Ankle Anterior;Right #1 Right Lateral Ankle-Dressing/Treatment:  (Sulfamylon, Stimulen, ca alginate, Gentac)  --  --    Coronary artery disease has been asymptomatic w/o any ongoing sx of CP, SOB/RODRIGEZ, palpitations, lt headedness, diaphoresis. Is continuing medications regularly. Hypertension: Stable. Denies CP, SOB, cough, visual changes, dizziness, palpitations or HA. Hypothyroid has been asymptomatic w/o sx of fatigue, constipation, cold intolerance, depression. Some chr anxiety, stable on zoloft. Lipids:  Is continuing statin therapy and low fat diet. Tolerating medications w/o myalgias or GI upset. OBJECTIVE:    /60   Pulse 60   Resp 14   Wt 151 lb (68.5 kg)   LMP  (LMP Unknown)   SpO2 98%   BMI 30.50 kg/m²     Physical Exam  Vitals reviewed. Constitutional:       Appearance: She is well-developed. HENT:      Head: Normocephalic and atraumatic. Eyes:      General: No scleral icterus. Right eye: No discharge. Left eye: No discharge. Conjunctiva/sclera: Conjunctivae normal.      Pupils: Pupils are equal, round, and reactive to light. Neck:      Thyroid: No thyromegaly. Vascular: No JVD. Trachea: No tracheal deviation. Cardiovascular:      Rate and Rhythm: Normal rate and regular rhythm. Heart sounds: Normal heart sounds. No murmur heard. No friction rub. No gallop. Pulmonary:      Effort: Pulmonary effort is normal. No respiratory distress. Breath sounds: Normal breath sounds. No wheezing or rales. Abdominal:      General: Bowel sounds are normal. There is no distension. Palpations: Abdomen is soft. There is no mass. Tenderness: There is no abdominal tenderness. There is no guarding or rebound. Hernia: No hernia is present. Musculoskeletal:      Cervical back: Neck supple. Lymphadenopathy:      Cervical: No cervical adenopathy. Skin:     General: Skin is warm and dry. Comments: R lateral ankle ulceration healing well/resolving. Neurological:      Mental Status: She is alert. Psychiatric:         Mood and Affect: Mood normal.         ASSESSMENT:    1. Coronary artery disease involving native coronary artery of native heart without angina pectoris    2. Essential hypertension    3. Hypothyroidism due to acquired atrophy of thyroid    4. Anxiety    5. Cellulitis of right ankle        PLAN:    Tiffanie Neat was seen today for hypertension. Diagnoses and all orders for this visit:    Coronary artery disease involving native coronary artery of native heart without angina pectoris - Coronary artery disease stable and asymptomatic, will continue to monitor for any signs of instability. Will periodically monitor lipid profile and liver function, cardiac risk factors. Continue current medical regimen. -     metoprolol succinate (TOPROL XL) 25 MG extended release tablet; Take 1 tablet by mouth daily  -     clopidogrel (PLAVIX) 75 MG tablet; Take 1 tablet by mouth daily  -     simvastatin (ZOCOR) 20 MG tablet; Take 1 tablet by mouth nightly  -     Comprehensive Metabolic Panel; Future  -     CBC with Auto Differential; Future  -     Lipid Panel;  Future    Essential hypertension - Blood pressure stable and will continue current regimen. Will plan periodic monitoring of renal function, electrolytes, lipid profile.     -     metoprolol succinate (TOPROL XL) 25 MG extended release tablet; Take 1 tablet by mouth daily  -     hydrALAZINE (APRESOLINE) 50 MG tablet; Take 1/2 tablet in the morning and 1 tablet in the evening  -     hydroCHLOROthiazide (MICROZIDE) 12.5 MG capsule; Take 1 capsule by mouth every morning  -     Comprehensive Metabolic Panel; Future  -     CBC with Auto Differential; Future  -     Lipid Panel; Future    Hypothyroidism due to acquired atrophy of thyroid - Clinically hypothyroidism appears stable and will continue current dosing, also periodic monitoring of TFTs. -     levothyroxine (SYNTHROID) 25 MCG tablet; Take 1 tablet by mouth daily  -     TSH; Future  -     T4, Free; Future    Anxiety - Mood stable on current regimen w/o any significant manifestations of severe depression or anxiety noted at this time. Cont current meds. -     sertraline (ZOLOFT) 50 MG tablet;  Take 1 tablet by mouth daily    Cellulitis of right ankle- resolving nicely w wound care

## 2022-05-24 ENCOUNTER — HOSPITAL ENCOUNTER (OUTPATIENT)
Dept: WOUND CARE | Age: 77
Discharge: HOME | End: 2022-05-24
Payer: COMMERCIAL

## 2022-05-24 VITALS
HEART RATE: 57 BPM | RESPIRATION RATE: 16 BRPM | DIASTOLIC BLOOD PRESSURE: 51 MMHG | TEMPERATURE: 98.8 F | SYSTOLIC BLOOD PRESSURE: 128 MMHG

## 2022-05-24 DIAGNOSIS — L97.912 CHRONIC ULCER OF RIGHT LEG WITH FAT LAYER EXPOSED (HCC): Primary | ICD-10-CM

## 2022-05-24 PROCEDURE — 97597 DBRDMT OPN WND 1ST 20 CM/<: CPT

## 2022-05-24 PROCEDURE — 97597 DBRDMT OPN WND 1ST 20 CM/<: CPT | Performed by: NURSE PRACTITIONER

## 2022-05-24 RX ORDER — BETAMETHASONE DIPROPIONATE 0.05 %
OINTMENT (GRAM) TOPICAL ONCE
Status: CANCELLED | OUTPATIENT
Start: 2022-05-24 | End: 2022-05-24

## 2022-05-24 RX ORDER — GINSENG 100 MG
CAPSULE ORAL ONCE
Status: CANCELLED | OUTPATIENT
Start: 2022-05-24 | End: 2022-05-24

## 2022-05-24 RX ORDER — LIDOCAINE HYDROCHLORIDE 40 MG/ML
SOLUTION TOPICAL ONCE
Status: CANCELLED | OUTPATIENT
Start: 2022-05-24 | End: 2022-05-24

## 2022-05-24 RX ORDER — BACITRACIN, NEOMYCIN, POLYMYXIN B 400; 3.5; 5 [USP'U]/G; MG/G; [USP'U]/G
OINTMENT TOPICAL ONCE
Status: CANCELLED | OUTPATIENT
Start: 2022-05-24 | End: 2022-05-24

## 2022-05-24 RX ORDER — LIDOCAINE HYDROCHLORIDE 20 MG/ML
JELLY TOPICAL ONCE
Status: CANCELLED | OUTPATIENT
Start: 2022-05-24 | End: 2022-05-24

## 2022-05-24 RX ORDER — LIDOCAINE 40 MG/G
CREAM TOPICAL ONCE
Status: CANCELLED | OUTPATIENT
Start: 2022-05-24 | End: 2022-05-24

## 2022-05-24 RX ORDER — GENTAMICIN SULFATE 1 MG/G
OINTMENT TOPICAL ONCE
Status: CANCELLED | OUTPATIENT
Start: 2022-05-24 | End: 2022-05-24

## 2022-05-24 RX ORDER — LIDOCAINE 50 MG/G
OINTMENT TOPICAL ONCE
Status: CANCELLED | OUTPATIENT
Start: 2022-05-24 | End: 2022-05-24

## 2022-05-24 RX ORDER — BACITRACIN ZINC AND POLYMYXIN B SULFATE 500; 1000 [USP'U]/G; [USP'U]/G
OINTMENT TOPICAL ONCE
Status: CANCELLED | OUTPATIENT
Start: 2022-05-24 | End: 2022-05-24

## 2022-05-24 RX ORDER — CLOBETASOL PROPIONATE 0.5 MG/G
OINTMENT TOPICAL ONCE
Status: CANCELLED | OUTPATIENT
Start: 2022-05-24 | End: 2022-05-24

## 2022-05-24 ASSESSMENT — PAIN SCALES - GENERAL: PAINLEVEL_OUTOF10: 0

## 2022-05-24 NOTE — PROGRESS NOTES
Wound Care Center Progress Note With Procedure    Lynnette Kelly  AGE: 67 y.o. GENDER: female  : 1945  EPISODE DATE:  2022     Subjective:     Chief Complaint   Patient presents with    Wound Check     ankle         HISTORY of PRESENT ILLNESS     Elysia wolfe 67 y. o. 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  The ulcer is of mild severity.  The underlying cause of the wound is pressure verses arterial.  Wound Pain Timing/Severity: waxing and waning, mild  Quality of pain: shooting, tender  Severity of pain:  3 / 10   Modifying Factors: chronic pressure, shear force and obesity  Associated Signs/Symptoms: edema, erythema, drainage and pain     Patient is nearly healed. Pinpoint lesion covered by devitalized tissue. Patient having some pain may make debridement difficult     Diabetes: No (HgA1c  5.7 as of 22)  Smoking: Never smoker  Obesity: Yes  Anticoagulant/Antiplatelet therapy: Plavix, low dose aspirin  Immunosuppression: No        PAST MEDICAL HISTORY        Diagnosis Date    Arthritis     \"Hands\"    CAD (coronary artery disease)     Dr Dong Garcia Chronic kidney disease, stage I 10/16/2019    Chronic ulcer of right leg with fat layer exposed ([facility] [address] 2022    COVID-19 2021    H/O cardiovascular stress test 02/15/2018    EF60% mod ischemia ant wall    H/O Doppler ultrasound 2017    carotid doppler - severe degree stenosis LICA    H/O echocardiogram 2016    ef 56% mild to mod.  mr and tr    H/O echocardiogram 10/16/2018    EF55-60% mild AS, mild AR, mild-mod MR, mild TR, mild phtn    H/O echocardiogram 2020    EF 55-60% mild aortic stenosis mild mitral aortic and tricuspid regurg  no evidence of pericardial effusion    History of cardiac cath 2018    patent stents, nml EF, abn stress sec to jailed diag    History of echocardiogram 2019    s/p Left CEA, Mild 0-49% disease of the bilateral ICA, dampened left vertebral flow, normal left subclavian flow.  Hx of Carotid Doppler ultrasound 05/06/2020    Mild 0-49% disease of the bilateral ICA, normal vertebral flow    Hypercalcemia     ? possible hyperparathyroidism/saw Dr Chaim Blackmon previously    Hyperlipidemia     Hypertension 1978    Hyponatremia     chronic ~130-131 range.  Hypothyroidism due to acquired atrophy of thyroid     antiperoxidase ab tested negative    Insomnia     Leg pain, lateral, right 4/18/2022    Menopause     s/p JAMIE    Osteopenia     has hx of rib fractures after a fall    Osteopenia     S/P colonoscopic polypectomy 07/19/2018    Dr Bairon Bradley, recheck 5 yrs    Shortness of breath on exertion     Vitamin D deficiency        PAST SURGICAL HISTORY    Past Surgical History:   Procedure Laterality Date    APPENDECTOMY      CARDIAC SURGERY      stents x 3 Feb 2007    CAROTID ENDARTERECTOMY Left 02/01/2017    with patch     COLONOSCOPY      CORONARY ANGIOPLASTY WITH STENT PLACEMENT  2006, 2007    X 2 in LAD and one in ? CIRC w Dr Fara Martinez Bilateral 2000's    Cataracts With Lens Implants    HYSTERECTOMY, TOTAL ABDOMINAL  1981    ROTATOR CUFF REPAIR Right 2003    TONSILLECTOMY  1954       FAMILY HISTORY    Family History   Problem Relation Age of Onset    High Blood Pressure Mother     Migraines Mother     Heart Disease Father     High Blood Pressure Father     Other Father         Ulcer    Cancer Paternal Aunt     Diabetes Brother     Cancer Paternal Aunt     Diabetes Grandchild     Asthma Grandchild     Heart Disease Maternal Aunt     Colon Cancer Maternal Aunt     Diabetes Paternal Grandfather        SOCIAL HISTORY    Social History     Tobacco Use    Smoking status: Never Smoker    Smokeless tobacco: Never Used   Vaping Use    Vaping Use: Never used   Substance Use Topics    Alcohol use: No     Comment: caffeine 1-2 cups of coffee a day 2 pops a day    Drug use:  No ALLERGIES    Allergies   Allergen Reactions    Celexa [Citalopram] Nausea And Vomiting    Poison Ivy Extract [Poison Ivy Extract] Rash    Septra [Sulfamethoxazole-Trimethoprim] Nausea And Vomiting    Ultram [Tramadol] Nausea And Vomiting    Valsartan Other (See Comments)     Increased K    Xanax Xr [Alprazolam Er]      NOT ALLERGY, FAMILY STRONGLY OPPOSED TO BENZODIAZEPINES       MEDICATIONS    Current Outpatient Medications on File Prior to Encounter   Medication Sig Dispense Refill    metoprolol succinate (TOPROL XL) 25 MG extended release tablet Take 1 tablet by mouth daily 90 tablet 1    hydrALAZINE (APRESOLINE) 50 MG tablet Take 1/2 tablet in the morning and 1 tablet in the evening 135 tablet 1    levothyroxine (SYNTHROID) 25 MCG tablet Take 1 tablet by mouth daily 90 tablet 1    clopidogrel (PLAVIX) 75 MG tablet Take 1 tablet by mouth daily 90 tablet 1    sertraline (ZOLOFT) 50 MG tablet Take 1 tablet by mouth daily 90 tablet 1    hydroCHLOROthiazide (MICROZIDE) 12.5 MG capsule Take 1 capsule by mouth every morning 90 capsule 3    simvastatin (ZOCOR) 20 MG tablet Take 1 tablet by mouth nightly 90 tablet 1    losartan (COZAAR) 100 MG tablet Take 1 tablet by mouth daily 90 tablet 1    Multiple Vitamins-Minerals (THERAPEUTIC MULTIVITAMIN-MINERALS) tablet Take 1 tablet by mouth daily      Acetaminophen (TYLENOL ARTHRITIS PAIN PO) Take by mouth as needed      Melatonin 5 MG CAPS Take 5 mg by mouth nightly       aspirin 81 MG tablet Take 81 mg by mouth nightly        No current facility-administered medications on file prior to encounter. REVIEW OF SYSTEMS    Pertinent items are noted in HPI. Constitutional: Negative for systemic symptoms including fever, chills and malaise. Objective:      BP (!) 128/51   Pulse 57   Temp 98.8 °F (37.1 °C) (Temporal)   Resp 16   LMP  (LMP Unknown)     PHYSICAL EXAM      General: The patient is in no acute distress.     Mental status:  Patient is appropriate, is  oriented to place and plan of care. Dermatologic exam: Visual inspection of the periwound reveals the skin to be normal in turgor and texture and dry  Wound exam: see wound description below in procedure note      Assessment:     Problem List Items Addressed This Visit        Other    Chronic ulcer of right leg with fat layer exposed ([address] - Primary    Relevant Orders    Initiate Outpatient Wound Care Protocol        Procedure Note    Indications:  Based on my examination of this patient's wound(s) today, sharp excision into necrotic devitalized tissue is required to promote healing and evaluate the extent of previous healing. Performed by: WANDY Samson CNP    Consent obtained: Yes    Time out taken:  Yes    Pain Control: Anesthetic  Anesthetic: 2% Lidocaine Gel Topical     Debridement:Excisional Debridement    Using curette the wound(s) was/were sharply debrided down through and including the removal of devitalized tissue.         Devitalized Tissue Debrided:  necrotic/eschar and exudate    Pre Debridement Measurements:  Are located in the Wound Documentation Flow Sheet    All active wounds listed below with today's date are evaluated  Wound(s)    debrided this date include # : 1     Post  Debridement Measurements:  Wound 04/18/22 Ankle Anterior;Right #1 Right Lateral Ankle (Active)   Wound Image   05/09/22 1039   Wound Etiology Arterial 05/17/22 1034   Dressing Status New dressing applied 05/24/22 1100   Wound Cleansed Wound cleanser 05/24/22 1028   Offloading for Diabetic Foot Ulcers Offloading not required 05/24/22 1028   Wound Length (cm) 0.2 cm 05/24/22 1028   Wound Width (cm) 0.2 cm 05/24/22 1028   Wound Depth (cm) 0.1 cm 05/24/22 1028   Wound Surface Area (cm^2) 0.04 cm^2 05/24/22 1028   Change in Wound Size % (l*w) 33.33 05/24/22 1028   Wound Volume (cm^3) 0.004 cm^3 05/24/22 1028   Wound Healing % 33 05/24/22 1028   Post-Procedure Length (cm) 0.2 cm 05/24/22 1044 Post-Procedure Width (cm) 0.2 cm 05/24/22 1044   Post-Procedure Depth (cm) 0.1 cm 05/24/22 1044   Post-Procedure Surface Area (cm^2) 0.04 cm^2 05/24/22 1044   Post-Procedure Volume (cm^3) 0.004 cm^3 05/24/22 1044   Distance Tunneling (cm) 0 cm 05/24/22 1028   Tunneling Position ___ O'Clock 0 05/24/22 1028   Undermining Starts ___ O'Clock 0 05/24/22 1028   Undermining Ends___ O'Clock 0 05/24/22 1028   Undermining Maxium Distance (cm) 0 05/24/22 1028   Wound Assessment [place]/red;Slough 05/24/22 1028   Drainage Amount Moderate 05/24/22 1028   Drainage Description Serosanguinous 05/24/22 1028   Odor None 05/24/22 1028   Gabriella-wound Assessment Intact 05/24/22 1028   Margins Defined edges 05/24/22 1028   Wound Thickness Description not for Pressure Injury Full thickness 05/24/22 1028   Number of days: 36       Percent of Wound(s) Debrided: approximately 100%    Total  Area  Debrided:  0.04 sq cm     Bleeding:  Minimal    Hemostasis Achieved:  by pressure    Procedural Pain:  6  / 10     Post Procedural Pain:  1 / 10     Response to treatment:  With complaints of pain. Status of wound progress and description from last visit:   Nearly healed. Follow up 2 weeks and hope to discharge at that time       Plan:       Discharge Instructions       [address]     NOTE: Upon discharge from the [address], you will receive a patient experience survey. We would be grateful if you would take the time to fill this survey out.     Wound care order history:                 PAWAN's   Right       Left               Date:     Continuing wound care orders and information:                 Residence:                Continue home health care with:               Your wound-care supplies will be provided by:      Wound cleansing:               PQ not scrub or use excessive force.              Wash hands with soap and water before and after dressing changes.               XOZMV to applying a clean dressing, cleanse wound with normal saline, wound cleanser, or mild soap and water.               Ask the physician or nurse before getting the wound(s) wet in a shower     Daily Wound management:              Keep weight off wounds and reposition every 2 hours.              Avoid standing for long periods of time.              Apply wraps/stockings in AM and remove at bedtime.              If swelling is present, elevate legs to the level of the heart or above for 30 minutes 4-5 times a day and/or when sitting.                                      When taking antibiotics take entire prescription as ordered by physician do not stop taking until medicine is all gone.                             Wound Care Notes:   Gentamicin ordered 04/25/22                                Orders for this week:  05/24/22                  R Lateral Ankle -- Clean with soap and water, pat dry. Apply sulfamylon in clinic (gentamicin at home) and Stimulen Powder to wound bed. Cover with Ca Alginate. Cover with gentac border. Change Daily         Wound Care Provider: Nafisa Powell CNP  Follow up in 2 weeks at the wound care center on Monday   Call [phone] for any questions or concerns.   Date__________   Time____________        Treatment Note Wound 04/18/22 Ankle Anterior;Right #1 Right Lateral Ankle-Dressing/Treatment:  (Sulfamylon, Stimulen, ca alginate, Gentac)    Written Patient Dismissal Instructions Given            Electronically signed by WANDY Camacho CNP on 5/24/2022 at 11:18 AM

## 2022-06-07 ENCOUNTER — HOSPITAL ENCOUNTER (OUTPATIENT)
Dept: WOUND CARE | Age: 77
Discharge: HOME | End: 2022-06-07
Payer: COMMERCIAL

## 2022-06-07 VITALS
HEART RATE: 58 BPM | DIASTOLIC BLOOD PRESSURE: 56 MMHG | RESPIRATION RATE: 16 BRPM | TEMPERATURE: 98.7 F | SYSTOLIC BLOOD PRESSURE: 149 MMHG

## 2022-06-07 DIAGNOSIS — L97.912 CHRONIC ULCER OF RIGHT LEG WITH FAT LAYER EXPOSED (HCC): Primary | ICD-10-CM

## 2022-06-07 DIAGNOSIS — M79.604 LEG PAIN, LATERAL, RIGHT: ICD-10-CM

## 2022-06-07 PROCEDURE — 11042 DBRDMT SUBQ TIS 1ST 20SQCM/<: CPT | Performed by: NURSE PRACTITIONER

## 2022-06-07 PROCEDURE — 11042 DBRDMT SUBQ TIS 1ST 20SQCM/<: CPT

## 2022-06-07 RX ORDER — LIDOCAINE 50 MG/G
OINTMENT TOPICAL ONCE
Status: CANCELLED | OUTPATIENT
Start: 2022-06-07 | End: 2022-06-07

## 2022-06-07 RX ORDER — BETAMETHASONE DIPROPIONATE 0.05 %
OINTMENT (GRAM) TOPICAL ONCE
Status: CANCELLED | OUTPATIENT
Start: 2022-06-07 | End: 2022-06-07

## 2022-06-07 RX ORDER — BACITRACIN, NEOMYCIN, POLYMYXIN B 400; 3.5; 5 [USP'U]/G; MG/G; [USP'U]/G
OINTMENT TOPICAL ONCE
Status: CANCELLED | OUTPATIENT
Start: 2022-06-07 | End: 2022-06-07

## 2022-06-07 RX ORDER — LIDOCAINE HYDROCHLORIDE 40 MG/ML
SOLUTION TOPICAL ONCE
Status: CANCELLED | OUTPATIENT
Start: 2022-06-07 | End: 2022-06-07

## 2022-06-07 RX ORDER — CLOBETASOL PROPIONATE 0.5 MG/G
OINTMENT TOPICAL ONCE
Status: CANCELLED | OUTPATIENT
Start: 2022-06-07 | End: 2022-06-07

## 2022-06-07 RX ORDER — LIDOCAINE 40 MG/G
CREAM TOPICAL ONCE
Status: CANCELLED | OUTPATIENT
Start: 2022-06-07 | End: 2022-06-07

## 2022-06-07 RX ORDER — BACITRACIN ZINC AND POLYMYXIN B SULFATE 500; 1000 [USP'U]/G; [USP'U]/G
OINTMENT TOPICAL ONCE
Status: CANCELLED | OUTPATIENT
Start: 2022-06-07 | End: 2022-06-07

## 2022-06-07 RX ORDER — LIDOCAINE HYDROCHLORIDE 20 MG/ML
JELLY TOPICAL ONCE
Status: CANCELLED | OUTPATIENT
Start: 2022-06-07 | End: 2022-06-07

## 2022-06-07 RX ORDER — GINSENG 100 MG
CAPSULE ORAL ONCE
Status: CANCELLED | OUTPATIENT
Start: 2022-06-07 | End: 2022-06-07

## 2022-06-07 RX ORDER — GENTAMICIN SULFATE 1 MG/G
OINTMENT TOPICAL ONCE
Status: CANCELLED | OUTPATIENT
Start: 2022-06-07 | End: 2022-06-07

## 2022-06-07 ASSESSMENT — PAIN DESCRIPTION - ORIENTATION: ORIENTATION: RIGHT

## 2022-06-07 ASSESSMENT — PAIN DESCRIPTION - LOCATION: LOCATION: ANKLE

## 2022-06-07 ASSESSMENT — PAIN SCALES - GENERAL: PAINLEVEL_OUTOF10: 0

## 2022-06-07 ASSESSMENT — PAIN - FUNCTIONAL ASSESSMENT: PAIN_FUNCTIONAL_ASSESSMENT: ACTIVITIES ARE NOT PREVENTED

## 2022-06-07 NOTE — PROGRESS NOTES
Wound Care Center Progress Note With Procedure    Terry Kelly  AGE: 67 y.o. GENDER: female  : 1945  EPISODE DATE:  2022     Subjective:     Chief Complaint   Patient presents with    Wound Check     Rt lateral ankle         HISTORY of PRESENT ILLNESS     Eduar wolfe 67 y. o. 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  The ulcer is of mild severity.  The underlying cause of the wound is pressure verses arterial.    Patient not yet healed. Need a more aggressive debridement today to get slough off to make dressings more effective     Wound Pain Timing/Severity: waxing and waning, mild  Quality of pain: shooting, tender  Severity of pain:  3 / 10   Modifying Factors: chronic pressure, shear force and obesity  Associated Signs/Symptoms: edema, erythema, drainage and pain              PAST MEDICAL HISTORY        Diagnosis Date    Arthritis     \"Hands\"    CAD (coronary artery disease)     Dr Raymundo Martinez Chronic kidney disease, stage I 10/16/2019    Chronic ulcer of right leg with fat layer exposed ([address] 2022    COVID-19 2021    H/O cardiovascular stress test 02/15/2018    EF60% mod ischemia ant wall    H/O Doppler ultrasound 2017    carotid doppler - severe degree stenosis LICA    H/O echocardiogram 2016    ef 56% mild to mod. mr and tr    H/O echocardiogram 10/16/2018    EF55-60% mild AS, mild AR, mild-mod MR, mild TR, mild phtn    H/O echocardiogram 2020    EF 55-60% mild aortic stenosis mild mitral aortic and tricuspid regurg  no evidence of pericardial effusion    History of cardiac cath 2018    patent stents, nml EF, abn stress sec to jailed diag    History of echocardiogram 2019    s/p Left CEA, Mild 0-49% disease of the bilateral ICA, dampened left vertebral flow, normal left subclavian flow.     Hx of Carotid Doppler ultrasound 2020    Mild 0-49% disease of the bilateral ICA, normal vertebral flow    Hypercalcemia     ? possible hyperparathyroidism/saw Dr Lewis Gonsalez previously    Hyperlipidemia     Hypertension 1978    Hyponatremia     chronic ~130-131 range.  Hypothyroidism due to acquired atrophy of thyroid     antiperoxidase ab tested negative    Insomnia     Leg pain, lateral, right 4/18/2022    Menopause     s/p JAMIE    Osteopenia     has hx of rib fractures after a fall    Osteopenia     S/P colonoscopic polypectomy 07/19/2018    Dr Leticia Zamora, recheck 5 yrs    Shortness of breath on exertion     Vitamin D deficiency        PAST SURGICAL HISTORY    Past Surgical History:   Procedure Laterality Date    APPENDECTOMY      CARDIAC SURGERY      stents x 3 Feb 2007    CAROTID ENDARTERECTOMY Left 02/01/2017    with patch     COLONOSCOPY      CORONARY ANGIOPLASTY WITH STENT PLACEMENT  2006, 2007    X 2 in LAD and one in ? CIRC w Dr Marietta Dupont Bilateral 2000's    Cataracts With Lens Implants    HYSTERECTOMY, TOTAL ABDOMINAL  1981    ROTATOR CUFF REPAIR Right 2003    TONSILLECTOMY  1954       FAMILY HISTORY    Family History   Problem Relation Age of Onset    High Blood Pressure Mother     Migraines Mother     Heart Disease Father     High Blood Pressure Father     Other Father         Ulcer    Cancer Paternal Aunt     Diabetes Brother     Cancer Paternal Aunt     Diabetes Grandchild     Asthma Grandchild     Heart Disease Maternal Aunt     Colon Cancer Maternal Aunt     Diabetes Paternal Grandfather        SOCIAL HISTORY    Social History     Tobacco Use    Smoking status: Never Smoker    Smokeless tobacco: Never Used   Vaping Use    Vaping Use: Never used   Substance Use Topics    Alcohol use: No     Comment: caffeine 1-2 cups of coffee a day 2 pops a day    Drug use: No       ALLERGIES    Allergies   Allergen Reactions    Celexa [Citalopram] Nausea And Vomiting    Poison Ivy Extract [Poison Ivy Extract] Rash    Septra [Sulfamethoxazole-Trimethoprim] Nausea And Vomiting    Ultram [Tramadol] Nausea And Vomiting    Valsartan Other (See Comments)     Increased K    Xanax Xr [Alprazolam Er]      NOT ALLERGY, FAMILY STRONGLY OPPOSED TO BENZODIAZEPINES       MEDICATIONS    Current Outpatient Medications on File Prior to Encounter   Medication Sig Dispense Refill    metoprolol succinate (TOPROL XL) 25 MG extended release tablet Take 1 tablet by mouth daily 90 tablet 1    hydrALAZINE (APRESOLINE) 50 MG tablet Take 1/2 tablet in the morning and 1 tablet in the evening 135 tablet 1    levothyroxine (SYNTHROID) 25 MCG tablet Take 1 tablet by mouth daily 90 tablet 1    clopidogrel (PLAVIX) 75 MG tablet Take 1 tablet by mouth daily 90 tablet 1    sertraline (ZOLOFT) 50 MG tablet Take 1 tablet by mouth daily 90 tablet 1    hydroCHLOROthiazide (MICROZIDE) 12.5 MG capsule Take 1 capsule by mouth every morning 90 capsule 3    simvastatin (ZOCOR) 20 MG tablet Take 1 tablet by mouth nightly 90 tablet 1    losartan (COZAAR) 100 MG tablet Take 1 tablet by mouth daily 90 tablet 1    Multiple Vitamins-Minerals (THERAPEUTIC MULTIVITAMIN-MINERALS) tablet Take 1 tablet by mouth daily      Acetaminophen (TYLENOL ARTHRITIS PAIN PO) Take by mouth as needed      Melatonin 5 MG CAPS Take 5 mg by mouth nightly       aspirin 81 MG tablet Take 81 mg by mouth nightly        No current facility-administered medications on file prior to encounter. REVIEW OF SYSTEMS    Pertinent items are noted in HPI. Constitutional: Negative for systemic symptoms including fever, chills and malaise. Objective:      BP (!) 149/56   Pulse 58   Temp 98.7 °F (37.1 °C) (Temporal)   Resp 16   LMP  (LMP Unknown)     PHYSICAL EXAM      General: The patient is in no acute distress. Mental status:  Patient is appropriate, is  oriented to place and plan of care.   Dermatologic exam: Visual inspection of the periwound reveals the skin to be normal in turgor and texture and dry  Wound exam: see wound description below in procedure note      Assessment:     Problem List Items Addressed This Visit        Other    Chronic ulcer of right leg with fat layer exposed ([address] - Primary    Relevant Orders    Initiate Outpatient Wound Care Protocol    Leg pain, lateral, right        Procedure Note    Indications:  Based on my examination of this patient's wound(s) today, sharp excision into necrotic subcutaneous tissue is required to promote healing and evaluate the extent of previous healing. Performed by: WANDY Iglesias CNP    Consent obtained: Yes    Time out taken:  Yes    Pain Control: N/A       Debridement:Excisional Debridement    Using curette and #11 blade scalpel the wound(s) was/were sharply debrided down through and including the removal of subcutaneous tissue.         Devitalized Tissue Debrided:  fibrin, biofilm and slough    Pre Debridement Measurements:  Are located in the Wound Documentation Flow Sheet    All active wounds listed below with today's date are evaluated  Wound(s)    debrided this date include # : 1     Post  Debridement Measurements:  Wound 04/18/22 Ankle Anterior;Right #1 Right Lateral Ankle (Active)   Wound Image   06/07/22 1112   Wound Etiology Arterial 06/07/22 1112   Dressing Status New dressing applied 05/24/22 1100   Wound Cleansed Wound cleanser 06/07/22 1112   Offloading for Diabetic Foot Ulcers Offloading not required 06/07/22 1112   Wound Length (cm) 0.3 cm 06/07/22 1112   Wound Width (cm) 0.3 cm 06/07/22 1112   Wound Depth (cm) 0.1 cm 06/07/22 1112   Wound Surface Area (cm^2) 0.09 cm^2 06/07/22 1112   Change in Wound Size % (l*w) -50 06/07/22 1112   Wound Volume (cm^3) 0.009 cm^3 06/07/22 1112   Wound Healing % -50 06/07/22 1112   Post-Procedure Length (cm) 0.3 cm 06/07/22 1118   Post-Procedure Width (cm) 0.3 cm 06/07/22 1118   Post-Procedure Depth (cm) 0.1 cm 06/07/22 1118   Post-Procedure Surface Area (cm^2) 0.09 cm^2 06/07/22 1118   Post-Procedure Volume (cm^3) 0.009 cm^3 06/07/22 1118   Distance Tunneling (cm) 0 cm 06/07/22 1112   Tunneling Position ___ O'Clock 0 06/07/22 1112   Undermining Starts ___ O'Clock 0 06/07/22 1112   Undermining Ends___ O'Clock 0 06/07/22 1112   Undermining Maxium Distance (cm) 0 06/07/22 1112   Wound Assessment Pink/red;Slough 06/07/22 1112   Drainage Amount Scant 06/07/22 1112   Drainage Description Yellow 06/07/22 1112   Odor None 06/07/22 1112   Gabriella-wound Assessment Intact 06/07/22 1112   Margins Defined edges 06/07/22 1112   Wound Thickness Description not for Pressure Injury Full thickness 06/07/22 1112   Number of days: 50       Percent of Wound(s) Debrided: approximately 100%    Total  Area  Debrided:  0.09 sq cm     Bleeding:  Minimal    Hemostasis Achieved:  by pressure    Procedural Pain:  5  / 10     Post Procedural Pain:  0 / 10     Response to treatment:  With complaints of pain. Status of wound progress and description from last visit:   Stable and still close to healing. Patient 2 week follow up and hope to discharge       Plan:       Discharge Instructions       [address]     NOTE: Upon discharge from the [address], you will receive a patient experience survey. We would be grateful if you would take the time to fill this survey out.     Wound care order history:                 PAWAN's   Right       Left               Date:     Continuing wound care orders and information:                 Residence:                Continue home health care with:               Your wound-care supplies will be provided by:      Wound cleansing:               ZF not scrub or use excessive force.              Wash hands with soap and water before and after dressing changes.               NBEZM to applying a clean dressing, cleanse wound with normal saline, wound cleanser, or mild soap and water.               Ask the physician or nurse before getting the wound(s) wet in a shower     Daily Wound management:              Keep weight off wounds and reposition every 2 hours.              Avoid standing for long periods of time.              Apply wraps/stockings in AM and remove at bedtime.              If swelling is present, elevate legs to the level of the heart or above for 30 minutes 4-5 times a day and/or when sitting.                                      When taking antibiotics take entire prescription as ordered by physician do not stop taking until medicine is all gone.                             Wound Care Notes:   Gentamicin ordered 04/25/22                                Orders for this week: 6/7/22                  R Lateral Ankle -- Clean with soap and water, pat dry. Apply sulfamylon in clinic (gentamicin at home) and Stimulen powder to wound bed. Cover with Gentac border. Change Daily.        Wound Care Provider: Radha Dubois CNP  Follow up in 2 weeks at the wound care center on Monday   Call [phone] for any questions or concerns.   Date__________   Time____________        Treatment Note      Written Patient Dismissal Instructions Given            Electronically signed by WANDY Giang CNP on 6/7/2022 at 11:22 AM

## 2022-06-08 DIAGNOSIS — I10 ESSENTIAL HYPERTENSION: ICD-10-CM

## 2022-06-08 RX ORDER — LOSARTAN POTASSIUM 100 MG/1
100 TABLET ORAL DAILY
Qty: 90 TABLET | Refills: 1 | Status: SHIPPED | OUTPATIENT
Start: 2022-06-08 | End: 2022-12-05

## 2022-06-21 ENCOUNTER — OFFICE VISIT (OUTPATIENT)
Dept: INTERNAL MEDICINE CLINIC | Age: 77
End: 2022-06-21
Payer: COMMERCIAL

## 2022-06-21 ENCOUNTER — HOSPITAL ENCOUNTER (OUTPATIENT)
Dept: WOUND CARE | Age: 77
Discharge: HOME | End: 2022-06-21
Payer: COMMERCIAL

## 2022-06-21 VITALS
TEMPERATURE: 98.6 F | HEART RATE: 59 BPM | RESPIRATION RATE: 16 BRPM | SYSTOLIC BLOOD PRESSURE: 144 MMHG | DIASTOLIC BLOOD PRESSURE: 66 MMHG

## 2022-06-21 VITALS
OXYGEN SATURATION: 97 % | SYSTOLIC BLOOD PRESSURE: 122 MMHG | BODY MASS INDEX: 31.04 KG/M2 | DIASTOLIC BLOOD PRESSURE: 79 MMHG | HEIGHT: 59 IN | RESPIRATION RATE: 20 BRPM | HEART RATE: 61 BPM | WEIGHT: 154 LBS

## 2022-06-21 DIAGNOSIS — R30.0 DYSURIA: Primary | ICD-10-CM

## 2022-06-21 DIAGNOSIS — G89.29 CHRONIC BILATERAL LOW BACK PAIN WITHOUT SCIATICA: ICD-10-CM

## 2022-06-21 DIAGNOSIS — M54.50 CHRONIC BILATERAL LOW BACK PAIN WITHOUT SCIATICA: ICD-10-CM

## 2022-06-21 DIAGNOSIS — L97.912 CHRONIC ULCER OF RIGHT LEG WITH FAT LAYER EXPOSED (HCC): Primary | ICD-10-CM

## 2022-06-21 LAB
BILIRUBIN, POC: NORMAL
BLOOD URINE, POC: NORMAL
CLARITY, POC: CLEAR
COLOR, POC: CLEAR
GLUCOSE URINE, POC: NORMAL
KETONES, POC: NORMAL
LEUKOCYTE EST, POC: NORMAL
NITRITE, POC: NORMAL
PH, POC: 6.5
PROTEIN, POC: NORMAL
SPECIFIC GRAVITY, POC: 1.02
UROBILINOGEN, POC: 0.2

## 2022-06-21 PROCEDURE — 1123F ACP DISCUSS/DSCN MKR DOCD: CPT | Performed by: NURSE PRACTITIONER

## 2022-06-21 PROCEDURE — 81002 URINALYSIS NONAUTO W/O SCOPE: CPT | Performed by: NURSE PRACTITIONER

## 2022-06-21 PROCEDURE — 99214 OFFICE O/P EST MOD 30 MIN: CPT | Performed by: NURSE PRACTITIONER

## 2022-06-21 PROCEDURE — 11042 DBRDMT SUBQ TIS 1ST 20SQCM/<: CPT | Performed by: NURSE PRACTITIONER

## 2022-06-21 PROCEDURE — 11042 DBRDMT SUBQ TIS 1ST 20SQCM/<: CPT

## 2022-06-21 RX ORDER — LIDOCAINE HYDROCHLORIDE 20 MG/ML
JELLY TOPICAL ONCE
Status: CANCELLED | OUTPATIENT
Start: 2022-06-21 | End: 2022-06-21

## 2022-06-21 RX ORDER — LIDOCAINE HYDROCHLORIDE 40 MG/ML
SOLUTION TOPICAL ONCE
Status: CANCELLED | OUTPATIENT
Start: 2022-06-21 | End: 2022-06-21

## 2022-06-21 RX ORDER — CLOBETASOL PROPIONATE 0.5 MG/G
OINTMENT TOPICAL ONCE
Status: CANCELLED | OUTPATIENT
Start: 2022-06-21 | End: 2022-06-21

## 2022-06-21 RX ORDER — LIDOCAINE 40 MG/G
CREAM TOPICAL ONCE
Status: CANCELLED | OUTPATIENT
Start: 2022-06-21 | End: 2022-06-21

## 2022-06-21 RX ORDER — GINSENG 100 MG
CAPSULE ORAL ONCE
Status: CANCELLED | OUTPATIENT
Start: 2022-06-21 | End: 2022-06-21

## 2022-06-21 RX ORDER — BACITRACIN ZINC AND POLYMYXIN B SULFATE 500; 1000 [USP'U]/G; [USP'U]/G
OINTMENT TOPICAL ONCE
Status: CANCELLED | OUTPATIENT
Start: 2022-06-21 | End: 2022-06-21

## 2022-06-21 RX ORDER — CIPROFLOXACIN 250 MG/1
250 TABLET, FILM COATED ORAL 2 TIMES DAILY
Qty: 14 TABLET | Refills: 0 | Status: SHIPPED | OUTPATIENT
Start: 2022-06-21 | End: 2022-06-28

## 2022-06-21 RX ORDER — LIDOCAINE 50 MG/G
OINTMENT TOPICAL ONCE
Status: CANCELLED | OUTPATIENT
Start: 2022-06-21 | End: 2022-06-21

## 2022-06-21 RX ORDER — BETAMETHASONE DIPROPIONATE 0.05 %
OINTMENT (GRAM) TOPICAL ONCE
Status: CANCELLED | OUTPATIENT
Start: 2022-06-21 | End: 2022-06-21

## 2022-06-21 RX ORDER — GENTAMICIN SULFATE 1 MG/G
OINTMENT TOPICAL ONCE
Status: CANCELLED | OUTPATIENT
Start: 2022-06-21 | End: 2022-06-21

## 2022-06-21 RX ORDER — BACITRACIN, NEOMYCIN, POLYMYXIN B 400; 3.5; 5 [USP'U]/G; MG/G; [USP'U]/G
OINTMENT TOPICAL ONCE
Status: CANCELLED | OUTPATIENT
Start: 2022-06-21 | End: 2022-06-21

## 2022-06-21 ASSESSMENT — PATIENT HEALTH QUESTIONNAIRE - PHQ9
SUM OF ALL RESPONSES TO PHQ QUESTIONS 1-9: 0
2. FEELING DOWN, DEPRESSED OR HOPELESS: 0
SUM OF ALL RESPONSES TO PHQ QUESTIONS 1-9: 0
SUM OF ALL RESPONSES TO PHQ QUESTIONS 1-9: 0
1. LITTLE INTEREST OR PLEASURE IN DOING THINGS: 0
SUM OF ALL RESPONSES TO PHQ9 QUESTIONS 1 & 2: 0
SUM OF ALL RESPONSES TO PHQ QUESTIONS 1-9: 0
DEPRESSION UNABLE TO ASSESS: FUNCTIONAL CAPACITY MOTIVATION LIMITS ACCURACY

## 2022-06-21 ASSESSMENT — ENCOUNTER SYMPTOMS
ABDOMINAL PAIN: 0
CHEST TIGHTNESS: 0
SINUS PAIN: 0
SHORTNESS OF BREATH: 0
NAUSEA: 0
SINUS PRESSURE: 0
COLOR CHANGE: 0
VOMITING: 0
DIARRHEA: 0
BACK PAIN: 1
APNEA: 0
COUGH: 0

## 2022-06-21 ASSESSMENT — PAIN SCALES - GENERAL: PAINLEVEL_OUTOF10: 0

## 2022-06-21 NOTE — PROGRESS NOTES
Wound Care Center Progress Note With Procedure    Richard Kelly  AGE: 67 y.o. GENDER: female  : 1945  EPISODE DATE:  2022     Subjective:     Chief Complaint   Patient presents with    Wound Check     RIGHT ANKLE         HISTORY of PRESENT ILLNESS     Annemarie Kaplan a 67 y. o. 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  The ulcer is of mild severity.  The underlying cause of the wound is pressure verses arterial.     Patient wound is stubborn and is still not fully closed. She has a persistent cap of fibrous tissue to ulcer that she will not allow us to debride due to pain. She has been treating at home without issue, but due to our unproductive office visits, progress is more slow. She is adamant about reducing her visits. We are willing to work with her on this while not fully discharging her yet. Patient has a long cruise she is leaving for next month and we will try to accommodate her schedule      Wound Pain Timing/Severity: waxing and waning, mild  Quality of pain: shooting, tender  Severity of pain:  3 / 10   Modifying Factors: chronic pressure, shear force and obesity  Associated Signs/Symptoms: edema, erythema, drainage and pain        PAST MEDICAL HISTORY        Diagnosis Date    Arthritis     \"Hands\"    CAD (coronary artery disease)     Dr Epstein Proffer Chronic kidney disease, stage I 10/16/2019    Chronic ulcer of right leg with fat layer exposed ([facility] [address] 2022    COVID-19 2021    H/O cardiovascular stress test 02/15/2018    EF60% mod ischemia ant wall    H/O Doppler ultrasound 2017    carotid doppler - severe degree stenosis LICA    H/O echocardiogram 2016    ef 56% mild to mod.  mr and tr    H/O echocardiogram 10/16/2018    EF55-60% mild AS, mild AR, mild-mod MR, mild TR, mild phtn    H/O echocardiogram 2020    EF 55-60% mild aortic stenosis mild mitral aortic and tricuspid regurg no evidence of pericardial effusion    History of cardiac cath 02/19/2018    patent stents, nml EF, abn stress sec to jailed diag    History of echocardiogram 07/08/2019    s/p Left CEA, Mild 0-49% disease of the bilateral ICA, dampened left vertebral flow, normal left subclavian flow.  Hx of Carotid Doppler ultrasound 05/06/2020    Mild 0-49% disease of the bilateral ICA, normal vertebral flow    Hypercalcemia     ? possible hyperparathyroidism/saw Dr Marcia Moreno previously    Hyperlipidemia     Hypertension 1978    Hyponatremia     chronic ~130-131 range.  Hypothyroidism due to acquired atrophy of thyroid     antiperoxidase ab tested negative    Insomnia     Leg pain, lateral, right 4/18/2022    Menopause     s/p JAMIE    Osteopenia     has hx of rib fractures after a fall    Osteopenia     S/P colonoscopic polypectomy 07/19/2018    Dr Edda Gavin, recheck 5 yrs    Shortness of breath on exertion     Vitamin D deficiency        PAST SURGICAL HISTORY    Past Surgical History:   Procedure Laterality Date    APPENDECTOMY      CARDIAC SURGERY      stents x 3 Feb 2007    CAROTID ENDARTERECTOMY Left 02/01/2017    with patch     COLONOSCOPY      CORONARY ANGIOPLASTY WITH STENT PLACEMENT  2006, 2007    X 2 in LAD and one in ? CIRC w Dr Jennyfer Mancini Bilateral 2000's    Cataracts With Lens Implants    HYSTERECTOMY, TOTAL ABDOMINAL (CERVIX REMOVED)  1981    ROTATOR CUFF REPAIR Right 2003    TONSILLECTOMY  1954       FAMILY HISTORY    Family History   Problem Relation Age of Onset    High Blood Pressure Mother     Migraines Mother     Heart Disease Father     High Blood Pressure Father     Other Father         Ulcer    Cancer Paternal Aunt     Diabetes Brother     Cancer Paternal Aunt     Diabetes Grandchild     Asthma Grandchild     Heart Disease Maternal Aunt     Colon Cancer Maternal Aunt     Diabetes Paternal Grandfather        SOCIAL HISTORY    Social History     Tobacco Use    Smoking status: Never Smoker    Smokeless tobacco: Never Used   Vaping Use    Vaping Use: Never used   Substance Use Topics    Alcohol use: No     Comment: caffeine 1-2 cups of coffee a day 2 pops a day    Drug use: No       ALLERGIES    Allergies   Allergen Reactions    Celexa [Citalopram] Nausea And Vomiting    Poison Ivy Extract [Poison Ivy Extract] Rash    Septra [Sulfamethoxazole-Trimethoprim] Nausea And Vomiting    Ultram [Tramadol] Nausea And Vomiting    Valsartan Other (See Comments)     Increased K    Xanax Xr [Alprazolam Er]      NOT ALLERGY, FAMILY STRONGLY OPPOSED TO BENZODIAZEPINES       MEDICATIONS    Current Outpatient Medications on File Prior to Encounter   Medication Sig Dispense Refill    losartan (COZAAR) 100 MG tablet TAKE 1 TABLET BY MOUTH DAILY 90 tablet 1    metoprolol succinate (TOPROL XL) 25 MG extended release tablet Take 1 tablet by mouth daily 90 tablet 1    hydrALAZINE (APRESOLINE) 50 MG tablet Take 1/2 tablet in the morning and 1 tablet in the evening 135 tablet 1    levothyroxine (SYNTHROID) 25 MCG tablet Take 1 tablet by mouth daily 90 tablet 1    clopidogrel (PLAVIX) 75 MG tablet Take 1 tablet by mouth daily 90 tablet 1    sertraline (ZOLOFT) 50 MG tablet Take 1 tablet by mouth daily 90 tablet 1    hydroCHLOROthiazide (MICROZIDE) 12.5 MG capsule Take 1 capsule by mouth every morning 90 capsule 3    simvastatin (ZOCOR) 20 MG tablet Take 1 tablet by mouth nightly 90 tablet 1    Multiple Vitamins-Minerals (THERAPEUTIC MULTIVITAMIN-MINERALS) tablet Take 1 tablet by mouth daily      Acetaminophen (TYLENOL ARTHRITIS PAIN PO) Take by mouth as needed      Melatonin 5 MG CAPS Take 5 mg by mouth nightly       aspirin 81 MG tablet Take 81 mg by mouth nightly        No current facility-administered medications on file prior to encounter. REVIEW OF SYSTEMS    Pertinent items are noted in HPI.     Constitutional: Negative for systemic symptoms including fever, chills and malaise. Objective:      BP (!) 144/66   Pulse 59   Temp 98.6 °F (37 °C) (Temporal)   Resp 16   LMP  (LMP Unknown)     PHYSICAL EXAM      General: The patient is in no acute distress. Mental status:  Patient is appropriate, is  oriented to place and plan of care. Dermatologic exam: Visual inspection of the periwound reveals the skin to be normal in turgor and texture and dry  Wound exam: see wound description below in procedure note      Assessment:     Problem List Items Addressed This Visit        Other    Chronic ulcer of right leg with fat layer exposed ([address] - Primary    Relevant Orders    Initiate Outpatient Wound Care Protocol        Procedure Note    Indications:  Based on my examination of this patient's wound(s) today, sharp excision into necrotic subcutaneous tissue is required to promote healing and evaluate the extent of previous healing. Performed by: WANDY Haro - CNP    Consent obtained: Yes    Time out taken:  Yes    Pain Control: N/A       Debridement:Excisional Debridement    Using curette the wound(s) was/were sharply debrided down through and including the removal of subcutaneous tissue.         Devitalized Tissue Debrided:  fibrin, biofilm, slough and exudate    Pre Debridement Measurements:  Are located in the Wound Documentation Flow Sheet    All active wounds listed below with today's date are evaluated  Wound(s)    debrided this date include # : 1     Post  Debridement Measurements:  Wound 04/18/22 Ankle Anterior;Right #1 Right Lateral Ankle (Active)   Wound Image   06/21/22 1029   Wound Etiology Arterial 06/21/22 1029   Dressing Status New dressing applied 06/07/22 1118   Wound Cleansed Wound cleanser 06/21/22 1029   Offloading for Diabetic Foot Ulcers Offloading not required 06/21/22 1029   Wound Length (cm) 0.1 cm 06/21/22 1029   Wound Width (cm) 0.1 cm 06/21/22 1029   Wound Depth (cm) 0.1 cm 06/21/22 1029   Wound Surface Area (cm^2) 0.01 cm^2 06/21/22 1029 Change in Wound Size % (l*w) 83.33 06/21/22 1029   Wound Volume (cm^3) 0.001 cm^3 06/21/22 1029   Wound Healing % 83 06/21/22 1029   Post-Procedure Length (cm) 0.1 cm 06/21/22 1033   Post-Procedure Width (cm) 0.1 cm 06/21/22 1033   Post-Procedure Depth (cm) 0.1 cm 06/21/22 1033   Post-Procedure Surface Area (cm^2) 0.01 cm^2 06/21/22 1033   Post-Procedure Volume (cm^3) 0.001 cm^3 06/21/22 1033   Distance Tunneling (cm) 0 cm 06/21/22 1029   Tunneling Position ___ O'Clock 0 06/21/22 1029   Undermining Starts ___ O'Clock 0 06/21/22 1029   Undermining Ends___ O'Clock 0 06/21/22 1029   Undermining Maxium Distance (cm) 0 06/21/22 1029   Wound Assessment Dry 06/21/22 1029   Drainage Amount None 06/21/22 1029   Drainage Description Yellow 06/07/22 1112   Odor None 06/21/22 1029   Gabriella-wound Assessment Dry/flaky 06/21/22 1029   Margins Defined edges 06/21/22 1029   Wound Thickness Description not for Pressure Injury Full thickness 06/21/22 1029   Number of days: 64       Percent of Wound(s) Debrided: approximately 100%    Total  Area  Debrided:  0.01 sq cm     Bleeding:  None    Hemostasis Achieved:  not needed    Procedural Pain:  6  / 10     Post Procedural Pain:  0 / 10     Response to treatment:  Well tolerated by patient. Status of wound progress and description from last visit:   Slow, slow improvement, but without a heavier debridement her progress will continue to be slow. Patient willing to follow up in 1 month and will return sooner if needed. Re-ordering supplies at this time       Plan:       Discharge Instructions       [address]     NOTE: Upon discharge from the [address], you will receive a patient experience survey.  We would be grateful if you would take the time to fill this survey out.     Wound care order history:                 PAWAN's   Right       Left               Date:     Continuing wound care orders and information:                 Residence:                Continue home health care with:               Your wound-care supplies will be provided by:      Wound cleansing:               YY not scrub or use excessive force.              Wash hands with soap and water before and after dressing changes.             TQNJE to applying a clean dressing, cleanse wound with normal saline, wound cleanser, or mild soap and water.               Ask the physician or nurse before getting the wound(s) wet in a shower     Daily Wound management:              Keep weight off wounds and reposition every 2 hours.              Avoid standing for long periods of time.              Apply wraps/stockings in AM and remove at bedtime.              If swelling is present, elevate legs to the level of the heart or above for 30 minutes 4-5 times a day and/or when sitting.                                      When taking antibiotics take entire prescription as ordered by physician do not stop taking until medicine is all gone.                             Wound Care Notes:   Gentamicin ordered 04/25/22                                Orders for this week: 6/21/22                  R Lateral Ankle -- Clean with soap and water, pat dry. Apply sulfamylon in clinic (gentamicin at home) and Stimulen powder to wound bed. Cover with Silicone border. Change Daily.        Wound Care Provider: Tamera Bolden CNP  Follow up in 4 weeks (around 7/19/22) at the wound care center. Call [phone] for any questions or concerns.   Date__________   Time____________        Treatment Note      Written Patient Dismissal Instructions Given            Electronically signed by WANDY Will CNP on 6/21/2022 at 10:55 AM

## 2022-06-21 NOTE — PROGRESS NOTES
Bruce Armando  1945 06/21/22    Chief Complaint   Patient presents with    Dysuria     lower back pain, lower abdominal pain sx started 1 week ago        SUBJECTIVE:      Mrs Heather Smallwood is a current patient of Dr Rowena Toro who presents to the office this morning for c/o mild intermittent lower back pain and suprapubic pain over the last week approximately. Also having slight urinary frequency. Denies any hematuria, dysuria, fevers, chills. Did also injury her back a little while ago lifting a suitcase around April last year so unsure if this is a lingering affect of her underlying back pain. Review of Systems   Constitutional: Negative for activity change, appetite change, fatigue and fever. HENT: Negative for congestion, nosebleeds, sinus pressure and sinus pain. Respiratory: Negative for apnea, cough, chest tightness and shortness of breath. Cardiovascular: Negative for chest pain and palpitations. Gastrointestinal: Negative for abdominal pain, diarrhea, nausea and vomiting. Genitourinary: Positive for frequency. Negative for difficulty urinating, dyspareunia, dysuria, flank pain, hematuria and urgency. Suprapubic pressure   Musculoskeletal: Positive for back pain. Negative for arthralgias, joint swelling and myalgias. Skin: Negative for color change and rash. Neurological: Negative for dizziness, light-headedness and headaches. Psychiatric/Behavioral: Negative. Negative for behavioral problems. OBJECTIVE:    /79   Pulse 61   Resp 20   Ht 4' 11\" (1.499 m)   Wt 154 lb (69.9 kg)   LMP  (LMP Unknown)   SpO2 97%   BMI 31.10 kg/m²     Physical Exam  Constitutional:       General: She is not in acute distress. Appearance: She is well-developed. She is not diaphoretic. HENT:      Head: Normocephalic and atraumatic. Cardiovascular:      Rate and Rhythm: Normal rate and regular rhythm. Pulmonary:      Effort: Pulmonary effort is normal. No respiratory distress. Breath sounds: Normal breath sounds. Abdominal:      General: Bowel sounds are normal.      Palpations: Abdomen is soft. Tenderness: There is no abdominal tenderness. There is no right CVA tenderness or left CVA tenderness. Musculoskeletal:         General: Normal range of motion. Cervical back: Normal range of motion and neck supple. No edema or erythema. No muscular tenderness. Skin:     General: Skin is warm and dry. Capillary Refill: Capillary refill takes less than 2 seconds. Neurological:      Mental Status: She is alert and oriented to person, place, and time. Cranial Nerves: No cranial nerve deficit. Coordination: Coordination normal.   Psychiatric:         Behavior: Behavior normal.         Thought Content: Thought content normal.         Judgment: Judgment normal.         ASSESSMENT:    1. Dysuria    2. Chronic bilateral low back pain without sciatica        PLAN:    Joao Scherer was seen today for dysuria. Diagnoses and all orders for this visit:    Dysuria-overall, I feel like there may be 2 separate issues going on. Her point-of-care urinalysis is overall benign aside for some trace leukocytes so possibly an early UTI? We will start Cipro and also send urine for culture at this time to confirm infection. However, I do feel that her pain may be an exacerbation of a chronic underlying lumbar spine issue. Since this has been on going for greater than a year, we will also proceed with x-ray of the lumbar spine.  -     POCT Urinalysis no Micro  -     Culture, Urine  -     ciprofloxacin (CIPRO) 250 MG tablet; Take 1 tablet by mouth 2 times daily for 7 days    Chronic bilateral low back pain without sciatica  -     XR LUMBAR SPINE (MIN 4 VIEWS); Future        No flowsheet data found. No follow-ups on file.     Richie note this reports has been produced speech recognition software and may contain errors related to that system including errors in grammar, punctuation, and spelling, as well as words and phrases that may be appropriate. If there are any questions or concerns please feel free to contact the dictating provider for clarification.

## 2022-06-22 ENCOUNTER — HOSPITAL ENCOUNTER (OUTPATIENT)
Age: 77
Discharge: HOME | End: 2022-06-22
Payer: COMMERCIAL

## 2022-06-22 ENCOUNTER — HOSPITAL ENCOUNTER (OUTPATIENT)
Dept: GENERAL RADIOLOGY | Age: 77
Discharge: HOME | End: 2022-06-22
Payer: COMMERCIAL

## 2022-06-22 DIAGNOSIS — G89.29 CHRONIC BILATERAL LOW BACK PAIN WITHOUT SCIATICA: ICD-10-CM

## 2022-06-22 DIAGNOSIS — M54.50 CHRONIC BILATERAL LOW BACK PAIN WITHOUT SCIATICA: ICD-10-CM

## 2022-06-22 PROCEDURE — 72110 X-RAY EXAM L-2 SPINE 4/>VWS: CPT

## 2022-06-23 LAB
ORGANISM: ABNORMAL
URINE CULTURE, ROUTINE: ABNORMAL

## 2022-06-24 ENCOUNTER — TELEPHONE (OUTPATIENT)
Dept: INTERNAL MEDICINE CLINIC | Age: 77
End: 2022-06-24

## 2022-06-24 NOTE — TELEPHONE ENCOUNTER
Called and LM on VM informing patient of Dr. Haley Ding plan of care. I asked that she call us back with her decision to proceed with PT and/or MRI.

## 2022-06-24 NOTE — TELEPHONE ENCOUNTER
Called patient and LM on VM with the plan of care. Also, advised her to call back with her decision to proceed with MRI/PT.

## 2022-06-24 NOTE — TELEPHONE ENCOUNTER
pls let Michaela Gambino know I reviewed the xray rept and also Thiago's note. We can try conservative therapy first, since pain could also be from her severe back arthritis noted on xray, in addition to the compression fracture which may have been present for months. Would ask Michaela Gambino to proceed with a lumbar MRI to assess fracture- we need this regardless of whether she'll need any intervention or not-  and also that we set up for PT eval.  If pain is severe then we should f/u w her next week, but if pain is mild/moderate and tolerable then she can keep appt for 7/19 and we'll see if PT has been helpful.

## 2022-06-27 DIAGNOSIS — M54.50 CHRONIC BILATERAL LOW BACK PAIN WITHOUT SCIATICA: Primary | ICD-10-CM

## 2022-06-27 DIAGNOSIS — G89.29 CHRONIC BILATERAL LOW BACK PAIN WITHOUT SCIATICA: Primary | ICD-10-CM

## 2022-07-05 DIAGNOSIS — N30.90 CYSTITIS: Primary | ICD-10-CM

## 2022-07-05 LAB
BACTERIA, URINE: NORMAL /HPF
BILIRUBIN URINE: NEGATIVE MG/DL
CLARITY: CLEAR
COLOR, URINE: YELLOW
GLUCOSE URINE: NEGATIVE MG/DL
KETONES, URINE: NEGATIVE MG/DL
LEUKOCYTE ESTERASE, URINE: NEGATIVE
NITRATE, UA: NEGATIVE
PH, URINE: 6.5 (ref 4.5–8)
RBC URINE: NORMAL /HPF (ref 0–2)
SPECIFIC GRAVITY, URINE: 1.02 (ref 1–1.03)
SQUAMOUS EPITHELIAL CELLS: NORMAL /HPF (ref 0–5)
TOTAL PROTEIN, URINE: NEGATIVE MG/DL
TRANSITIONAL EPITHELIAL: NORMAL /HPF (ref 0–5)
URINE HGB: NEGATIVE MG/DL
UROBILINOGEN, URINE: <2 MG/DL (ref 0–2)
WBC URINE: NORMAL /HPF (ref 0–5)

## 2022-07-11 ENCOUNTER — HOSPITAL ENCOUNTER (OUTPATIENT)
Dept: MRI IMAGING | Age: 77
Discharge: HOME | End: 2022-07-11
Payer: COMMERCIAL

## 2022-07-11 DIAGNOSIS — G89.29 CHRONIC BILATERAL LOW BACK PAIN WITHOUT SCIATICA: ICD-10-CM

## 2022-07-11 DIAGNOSIS — M54.50 CHRONIC BILATERAL LOW BACK PAIN WITHOUT SCIATICA: ICD-10-CM

## 2022-07-11 PROCEDURE — 72148 MRI LUMBAR SPINE W/O DYE: CPT

## 2022-07-12 PROBLEM — M48.061 SPINAL STENOSIS OF LUMBAR REGION WITHOUT NEUROGENIC CLAUDICATION: Status: ACTIVE | Noted: 2022-07-01

## 2022-07-12 NOTE — RESULT ENCOUNTER NOTE
Please call pt, Aria's MRI indicates the compression fracture has been chronic and longstanding so is not acute. Also does have some spinal stenosis. If low back pain is improving, we can continue w conservative therapy and I would rec a course of physical therapy if she's willing to proceed- for lumbar sp stenosis. If pain remains significant we should still proceed w PT eval but also consider a neurosurgical consultation next and I should see her for a f/u appt in the next 1-2 weeks.   Lastly, if pain is significant we can also try a course of back nerve pain medication called neurontin 100mg BID for a month #60 NR but if to proceed, again need f/u w her in 1-2 weeks

## 2022-07-19 ENCOUNTER — HOSPITAL ENCOUNTER (OUTPATIENT)
Dept: WOUND CARE | Age: 77
Discharge: HOME | End: 2022-07-19
Payer: COMMERCIAL

## 2022-07-19 VITALS
RESPIRATION RATE: 18 BRPM | DIASTOLIC BLOOD PRESSURE: 75 MMHG | TEMPERATURE: 99.6 F | HEART RATE: 64 BPM | SYSTOLIC BLOOD PRESSURE: 144 MMHG

## 2022-07-19 DIAGNOSIS — L97.912 CHRONIC ULCER OF RIGHT LEG WITH FAT LAYER EXPOSED (HCC): Primary | ICD-10-CM

## 2022-07-19 PROCEDURE — 11042 DBRDMT SUBQ TIS 1ST 20SQCM/<: CPT | Performed by: NURSE PRACTITIONER

## 2022-07-19 PROCEDURE — 11042 DBRDMT SUBQ TIS 1ST 20SQCM/<: CPT

## 2022-07-19 RX ORDER — LIDOCAINE HYDROCHLORIDE 20 MG/ML
JELLY TOPICAL ONCE
Status: CANCELLED | OUTPATIENT
Start: 2022-07-19 | End: 2022-07-19

## 2022-07-19 RX ORDER — BETAMETHASONE DIPROPIONATE 0.05 %
OINTMENT (GRAM) TOPICAL ONCE
Status: CANCELLED | OUTPATIENT
Start: 2022-07-19 | End: 2022-07-19

## 2022-07-19 RX ORDER — GINSENG 100 MG
CAPSULE ORAL ONCE
Status: CANCELLED | OUTPATIENT
Start: 2022-07-19 | End: 2022-07-19

## 2022-07-19 RX ORDER — LIDOCAINE HYDROCHLORIDE 40 MG/ML
SOLUTION TOPICAL ONCE
Status: CANCELLED | OUTPATIENT
Start: 2022-07-19 | End: 2022-07-19

## 2022-07-19 RX ORDER — BACITRACIN, NEOMYCIN, POLYMYXIN B 400; 3.5; 5 [USP'U]/G; MG/G; [USP'U]/G
OINTMENT TOPICAL ONCE
Status: CANCELLED | OUTPATIENT
Start: 2022-07-19 | End: 2022-07-19

## 2022-07-19 RX ORDER — LIDOCAINE 50 MG/G
OINTMENT TOPICAL ONCE
Status: CANCELLED | OUTPATIENT
Start: 2022-07-19 | End: 2022-07-19

## 2022-07-19 RX ORDER — GENTAMICIN SULFATE 1 MG/G
OINTMENT TOPICAL ONCE
Status: CANCELLED | OUTPATIENT
Start: 2022-07-19 | End: 2022-07-19

## 2022-07-19 RX ORDER — BACITRACIN ZINC AND POLYMYXIN B SULFATE 500; 1000 [USP'U]/G; [USP'U]/G
OINTMENT TOPICAL ONCE
Status: CANCELLED | OUTPATIENT
Start: 2022-07-19 | End: 2022-07-19

## 2022-07-19 RX ORDER — CLOBETASOL PROPIONATE 0.5 MG/G
OINTMENT TOPICAL ONCE
Status: CANCELLED | OUTPATIENT
Start: 2022-07-19 | End: 2022-07-19

## 2022-07-19 RX ORDER — LIDOCAINE 40 MG/G
CREAM TOPICAL ONCE
Status: CANCELLED | OUTPATIENT
Start: 2022-07-19 | End: 2022-07-19

## 2022-07-19 ASSESSMENT — PAIN DESCRIPTION - PROGRESSION: CLINICAL_PROGRESSION: NOT CHANGED

## 2022-07-19 NOTE — DISCHARGE INSTRUCTIONS
PHYSICIAN ORDERS AND DISCHARGE INSTRUCTIONS     NOTE: Upon discharge from the [address], you will receive a patient experience survey. We would be grateful if you would take the time to fill this survey out. Wound care order history:                 PAWAN's   Right       Left               Date:     Continuing wound care orders and information:                 Residence:                Continue home health care with:              Your wound-care supplies will be provided by: Wound cleansing:              Do not scrub or use excessive force. Wash hands with soap and water before and after dressing changes. Prior to applying a clean dressing, cleanse wound with normal saline, wound cleanser, or mild soap and water. Ask the physician or nurse before getting the wound(s) wet in a shower     Daily Wound management:              Keep weight off wounds and reposition every 2 hours. Avoid standing for long periods of time. Apply wraps/stockings in AM and remove at bedtime. If swelling is present, elevate legs to the level of the heart or above for 30 minutes 4-5 times a day and/or when sitting. When taking antibiotics take entire prescription as ordered by physician do not stop taking until medicine is all gone. Wound Care Notes:  Gentamicin                                Orders for this week: 7/19/22                  R Lateral Ankle -- Clean with soap and water, pat dry. Apply sulfamylon in clinic (gentamicin at home) and Stimulen powder to wound bed. Cover with Silicone border. Change Daily. Wound Care Provider: Jane Gannon CNP  Follow up in 2 weeks (Wednesday 8/3/22) at the wound care center. Call [phone] for any questions or concerns.   Date__________   Time____________

## 2022-07-19 NOTE — PROGRESS NOTES
Wound Care Center Progress Note With Procedure    Eliezer Kelly  AGE: 67 y.o. GENDER: female  : 1945  EPISODE DATE:  2022     Subjective:     Chief Complaint   Patient presents with    Wound Check     Right lateral ankle          HISTORY of PRESENT ILLNESS     Austin Bobo is a 67 y.o. 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 The ulcer is of mild severity. The underlying cause of the wound is pressure verses arterial.     Patient fibrous small cap remains and she still reports increased pain and is very sensitive to any light touch. Patient is leaving for vacation this week, but explained that we need to be more aggressive with wound to avoid complications and encourage healing    Patient educated on the 6 essential components necessary for wound healing: Circulation, Debridements, Proper Dressings and Topical Wound Products, Infection Control, Edema Control and Offloading. Patient educated on those factors that negatively effect or impact wound healing: smoking, obesity, uncontrolled diabetes, anticoagulant and immunosuppressive regimens, inadequate nutrition, untreated arterial and venous disease if applicable and measures to manage edema.        Wound Pain Timing/Severity: waxing and waning, mild  Quality of pain: shooting, tender  Severity of pain:  3 / 10  Modifying Factors: chronic pressure, shear force and obesity  Associated Signs/Symptoms: edema, erythema, drainage and pain        PAST MEDICAL HISTORY        Diagnosis Date    Arthritis     \"Hands\"    CAD (coronary artery disease)     Dr Marizol Cornejo    Chronic kidney disease, stage I 10/16/2019    Chronic ulcer of right leg with fat layer exposed ([facility] [address] 2022    COVID-19 2021    H/O cardiovascular stress test 02/15/2018    EF60% mod ischemia ant wall    H/O Doppler ultrasound 2017    carotid doppler - severe degree stenosis LICA    H/O echocardiogram 06/06/2016    ef 56% mild to mod. mr and tr    H/O echocardiogram 10/16/2018    EF55-60% mild AS, mild AR, mild-mod MR, mild TR, mild phtn    H/O echocardiogram 12/29/2020    EF 55-60% mild aortic stenosis mild mitral aortic and tricuspid regurg  no evidence of pericardial effusion    History of cardiac cath 02/19/2018    patent stents, nml EF, abn stress sec to jailed diag    History of echocardiogram 07/08/2019    s/p Left CEA, Mild 0-49% disease of the bilateral ICA, dampened left vertebral flow, normal left subclavian flow. Hx of Carotid Doppler ultrasound 05/06/2020    Mild 0-49% disease of the bilateral ICA, normal vertebral flow    Hypercalcemia     ? possible hyperparathyroidism/saw Dr Vance Carballo previously    Hyperlipidemia     Hypertension 1978    Hyponatremia     chronic ~130-131 range. Hypothyroidism due to acquired atrophy of thyroid     antiperoxidase ab tested negative    Insomnia     Leg pain, lateral, right 04/18/2022    Menopause     s/p JAMIE    Osteopenia     has hx of rib fractures after a fall    Osteopenia     S/P colonoscopic polypectomy 07/19/2018    Dr Hector Manrique, recheck 5 yrs    Shortness of breath on exertion     Spinal stenosis of lumbar region without neurogenic claudication 07/2022    noted on MRI, w chr compression fx and area of sever sp stenosis    Vitamin D deficiency        PAST SURGICAL HISTORY    Past Surgical History:   Procedure Laterality Date    APPENDECTOMY      CARDIAC SURGERY      stents x 3 Feb 2007    CAROTID ENDARTERECTOMY Left 02/01/2017    with patch     COLONOSCOPY      CORONARY ANGIOPLASTY WITH STENT PLACEMENT  2006, 2007    X 2 in LAD and one in ? CIRC w Dr Paulo Zuluaga Bilateral 2000's    Cataracts With Lens Implants    HYSTERECTOMY, TOTAL ABDOMINAL (CERVIX REMOVED)  1981    ROTATOR CUFF REPAIR Right 2003    TONSILLECTOMY  1954       FAMILY HISTORY    Family History   Problem Relation Age of Onset    High Blood Pressure Mother     Migraines Mother     Heart Melatonin 5 MG CAPS Take 5 mg by mouth nightly       aspirin 81 MG tablet Take 81 mg by mouth nightly        No current facility-administered medications on file prior to encounter. REVIEW OF SYSTEMS    Pertinent items are noted in HPI. Constitutional: Negative for systemic symptoms including fever, chills and malaise. Objective:      BP (!) 144/75   Pulse 64   Temp 99.6 °F (37.6 °C) (Temporal)   Resp 18   LMP  (LMP Unknown)     PHYSICAL EXAM      General: The patient is in no acute distress. Mental status:  Patient is appropriate, is  oriented to place and plan of care. Dermatologic exam: Visual inspection of the periwound reveals the skin to be normal in turgor and texture and dry  Wound exam: see wound description below in procedure note      Assessment:     Problem List Items Addressed This Visit          Other    Chronic ulcer of right leg with fat layer exposed ([address] - Primary    Relevant Orders    Initiate Outpatient Wound Care Protocol     Procedure Note    Indications:  Based on my examination of this patient's wound(s) today, sharp excision into necrotic subcutaneous tissue is required to promote healing and evaluate the extent of previous healing. Performed by: WANDY Urena - CNP    Consent obtained: Yes    Time out taken:  Yes    Pain Control: N/A      Debridement:Excisional Debridement    Using curette the wound(s) was/were sharply debrided down through and including the removal of subcutaneous tissue.         Devitalized Tissue Debrided:  fibrin, biofilm, and slough    Pre Debridement Measurements:  Are located in the Wound Documentation Flow Sheet    All active wounds listed below with today's date are evaluated  Wound(s)    debrided this date include # : 1     Post  Debridement Measurements:  Wound 04/18/22 Ankle Anterior;Right #1 Right Lateral Ankle (Active)   Wound Image   07/19/22 1050   Wound Etiology Arterial 06/21/22 1029   Dressing Status New dressing applied 06/21/22 1059   Wound Cleansed Wound cleanser 07/19/22 1050   Offloading for Diabetic Foot Ulcers Offloading not required 06/21/22 1059   Wound Length (cm) 0.3 cm 07/19/22 1050   Wound Width (cm) 0.1 cm 07/19/22 1050   Wound Depth (cm) 0.1 cm 07/19/22 1050   Wound Surface Area (cm^2) 0.03 cm^2 07/19/22 1050   Change in Wound Size % (l*w) 50 07/19/22 1050   Wound Volume (cm^3) 0.003 cm^3 07/19/22 1050   Wound Healing % 50 07/19/22 1050   Post-Procedure Length (cm) 0.3 cm 07/19/22 1103   Post-Procedure Width (cm) 0.1 cm 07/19/22 1103   Post-Procedure Depth (cm) 0.1 cm 07/19/22 1103   Post-Procedure Surface Area (cm^2) 0.03 cm^2 07/19/22 1103   Post-Procedure Volume (cm^3) 0.003 cm^3 07/19/22 1103   Distance Tunneling (cm) 0 cm 07/19/22 1050   Tunneling Position ___ O'Clock 0 07/19/22 1050   Undermining Starts ___ O'Clock 0 07/19/22 1050   Undermining Ends___ O'Clock 0 07/19/22 1050   Undermining Maxium Distance (cm) 0 07/19/22 1050   Wound Assessment Slough;Pale granulation tissue 07/19/22 1050   Drainage Amount None 07/19/22 1050   Drainage Description Yellow 06/21/22 1033   Odor None 07/19/22 1050   Gabriella-wound Assessment Hyperpigmented 07/19/22 1050   Margins Defined edges 07/19/22 1050   Wound Thickness Description not for Pressure Injury Full thickness 07/19/22 1050   Number of days: 92       Percent of Wound(s) Debrided: approximately 100%    Total  Area  Debrided:  0.03 sq cm     Bleeding:  Minimal    Hemostasis Achieved:  by pressure    Procedural Pain:  4  / 10     Post Procedural Pain:  0 / 10     Response to treatment:  With complaints of pain. Status of wound progress and description from last visit:   Explained to patient that we need to debride in order to get wound to heal  She is agreeable to this when she returns from vacation, but she will need injectable numbing medication to tolerate  Concerns are for pocket of infection and deeper involvement than we are able to see.   Follow up 2 weeks in Wednesday clinic       Plan:       Discharge Instructions         PHYSICIAN ORDERS AND DISCHARGE INSTRUCTIONS     NOTE: Upon discharge from the [address], you will receive a patient experience survey. We would be grateful if you would take the time to fill this survey out. Wound care order history:                 PAWAN's   Right       Left               Date:     Continuing wound care orders and information:                 Residence:                Continue home health care with:              Your wound-care supplies will be provided by: Wound cleansing:              Do not scrub or use excessive force. Wash hands with soap and water before and after dressing changes. Prior to applying a clean dressing, cleanse wound with normal saline, wound cleanser, or mild soap and water. Ask the physician or nurse before getting the wound(s) wet in a shower     Daily Wound management:              Keep weight off wounds and reposition every 2 hours. Avoid standing for long periods of time. Apply wraps/stockings in AM and remove at bedtime. If swelling is present, elevate legs to the level of the heart or above for 30 minutes 4-5 times a day and/or when sitting. When taking antibiotics take entire prescription as ordered by physician do not stop taking until medicine is all gone. Wound Care Notes:  Gentamicin                                Orders for this week: 7/19/22                  R Lateral Ankle -- Clean with soap and water, pat dry. Apply sulfamylon in clinic (gentamicin at home) and Stimulen powder to wound bed. Cover with Silicone border. Change Daily. Wound Care Provider: Haleigh Tolentino CNP  Follow up in 2 weeks (Wednesday 8/3/22) at the wound care center. Call [phone] for any questions or concerns.   Date__________   Time____________        Treatment

## 2022-08-06 DIAGNOSIS — I25.10 CORONARY ARTERY DISEASE INVOLVING NATIVE CORONARY ARTERY OF NATIVE HEART WITHOUT ANGINA PECTORIS: ICD-10-CM

## 2022-08-08 RX ORDER — SIMVASTATIN 20 MG
20 TABLET ORAL NIGHTLY
Qty: 90 TABLET | Refills: 1 | OUTPATIENT
Start: 2022-08-08

## 2022-08-10 ENCOUNTER — HOSPITAL ENCOUNTER (OUTPATIENT)
Dept: WOUND CARE | Age: 77
Discharge: HOME | End: 2022-08-10
Payer: COMMERCIAL

## 2022-08-10 VITALS
RESPIRATION RATE: 61 BRPM | HEART RATE: 65 BPM | TEMPERATURE: 98.5 F | DIASTOLIC BLOOD PRESSURE: 65 MMHG | SYSTOLIC BLOOD PRESSURE: 141 MMHG

## 2022-08-10 DIAGNOSIS — L97.912 CHRONIC ULCER OF RIGHT LEG WITH FAT LAYER EXPOSED (HCC): Primary | ICD-10-CM

## 2022-08-10 PROCEDURE — 11042 DBRDMT SUBQ TIS 1ST 20SQCM/<: CPT | Performed by: NURSE PRACTITIONER

## 2022-08-10 PROCEDURE — 11042 DBRDMT SUBQ TIS 1ST 20SQCM/<: CPT

## 2022-08-10 RX ORDER — BACITRACIN, NEOMYCIN, POLYMYXIN B 400; 3.5; 5 [USP'U]/G; MG/G; [USP'U]/G
OINTMENT TOPICAL ONCE
Status: CANCELLED | OUTPATIENT
Start: 2022-08-10 | End: 2022-08-10

## 2022-08-10 RX ORDER — BACITRACIN ZINC AND POLYMYXIN B SULFATE 500; 1000 [USP'U]/G; [USP'U]/G
OINTMENT TOPICAL ONCE
Status: CANCELLED | OUTPATIENT
Start: 2022-08-10 | End: 2022-08-10

## 2022-08-10 RX ORDER — LIDOCAINE HYDROCHLORIDE 20 MG/ML
JELLY TOPICAL ONCE
Status: CANCELLED | OUTPATIENT
Start: 2022-08-10 | End: 2022-08-10

## 2022-08-10 RX ORDER — GENTAMICIN SULFATE 1 MG/G
OINTMENT TOPICAL ONCE
Status: CANCELLED | OUTPATIENT
Start: 2022-08-10 | End: 2022-08-10

## 2022-08-10 RX ORDER — LIDOCAINE 50 MG/G
OINTMENT TOPICAL ONCE
Status: CANCELLED | OUTPATIENT
Start: 2022-08-10 | End: 2022-08-10

## 2022-08-10 RX ORDER — BETAMETHASONE DIPROPIONATE 0.05 %
OINTMENT (GRAM) TOPICAL ONCE
Status: CANCELLED | OUTPATIENT
Start: 2022-08-10 | End: 2022-08-10

## 2022-08-10 RX ORDER — LIDOCAINE 40 MG/G
CREAM TOPICAL ONCE
Status: CANCELLED | OUTPATIENT
Start: 2022-08-10 | End: 2022-08-10

## 2022-08-10 RX ORDER — LIDOCAINE HYDROCHLORIDE 40 MG/ML
SOLUTION TOPICAL ONCE
Status: CANCELLED | OUTPATIENT
Start: 2022-08-10 | End: 2022-08-10

## 2022-08-10 RX ORDER — GINSENG 100 MG
CAPSULE ORAL ONCE
Status: CANCELLED | OUTPATIENT
Start: 2022-08-10 | End: 2022-08-10

## 2022-08-10 RX ORDER — CLOBETASOL PROPIONATE 0.5 MG/G
OINTMENT TOPICAL ONCE
Status: CANCELLED | OUTPATIENT
Start: 2022-08-10 | End: 2022-08-10

## 2022-08-10 NOTE — DISCHARGE INSTRUCTIONS
Discharge Instructions           PHYSICIAN ORDERS AND DISCHARGE INSTRUCTIONS     NOTE: Upon discharge from the [address], you will receive a patient experience survey. We would be grateful if you would take the time to fill this survey out. Wound care order history:                 PAWAN's   Right       Left               Date:     Continuing wound care orders and information:                 Residence:                Continue home health care with:              Your wound-care supplies will be provided by: Wound cleansing:              Do not scrub or use excessive force. Wash hands with soap and water before and after dressing changes. Prior to applying a clean dressing, cleanse wound with normal saline, wound cleanser, or mild soap and water. Ask the physician or nurse before getting the wound(s) wet in a shower     Daily Wound management:              Keep weight off wounds and reposition every 2 hours. Avoid standing for long periods of time. Apply wraps/stockings in AM and remove at bedtime. If swelling is present, elevate legs to the level of the heart or above for 30 minutes 4-5 times a day and/or when sitting. When taking antibiotics take entire prescription as ordered by physician do not stop taking until medicine is all gone. Wound Care Notes:  Gentamicin                                Orders for this week: 8/10/22                  R Lateral Ankle -- Clean with soap and water, pat dry. Apply santyl in clinic (gentamicin at home) and Stimulen powder to wound bed. Cover with Silicone border. Change Daily. Tubi F on in am and off at night         Wound Care Provider: Yael Culver CNP  Follow up in 2 weeks  at the wound care center. Call [phone] for any questions or concerns.   Date__________   Time____________

## 2022-08-10 NOTE — PROGRESS NOTES
Wound Care Center Progress Note With Procedure    Annmarie Kelly  AGE: 65 y.o. GENDER: female  : 1945  EPISODE DATE:  8/10/2022     Subjective:     Chief Complaint   Patient presents with    Wound Check     Rt lateral ankle         HISTORY of PRESENT ILLNESS     Sammie Zambrano is a 67 y.o. 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 The ulcer is of mild severity. The underlying cause of the wound is pressure verses arterial.     Patient refuses any heavy debridement or injections of lidocaine for a more deep debridement. Wound is improving but very slowly, and fibrous cap remains     Patient educated on the 6 essential components necessary for wound healing: Circulation, Debridements, Proper Dressings and Topical Wound Products, Infection Control, Edema Control and Offloading. Patient educated on those factors that negatively effect or impact wound healing: smoking, obesity, uncontrolled diabetes, anticoagulant and immunosuppressive regimens, inadequate nutrition, untreated arterial and venous disease if applicable and measures to manage edema. Wound Pain Timing/Severity: waxing and waning, mild  Quality of pain: shooting, tender  Severity of pain:  3 / 10  Modifying Factors: chronic pressure, shear force and obesity  Associated Signs/Symptoms: edema, erythema, drainage and pain        PAST MEDICAL HISTORY        Diagnosis Date    Arthritis     \"Hands\"    CAD (coronary artery disease)     Dr Raul Haines    Chronic kidney disease, stage I 10/16/2019    Chronic ulcer of right leg with fat layer exposed ([facility] [address] 2022    COVID-19 2021    H/O cardiovascular stress test 02/15/2018    EF60% mod ischemia ant wall    H/O Doppler ultrasound 2017    carotid doppler - severe degree stenosis LICA    H/O echocardiogram 2016    ef 56% mild to mod.  mr and tr    H/O echocardiogram 10/16/2018    EF55-60% mild AS, mild AR, mild-mod MR, mild TR, mild phtn    H/O echocardiogram 12/29/2020    EF 55-60% mild aortic stenosis mild mitral aortic and tricuspid regurg  no evidence of pericardial effusion    History of cardiac cath 02/19/2018    patent stents, nml EF, abn stress sec to jailed diag    History of echocardiogram 07/08/2019    s/p Left CEA, Mild 0-49% disease of the bilateral ICA, dampened left vertebral flow, normal left subclavian flow. Hx of Carotid Doppler ultrasound 05/06/2020    Mild 0-49% disease of the bilateral ICA, normal vertebral flow    Hypercalcemia     ? possible hyperparathyroidism/saw Dr Merna Abrams previously    Hyperlipidemia     Hypertension 1978    Hyponatremia     chronic ~130-131 range. Hypothyroidism due to acquired atrophy of thyroid     antiperoxidase ab tested negative    Insomnia     Leg pain, lateral, right 04/18/2022    Menopause     s/p JAMIE    Osteopenia     has hx of rib fractures after a fall    Osteopenia     S/P colonoscopic polypectomy 07/19/2018    Dr Bri Green, recheck 5 yrs    Shortness of breath on exertion     Spinal stenosis of lumbar region without neurogenic claudication 07/2022    noted on MRI, w chr compression fx and area of sever sp stenosis    Vitamin D deficiency        PAST SURGICAL HISTORY    Past Surgical History:   Procedure Laterality Date    APPENDECTOMY      CARDIAC SURGERY      stents x 3 Feb 2007    CAROTID ENDARTERECTOMY Left 02/01/2017    with patch     COLONOSCOPY      CORONARY ANGIOPLASTY WITH STENT PLACEMENT  2006, 2007    X 2 in LAD and one in ? CIRC w Dr Carol Jin Bilateral 2000's    Cataracts With Lens Implants    HYSTERECTOMY, TOTAL ABDOMINAL (CERVIX REMOVED)  1981    ROTATOR CUFF REPAIR Right 2003    TONSILLECTOMY  1954       FAMILY HISTORY    Family History   Problem Relation Age of Onset    High Blood Pressure Mother     Migraines Mother     Heart Disease Father     High Blood Pressure Father     Other Father         Ulcer    Cancer Paternal Aunt Diabetes Brother     Cancer Paternal Aunt     Diabetes Grandchild     Asthma Grandchild     Heart Disease Maternal Aunt     Colon Cancer Maternal Aunt     Diabetes Paternal Grandfather        SOCIAL HISTORY    Social History     Tobacco Use    Smoking status: Never    Smokeless tobacco: Never   Vaping Use    Vaping Use: Never used   Substance Use Topics    Alcohol use: No     Comment: caffeine 1-2 cups of coffee a day 2 pops a day    Drug use: No       ALLERGIES    Allergies   Allergen Reactions    Celexa [Citalopram] Nausea And Vomiting    Poison Ivy Extract [Poison Renee Extract] Rash    Septra [Sulfamethoxazole-Trimethoprim] Nausea And Vomiting    Ultram [Tramadol] Nausea And Vomiting    Valsartan Other (See Comments)     Increased K    Xanax Xr [Alprazolam Er]      NOT ALLERGY, FAMILY STRONGLY OPPOSED TO BENZODIAZEPINES       MEDICATIONS    Current Outpatient Medications on File Prior to Encounter   Medication Sig Dispense Refill    losartan (COZAAR) 100 MG tablet TAKE 1 TABLET BY MOUTH DAILY 90 tablet 1    metoprolol succinate (TOPROL XL) 25 MG extended release tablet Take 1 tablet by mouth daily 90 tablet 1    hydrALAZINE (APRESOLINE) 50 MG tablet Take 1/2 tablet in the morning and 1 tablet in the evening 135 tablet 1    levothyroxine (SYNTHROID) 25 MCG tablet Take 1 tablet by mouth daily 90 tablet 1    clopidogrel (PLAVIX) 75 MG tablet Take 1 tablet by mouth daily 90 tablet 1    sertraline (ZOLOFT) 50 MG tablet Take 1 tablet by mouth daily 90 tablet 1    hydroCHLOROthiazide (MICROZIDE) 12.5 MG capsule Take 1 capsule by mouth every morning 90 capsule 3    simvastatin (ZOCOR) 20 MG tablet Take 1 tablet by mouth nightly 90 tablet 1    Multiple Vitamins-Minerals (THERAPEUTIC MULTIVITAMIN-MINERALS) tablet Take 1 tablet by mouth daily      Acetaminophen (TYLENOL ARTHRITIS PAIN PO) Take by mouth as needed      Melatonin 5 MG CAPS Take 5 mg by mouth nightly       aspirin 81 MG tablet Take 81 mg by mouth nightly No current facility-administered medications on file prior to encounter. REVIEW OF SYSTEMS    Pertinent items are noted in HPI. Constitutional: Negative for systemic symptoms including fever, chills and malaise. Objective:      BP (!) 141/65   Pulse 65   Temp 98.5 °F (36.9 °C) (Temporal)   Resp (!) 61   LMP  (LMP Unknown)     PHYSICAL EXAM      General: The patient is in no acute distress. Mental status:  Patient is appropriate, is  oriented to place and plan of care. Dermatologic exam: Visual inspection of the periwound reveals the skin to be normal in turgor and texture and dry  Wound exam: see wound description below in procedure note      Assessment:     Problem List Items Addressed This Visit          Other    Chronic ulcer of right leg with fat layer exposed ([address] - Primary    Relevant Orders    Initiate Outpatient Wound Care Protocol     Procedure Note    Indications:  Based on my examination of this patient's wound(s) today, sharp excision into necrotic subcutaneous tissue is required to promote healing and evaluate the extent of previous healing. Performed by: WANDY Lang - CNP    Consent obtained: Yes    Time out taken:  Yes    Pain Control: N/A      Debridement:Excisional Debridement    Using #15 blade scalpel the wound(s) was/were sharply debrided down through and including the removal of subcutaneous tissue.         Devitalized Tissue Debrided:  fibrin, biofilm, and slough    Pre Debridement Measurements:  Are located in the Wound Documentation Flow Sheet    All active wounds listed below with today's date are evaluated  Wound(s)    debrided this date include # : 1     Post  Debridement Measurements:  Wound 04/18/22 Ankle Anterior;Right #1 Right Lateral Ankle (Active)   Wound Image   07/19/22 1050   Wound Etiology Arterial 08/10/22 1302   Dressing Status New dressing applied 07/19/22 1106   Wound Cleansed Wound cleanser 08/10/22 1302   Offloading for Diabetic Foot Ulcers Offloading not required 07/19/22 1106   Wound Length (cm) 0.3 cm 08/10/22 1302   Wound Width (cm) 0.2 cm 08/10/22 1302   Wound Depth (cm) 0.1 cm 08/10/22 1302   Wound Surface Area (cm^2) 0.06 cm^2 08/10/22 1302   Change in Wound Size % (l*w) 0 08/10/22 1302   Wound Volume (cm^3) 0.006 cm^3 08/10/22 1302   Wound Healing % 0 08/10/22 1302   Post-Procedure Length (cm) 0.3 cm 07/19/22 1103   Post-Procedure Width (cm) 0.1 cm 07/19/22 1103   Post-Procedure Depth (cm) 0.1 cm 07/19/22 1103   Post-Procedure Surface Area (cm^2) 0.03 cm^2 07/19/22 1103   Post-Procedure Volume (cm^3) 0.003 cm^3 07/19/22 1103   Distance Tunneling (cm) 0 cm 08/10/22 1302   Tunneling Position ___ O'Clock 0 08/10/22 1302   Undermining Starts ___ O'Clock 0 08/10/22 1302   Undermining Ends___ O'Clock 0 08/10/22 1302   Undermining Maxium Distance (cm) 0 08/10/22 1302   Wound Assessment Slough 08/10/22 1302   Drainage Amount None 08/10/22 1302   Drainage Description Yellow 06/21/22 1033   Odor None 08/10/22 1302   Gabriella-wound Assessment Intact 08/10/22 1302   Margins Defined edges 08/10/22 1302   Wound Thickness Description not for Pressure Injury Full thickness 08/10/22 1302   Number of days: 114       Percent of Wound(s) Debrided: approximately 100%    Total  Area  Debrided:  0.03 sq cm     Bleeding:  Minimal    Hemostasis Achieved:  by pressure    Procedural Pain:  6  / 10     Post Procedural Pain:  0 / 10     Response to treatment:  Well tolerated by patient. Status of wound progress and description from last visit:   Stable. Will apply santyl today and have patient return in 2 weeks instead of 4     Plan:       Discharge Instructions           Discharge Instructions           PHYSICIAN ORDERS AND DISCHARGE INSTRUCTIONS     NOTE: Upon discharge from the [address], you will receive a patient experience survey. We would be grateful if you would take the time to fill this survey out.      Wound care order history: PAWAN's   Right       Left               Date:     Continuing wound care orders and information:                 Residence:                Continue home health care with:              Your wound-care supplies will be provided by: Wound cleansing:              Do not scrub or use excessive force. Wash hands with soap and water before and after dressing changes. Prior to applying a clean dressing, cleanse wound with normal saline, wound cleanser, or mild soap and water. Ask the physician or nurse before getting the wound(s) wet in a shower     Daily Wound management:              Keep weight off wounds and reposition every 2 hours. Avoid standing for long periods of time. Apply wraps/stockings in AM and remove at bedtime. If swelling is present, elevate legs to the level of the heart or above for 30 minutes 4-5 times a day and/or when sitting. When taking antibiotics take entire prescription as ordered by physician do not stop taking until medicine is all gone. Wound Care Notes:  Gentamicin                                Orders for this week: 8/10/22                  R Lateral Ankle -- Clean with soap and water, pat dry. Apply santyl in clinic (gentamicin at home) and Stimulen powder to wound bed. Cover with Silicone border. Change Daily. Tubi F on in am and off at night         Wound Care Provider: Liane Parisi CNP  Follow up in 2 weeks  at the wound care center. Call [phone] for any questions or concerns.   Date__________   Time____________                 Treatment Note      Written Patient Dismissal Instructions Given            Electronically signed by WANDY Puckett CNP on 8/10/2022 at 1:27 PM

## 2022-08-24 ENCOUNTER — HOSPITAL ENCOUNTER (OUTPATIENT)
Dept: WOUND CARE | Age: 77
Discharge: HOME | End: 2022-08-24
Payer: COMMERCIAL

## 2022-08-24 VITALS
DIASTOLIC BLOOD PRESSURE: 60 MMHG | SYSTOLIC BLOOD PRESSURE: 126 MMHG | RESPIRATION RATE: 18 BRPM | HEART RATE: 63 BPM | TEMPERATURE: 98.3 F

## 2022-08-24 DIAGNOSIS — L97.912 CHRONIC ULCER OF RIGHT LEG WITH FAT LAYER EXPOSED (HCC): Primary | ICD-10-CM

## 2022-08-24 PROCEDURE — 99213 OFFICE O/P EST LOW 20 MIN: CPT | Performed by: NURSE PRACTITIONER

## 2022-08-24 PROCEDURE — 99213 OFFICE O/P EST LOW 20 MIN: CPT

## 2022-08-24 RX ORDER — BACITRACIN, NEOMYCIN, POLYMYXIN B 400; 3.5; 5 [USP'U]/G; MG/G; [USP'U]/G
OINTMENT TOPICAL ONCE
Status: CANCELLED | OUTPATIENT
Start: 2022-08-24 | End: 2022-08-24

## 2022-08-24 RX ORDER — GINSENG 100 MG
CAPSULE ORAL ONCE
Status: CANCELLED | OUTPATIENT
Start: 2022-08-24 | End: 2022-08-24

## 2022-08-24 RX ORDER — LIDOCAINE HYDROCHLORIDE 20 MG/ML
JELLY TOPICAL ONCE
Status: CANCELLED | OUTPATIENT
Start: 2022-08-24 | End: 2022-08-24

## 2022-08-24 RX ORDER — CLOBETASOL PROPIONATE 0.5 MG/G
OINTMENT TOPICAL ONCE
Status: CANCELLED | OUTPATIENT
Start: 2022-08-24 | End: 2022-08-24

## 2022-08-24 RX ORDER — LIDOCAINE 40 MG/G
CREAM TOPICAL ONCE
Status: CANCELLED | OUTPATIENT
Start: 2022-08-24 | End: 2022-08-24

## 2022-08-24 RX ORDER — BACITRACIN ZINC AND POLYMYXIN B SULFATE 500; 1000 [USP'U]/G; [USP'U]/G
OINTMENT TOPICAL ONCE
Status: CANCELLED | OUTPATIENT
Start: 2022-08-24 | End: 2022-08-24

## 2022-08-24 RX ORDER — BETAMETHASONE DIPROPIONATE 0.05 %
OINTMENT (GRAM) TOPICAL ONCE
Status: CANCELLED | OUTPATIENT
Start: 2022-08-24 | End: 2022-08-24

## 2022-08-24 RX ORDER — LIDOCAINE 50 MG/G
OINTMENT TOPICAL ONCE
Status: CANCELLED | OUTPATIENT
Start: 2022-08-24 | End: 2022-08-24

## 2022-08-24 RX ORDER — LIDOCAINE HYDROCHLORIDE 40 MG/ML
SOLUTION TOPICAL ONCE
Status: CANCELLED | OUTPATIENT
Start: 2022-08-24 | End: 2022-08-24

## 2022-08-24 RX ORDER — GENTAMICIN SULFATE 1 MG/G
OINTMENT TOPICAL ONCE
Status: CANCELLED | OUTPATIENT
Start: 2022-08-24 | End: 2022-08-24

## 2022-08-24 ASSESSMENT — PAIN SCALES - GENERAL: PAINLEVEL_OUTOF10: 0

## 2022-08-24 NOTE — PLAN OF CARE
Problem: Pain  Goal: Verbalizes/displays adequate comfort level or baseline comfort level  Outcome: Adequate for Discharge     Problem: Wound:  Goal: Will show signs of wound healing; wound closure and no evidence of infection  Description: Will show signs of wound healing; wound closure and no evidence of infection  Outcome: Adequate for Discharge

## 2022-08-24 NOTE — DISCHARGE INSTRUCTIONS
PHYSICIAN ORDERS AND DISCHARGE INSTRUCTIONS     NOTE: Upon discharge from the [address], you will receive a patient experience survey. We would be grateful if you would take the time to fill this survey out. Wound care order history:                 PAWAN's   Right       Left               Date:     Continuing wound care orders and information:                 Residence:                Continue home health care with:              Your wound-care supplies will be provided by: Wound cleansing:              Do not scrub or use excessive force. Wash hands with soap and water before and after dressing changes. Prior to applying a clean dressing, cleanse wound with normal saline, wound cleanser, or mild soap and water. Ask the physician or nurse before getting the wound(s) wet in a shower     Daily Wound management:              Keep weight off wounds and reposition every 2 hours. Avoid standing for long periods of time. Apply wraps/stockings in AM and remove at bedtime. If swelling is present, elevate legs to the level of the heart or above for 30 minutes 4-5 times a day and/or when sitting. When taking antibiotics take entire prescription as ordered by physician do not stop taking until medicine is all gone. Wound Care Notes:  Gentamicin                                Orders for this week: 8/24/22                  R Lateral Ankle -- Clean with soap and water, pat dry. Cover with Silicone border. Change every 2-3 days and as needed. Wound Care Provider: Ashley Otero CNP  Discharged from the wound care center 8/24/22. Call [phone] for any questions or concerns.   Date__________   Time____________

## 2022-08-24 NOTE — PROGRESS NOTES
evidence of pericardial effusion    History of cardiac cath 02/19/2018    patent stents, nml EF, abn stress sec to jailed diag    History of echocardiogram 07/08/2019    s/p Left CEA, Mild 0-49% disease of the bilateral ICA, dampened left vertebral flow, normal left subclavian flow. Hx of Carotid Doppler ultrasound 05/06/2020    Mild 0-49% disease of the bilateral ICA, normal vertebral flow    Hypercalcemia     ? possible hyperparathyroidism/saw Dr Maddie Marquez previously    Hyperlipidemia     Hypertension 1978    Hyponatremia     chronic ~130-131 range. Hypothyroidism due to acquired atrophy of thyroid     antiperoxidase ab tested negative    Insomnia     Leg pain, lateral, right 04/18/2022    Menopause     s/p JAMIE    Osteopenia     has hx of rib fractures after a fall    Osteopenia     S/P colonoscopic polypectomy 07/19/2018    Dr Navin Aguayo, recheck 5 yrs    Shortness of breath on exertion     Spinal stenosis of lumbar region without neurogenic claudication 07/2022    noted on MRI, w chr compression fx and area of sever sp stenosis    Vitamin D deficiency        PAST SURGICAL HISTORY    Past Surgical History:   Procedure Laterality Date    APPENDECTOMY      CARDIAC SURGERY      stents x 3 Feb 2007    CAROTID ENDARTERECTOMY Left 02/01/2017    with patch     COLONOSCOPY      CORONARY ANGIOPLASTY WITH STENT PLACEMENT  2006, 2007    X 2 in LAD and one in ? CIRC w Dr Canchola Shoulder Bilateral 2000's    Cataracts With Lens Implants    HYSTERECTOMY, TOTAL ABDOMINAL (CERVIX REMOVED)  1981    ROTATOR CUFF REPAIR Right 2003    TONSILLECTOMY  1954       FAMILY HISTORY    Family History   Problem Relation Age of Onset    High Blood Pressure Mother     Migraines Mother     Heart Disease Father     High Blood Pressure Father     Other Father         Ulcer    Cancer Paternal Aunt     Diabetes Brother     Cancer Paternal Aunt     Diabetes Grandchild     Asthma Grandchild     Heart Disease Maternal Aunt     Colon Cancer Maternal Aunt     Diabetes Paternal Grandfather        SOCIAL HISTORY    Social History     Tobacco Use    Smoking status: Never    Smokeless tobacco: Never   Vaping Use    Vaping Use: Never used   Substance Use Topics    Alcohol use: No     Comment: caffeine 1-2 cups of coffee a day 2 pops a day    Drug use: No       ALLERGIES    Allergies   Allergen Reactions    Celexa [Citalopram] Nausea And Vomiting    Poison Ivy Extract [Poison Renee Extract] Rash    Septra [Sulfamethoxazole-Trimethoprim] Nausea And Vomiting    Ultram [Tramadol] Nausea And Vomiting    Valsartan Other (See Comments)     Increased K    Xanax Xr [Alprazolam Er]      NOT ALLERGY, FAMILY STRONGLY OPPOSED TO BENZODIAZEPINES       MEDICATIONS    Current Outpatient Medications on File Prior to Encounter   Medication Sig Dispense Refill    losartan (COZAAR) 100 MG tablet TAKE 1 TABLET BY MOUTH DAILY 90 tablet 1    metoprolol succinate (TOPROL XL) 25 MG extended release tablet Take 1 tablet by mouth daily 90 tablet 1    hydrALAZINE (APRESOLINE) 50 MG tablet Take 1/2 tablet in the morning and 1 tablet in the evening 135 tablet 1    levothyroxine (SYNTHROID) 25 MCG tablet Take 1 tablet by mouth daily 90 tablet 1    clopidogrel (PLAVIX) 75 MG tablet Take 1 tablet by mouth daily 90 tablet 1    sertraline (ZOLOFT) 50 MG tablet Take 1 tablet by mouth daily 90 tablet 1    hydroCHLOROthiazide (MICROZIDE) 12.5 MG capsule Take 1 capsule by mouth every morning 90 capsule 3    simvastatin (ZOCOR) 20 MG tablet Take 1 tablet by mouth nightly 90 tablet 1    Multiple Vitamins-Minerals (THERAPEUTIC MULTIVITAMIN-MINERALS) tablet Take 1 tablet by mouth daily      Acetaminophen (TYLENOL ARTHRITIS PAIN PO) Take by mouth as needed      Melatonin 5 MG CAPS Take 5 mg by mouth nightly       aspirin 81 MG tablet Take 81 mg by mouth nightly        No current facility-administered medications on file prior to encounter.        REVIEW OF SYSTEMS    Pertinent items are noted in HPI.    Constitutional: Negative for systemic symptoms including fever, chills and malaise. Objective:      /60   Pulse 63   Temp 98.3 °F (36.8 °C) (Temporal)   Resp 18   LMP  (LMP Unknown)     PHYSICAL EXAM      General: The patient is in no acute distress. Mental status:  Patient is appropriate, is  oriented to place and plan of care. Dermatologic exam: Visual inspection of the periwound reveals the skin to be normal in turgor and texture and dry  Wound exam: see wound description below in procedure note      Assessment:     Problem List Items Addressed This Visit          Other    Chronic ulcer of right leg with fat layer exposed ([address] - Primary    Relevant Orders    Initiate Outpatient Wound Care Protocol       Healed    Discharge at this time. Patient knows that they may return or call with any questions, comments, or concerns. Discussed strategies for preventing future wounds, including regular compression, daily moisturizing, regular exercise, and performing regular foot checks. Patient verbalized understanding        Plan:       Discharge Instructions         PHYSICIAN ORDERS AND DISCHARGE INSTRUCTIONS     NOTE: Upon discharge from the [address], you will receive a patient experience survey. We would be grateful if you would take the time to fill this survey out. Wound care order history:                 PAWAN's   Right       Left               Date:     Continuing wound care orders and information:                 Residence:                Continue home health care with:              Your wound-care supplies will be provided by: Wound cleansing:              Do not scrub or use excessive force. Wash hands with soap and water before and after dressing changes. Prior to applying a clean dressing, cleanse wound with normal saline, wound cleanser, or mild soap and water.               Ask the physician or nurse before getting the wound(s) wet in a shower Daily Wound management:              Keep weight off wounds and reposition every 2 hours. Avoid standing for long periods of time. Apply wraps/stockings in AM and remove at bedtime. If swelling is present, elevate legs to the level of the heart or above for 30 minutes 4-5 times a day and/or when sitting. When taking antibiotics take entire prescription as ordered by physician do not stop taking until medicine is all gone. Wound Care Notes:  Gentamicin                                Orders for this week: 8/24/22                  R Lateral Ankle -- Clean with soap and water, pat dry. Cover with Silicone border. Change every 2-3 days and as needed. Wound Care Provider: Cheryl Coelho CNP  Discharged from the wound care center 8/24/22. Call [phone] for any questions or concerns.   Date__________   Time____________        Treatment Note      Written Patient Dismissal Instructions Given            Electronically signed by WANDY Horowitz CNP on 8/24/2022 at 1:36 PM

## 2022-08-26 DIAGNOSIS — I25.10 CORONARY ARTERY DISEASE INVOLVING NATIVE CORONARY ARTERY OF NATIVE HEART WITHOUT ANGINA PECTORIS: ICD-10-CM

## 2022-08-26 DIAGNOSIS — F41.9 ANXIETY: ICD-10-CM

## 2022-08-26 DIAGNOSIS — I10 ESSENTIAL HYPERTENSION: ICD-10-CM

## 2022-08-26 RX ORDER — METOPROLOL SUCCINATE 25 MG/1
25 TABLET, EXTENDED RELEASE ORAL DAILY
Qty: 90 TABLET | Refills: 1 | OUTPATIENT
Start: 2022-08-26

## 2022-08-26 RX ORDER — CLOPIDOGREL BISULFATE 75 MG/1
75 TABLET ORAL DAILY
Qty: 90 TABLET | Refills: 1 | OUTPATIENT
Start: 2022-08-26

## 2022-09-07 ENCOUNTER — OFFICE VISIT (OUTPATIENT)
Dept: INTERNAL MEDICINE CLINIC | Age: 77
End: 2022-09-07
Payer: COMMERCIAL

## 2022-09-07 VITALS
DIASTOLIC BLOOD PRESSURE: 68 MMHG | RESPIRATION RATE: 14 BRPM | OXYGEN SATURATION: 98 % | WEIGHT: 145 LBS | HEART RATE: 60 BPM | SYSTOLIC BLOOD PRESSURE: 126 MMHG | BODY MASS INDEX: 29.29 KG/M2

## 2022-09-07 DIAGNOSIS — R05.8 POST-VIRAL COUGH SYNDROME: ICD-10-CM

## 2022-09-07 DIAGNOSIS — U07.1 COVID-19 VIRUS INFECTION: Primary | ICD-10-CM

## 2022-09-07 PROCEDURE — 99213 OFFICE O/P EST LOW 20 MIN: CPT | Performed by: INTERNAL MEDICINE

## 2022-09-07 PROCEDURE — 1123F ACP DISCUSS/DSCN MKR DOCD: CPT | Performed by: INTERNAL MEDICINE

## 2022-09-07 RX ORDER — ALBUTEROL SULFATE 90 UG/1
2 AEROSOL, METERED RESPIRATORY (INHALATION) EVERY 6 HOURS PRN
Qty: 18 G | Refills: 1 | Status: SHIPPED | OUTPATIENT
Start: 2022-09-07

## 2022-09-07 RX ORDER — BENZONATATE 100 MG/1
100 CAPSULE ORAL 3 TIMES DAILY PRN
Qty: 30 CAPSULE | Refills: 0 | Status: SHIPPED | OUTPATIENT
Start: 2022-09-07 | End: 2022-09-17

## 2022-09-07 RX ORDER — DOXYCYCLINE HYCLATE 100 MG
100 TABLET ORAL 2 TIMES DAILY
Qty: 20 TABLET | Refills: 0 | Status: SHIPPED | OUTPATIENT
Start: 2022-09-07 | End: 2022-09-17

## 2022-09-07 RX ORDER — PREDNISONE 1 MG/1
TABLET ORAL
Qty: 21 TABLET | Refills: 0 | Status: SHIPPED | OUTPATIENT
Start: 2022-09-07

## 2022-09-07 NOTE — PROGRESS NOTES
Pastora Valera  1945 09/07/22    SUBJECTIVE:  had gone on a [employer] [place] [employer] end of July. Still w some residual cough, worst in am and incr w any physical activity. No wheezing or SOB, phlegm is clear. Denies fever or chills. OBJECTIVE:    /68   Pulse 60   Resp 14   Wt 145 lb (65.8 kg)   LMP  (LMP Unknown)   SpO2 98%   BMI 29.29 kg/m²     Physical Exam  Constitutional:       Appearance: Normal appearance. HENT:      Head: Normocephalic and atraumatic. Cardiovascular:      Rate and Rhythm: Normal rate and regular rhythm. Heart sounds: Normal heart sounds. No murmur heard. Pulmonary:      Effort: Pulmonary effort is normal. No respiratory distress. Breath sounds: Normal breath sounds. Abdominal:      General: Abdomen is flat. Bowel sounds are normal. There is no distension. Palpations: Abdomen is soft. There is no mass. Tenderness: There is no abdominal tenderness. Hernia: No hernia is present. Musculoskeletal:      Cervical back: Neck supple. Neurological:      Mental Status: She is alert. Psychiatric:         Mood and Affect: Mood normal.       ASSESSMENT:    1. COVID-19 virus infection    2. Post-viral cough syndrome        PLAN:    Rosy Caba was seen today for post-covid symptoms and cough. Diagnoses and all orders for this visit:    COVID-19 virus infection- PERSISTENT HACKING COUGH MOST LIKEL POST VIRAL SYNDROME BUT ALSO CONSIDER UNDERLYING PNEUMONIA W BACTERIAL SECONDARY INFECTION. RX AS BELOW, IF NO BETTER IN TWO WEEKS, CHECK XRAY. -     doxycycline hyclate (VIBRA-TABS) 100 MG tablet; Take 1 tablet by mouth 2 times daily for 10 days    Post-viral cough syndrome- RX AS NOTED  -     XR CHEST STANDARD (2 VW); Future  -     albuterol sulfate HFA (PROVENTIL HFA) 108 (90 Base) MCG/ACT inhaler; Inhale 2 puffs into the lungs every 6 hours as needed for Wheezing  -     benzonatate (TESSALON) 100 MG capsule;  Take 1 capsule by mouth 3 times daily as needed for Cough  -     predniSONE (DELTASONE) 5 MG tablet; Take 6 tablets by mouth on day 1, 5 on day 2, 4 on day 3, 3 on day 4, 2 on day 5, 1 on day 6.  -     doxycycline hyclate (VIBRA-TABS) 100 MG tablet;  Take 1 tablet by mouth 2 times daily for 10 days

## 2022-09-10 DIAGNOSIS — I10 ESSENTIAL HYPERTENSION: ICD-10-CM

## 2022-09-12 RX ORDER — LOSARTAN POTASSIUM 100 MG/1
100 TABLET ORAL DAILY
Qty: 90 TABLET | Refills: 1 | OUTPATIENT
Start: 2022-09-12 | End: 2023-03-11

## 2022-09-19 ENCOUNTER — CARE COORDINATION (OUTPATIENT)
Dept: CARE COORDINATION | Age: 77
End: 2022-09-19

## 2022-09-19 SDOH — ECONOMIC STABILITY: FOOD INSECURITY: WITHIN THE PAST 12 MONTHS, YOU WORRIED THAT YOUR FOOD WOULD RUN OUT BEFORE YOU GOT MONEY TO BUY MORE.: NEVER TRUE

## 2022-09-19 SDOH — HEALTH STABILITY: MENTAL HEALTH: HOW OFTEN DO YOU HAVE A DRINK CONTAINING ALCOHOL?: PATIENT DECLINED

## 2022-09-19 SDOH — HEALTH STABILITY: MENTAL HEALTH: HOW MANY STANDARD DRINKS CONTAINING ALCOHOL DO YOU HAVE ON A TYPICAL DAY?: PATIENT DECLINED

## 2022-09-19 SDOH — SOCIAL STABILITY: SOCIAL NETWORK: ARE YOU MARRIED, WIDOWED, DIVORCED, SEPARATED, NEVER MARRIED, OR LIVING WITH A PARTNER?: WIDOWED

## 2022-09-19 SDOH — ECONOMIC STABILITY: INCOME INSECURITY: HOW HARD IS IT FOR YOU TO PAY FOR THE VERY BASICS LIKE FOOD, HOUSING, MEDICAL CARE, AND HEATING?: NOT HARD AT ALL

## 2022-09-19 SDOH — SOCIAL STABILITY: SOCIAL NETWORK: HOW OFTEN DO YOU ATTENT MEETINGS OF THE CLUB OR ORGANIZATION YOU BELONG TO?: MORE THAN 4 TIMES PER YEAR

## 2022-09-19 SDOH — SOCIAL STABILITY: SOCIAL NETWORK
DO YOU BELONG TO ANY CLUBS OR ORGANIZATIONS SUCH AS CHURCH GROUPS UNIONS, FRATERNAL OR ATHLETIC GROUPS, OR SCHOOL GROUPS?: YES

## 2022-09-19 SDOH — SOCIAL STABILITY: SOCIAL NETWORK: HOW OFTEN DO YOU ATTEND CHURCH OR RELIGIOUS SERVICES?: MORE THAN 4 TIMES PER YEAR

## 2022-09-19 SDOH — ECONOMIC STABILITY: FOOD INSECURITY: WITHIN THE PAST 12 MONTHS, THE FOOD YOU BOUGHT JUST DIDN'T LAST AND YOU DIDN'T HAVE MONEY TO GET MORE.: NEVER TRUE

## 2022-09-19 SDOH — SOCIAL STABILITY: SOCIAL NETWORK: HOW OFTEN DO YOU GET TOGETHER WITH FRIENDS OR RELATIVES?: MORE THAN THREE TIMES A WEEK

## 2022-09-19 SDOH — HEALTH STABILITY: PHYSICAL HEALTH: ON AVERAGE, HOW MANY DAYS PER WEEK DO YOU ENGAGE IN MODERATE TO STRENUOUS EXERCISE (LIKE A BRISK WALK)?: 2 DAYS

## 2022-09-19 SDOH — ECONOMIC STABILITY: INCOME INSECURITY: IN THE LAST 12 MONTHS, WAS THERE A TIME WHEN YOU WERE NOT ABLE TO PAY THE MORTGAGE OR RENT ON TIME?: NO

## 2022-09-19 SDOH — SOCIAL STABILITY: SOCIAL NETWORK
IN A TYPICAL WEEK, HOW MANY TIMES DO YOU TALK ON THE PHONE WITH FAMILY, FRIENDS, OR NEIGHBORS?: MORE THAN THREE TIMES A WEEK

## 2022-09-19 SDOH — HEALTH STABILITY: PHYSICAL HEALTH: ON AVERAGE, HOW MANY MINUTES DO YOU ENGAGE IN EXERCISE AT THIS LEVEL?: 20 MIN

## 2022-09-19 SDOH — ECONOMIC STABILITY: HOUSING INSECURITY: IN THE LAST 12 MONTHS, HOW MANY PLACES HAVE YOU LIVED?: 1

## 2022-09-19 SDOH — HEALTH STABILITY: MENTAL HEALTH
STRESS IS WHEN SOMEONE FEELS TENSE, NERVOUS, ANXIOUS, OR CAN'T SLEEP AT NIGHT BECAUSE THEIR MIND IS TROUBLED. HOW STRESSED ARE YOU?: ONLY A LITTLE

## 2022-09-19 NOTE — CARE COORDINATION
Ambulatory Care Coordination Note  9/19/2022    ACC: Sofiya Villa, SELINA    Summary Note:  Spoke with patient for ACM follow up:  [facility] eligible for enrollment. Oriented to the role of ACM. Patient is very pleasant and is engaged with ACM. Confirmed recent OV for c/o cough. Patient reports that she is feeling better. Encouraged continued hydration / rest.  Patient reports that she is not having to use her inhaler. Reports that she is checking her SaO2 and it is running WNL. Encouraged patient to keep rescue inhaler on hand as needed for SOB, cough or wheezing. Patient verbalized understanding and reports that she is not having any of these symptoms. Medications:  Medication reconciliation completed. Patient reports that she completed Prednisone and is no longer taking Zoloft. Patient reports that she is taking medication as directed and denies any issue with the cost of her medications. Discussed support/ resource needs. Patient reports that she has reliable transportation. Reports that she has no issue with the cost of groceries or medications. Patient is independent at home and denies any barriers to housekeeping or meal prep. Patient is well supported by family and is active in and supported by her [employer] family. Patient denies any questions or needs at this time. ACM contact information provided. Encouraged patient to reach out should needs arise. Plan for next outreach 1-2 weeks:   address questions, bathroom safety, instruct on fall prevention    Ambulatory Care Coordination Assessment    Care Coordination Protocol  Referral from Primary Care Provider: No  Week 1 - Initial Assessment     Do you have all of your prescriptions and are they filled?: Yes  Barriers to medication adherence: None  Are you able to afford your medications?: Yes  How often do you have trouble taking your medications the way you have been told to take them?: I always take them as prescribed.      Do you other): No identified problems or perceived positive benefits   How would you rate their social network (family, work, friends)?: Good participation with social networks   How would you rate their financial resources (including ability to afford all required medical care)?: Financially secure, resources adequate, no identified problems   How wells does the patient now understand their health and well-being (symptoms, signs or risk factors) and what they need to do to manage their health?: Reasonable to good understanding and already engages in managing health or is willing to undertake better management   How well do you think your patient can engage in healthcare discussions? (Barriers include language, deafness, aphasia, alcohol or drug problems, learning difficulties, concentration): Clear and open communication, no identified barriers   Do other services need to be involved to help this patient?: Other care/services not required at this time   Are current services involved with this patient well-coordinated? (Include coordination with other services you are now recommendation): All required care/services in place and well-coordinated   Suggested Interventions and Community Resources   Medication Assistance Program: Declined         Set up/Review an Education Plan, Set up/Review Goals                Prior to Admission medications    Medication Sig Start Date End Date Taking?  Authorizing Provider   albuterol sulfate HFA (PROVENTIL HFA) 108 (90 Base) MCG/ACT inhaler Inhale 2 puffs into the lungs every 6 hours as needed for Wheezing 9/7/22  Yes Josiah Murphy MD   losartan (COZAAR) 100 MG tablet TAKE 1 TABLET BY MOUTH DAILY  Patient taking differently: Take 50 mg by mouth daily 6/8/22 12/5/22 Yes Josiah Murphy MD   metoprolol succinate (TOPROL XL) 25 MG extended release tablet Take 1 tablet by mouth daily 5/23/22  Yes Josiah Murphy MD   hydrALAZINE (APRESOLINE) 50 MG tablet Take 1/2 tablet in the morning and 1 tablet in the evening 5/23/22  Yes Ollie Rucker MD   levothyroxine (SYNTHROID) 25 MCG tablet Take 1 tablet by mouth daily 5/23/22  Yes Ollie Rucker MD   clopidogrel (PLAVIX) 75 MG tablet Take 1 tablet by mouth daily 5/23/22  Yes Ollie Rucker MD   hydroCHLOROthiazide (MICROZIDE) 12.5 MG capsule Take 1 capsule by mouth every morning 5/23/22  Yes Ollie Rucker MD   simvastatin (ZOCOR) 20 MG tablet Take 1 tablet by mouth nightly 5/23/22  Yes Ollie Rucker MD   Multiple Vitamins-Minerals (THERAPEUTIC MULTIVITAMIN-MINERALS) tablet Take 1 tablet by mouth daily   Yes Historical Provider, MD   Acetaminophen (TYLENOL ARTHRITIS PAIN PO) Take by mouth as needed   Yes Historical Provider, MD   Melatonin 5 MG CAPS Take 5 mg by mouth nightly    Yes Historical Provider, MD   aspirin 81 MG tablet Take 81 mg by mouth nightly    Yes Historical Provider, MD   predniSONE (DELTASONE) 5 MG tablet Take 6 tablets by mouth on day 1, 5 on day 2, 4 on day 3, 3 on day 4, 2 on day 5, 1 on day 6.   Patient not taking: Reported on 9/19/2022 9/7/22   Ollie Rucker MD   sertraline (ZOLOFT) 50 MG tablet Take 1 tablet by mouth daily  Patient not taking: Reported on 9/19/2022 5/23/22   Ollie Rucker MD       Future Appointments   Date Time Provider Janet Land   10/12/2022  1:50 PM Maria R Pablo MD [facility]   11/28/2022 10:30 AM Ollie Rucker MD [facility]  [facility]      and   General Assessment    Do you have any symptoms that are causing concern?: No

## 2022-09-29 ENCOUNTER — CARE COORDINATION (OUTPATIENT)
Dept: CARE COORDINATION | Age: 77
End: 2022-09-29

## 2022-09-29 NOTE — CARE COORDINATION
Ambulatory Care Coordination Note  9/29/2022    ACC: Luis Greene, RN      Spoke with patient for ACM follow up. Patient reports that she is doing well. Taking medications as directed. Symptoms are well managed at this time. Discussed care gaps/ support needs. Patient is independent and denies support needs. Patient denies any questions at this time. Further assessment deferred as patient reports that she is picking her grand children up from school. ACM contact information confirmed. Encouraged patient to reach out if needs arise. Offered patient enrollment in the Remote Patient Monitoring (RPM) program for in-home monitoring: NA.    Plan for next outreach: Address support needs, progress toward graduation. Lab Results       None            Care Coordination Interventions    Referral from Primary Care Provider: No  Suggested Interventions and Community Resources  Medication Assistance Program: Declined          Goals Addressed                   This Visit's Progress     Wellness Goal   On track     Patient Self-Management Goal for Health Maintenance  Goal: I will schedule a yearly preventative care visit. I will schedule routine eye examinations with an eye specialist as directed by my provider. I will consider obtaining vaccines recommended by my provider. Barriers: none  Plan for overcoming my barriers: Patient will schedule routine Provider follow up as directed. Confidence:  9/10  Anticipated Goal Completion Date:  12/19/22              Prior to Admission medications    Medication Sig Start Date End Date Taking? Authorizing Provider   albuterol sulfate HFA (PROVENTIL HFA) 108 (90 Base) MCG/ACT inhaler Inhale 2 puffs into the lungs every 6 hours as needed for Wheezing 9/7/22   Brian Zaragoza MD   predniSONE (DELTASONE) 5 MG tablet Take 6 tablets by mouth on day 1, 5 on day 2, 4 on day 3, 3 on day 4, 2 on day 5, 1 on day 6.   Patient not taking: Reported on 9/19/2022 9/7/22 Roger Montoya MD   losartan (COZAAR) 100 MG tablet TAKE 1 TABLET BY MOUTH DAILY  Patient taking differently: Take 50 mg by mouth daily 6/8/22 12/5/22  Roger Montoya MD   metoprolol succinate (TOPROL XL) 25 MG extended release tablet Take 1 tablet by mouth daily 5/23/22   Roger Montoya MD   hydrALAZINE (APRESOLINE) 50 MG tablet Take 1/2 tablet in the morning and 1 tablet in the evening 5/23/22   Roger Montoya MD   levothyroxine (SYNTHROID) 25 MCG tablet Take 1 tablet by mouth daily 5/23/22   Roger Montoya MD   clopidogrel (PLAVIX) 75 MG tablet Take 1 tablet by mouth daily 5/23/22   Roger Montoya MD   sertraline (ZOLOFT) 50 MG tablet Take 1 tablet by mouth daily  Patient not taking: Reported on 9/19/2022 5/23/22   Roger Montoya MD   hydroCHLOROthiazide (MICROZIDE) 12.5 MG capsule Take 1 capsule by mouth every morning 5/23/22   Roger Montoya MD   simvastatin (ZOCOR) 20 MG tablet Take 1 tablet by mouth nightly 5/23/22   Roger Montoya MD   Multiple Vitamins-Minerals (THERAPEUTIC MULTIVITAMIN-MINERALS) tablet Take 1 tablet by mouth daily    Historical Provider, MD   Acetaminophen (TYLENOL ARTHRITIS PAIN PO) Take by mouth as needed    Historical Provider, MD   Melatonin 5 MG CAPS Take 5 mg by mouth nightly     Historical Provider, MD   aspirin 81 MG tablet Take 81 mg by mouth nightly     Historical Provider, MD       Future Appointments   Date Time Provider Janet Land   10/12/2022  1:50 PM Nargis Laird MD [facility]   11/28/2022 10:30 AM Roger Montoya MD [facility]    and   General Assessment    Do you have any symptoms that are causing concern?: No

## 2022-10-12 ENCOUNTER — OFFICE VISIT (OUTPATIENT)
Dept: CARDIOLOGY CLINIC | Age: 77
End: 2022-10-12
Payer: COMMERCIAL

## 2022-10-12 VITALS
HEIGHT: 59 IN | HEART RATE: 68 BPM | BODY MASS INDEX: 29.84 KG/M2 | SYSTOLIC BLOOD PRESSURE: 130 MMHG | DIASTOLIC BLOOD PRESSURE: 64 MMHG | WEIGHT: 148 LBS

## 2022-10-12 DIAGNOSIS — R42 DIZZINESS: ICD-10-CM

## 2022-10-12 DIAGNOSIS — I65.29 STENOSIS OF CAROTID ARTERY, UNSPECIFIED LATERALITY: ICD-10-CM

## 2022-10-12 DIAGNOSIS — I38 VHD (VALVULAR HEART DISEASE): Primary | ICD-10-CM

## 2022-10-12 DIAGNOSIS — Z98.890 H/O CAROTID ENDARTERECTOMY: ICD-10-CM

## 2022-10-12 PROCEDURE — 99214 OFFICE O/P EST MOD 30 MIN: CPT | Performed by: INTERNAL MEDICINE

## 2022-10-12 PROCEDURE — 1123F ACP DISCUSS/DSCN MKR DOCD: CPT | Performed by: INTERNAL MEDICINE

## 2022-10-12 NOTE — PROGRESS NOTES
CARDIOLOGY NOTE      10/12/2022    RE: Allison Alvarez  ([birth year])                               TO:  Dr. Marty Yu MD            CHIEF COMPLAINT   Hali Samaniego is a 65 y.o. female who was seen today for management of  cad                                    HPI:                   Pt has h/o cad, htn, hyperlipidimnea, cea, seen today for  fu.  Pt has  No cardiac complains      Allison Alvarez has the following history recorded in care path:  Patient Active Problem List    Diagnosis Date Noted    Spinal stenosis of lumbar region without neurogenic claudication 07/2022    Stenosis of left carotid artery 01/09/2017    Nevus of multiple sites 08/26/2015    Hypertension     CAD (coronary artery disease)     Menopause     Osteopenia     Insomnia     Chronic ulcer of right leg with fat layer exposed ([facility] [address] 04/18/2022    Leg pain, lateral, right 04/18/2022    Hypothyroidism due to acquired atrophy of thyroid     Chronic kidney disease, stage I 10/16/2019    Hyponatremia 12/19/2018    Anxiety 05/26/2017     Current Outpatient Medications   Medication Sig Dispense Refill    albuterol sulfate HFA (PROVENTIL HFA) 108 (90 Base) MCG/ACT inhaler Inhale 2 puffs into the lungs every 6 hours as needed for Wheezing 18 g 1    losartan (COZAAR) 100 MG tablet TAKE 1 TABLET BY MOUTH DAILY (Patient taking differently: Take 50 mg by mouth daily) 90 tablet 1    metoprolol succinate (TOPROL XL) 25 MG extended release tablet Take 1 tablet by mouth daily 90 tablet 1    hydrALAZINE (APRESOLINE) 50 MG tablet Take 1/2 tablet in the morning and 1 tablet in the evening 135 tablet 1    levothyroxine (SYNTHROID) 25 MCG tablet Take 1 tablet by mouth daily 90 tablet 1    clopidogrel (PLAVIX) 75 MG tablet Take 1 tablet by mouth daily 90 tablet 1    sertraline (ZOLOFT) 50 MG tablet Take 1 tablet by mouth daily 90 tablet 1    hydroCHLOROthiazide (MICROZIDE) 12.5 MG capsule Take 1 capsule by mouth every morning 90 capsule 3    simvastatin (ZOCOR) 20 MG tablet Take 1 tablet by mouth nightly 90 tablet 1    Multiple Vitamins-Minerals (THERAPEUTIC MULTIVITAMIN-MINERALS) tablet Take 1 tablet by mouth daily      Acetaminophen (TYLENOL ARTHRITIS PAIN PO) Take by mouth as needed      Melatonin 5 MG CAPS Take 5 mg by mouth nightly       aspirin 81 MG tablet Take 81 mg by mouth nightly       predniSONE (DELTASONE) 5 MG tablet Take 6 tablets by mouth on day 1, 5 on day 2, 4 on day 3, 3 on day 4, 2 on day 5, 1 on day 6. (Patient not taking: No sig reported) 21 tablet 0     No current facility-administered medications for this visit. Allergies: Celexa [citalopram], Poison ivy extract [poison ivy extract], Septra [sulfamethoxazole-trimethoprim], Ultram [tramadol], Valsartan, and Xanax xr [alprazolam er]  Past Medical History:   Diagnosis Date    Arthritis     \"Hands\"    CAD (coronary artery disease) 2006    Dr Steven Manrique    Chronic kidney disease, stage I 10/16/2019    Chronic ulcer of right leg with fat layer exposed ([facility] [address] 04/18/2022    COVID-19 09/28/2021    H/O cardiovascular stress test 02/15/2018    EF60% mod ischemia ant wall    H/O Doppler ultrasound 01/09/2017    carotid doppler - severe degree stenosis LICA    H/O echocardiogram 06/06/2016    ef 56% mild to mod. mr and tr    H/O echocardiogram 10/16/2018    EF55-60% mild AS, mild AR, mild-mod MR, mild TR, mild phtn    H/O echocardiogram 12/29/2020    EF 55-60% mild aortic stenosis mild mitral aortic and tricuspid regurg  no evidence of pericardial effusion    History of cardiac cath 02/19/2018    patent stents, nml EF, abn stress sec to jailed diag    History of echocardiogram 07/08/2019    s/p Left CEA, Mild 0-49% disease of the bilateral ICA, dampened left vertebral flow, normal left subclavian flow. Hx of Carotid Doppler ultrasound 05/06/2020    Mild 0-49% disease of the bilateral ICA, normal vertebral flow    Hypercalcemia     ? possible hyperparathyroidism/saw Dr Papi Owens previously    Hyperlipidemia Hypertension 1978    Hyponatremia     chronic ~130-131 range. Hypothyroidism due to acquired atrophy of thyroid     antiperoxidase ab tested negative    Insomnia     Leg pain, lateral, right 04/18/2022    Menopause     s/p JAMIE    Osteopenia     has hx of rib fractures after a fall    Osteopenia     S/P colonoscopic polypectomy 07/19/2018    Dr Giselle Mcdonald, recheck 5 yrs    Shortness of breath on exertion     Spinal stenosis of lumbar region without neurogenic claudication 07/2022    noted on MRI, w chr compression fx and area of sever sp stenosis    Vitamin D deficiency      Past Surgical History:   Procedure Laterality Date    APPENDECTOMY      CARDIAC SURGERY      stents x 3 Feb 2007    CAROTID ENDARTERECTOMY Left 02/01/2017    with patch     COLONOSCOPY      CORONARY ANGIOPLASTY WITH STENT PLACEMENT  2006, 2007    X 2 in LAD and one in ? CIRC w Dr Jose Juares Bilateral 2000's    Cataracts With Lens Implants    HYSTERECTOMY, TOTAL ABDOMINAL (CERVIX REMOVED)  [address] Right 2003    TONSILLECTOMY  1954      As reviewed   Family History   Problem Relation Age of Onset    High Blood Pressure Mother     Migraines Mother     Heart Disease Father     High Blood Pressure Father     Other Father         Ulcer    Cancer Paternal Aunt     Diabetes Brother     Cancer Paternal Aunt     Diabetes Grandchild     Asthma Grandchild     Heart Disease Maternal Aunt     Colon Cancer Maternal Aunt     Diabetes Paternal Grandfather      Social History     Tobacco Use    Smoking status: Never    Smokeless tobacco: Never   Substance Use Topics    Alcohol use: No     Comment: caffeine 1-2 cups of coffee a day 2 pops a day      Review of Systems:    Constitutional: Negative for diaphoresis and fatigue  Psychological:Negative for anxiety or depression  HENT: Negative for headaches, nasal congestion, sinus pain or vertigo  Eyes: Negative for visual disturbance.    Endocrine: Negative for polydipsia/polyuria  Respiratory: Negative for shortness of breath  Cardiovascular: Negative for chest pain, dyspnea on exertion, claudication, edema, irregular heartbeat, murmur, palpitations or shortness of breath  Gastrointestinal: Negative for abdominal pain or heartburn  Genito-Urinary: Negative for urinary frequency/urgency  Musculoskeletal: Negative for muscle pain, muscular weakness, negative for pain in arm and leg or swelling in foot and leg  Neurological: Negative for dizziness, headaches, memory loss, numbness/tingling, visual changes, syncope  Dermatological: Negative for rash    Objective:    Vitals:    10/12/22 1352   BP: 130/64   Pulse: 68   Weight: 148 lb (67.1 kg)   Height: 4' 11\" (1.499 m)     /64   Pulse 68   Ht 4' 11\" (1.499 m)   Wt 148 lb (67.1 kg)   LMP  (LMP Unknown)   BMI 29.89 kg/m²     Patient-Reported Vitals 9/28/2021   Patient-Reported Weight 154   Patient-Reported Height 4'11\"   Patient-Reported Systolic 209   Patient-Reported Diastolic 75   Patient-Reported Pulse 90   Patient-Reported Temperature 100.5   Patient-Reported SpO2 94        Wt Readings from Last 3 Encounters:   10/12/22 148 lb (67.1 kg)   09/07/22 145 lb (65.8 kg)   06/21/22 154 lb (69.9 kg)     Body mass index is 29.89 kg/m². GENERAL - Alert, oriented, pleasant, in no apparent distress. EYES: No jaundice, no conjunctival pallor. SKIN: It is warm & dry. No rashes. No Echhymosis    HEENT - No clinically significant abnormalities seen. Neck - Supple. No jugular venous distention noted. No carotid bruits. Cardiovascular - Normal S1 and S2 without obvious murmur or gallop. Extremities - No cyanosis, clubbing, or significant edema. Pulmonary - No respiratory distress. No wheezes or rales. Abdomen - No masses, tenderness, or organomegaly. Musculoskeletal - No significant edema. No joint deformities. No muscle wasting. Neurologic - Cranial nerves II through XII are grossly intact.   There were no gross focal neurologic abnormalities. Lab Review   Lab Results   Component Value Date/Time    CKTOTAL 71 04/02/2017 11:31 AM    TROPONINT <0.010 04/02/2017 07:32 PM     BNP:  No results found for: BNP  PT/INR:    Lab Results   Component Value Date    INR 0.97 02/16/2018     Lab Results   Component Value Date    LABA1C 5.7 04/06/2022     Lab Results   Component Value Date    WBC 7.4 05/16/2022    HCT 36.5 05/16/2022    MCV 88.2 05/16/2022     05/16/2022     Lab Results   Component Value Date    CHOL 131 05/16/2022    TRIG 129 05/16/2022    HDL 43 05/16/2022    LDLCALC 62 05/16/2022    LDLDIRECT 72 11/03/2017     Lab Results   Component Value Date    ALT 31 05/16/2022    AST 22 05/16/2022     BMP:    Lab Results   Component Value Date/Time     05/16/2022 09:26 AM    K 4.2 05/16/2022 09:26 AM    CL 97 05/16/2022 09:26 AM    CO2 26 05/16/2022 09:26 AM    BUN 19 05/16/2022 09:26 AM    CREATININE 0.7 05/16/2022 09:26 AM     CMP:   Lab Results   Component Value Date/Time     05/16/2022 09:26 AM    K 4.2 05/16/2022 09:26 AM    CL 97 05/16/2022 09:26 AM    CO2 26 05/16/2022 09:26 AM    BUN 19 05/16/2022 09:26 AM    PROT 7.1 05/16/2022 09:26 AM    PROT 7.4 01/04/2013 09:20 AM     TSH:    Lab Results   Component Value Date/Time    TSH 2.850 05/16/2022 09:26 AM    TSHHS 2.620 04/06/2022 11:08 AM           Assessment & Plan:               -     CORONARY ARTERY DISEASE:  asymptomatic     All available  tests in chart reviewed. Management discussed . Testing ordered  no              On asa     compliant we will continue with present medical therapy              -  Hypertension: Patients blood pressure is normal. Patient is advised about low sodium diet. Present medical regimen will not be changed.         On hctz stable we will continue        - cea  stable we will check a carotid ultrasound which is usually checked              -  LIPID MANAGEMENT:  Importance of lipid levels discussed with patient   and patient was given dietary advice. NCEP- ATP III guidelines reviewed with patient. -   Changes  in medicines made: No                       On zocor compliant we will check the LDL    - had COVID         -Valvular heart disease has aortic stenosis mild to moderate we will recheck with an echo  Conclusions      Summary   Left ventricular function is normal, EF is estimated at 55-60%. Mild left ventricular hypertrophy. Normal diastolic filling pattern for age. No regional wall motion abnormalities were detected. Mildly dilated left atrium. Sclerotic aortic valve. Mild aortic stenosis with a mean gradient of 13mmHg. Mild mitral , aortic and tricuspid regurgitation is present. RVSP is 32 mmHg. No evidence of pericardial effusion.    REECHO IN 6 MOS      Signature      ------------------------------------------------------------------   Electronically signed by Nitesh Beckett MD   (Interpreting physician) on 12/29/2020 at 04:50 PM    Merlin Lance MD    [address] [facility]

## 2022-10-12 NOTE — PATIENT INSTRUCTIONS
Thank you for allowing us to care for you today! We want to ensure we can follow your treatment plan and we strive to give you the best outcomes and experience possible. If you ever have a life threatening emergency and call 911 - for an ambulance (EMS)   Our providers can only care for you at:   [facility] or [facility]. Even if you have someone take you or you drive yourself we can only care for you in a [facility] [place]. Our providers are not setup at the other healthcare locations! Please be informed that if you contact our office outside of normal business hours the physician on call cannot help with any scheduling or rescheduling issues, procedure instruction questions or any type of medication issue. We advise you for any urgent/emergency that you go to the nearest emergency room! PLEASE CALL OUR OFFICE DURING NORMAL BUSINESS HOURS    Monday - Friday   8 am to 5 pm    [facility] [address]: [phone]    [place]:  [phone]  **It is YOUR responsibilty to bring medication bottles and/or updated medication list to [address] [place] [address]. This will allow us to better serve you and all your healthcare needs**  [facility] Laboratory Locations - No appointment necessary. Sites open Monday to Friday. Call your preferred location for test preparation, business hours and other information you need. [employer] accepts [employer]. [facility] [address] [facility], [address] [facility]  Phone: [phone] [facility] [address] [facility]  [address] [place], [address]  Phone: [phone]   Please hold on to these instructions the  will call you within 1-9 business days when we receive authorization from your insurance. Echocardiogram    WHAT TO EXPECT:   This test will take approximately 45 minutes. It is an ultrasound of the heart.   It can look at the valves and chambers inside the heart   There is no special instructions for this test.     If you are unable to keep this appointment, please notify us 24 hours prior to test at [phone].

## 2022-10-31 ENCOUNTER — CARE COORDINATION (OUTPATIENT)
Dept: CARE COORDINATION | Age: 77
End: 2022-10-31

## 2022-10-31 NOTE — CARE COORDINATION
Ambulatory Care Coordination Note  10/31/2022    ACC: Varghese Saeed, RN      Spoke with patient for ACM follow up. Patient reports that she is doing well. Taking medications as directed; symptoms are well managed. Patient is independent with ADL's and denies support needs. Offered patient enrollment in the Remote Patient Monitoring (RPM) program for in-home monitoring: NA. Next PCP follow up scheduled for 11/28/22. Patient denies any questions or concerns at this time. Denies the need for ongoing follow up. Confirmed ACM contact information should needs arise. No further outreach planned. Lab Results       None            Care Coordination Interventions    Referral from Primary Care Provider: No  Suggested Interventions and Community Resources  Medication Assistance Program: Declined          Goals Addressed                   This Visit's Progress     COMPLETED: Wellness Goal   On track     Patient Self-Management Goal for Health Maintenance  Goal: I will schedule a yearly preventative care visit. I will schedule routine eye examinations with an eye specialist as directed by my provider. I will consider obtaining vaccines recommended by my provider. Barriers: none  Plan for overcoming my barriers: Patient will schedule routine Provider follow up as directed. Confidence:  9/10  Anticipated Goal Completion Date:  12/19/22              Prior to Admission medications    Medication Sig Start Date End Date Taking? Authorizing Provider   albuterol sulfate HFA (PROVENTIL HFA) 108 (90 Base) MCG/ACT inhaler Inhale 2 puffs into the lungs every 6 hours as needed for Wheezing 9/7/22   Josesito Altman MD   predniSONE (DELTASONE) 5 MG tablet Take 6 tablets by mouth on day 1, 5 on day 2, 4 on day 3, 3 on day 4, 2 on day 5, 1 on day 6.   Patient not taking: No sig reported 9/7/22   Josesito Altman MD   losartan (COZAAR) 100 MG tablet TAKE 1 TABLET BY MOUTH DAILY  Patient taking differently: Take 50 mg by mouth daily 6/8/22 12/5/22  Ya Johnson MD   metoprolol succinate (TOPROL XL) 25 MG extended release tablet Take 1 tablet by mouth daily 5/23/22   Ya Johnson MD   hydrALAZINE (APRESOLINE) 50 MG tablet Take 1/2 tablet in the morning and 1 tablet in the evening 5/23/22   Ya Johnson MD   levothyroxine (SYNTHROID) 25 MCG tablet Take 1 tablet by mouth daily 5/23/22   Ya Johnson MD   clopidogrel (PLAVIX) 75 MG tablet Take 1 tablet by mouth daily 5/23/22   Ya Johnson MD   sertraline (ZOLOFT) 50 MG tablet Take 1 tablet by mouth daily 5/23/22   Ya oJhnson MD   hydroCHLOROthiazide (MICROZIDE) 12.5 MG capsule Take 1 capsule by mouth every morning 5/23/22   Ya Johnson MD   simvastatin (ZOCOR) 20 MG tablet Take 1 tablet by mouth nightly 5/23/22   Ya Johnson MD   Multiple Vitamins-Minerals (THERAPEUTIC MULTIVITAMIN-MINERALS) tablet Take 1 tablet by mouth daily    Historical Provider, MD   Acetaminophen (TYLENOL ARTHRITIS PAIN PO) Take by mouth as needed    Historical Provider, MD   Melatonin 5 MG CAPS Take 5 mg by mouth nightly     Historical Provider, MD   aspirin 81 MG tablet Take 81 mg by mouth nightly     Historical Provider, MD       Future Appointments   Date Time Provider Janet Shanda   11/3/2022 10:00 AM SCHEDULE, [facility] VASCULAR [facility]   11/4/2022 10:00 AM SCHEDULE, [facility] ECHO [facility]   11/28/2022 10:30 AM Ya Johnson MD [address] [facility] [place] [facility]   10/13/2023 10:10 AM Idris Gautam MD [facility] Heart [facility]    and   General Assessment    Do you have any symptoms that are causing concern?: No

## 2022-11-03 ENCOUNTER — PROCEDURE VISIT (OUTPATIENT)
Dept: CARDIOLOGY CLINIC | Age: 77
End: 2022-11-03
Payer: COMMERCIAL

## 2022-11-03 DIAGNOSIS — I38 VHD (VALVULAR HEART DISEASE): ICD-10-CM

## 2022-11-03 DIAGNOSIS — I65.29 STENOSIS OF CAROTID ARTERY, UNSPECIFIED LATERALITY: ICD-10-CM

## 2022-11-03 DIAGNOSIS — R42 DIZZINESS: ICD-10-CM

## 2022-11-03 DIAGNOSIS — I38 VHD (VALVULAR HEART DISEASE): Primary | ICD-10-CM

## 2022-11-03 DIAGNOSIS — Z98.890 H/O CAROTID ENDARTERECTOMY: ICD-10-CM

## 2022-11-03 LAB
LV EF: 58 %
LVEF MODALITY: NORMAL

## 2022-11-03 PROCEDURE — 93880 EXTRACRANIAL BILAT STUDY: CPT | Performed by: INTERNAL MEDICINE

## 2022-11-03 PROCEDURE — 93306 TTE W/DOPPLER COMPLETE: CPT | Performed by: INTERNAL MEDICINE

## 2022-11-04 ENCOUNTER — TELEPHONE (OUTPATIENT)
Dept: CARDIOLOGY CLINIC | Age: 77
End: 2022-11-04

## 2022-11-04 NOTE — TELEPHONE ENCOUNTER
Patient set up with Lenard Murry for 11/14/22 for results. Mild (0-49%) disease of the Bilateral proximal Internal carotid artery. Normal right vertebral flow.     Alternating left vertebral flow, indicating left latent subclavian steal.        Recommendations        OV TO DISCUSS LT SUBCLAVIAN STENOSIS

## 2022-11-14 ENCOUNTER — OFFICE VISIT (OUTPATIENT)
Dept: CARDIOLOGY CLINIC | Age: 77
End: 2022-11-14
Payer: COMMERCIAL

## 2022-11-14 VITALS
DIASTOLIC BLOOD PRESSURE: 60 MMHG | SYSTOLIC BLOOD PRESSURE: 142 MMHG | BODY MASS INDEX: 30.04 KG/M2 | HEIGHT: 59 IN | WEIGHT: 149 LBS

## 2022-11-14 DIAGNOSIS — I77.1 STENOSIS OF LEFT SUBCLAVIAN ARTERY (HCC): Primary | ICD-10-CM

## 2022-11-14 PROCEDURE — 3074F SYST BP LT 130 MM HG: CPT | Performed by: INTERNAL MEDICINE

## 2022-11-14 PROCEDURE — 99214 OFFICE O/P EST MOD 30 MIN: CPT | Performed by: INTERNAL MEDICINE

## 2022-11-14 PROCEDURE — 1123F ACP DISCUSS/DSCN MKR DOCD: CPT | Performed by: INTERNAL MEDICINE

## 2022-11-14 PROCEDURE — 3078F DIAST BP <80 MM HG: CPT | Performed by: INTERNAL MEDICINE

## 2022-11-14 RX ORDER — HYDRALAZINE HYDROCHLORIDE 50 MG/1
50 TABLET, FILM COATED ORAL DAILY
COMMUNITY

## 2022-11-14 NOTE — PATIENT INSTRUCTIONS
Please be informed that if you contact our office outside of normal business hours the physician on call cannot help with any scheduling or rescheduling issues, procedure instruction questions or any type of medication issue. We advise you for any urgent/emergency that you go to the nearest emergency room! PLEASE CALL OUR OFFICE DURING NORMAL BUSINESS HOURS    Monday - Friday   8 am to 5 pm    [place]: [phone]    [place]:  [phone]  **It is YOUR responsibilty to bring medication bottles and/or updated medication list to [address] [place] [address]. This will allow us to better serve you and all your healthcare needs**  [facility] Laboratory Locations - No appointment necessary. Sites open Monday to Friday. Call your preferred location for test preparation, business hours and other information you need. [employer] accepts [employer]. [place] [facility] [address] Ramon Ochoa, [address] [facility]  Phone: [phone] [facility] [address]  [address] Maddie Bush, [address] [facility]  Phone: [phone]     Thank you for allowing us to care for you today! We want to ensure we can follow your treatment plan and we strive to give you the best outcomes and experience possible. If you ever have a life threatening emergency and call 911 - for an ambulance (EMS)   Our providers can only care for you at:   [facility] or [facility]. Even if you have someone take you or you drive yourself we can only care for you in a [facility] facility. Our providers are not setup at the other healthcare locations!

## 2022-11-14 NOTE — PROGRESS NOTES
CARDIOLOGY NOTE      11/14/2022    RE: Ryan Cancel  ([birth year])                               TO:  Dr. Bhumi Bess MD            CHIEF COMPLAINT   Nichole Saenz is a 65 y.o. female who was seen today for management of  cad                                    HPI:                   Pt has h/o cad, htn, hyperlipidimnea, cea, seen today for  fu.  Pt has  No cardiac complains  But gets coldness in LUE onn and off    [place] C[place] has the following history recorded in care path:  Patient Active Problem List    Diagnosis Date Noted    Spinal stenosis of lumbar region without neurogenic claudication 07/2022    Stenosis of left carotid artery 01/09/2017    Nevus of multiple sites 08/26/2015    Hypertension     CAD (coronary artery disease)     Menopause     Osteopenia     Insomnia     Chronic ulcer of right leg with fat layer exposed ([facility] [address] 04/18/2022    Leg pain, lateral, right 04/18/2022    Hypothyroidism due to acquired atrophy of thyroid     Chronic kidney disease, stage I 10/16/2019    Hyponatremia 12/19/2018    Anxiety 05/26/2017     Current Outpatient Medications   Medication Sig Dispense Refill    hydrALAZINE (APRESOLINE) 50 MG tablet Take 50 mg by mouth daily      albuterol sulfate HFA (PROVENTIL HFA) 108 (90 Base) MCG/ACT inhaler Inhale 2 puffs into the lungs every 6 hours as needed for Wheezing 18 g 1    metoprolol succinate (TOPROL XL) 25 MG extended release tablet Take 1 tablet by mouth daily 90 tablet 1    levothyroxine (SYNTHROID) 25 MCG tablet Take 1 tablet by mouth daily 90 tablet 1    clopidogrel (PLAVIX) 75 MG tablet Take 1 tablet by mouth daily 90 tablet 1    sertraline (ZOLOFT) 50 MG tablet Take 1 tablet by mouth daily 90 tablet 1    hydroCHLOROthiazide (MICROZIDE) 12.5 MG capsule Take 1 capsule by mouth every morning 90 capsule 3    simvastatin (ZOCOR) 20 MG tablet Take 1 tablet by mouth nightly 90 tablet 1    Multiple Vitamins-Minerals (THERAPEUTIC MULTIVITAMIN-MINERALS) tablet Take 1 tablet by mouth daily      Acetaminophen (TYLENOL ARTHRITIS PAIN PO) Take by mouth as needed      Melatonin 5 MG CAPS Take 5 mg by mouth nightly       aspirin 81 MG tablet Take 81 mg by mouth nightly       losartan (COZAAR) 100 MG tablet TAKE 1 TABLET BY MOUTH DAILY (Patient taking differently: Take 50 mg by mouth daily) 90 tablet 1     No current facility-administered medications for this visit. Allergies: Celexa [citalopram], Poison ivy extract [poison ivy extract], Septra [sulfamethoxazole-trimethoprim], Ultram [tramadol], Valsartan, and Xanax xr [alprazolam er]  Past Medical History:   Diagnosis Date    Arthritis     \"Hands\"    CAD (coronary artery disease) 2006    Dr Jesse Rich    Chronic kidney disease, stage I 10/16/2019    Chronic ulcer of right leg with fat layer exposed ([facility] [address] 04/18/2022    COVID-19 09/28/2021    H/O cardiovascular stress test 02/15/2018    EF60% mod ischemia ant wall    H/O Doppler ultrasound 01/09/2017    carotid doppler - severe degree stenosis LICA    H/O echocardiogram 06/06/2016    ef 56% mild to mod. mr and tr    H/O echocardiogram 10/16/2018    EF55-60% mild AS, mild AR, mild-mod MR, mild TR, mild phtn    H/O echocardiogram 12/29/2020    EF 55-60% mild aortic stenosis mild mitral aortic and tricuspid regurg  no evidence of pericardial effusion    History of cardiac cath 02/19/2018    patent stents, nml EF, abn stress sec to jailed diag    History of echocardiogram 07/08/2019    s/p Left CEA, Mild 0-49% disease of the bilateral ICA, dampened left vertebral flow, normal left subclavian flow. Hx of Carotid Doppler ultrasound 05/06/2020    Mild 0-49% disease of the bilateral ICA, normal vertebral flow    Hypercalcemia     ? possible hyperparathyroidism/saw Dr Terence Cabrera previously    Hyperlipidemia     Hypertension 1978    Hyponatremia     chronic ~130-131 range.     Hypothyroidism due to acquired atrophy of thyroid     antiperoxidase ab tested negative    Insomnia     Leg pain, lateral, right 04/18/2022    Menopause     s/p JAMIE    Osteopenia     has hx of rib fractures after a fall    Osteopenia     S/P colonoscopic polypectomy 07/19/2018    Dr David Davila, recheck 5 yrs    Shortness of breath on exertion     Spinal stenosis of lumbar region without neurogenic claudication 07/2022    noted on MRI, w chr compression fx and area of sever sp stenosis    Vitamin D deficiency      Past Surgical History:   Procedure Laterality Date    APPENDECTOMY      CARDIAC SURGERY      stents x 3 Feb 2007    CAROTID ENDARTERECTOMY Left 02/01/2017    with patch     COLONOSCOPY      CORONARY ANGIOPLASTY WITH STENT PLACEMENT  2006, 2007    X 2 in LAD and one in ? CIRC w Dr Genevieve Araiza Bilateral 2000's    Cataracts With Lens Implants    HYSTERECTOMY, TOTAL ABDOMINAL (CERVIX REMOVED)  [address] Right 2003    TONSILLECTOMY  1954      As reviewed   Family History   Problem Relation Age of Onset    High Blood Pressure Mother     Migraines Mother     Heart Disease Father     High Blood Pressure Father     Other Father         Ulcer    Cancer Paternal Aunt     Diabetes Brother     Cancer Paternal Aunt     Diabetes Grandchild     Asthma Grandchild     Heart Disease Maternal Aunt     Colon Cancer Maternal Aunt     Diabetes Paternal Grandfather      Social History     Tobacco Use    Smoking status: Never    Smokeless tobacco: Never   Substance Use Topics    Alcohol use: No     Comment: caffeine 1-2 cups of coffee a day 2 pops a day      Review of Systems:    Constitutional: Negative for diaphoresis and fatigue  Psychological:Negative for anxiety or depression  HENT: Negative for headaches, nasal congestion, sinus pain or vertigo  Eyes: Negative for visual disturbance.    Endocrine: Negative for polydipsia/polyuria  Respiratory: Negative for shortness of breath  Cardiovascular: Negative for chest pain, dyspnea on exertion, claudication, edema, irregular heartbeat, murmur, palpitations or shortness of breath  Gastrointestinal: Negative for abdominal pain or heartburn  Genito-Urinary: Negative for urinary frequency/urgency  Musculoskeletal: Negative for muscle pain, muscular weakness, negative for pain in arm and leg or swelling in foot and leg  Neurological: Negative for dizziness, headaches, memory loss, numbness/tingling, visual changes, syncope  Dermatological: Negative for rash    Objective:    Vitals:    11/14/22 1143 11/14/22 1155   BP: 136/64 (!) 142/60   Site: Right Upper Arm Left Upper Arm   Position: Sitting Sitting   Cuff Size: Medium Adult Medium Adult   Weight: 149 lb (67.6 kg)    Height: 4' 11\" (1.499 m)      BP (!) 142/60 (Site: Left Upper Arm, Position: Sitting, Cuff Size: Medium Adult)   Ht 4' 11\" (1.499 m)   Wt 149 lb (67.6 kg)   LMP  (LMP Unknown)   BMI 30.09 kg/m²     Patient-Reported Vitals 9/28/2021   Patient-Reported Weight 154   Patient-Reported Height 4'11\"   Patient-Reported Systolic 121   Patient-Reported Diastolic 75   Patient-Reported Pulse 90   Patient-Reported Temperature 100.5   Patient-Reported SpO2 94        Wt Readings from Last 3 Encounters:   11/14/22 149 lb (67.6 kg)   10/12/22 148 lb (67.1 kg)   09/07/22 145 lb (65.8 kg)     Body mass index is 30.09 kg/m². GENERAL - Alert, oriented, pleasant, in no apparent distress. EYES: No jaundice, no conjunctival pallor. SKIN: It is warm & dry. No rashes. No Echhymosis    HEENT - No clinically significant abnormalities seen. Neck - Supple. No jugular venous distention noted. No carotid bruits. Cardiovascular - Normal S1 and S2 without obvious murmur or gallop. Extremities - No cyanosis, clubbing, or significant edema. Pulmonary - No respiratory distress. No wheezes or rales. Abdomen - No masses, tenderness, or organomegaly. Musculoskeletal - No significant edema. No joint deformities. No muscle wasting. Neurologic - Cranial nerves II through XII are grossly intact.   There were no gross focal neurologic abnormalities. Lab Review   Lab Results   Component Value Date/Time    CKTOTAL 71 04/02/2017 11:31 AM    TROPONINT <0.010 04/02/2017 07:32 PM     BNP:  No results found for: BNP  PT/INR:    Lab Results   Component Value Date    INR 0.97 02/16/2018     Lab Results   Component Value Date    LABA1C 5.7 04/06/2022     Lab Results   Component Value Date    WBC 7.4 05/16/2022    HCT 36.5 05/16/2022    MCV 88.2 05/16/2022     05/16/2022     Lab Results   Component Value Date    CHOL 131 05/16/2022    TRIG 129 05/16/2022    HDL 43 05/16/2022    LDLCALC 62 05/16/2022    LDLDIRECT 72 11/03/2017     Lab Results   Component Value Date    ALT 31 05/16/2022    AST 22 05/16/2022     BMP:    Lab Results   Component Value Date/Time     05/16/2022 09:26 AM    K 4.2 05/16/2022 09:26 AM    CL 97 05/16/2022 09:26 AM    CO2 26 05/16/2022 09:26 AM    BUN 19 05/16/2022 09:26 AM    CREATININE 0.7 05/16/2022 09:26 AM     CMP:   Lab Results   Component Value Date/Time     05/16/2022 09:26 AM    K 4.2 05/16/2022 09:26 AM    CL 97 05/16/2022 09:26 AM    CO2 26 05/16/2022 09:26 AM    BUN 19 05/16/2022 09:26 AM    PROT 7.1 05/16/2022 09:26 AM    PROT 7.4 01/04/2013 09:20 AM     TSH:    Lab Results   Component Value Date/Time    TSH 2.850 05/16/2022 09:26 AM    TSHHS 2.620 04/06/2022 11:08 AM           Assessment & Plan:               -     CORONARY ARTERY DISEASE:  asymptomatic     All available  tests in chart reviewed. Management discussed . Testing ordered  no              On asa     compliant we will continue with present medical therapy              -  Hypertension: Patients blood pressure is normal. Patient is advised about low sodium diet. Present medical regimen will not be changed. On hctz stable we will continue        - cea  stable we will check a carotid ultrasound which is usually checked     Conclusions        Summary        Mild (0-49%) disease of the Bilateral proximal Internal carotid artery. Normal right vertebral flow. Alternating left vertebral flow, indicating left latent subclavian steal.        Recommendations        OV TO DISCUSS LT SUBCLAVIAN STENOSIS        Signature        ------------------------------------------------------------------    Electronically signed by Dru Yin MD    (Interpreting physician) on 11/03/2022 at 12:00 PM    ------------------------------------------------------------------      CTA  of Lt subclavian        -  LIPID MANAGEMENT:  Importance of lipid levels discussed with patient   and patient was given dietary advice. NCEP- ATP III guidelines reviewed with patient. -   Changes  in medicines made: No                       On zocor compliant we will check the LDL    - had COVID         -Valvular heart disease has aortic stenosis mild to moderate we will recheck with an echo  Conclusions      Summary   Left ventricular function is normal, EF is estimated at 55-60%. Mild left ventricular hypertrophy. Normal diastolic filling pattern for age. No regional wall motion abnormalities were detected. Mildly dilated left atrium. Sclerotic aortic valve. Mild aortic stenosis with a mean gradient of 13mmHg. Mild mitral , aortic and tricuspid regurgitation is present. RVSP is 32 mmHg. No evidence of pericardial effusion.    REECHO IN 6 MOS      Signature      ------------------------------------------------------------------   Electronically signed by Dru Yin MD   (Interpreting physician) on 12/29/2020 at 04:50 PM    Pattie Knapp MD    [facility] [place]

## 2022-11-16 LAB
ABSOLUTE IMMATURE GRANULOCYTE: 0 K/UL (ref 0–0.1)
ALBUMIN/GLOBULIN RATIO: 1.6 RATIO (ref 0.8–2.6)
ALBUMIN: 4.7 G/DL (ref 3.5–5.2)
ALP BLD-CCNC: 101 U/L (ref 23–144)
ALT SERPL-CCNC: 33 U/L (ref 0–60)
AST SERPL-CCNC: 26 U/L (ref 0–55)
BASOPHILS ABSOLUTE: 0.1 K/UL (ref 0–0.3)
BASOPHILS RELATIVE PERCENT: 0.8 % (ref 0–2)
BILIRUB SERPL-MCNC: 0.3 MG/DL (ref 0–1.2)
BUN BLDV-MCNC: 14 MG/DL (ref 3–29)
BUN/CREAT BLD: 18 (ref 7–25)
CALCIUM SERPL-MCNC: 10.8 MG/DL (ref 8.5–10.5)
CHLORIDE BLD-SCNC: 94 MEQ/L (ref 96–110)
CHOLESTEROL: 162 MG/DL
CO2: 30 MEQ/L (ref 19–32)
CREAT SERPL-MCNC: 0.8 MG/DL (ref 0.5–1.2)
DIFFERENTIAL TYPE: ABNORMAL
EOSINOPHILS ABSOLUTE: 0.2 K/UL (ref 0–0.5)
EOSINOPHILS RELATIVE PERCENT: 2.2 % (ref 0–5)
GLOBULIN: 2.9 G/DL (ref 1.9–3.6)
GLOMERULAR FILTRATION RATE: 75 MLS/MIN/1.73M2
GLUCOSE BLD-MCNC: 104 MG/DL (ref 70–99)
HCT VFR BLD CALC: 39.3 % (ref 34–49)
HDLC SERPL-MCNC: 56 MG/DL
HEMOGLOBIN: 13.6 G/DL (ref 11.2–15.7)
IMMATURE GRANULOCYTES: 0.3 %
LDL CHOLESTEROL CALCULATED: 78 MG/DL
LYMPHOCYTES ABSOLUTE: 2 K/UL (ref 0.9–4.1)
LYMPHOCYTES RELATIVE PERCENT: 22.7 % (ref 14–51)
MCH RBC QN AUTO: 31 PG (ref 26–34)
MCHC RBC AUTO-ENTMCNC: 34.6 G/DL (ref 30.7–35.5)
MCV RBC AUTO: 89.5 FL (ref 80–100)
MONOCYTES ABSOLUTE: 1.1 K/UL (ref 0.2–1)
MONOCYTES RELATIVE PERCENT: 12.4 % (ref 4–12)
NEUTROPHILS ABSOLUTE: 5.4 K/UL (ref 1.8–7.5)
NEUTROPHILS RELATIVE PERCENT: 61.6 % (ref 42–80)
NUCLEATED RBCS: 0 /100 WBC
PDW BLD-RTO: 12.8 %
PLATELET # BLD: 276 K/UL (ref 140–400)
PMV BLD AUTO: 10.8 FL (ref 7.2–11.7)
POTASSIUM SERPL-SCNC: 5 MEQ/L (ref 3.4–5.3)
RBC # BLD: 4.39 M/UL (ref 3.95–5.26)
SODIUM BLD-SCNC: 130 MEQ/L (ref 135–148)
STATUS: ABNORMAL
T4 FREE: 1.29 NG/DL (ref 0.8–1.8)
TOTAL PROTEIN: 7.6 G/DL (ref 6–8.3)
TRIGL SERPL-MCNC: 140 MG/DL
TSH SERPL DL<=0.05 MIU/L-ACNC: 2.81 MCIU/ML (ref 0.4–4.5)
VLDLC SERPL CALC-MCNC: 28 MG/DL (ref 4–38)
WBC: 8.7 K/UL (ref 3.5–10.9)

## 2022-11-17 NOTE — RESULT ENCOUNTER NOTE
I'll d/w pt at visit tomorrow: labs ok/chol ok and thyroid fxn is nl too. Sodium level remains borderline low at 130, is also recommended that Asha Jarvis also has sl high calcium-  ADD INTACT PARATHYROID HORMONE, - HYPERCALCEMIA  AND HAS SL HIGH MONOCYTES  - SO ALSO ADD FLOW CYTOMETRY (LEUKEMIA/LYMPHOMA) PLEASE, DX MONOCYTOSIS.   THX

## 2022-11-18 ENCOUNTER — OFFICE VISIT (OUTPATIENT)
Dept: INTERNAL MEDICINE CLINIC | Age: 77
End: 2022-11-18
Payer: COMMERCIAL

## 2022-11-18 VITALS
HEART RATE: 67 BPM | DIASTOLIC BLOOD PRESSURE: 69 MMHG | OXYGEN SATURATION: 96 % | RESPIRATION RATE: 16 BRPM | BODY MASS INDEX: 29.89 KG/M2 | WEIGHT: 148 LBS | SYSTOLIC BLOOD PRESSURE: 139 MMHG

## 2022-11-18 DIAGNOSIS — D72.821 MONOCYTOSIS: Primary | ICD-10-CM

## 2022-11-18 DIAGNOSIS — Z12.31 VISIT FOR SCREENING MAMMOGRAM: ICD-10-CM

## 2022-11-18 DIAGNOSIS — I35.1 AORTIC VALVE INSUFFICIENCY, ETIOLOGY OF CARDIAC VALVE DISEASE UNSPECIFIED: ICD-10-CM

## 2022-11-18 DIAGNOSIS — I25.10 CORONARY ARTERY DISEASE INVOLVING NATIVE CORONARY ARTERY OF NATIVE HEART WITHOUT ANGINA PECTORIS: Primary | ICD-10-CM

## 2022-11-18 DIAGNOSIS — E83.52 HYPERCALCEMIA: ICD-10-CM

## 2022-11-18 DIAGNOSIS — M81.0 AGE-RELATED OSTEOPOROSIS WITHOUT CURRENT PATHOLOGICAL FRACTURE: ICD-10-CM

## 2022-11-18 DIAGNOSIS — D72.821 MONOCYTOSIS: ICD-10-CM

## 2022-11-18 DIAGNOSIS — I10 ESSENTIAL HYPERTENSION: ICD-10-CM

## 2022-11-18 DIAGNOSIS — E03.4 HYPOTHYROIDISM DUE TO ACQUIRED ATROPHY OF THYROID: ICD-10-CM

## 2022-11-18 DIAGNOSIS — F41.9 ANXIETY: ICD-10-CM

## 2022-11-18 PROCEDURE — 3078F DIAST BP <80 MM HG: CPT | Performed by: INTERNAL MEDICINE

## 2022-11-18 PROCEDURE — 3074F SYST BP LT 130 MM HG: CPT | Performed by: INTERNAL MEDICINE

## 2022-11-18 PROCEDURE — 99214 OFFICE O/P EST MOD 30 MIN: CPT | Performed by: INTERNAL MEDICINE

## 2022-11-18 PROCEDURE — 1123F ACP DISCUSS/DSCN MKR DOCD: CPT | Performed by: INTERNAL MEDICINE

## 2022-11-18 RX ORDER — CLOPIDOGREL BISULFATE 75 MG/1
75 TABLET ORAL DAILY
Qty: 90 TABLET | Refills: 1 | Status: SHIPPED | OUTPATIENT
Start: 2022-11-18

## 2022-11-18 RX ORDER — LEVOTHYROXINE SODIUM 0.03 MG/1
25 TABLET ORAL DAILY
Qty: 90 TABLET | Refills: 1 | Status: SHIPPED | OUTPATIENT
Start: 2022-11-18

## 2022-11-18 RX ORDER — METOPROLOL SUCCINATE 25 MG/1
25 TABLET, EXTENDED RELEASE ORAL DAILY
Qty: 90 TABLET | Refills: 1 | Status: SHIPPED | OUTPATIENT
Start: 2022-11-18

## 2022-11-18 RX ORDER — HYDROCHLOROTHIAZIDE 12.5 MG/1
12.5 CAPSULE, GELATIN COATED ORAL EVERY MORNING
Qty: 90 CAPSULE | Refills: 3 | Status: SHIPPED | OUTPATIENT
Start: 2022-11-18

## 2022-11-18 RX ORDER — SIMVASTATIN 20 MG
20 TABLET ORAL NIGHTLY
Qty: 90 TABLET | Refills: 1 | Status: SHIPPED | OUTPATIENT
Start: 2022-11-18

## 2022-11-18 RX ORDER — LOSARTAN POTASSIUM 100 MG/1
100 TABLET ORAL DAILY
Qty: 90 TABLET | Refills: 1 | Status: SHIPPED | OUTPATIENT
Start: 2022-11-18 | End: 2023-05-17

## 2022-11-18 NOTE — PROGRESS NOTES
Shade Zepeda  1945 11/18/22    SUBJECTIVE:  Hypertension: Stable. Denies CP, SOB, cough, visual changes, dizziness, palpitations or HA. Coronary artery disease has been asymptomatic w/o any ongoing sx of CP, SOB/RODRIGEZ, palpitations, lt headedness, diaphoresis. Is continuing medications regularly. STATES DR Lisa May IS PLANNING CARDIAC CTA FOR POSSIBLE BLOCKAGE? SHE DOES HAVE SOME AORTIC VALVULAR DZ, AI NOTED ON ECHO LAST MO. Hypothyroid has been asymptomatic w/o sx of fatigue, constipation, cold intolerance, depression. Mood stable on ZOLOFT w/o current anxiety, depression or panic attacks. RECENT LAB SL HIGH CALCIUM SO WE'LL SCR iPTH, ALSO SO MONOCYTOSIS SO WE'LL SCR FLOW CYTOMETRY    OBJECTIVE:    /69   Pulse 67   Resp 16   Wt 148 lb (67.1 kg)   LMP  (LMP Unknown)   SpO2 96%   BMI 29.89 kg/m²     Physical Exam  Constitutional:       Appearance: Normal appearance. HENT:      Head: Normocephalic and atraumatic. Eyes:      Extraocular Movements: Extraocular movements intact. Conjunctiva/sclera: Conjunctivae normal.      Pupils: Pupils are equal, round, and reactive to light. Cardiovascular:      Rate and Rhythm: Normal rate and regular rhythm. Heart sounds: Murmur (KYMBERLY AT RUSB) heard. Pulmonary:      Effort: Pulmonary effort is normal. No respiratory distress. Breath sounds: Normal breath sounds. Abdominal:      General: Abdomen is flat. Bowel sounds are normal. There is no distension. Palpations: Abdomen is soft. There is no mass. Tenderness: There is no abdominal tenderness. Hernia: No hernia is present. Musculoskeletal:      Cervical back: Neck supple. Right lower leg: No edema. Left lower leg: No edema. Neurological:      Mental Status: She is alert. Psychiatric:         Mood and Affect: Mood normal.       ASSESSMENT:    1.  Coronary artery disease involving native coronary artery of native heart without angina pectoris 2. Essential hypertension    3. Anxiety    4. Hypothyroidism due to acquired atrophy of thyroid    5. Visit for screening mammogram    6. Age-related osteoporosis without current pathological fracture    7. Hypercalcemia    8. Monocytosis    9. Aortic valve insufficiency, etiology of cardiac valve disease unspecified        PLAN:    Marla Cardoza was seen today for hypertension. Diagnoses and all orders for this visit:    Coronary artery disease involving native coronary artery of native heart without angina pectoris - Coronary artery disease stable and asymptomatic, will continue to monitor for any signs of instability. Will periodically monitor lipid profile and liver function, cardiac risk factors. Continue current medical regimen. FOR POSSIBLE CTA CARDIAC, DR Pamella Cee  -     simvastatin (ZOCOR) 20 MG tablet; Take 1 tablet by mouth nightly  -     clopidogrel (PLAVIX) 75 MG tablet; Take 1 tablet by mouth daily  -     metoprolol succinate (TOPROL XL) 25 MG extended release tablet; Take 1 tablet by mouth daily  -     Comprehensive Metabolic Panel; Future  -     CBC with Auto Differential; Future  -     Lipid Panel; Future    Essential hypertension  - Blood pressure stable and will continue current regimen. Will plan periodic monitoring of renal function, electrolytes, lipid profile. -     hydroCHLOROthiazide (MICROZIDE) 12.5 MG capsule; Take 1 capsule by mouth every morning  -     metoprolol succinate (TOPROL XL) 25 MG extended release tablet; Take 1 tablet by mouth daily  -     losartan (COZAAR) 100 MG tablet; Take 1 tablet by mouth daily  -     Comprehensive Metabolic Panel; Future  -     CBC with Auto Differential; Future    Anxiety  - Mood stable on current regimen w/o any significant manifestations of severe depression or anxiety noted at this time. Cont current meds. -     sertraline (ZOLOFT) 50 MG tablet;  Take 1 tablet by mouth daily    Hypothyroidism due to acquired atrophy of thyroid  - Clinically hypothyroidism appears stable and will continue current dosing, also periodic monitoring of TFTs. -     levothyroxine (SYNTHROID) 25 MCG tablet; Take 1 tablet by mouth daily  -     TSH; Future  -     T4, Free; Future    Visit for screening mammogram  -     RENALDO DIGITAL SCREEN W CAD BILATERAL PER PROTOCOL; Future    Age-related osteoporosis without current pathological fracture  -     DEXA BONE DENSITY AXIAL SKELETON; Future    Hypercalcemia- SCR FOR HYPERPARATHYROID  -     PTH, Intact; Future  -     Comprehensive Metabolic Panel; Future    Monocytosis- SCR FOR BLOOD DYSCRASIA W FLOW CYTOMETRY AND CONT MONITOR  -     Flow Cytometry Leukemia/Lymphoma, Blood;  Future  -     CBC with Auto Differential; Future    Aortic valve insufficiency, etiology of cardiac valve disease unspecified

## 2022-11-30 ENCOUNTER — HOSPITAL ENCOUNTER (OUTPATIENT)
Dept: CT IMAGING | Age: 77
Discharge: HOME | End: 2022-11-30
Payer: COMMERCIAL

## 2022-11-30 DIAGNOSIS — I77.1 STENOSIS OF LEFT SUBCLAVIAN ARTERY (HCC): ICD-10-CM

## 2022-11-30 PROCEDURE — 6360000004 HC RX CONTRAST MEDICATION: Performed by: INTERNAL MEDICINE

## 2022-11-30 PROCEDURE — 70498 CT ANGIOGRAPHY NECK: CPT

## 2022-11-30 RX ORDER — SODIUM CHLORIDE 0.9 % (FLUSH) 0.9 %
10 SYRINGE (ML) INJECTION PRN
Status: DISCONTINUED | OUTPATIENT
Start: 2022-11-30 | End: 2022-12-01 | Stop reason: HOSPADM

## 2022-11-30 RX ORDER — SODIUM CHLORIDE 0.9 % (FLUSH) 0.9 %
10 SYRINGE (ML) INJECTION
Status: DISCONTINUED | OUTPATIENT
Start: 2022-11-30 | End: 2022-12-01 | Stop reason: HOSPADM

## 2022-11-30 RX ADMIN — IOPAMIDOL 75 ML: 755 INJECTION, SOLUTION INTRAVENOUS at 10:42

## 2022-12-05 RX ORDER — HYDRALAZINE HYDROCHLORIDE 50 MG/1
TABLET, FILM COATED ORAL
Qty: 135 TABLET | Refills: 1 | Status: SHIPPED | OUTPATIENT
Start: 2022-12-05

## 2022-12-20 ENCOUNTER — HOSPITAL ENCOUNTER (OUTPATIENT)
Dept: WOMENS IMAGING | Age: 77
Discharge: HOME | End: 2022-12-20
Payer: COMMERCIAL

## 2022-12-20 DIAGNOSIS — M81.0 AGE-RELATED OSTEOPOROSIS WITHOUT CURRENT PATHOLOGICAL FRACTURE: ICD-10-CM

## 2022-12-20 DIAGNOSIS — Z12.31 VISIT FOR SCREENING MAMMOGRAM: ICD-10-CM

## 2022-12-20 PROCEDURE — 77063 BREAST TOMOSYNTHESIS BI: CPT

## 2022-12-20 PROCEDURE — 77080 DXA BONE DENSITY AXIAL: CPT

## 2022-12-21 ENCOUNTER — OFFICE VISIT (OUTPATIENT)
Dept: CARDIOLOGY CLINIC | Age: 77
End: 2022-12-21
Payer: COMMERCIAL

## 2022-12-21 VITALS
DIASTOLIC BLOOD PRESSURE: 60 MMHG | WEIGHT: 146 LBS | SYSTOLIC BLOOD PRESSURE: 118 MMHG | HEIGHT: 59 IN | BODY MASS INDEX: 29.43 KG/M2 | HEART RATE: 72 BPM

## 2022-12-21 DIAGNOSIS — Z98.890 HISTORY OF CEA (CAROTID ENDARTERECTOMY): ICD-10-CM

## 2022-12-21 DIAGNOSIS — I77.1 STENOSIS OF LEFT SUBCLAVIAN ARTERY (HCC): Primary | ICD-10-CM

## 2022-12-21 PROCEDURE — 99214 OFFICE O/P EST MOD 30 MIN: CPT | Performed by: INTERNAL MEDICINE

## 2022-12-21 PROCEDURE — 3074F SYST BP LT 130 MM HG: CPT | Performed by: INTERNAL MEDICINE

## 2022-12-21 PROCEDURE — 3078F DIAST BP <80 MM HG: CPT | Performed by: INTERNAL MEDICINE

## 2022-12-21 PROCEDURE — 1123F ACP DISCUSS/DSCN MKR DOCD: CPT | Performed by: INTERNAL MEDICINE

## 2022-12-21 NOTE — PROGRESS NOTES
MULTIVITAMIN-MINERALS) tablet Take 1 tablet by mouth daily      Acetaminophen (TYLENOL ARTHRITIS PAIN PO) Take by mouth as needed      Melatonin 5 MG CAPS Take 5 mg by mouth nightly       aspirin 81 MG tablet Take 81 mg by mouth nightly       albuterol sulfate HFA (PROVENTIL HFA) 108 (90 Base) MCG/ACT inhaler Inhale 2 puffs into the lungs every 6 hours as needed for Wheezing (Patient not taking: Reported on 12/21/2022) 18 g 1     No current facility-administered medications for this visit. Allergies: Celexa [citalopram], Poison ivy extract [poison ivy extract], Septra [sulfamethoxazole-trimethoprim], Ultram [tramadol], Valsartan, and Xanax xr [alprazolam er]  Past Medical History:   Diagnosis Date    Aortic regurgitation     MILD MOD/ MILD STENOSIS NOTED ON ECHO 10/22- DR PERAZA    Arthritis     \"Hands\"    CAD (coronary artery disease) 2006    Dr Fiona Galarza    Chronic kidney disease, stage I 10/16/2019    Chronic ulcer of right leg with fat layer exposed ([facility] [address] 04/18/2022    COVID-19 09/28/2021    H/O cardiovascular stress test 02/15/2018    EF60% mod ischemia ant wall    H/O Doppler ultrasound 01/09/2017    carotid doppler - severe degree stenosis LICA    H/O echocardiogram 06/06/2016    ef 56% mild to mod. mr and tr    H/O echocardiogram 10/16/2018    EF55-60% mild AS, mild AR, mild-mod MR, mild TR, mild phtn    H/O echocardiogram 12/29/2020    EF 55-60% mild aortic stenosis mild mitral aortic and tricuspid regurg  no evidence of pericardial effusion    History of cardiac cath 02/19/2018    patent stents, nml EF, abn stress sec to jailed diag    History of echocardiogram 07/08/2019    s/p Left CEA, Mild 0-49% disease of the bilateral ICA, dampened left vertebral flow, normal left subclavian flow. Hx of Carotid Doppler ultrasound 05/06/2020    Mild 0-49% disease of the bilateral ICA, normal vertebral flow    Hypercalcemia     ? possible hyperparathyroidism/saw Dr Trevor Haines previously    Hyperlipidemia Hypertension 1978    Hyponatremia     chronic ~130-131 range. Hypothyroidism due to acquired atrophy of thyroid     antiperoxidase ab tested negative    Insomnia     Leg pain, lateral, right 04/18/2022    Menopause     s/p JAMIE    Osteopenia     has hx of rib fractures after a fall    Osteopenia     S/P colonoscopic polypectomy 07/19/2018    Dr Yessenia Santo, recheck 5 yrs    Shortness of breath on exertion     Spinal stenosis of lumbar region without neurogenic claudication 07/2022    noted on MRI, w chr compression fx and area of sever sp stenosis    Vitamin D deficiency      Past Surgical History:   Procedure Laterality Date    APPENDECTOMY      CARDIAC SURGERY      stents x 3 Feb 2007    CAROTID ENDARTERECTOMY Left 02/01/2017    with patch     COLONOSCOPY      CORONARY ANGIOPLASTY WITH STENT PLACEMENT  2006, 2007    X 2 in LAD and one in ? CIRC w Dr Marley Beverly Bilateral 2000's    Cataracts With Lens Implants    HYSTERECTOMY, TOTAL ABDOMINAL (CERVIX REMOVED)  1981    OVARY REMOVAL      ROTATOR CUFF REPAIR Right 2003    TONSILLECTOMY  1954      As reviewed   Family History   Problem Relation Age of Onset    High Blood Pressure Mother     Migraines Mother     Heart Disease Father     High Blood Pressure Father     Other Father         Ulcer    Cancer Paternal Aunt     Diabetes Brother     Cancer Paternal Aunt     Diabetes Grandchild     Asthma Grandchild     Heart Disease Maternal Aunt     Colon Cancer Maternal Aunt     Diabetes Paternal Grandfather      Social History     Tobacco Use    Smoking status: Never    Smokeless tobacco: Never   Substance Use Topics    Alcohol use: No     Comment: caffeine 1-2 cups of coffee a day 2 pops a day      Review of Systems:    Constitutional: Negative for diaphoresis and fatigue  Psychological:Negative for anxiety or depression  HENT: Negative for headaches, nasal congestion, sinus pain or vertigo  Eyes: Negative for visual disturbance.    Endocrine: Negative for polydipsia/polyuria  Respiratory: Negative for shortness of breath  Cardiovascular: Negative for chest pain, dyspnea on exertion, claudication, edema, irregular heartbeat, murmur, palpitations or shortness of breath  Gastrointestinal: Negative for abdominal pain or heartburn  Genito-Urinary: Negative for urinary frequency/urgency  Musculoskeletal: Negative for muscle pain, muscular weakness, negative for pain in arm and leg or swelling in foot and leg  Neurological: Negative for dizziness, headaches, memory loss, numbness/tingling, visual changes, syncope  Dermatological: Negative for rash    Objective:    Vitals:    12/21/22 1354   BP: 118/60   Site: Left Upper Arm   Position: Sitting   Cuff Size: Large Adult   Pulse: 72   Weight: 146 lb (66.2 kg)   Height: 4' 11\" (1.499 m)     /60 (Site: Left Upper Arm, Position: Sitting, Cuff Size: Large Adult)   Pulse 72   Ht 4' 11\" (1.499 m)   Wt 146 lb (66.2 kg)   LMP  (LMP Unknown)   BMI 29.49 kg/m²     Patient-Reported Vitals 9/28/2021   Patient-Reported Weight 154   Patient-Reported Height 4'11\"   Patient-Reported Systolic 778   Patient-Reported Diastolic 75   Patient-Reported Pulse 90   Patient-Reported Temperature 100.5   Patient-Reported SpO2 94        Wt Readings from Last 3 Encounters:   12/21/22 146 lb (66.2 kg)   11/18/22 148 lb (67.1 kg)   11/14/22 149 lb (67.6 kg)     Body mass index is 29.49 kg/m². GENERAL - Alert, oriented, pleasant, in no apparent distress. EYES: No jaundice, no conjunctival pallor. SKIN: It is warm & dry. No rashes. No Echhymosis    HEENT - No clinically significant abnormalities seen. Neck - Supple. No jugular venous distention noted. No carotid bruits. Cardiovascular - Normal S1 and S2 without obvious murmur or gallop. Extremities - No cyanosis, clubbing, or significant edema. Pulmonary - No respiratory distress. No wheezes or rales. Abdomen - No masses, tenderness, or organomegaly.   Musculoskeletal - No significant edema. No joint deformities. No muscle wasting. Neurologic - Cranial nerves II through XII are grossly intact. There were no gross focal neurologic abnormalities. Lab Review   Lab Results   Component Value Date/Time    CKTOTAL 71 04/02/2017 11:31 AM    TROPONINT <0.010 04/02/2017 07:32 PM     BNP:  No results found for: BNP  PT/INR:    Lab Results   Component Value Date    INR 0.97 02/16/2018     Lab Results   Component Value Date    LABA1C 5.7 04/06/2022     Lab Results   Component Value Date    WBC 8.7 11/16/2022    HCT 39.3 11/16/2022    MCV 89.5 11/16/2022     11/16/2022     Lab Results   Component Value Date    CHOL 162 11/16/2022    TRIG 140 11/16/2022    HDL 56 11/16/2022    LDLCALC 78 11/16/2022    LDLDIRECT 72 11/03/2017     Lab Results   Component Value Date    ALT 33 11/16/2022    AST 26 11/16/2022     BMP:    Lab Results   Component Value Date/Time     11/16/2022 08:43 AM    K 5.0 11/16/2022 08:43 AM    CL 94 11/16/2022 08:43 AM    CO2 30 11/16/2022 08:43 AM    BUN 14 11/16/2022 08:43 AM    CREATININE 0.8 11/16/2022 08:43 AM     CMP:   Lab Results   Component Value Date/Time     11/16/2022 08:43 AM    K 5.0 11/16/2022 08:43 AM    CL 94 11/16/2022 08:43 AM    CO2 30 11/16/2022 08:43 AM    BUN 14 11/16/2022 08:43 AM    PROT 7.6 11/16/2022 08:43 AM    PROT 7.4 01/04/2013 09:20 AM     TSH:    Lab Results   Component Value Date/Time    TSH 2.810 11/16/2022 08:43 AM    TSHHS 2.620 04/06/2022 11:08 AM           Assessment & Plan:               -     CORONARY ARTERY DISEASE:  asymptomatic     All available  tests in chart reviewed. Management discussed . Testing ordered  no              On asa     compliant we will continue with present medical therapy              -  Hypertension: Patients blood pressure is normal. Patient is advised about low sodium diet. Present medical regimen will not be changed.         On hctz stable we will continue        - cea  stable we will check a carotid ultrasound which is usually checked     Conclusions        Summary        Mild (0-49%) disease of the Bilateral proximal Internal carotid artery. Normal right vertebral flow. Alternating left vertebral flow, indicating left latent subclavian steal.        Recommendations        OV TO DISCUSS LT SUBCLAVIAN STENOSIS        Signature        ------------------------------------------------------------------    Electronically signed by Tameka Kirby MD    (Interpreting physician) on 11/03/2022 at 12:00 PM    ------------------------------------------------------------------      CTA  of Lt subclavian    No significant stenosis of the left subclavian artery identified. 2. Severe stenosis at the origin the left vertebral artery which is   hypoplastic. There is faint opacification of the V1 and V2 segments of the   left vertebral artery with occlusion of the distal V2 segment. Reconstitution of the V3 and V4 segments of the left vertebral artery is   present. 3. Normal appearance of the right vertebral artery. 4. Atherosclerosis without significant arterial stenosis identified within   the common or internal carotid arteries. DW pt in detail  Medical therapy    -  LIPID MANAGEMENT:  Importance of lipid levels discussed with patient   and patient was given dietary advice. NCEP- ATP III guidelines reviewed with patient. -   Changes  in medicines made: No                       On zocor compliant we will check the LDL    - had COVID         -Valvular heart disease has aortic stenosis mild to moderate we will recheck with an echo  Conclusions      Summary   Left ventricular function is normal, EF is estimated at 55-60%. Mild left ventricular hypertrophy. Normal diastolic filling pattern for age. No regional wall motion abnormalities were detected. Mildly dilated left atrium. Sclerotic aortic valve. Mild aortic stenosis with a mean gradient of 13mmHg.    Mild mitral , aortic and tricuspid regurgitation is present. RVSP is 32 mmHg. No evidence of pericardial effusion.    REECHO IN 6 MOS      Signature      ------------------------------------------------------------------   Electronically signed by Jared Myers MD   (Interpreting physician) on 12/29/2020 at 04:50 PM    Nubia Perry MD    [facility]

## 2022-12-21 NOTE — PATIENT INSTRUCTIONS
Please be informed that if you contact our office outside of normal business hours the physician on call cannot help with any scheduling or rescheduling issues, procedure instruction questions or any type of medication issue. We advise you for any urgent/emergency that you go to the nearest emergency room! PLEASE CALL OUR OFFICE DURING NORMAL BUSINESS HOURS    Monday - Friday   8 am to 5 pm    [place]: [phone]    [place]:  [phone]    **It is YOUR responsibilty to bring medication bottles and/or updated medication list to [address] [place] [address]. This will allow us to better serve you and all your healthcare needs**    [facility] Laboratory Locations - No appointment necessary. Sites open Monday to Friday. Call your preferred location for test preparation, business hours and other information you need. [employer] accepts [employer]. [facility] [address] [place], [address] [facility]  Phone: [phone] [facility] [address] [facility]  [address]. [place], [address] [facility]  Phone: [phone]       Thank you for allowing us to care for you today! We want to ensure we can follow your treatment plan and we strive to give you the best outcomes and experience possible. If you ever have a life threatening emergency and call 911 - for an ambulance (EMS)   Our providers can only care for you at:   [facility] or [facility]. Even if you have someone take you or you drive yourself we can only care for you in a [facility] facility. Our providers are not setup at the other healthcare locations!

## 2022-12-28 ENCOUNTER — HOSPITAL ENCOUNTER (OUTPATIENT)
Dept: ULTRASOUND IMAGING | Age: 77
Discharge: HOME | End: 2022-12-28
Payer: COMMERCIAL

## 2022-12-28 DIAGNOSIS — R92.8 ABNORMAL MAMMOGRAM: ICD-10-CM

## 2022-12-28 PROCEDURE — 76642 ULTRASOUND BREAST LIMITED: CPT

## 2022-12-31 NOTE — RESULT ENCOUNTER NOTE
Please call pt, her breast ultrasound appeared benign to radiologist but f/u u/s L breast was recommended for 6mo so if ok w her let's schedule

## 2023-01-03 DIAGNOSIS — R92.8 ABNORMAL MAMMOGRAM OF LEFT BREAST: Primary | ICD-10-CM

## 2023-01-31 DIAGNOSIS — I25.10 CORONARY ARTERY DISEASE INVOLVING NATIVE CORONARY ARTERY OF NATIVE HEART WITHOUT ANGINA PECTORIS: ICD-10-CM

## 2023-01-31 RX ORDER — SIMVASTATIN 20 MG
20 TABLET ORAL NIGHTLY
Qty: 90 TABLET | Refills: 1 | OUTPATIENT
Start: 2023-01-31

## 2023-02-17 DIAGNOSIS — F41.9 ANXIETY: ICD-10-CM

## 2023-02-17 DIAGNOSIS — E03.4 HYPOTHYROIDISM DUE TO ACQUIRED ATROPHY OF THYROID: ICD-10-CM

## 2023-02-17 DIAGNOSIS — I10 ESSENTIAL HYPERTENSION: ICD-10-CM

## 2023-02-17 DIAGNOSIS — I25.10 CORONARY ARTERY DISEASE INVOLVING NATIVE CORONARY ARTERY OF NATIVE HEART WITHOUT ANGINA PECTORIS: ICD-10-CM

## 2023-02-17 RX ORDER — LOSARTAN POTASSIUM 100 MG/1
100 TABLET ORAL DAILY
Qty: 90 TABLET | Refills: 1 | Status: SHIPPED | OUTPATIENT
Start: 2023-02-17 | End: 2023-08-16

## 2023-02-17 RX ORDER — CLOPIDOGREL BISULFATE 75 MG/1
75 TABLET ORAL DAILY
Qty: 90 TABLET | Refills: 1 | Status: SHIPPED | OUTPATIENT
Start: 2023-02-17

## 2023-02-17 RX ORDER — LEVOTHYROXINE SODIUM 0.03 MG/1
25 TABLET ORAL DAILY
Qty: 90 TABLET | Refills: 1 | Status: SHIPPED | OUTPATIENT
Start: 2023-02-17

## 2023-02-17 RX ORDER — METOPROLOL SUCCINATE 25 MG/1
25 TABLET, EXTENDED RELEASE ORAL DAILY
Qty: 90 TABLET | Refills: 1 | Status: SHIPPED | OUTPATIENT
Start: 2023-02-17

## 2023-05-11 LAB
ABSOLUTE IMMATURE GRANULOCYTE: 0 K/UL (ref 0–0.1)
ALBUMIN/GLOBULIN RATIO: 1.6 RATIO (ref 0.8–2.6)
ALBUMIN: 4.8 G/DL (ref 3.5–5.2)
ALP BLD-CCNC: 109 U/L (ref 23–144)
ALT SERPL-CCNC: 34 U/L (ref 0–60)
AST SERPL-CCNC: 27 U/L (ref 0–55)
BASOPHILS ABSOLUTE: 0.1 K/UL (ref 0–0.3)
BASOPHILS RELATIVE PERCENT: 0.8 % (ref 0–2)
BILIRUB SERPL-MCNC: 0.3 MG/DL (ref 0–1.2)
BUN BLDV-MCNC: 19 MG/DL (ref 3–29)
BUN/CREAT BLD: 21 (ref 7–25)
CALCIUM SERPL-MCNC: 11 MG/DL (ref 8.5–10.5)
CHLORIDE BLD-SCNC: 93 MEQ/L (ref 96–110)
CHOLESTEROL: 154 MG/DL
CO2: 25 MEQ/L (ref 19–32)
CREAT SERPL-MCNC: 0.9 MG/DL (ref 0.5–1.2)
DIFFERENTIAL TYPE: ABNORMAL
EOSINOPHILS ABSOLUTE: 0.2 K/UL (ref 0–0.5)
EOSINOPHILS RELATIVE PERCENT: 2.1 % (ref 0–5)
GLOBULIN: 3 G/DL (ref 1.9–3.6)
GLOMERULAR FILTRATION RATE: 65 MLS/MIN/1.73M2
GLUCOSE BLD-MCNC: 112 MG/DL (ref 70–99)
HCT VFR BLD CALC: 37.7 % (ref 34–49)
HDLC SERPL-MCNC: 58 MG/DL
HEMOGLOBIN: 12.8 G/DL (ref 11.2–15.7)
IMMATURE GRANULOCYTES: 0.2 %
LDL CHOLESTEROL CALCULATED: 74 MG/DL
LYMPHOCYTES ABSOLUTE: 1.3 K/UL (ref 0.9–4.1)
LYMPHOCYTES RELATIVE PERCENT: 14.3 % (ref 14–51)
MCH RBC QN AUTO: 30 PG (ref 26–34)
MCHC RBC AUTO-ENTMCNC: 34 G/DL (ref 30.7–35.5)
MCV RBC AUTO: 88.5 FL (ref 80–100)
MONOCYTES ABSOLUTE: 1.2 K/UL (ref 0.2–1)
MONOCYTES RELATIVE PERCENT: 13 % (ref 4–12)
NEUTROPHILS ABSOLUTE: 6.4 K/UL (ref 1.8–7.5)
NEUTROPHILS RELATIVE PERCENT: 69.6 % (ref 42–80)
NUCLEATED RBCS: 0 /100 WBC
PDW BLD-RTO: 12.9 %
PLATELET # BLD: 293 K/UL (ref 140–400)
PMV BLD AUTO: 11 FL (ref 7.2–11.7)
POTASSIUM SERPL-SCNC: 4.2 MEQ/L (ref 3.4–5.3)
RBC # BLD: 4.26 M/UL (ref 3.95–5.26)
SODIUM BLD-SCNC: 131 MEQ/L (ref 135–148)
STATUS: ABNORMAL
T4 FREE: 1.29 NG/DL (ref 0.8–1.8)
TOTAL PROTEIN: 7.8 G/DL (ref 6–8.3)
TRIGL SERPL-MCNC: 108 MG/DL
TSH SERPL DL<=0.05 MIU/L-ACNC: 2.6 MCIU/ML (ref 0.4–4.5)
VLDLC SERPL CALC-MCNC: 22 MG/DL (ref 4–38)
WBC: 9.1 K/UL (ref 3.5–10.9)

## 2023-05-12 DIAGNOSIS — E83.52 SERUM CALCIUM ELEVATED: ICD-10-CM

## 2023-05-12 DIAGNOSIS — R73.9 HYPERGLYCEMIA: Primary | ICD-10-CM

## 2023-05-12 PROBLEM — R73.01 IFG (IMPAIRED FASTING GLUCOSE): Status: ACTIVE | Noted: 2023-05-12

## 2023-05-12 LAB
ADD ON: NORMAL
HBA1C MFR BLD: 5.7 % (ref 4–6)
Lab: NORMAL
ORIGINAL ACCESSION NUMBER: NORMAL
PARATHYROID HORMONE INTACT: 38 PG/ML (ref 15–65)

## 2023-05-12 NOTE — RESULT ENCOUNTER NOTE
Call pt, labs ok/chol ok AND THYROID FXN IS ALSO NL RANGE. SODIUM LEVEL IS STABLE ,  LAST TIME. GLC REMAINS BORDERLINE HIGH SUGGESTIVE OF PREDM, RISING FROM 104-112 SO DO REC CONT WORK W EXERCISE AND LOW CARB DIET. PLS ADD HGB A1C TO SCR FOR DM, DX HYPERGLYCEMIA. LASTLY, CALCIUM ALSO REMAINS BORDERLINE HIGH, PLS ADD INTACT PARATHYROID HORMONE AND CONFIRM W HER SHE'S NOT ON ANY OTC CALCIUM SUPPLEMENTS.   THX

## 2023-05-18 ENCOUNTER — OFFICE VISIT (OUTPATIENT)
Dept: INTERNAL MEDICINE CLINIC | Age: 78
End: 2023-05-18
Payer: COMMERCIAL

## 2023-05-18 VITALS
RESPIRATION RATE: 14 BRPM | OXYGEN SATURATION: 98 % | BODY MASS INDEX: 30.5 KG/M2 | DIASTOLIC BLOOD PRESSURE: 72 MMHG | HEART RATE: 74 BPM | SYSTOLIC BLOOD PRESSURE: 124 MMHG | WEIGHT: 151 LBS

## 2023-05-18 DIAGNOSIS — I25.10 CORONARY ARTERY DISEASE INVOLVING NATIVE CORONARY ARTERY OF NATIVE HEART WITHOUT ANGINA PECTORIS: Primary | ICD-10-CM

## 2023-05-18 DIAGNOSIS — I35.1 AORTIC VALVE INSUFFICIENCY, ETIOLOGY OF CARDIAC VALVE DISEASE UNSPECIFIED: ICD-10-CM

## 2023-05-18 DIAGNOSIS — E78.2 HYPERLIPEMIA, MIXED: ICD-10-CM

## 2023-05-18 DIAGNOSIS — I65.02 VERTEBRAL ARTERY STENOSIS, LEFT: ICD-10-CM

## 2023-05-18 DIAGNOSIS — E03.4 HYPOTHYROIDISM DUE TO ACQUIRED ATROPHY OF THYROID: ICD-10-CM

## 2023-05-18 DIAGNOSIS — I10 ESSENTIAL HYPERTENSION: ICD-10-CM

## 2023-05-18 PROBLEM — L97.912 CHRONIC ULCER OF RIGHT LEG WITH FAT LAYER EXPOSED (HCC): Status: RESOLVED | Noted: 2022-04-18 | Resolved: 2023-05-18

## 2023-05-18 PROBLEM — I77.1 STENOSIS OF LEFT SUBCLAVIAN ARTERY (HCC): Status: ACTIVE | Noted: 2023-05-18

## 2023-05-18 PROCEDURE — 3078F DIAST BP <80 MM HG: CPT | Performed by: INTERNAL MEDICINE

## 2023-05-18 PROCEDURE — 3074F SYST BP LT 130 MM HG: CPT | Performed by: INTERNAL MEDICINE

## 2023-05-18 PROCEDURE — 99214 OFFICE O/P EST MOD 30 MIN: CPT | Performed by: INTERNAL MEDICINE

## 2023-05-18 PROCEDURE — 1123F ACP DISCUSS/DSCN MKR DOCD: CPT | Performed by: INTERNAL MEDICINE

## 2023-05-18 RX ORDER — SIMVASTATIN 40 MG
40 TABLET ORAL NIGHTLY
Qty: 90 TABLET | Refills: 1 | Status: SHIPPED | OUTPATIENT
Start: 2023-05-18

## 2023-05-18 ASSESSMENT — PATIENT HEALTH QUESTIONNAIRE - PHQ9
2. FEELING DOWN, DEPRESSED OR HOPELESS: 0
SUM OF ALL RESPONSES TO PHQ QUESTIONS 1-9: 0
SUM OF ALL RESPONSES TO PHQ QUESTIONS 1-9: 0
1. LITTLE INTEREST OR PLEASURE IN DOING THINGS: 0
SUM OF ALL RESPONSES TO PHQ QUESTIONS 1-9: 0
SUM OF ALL RESPONSES TO PHQ9 QUESTIONS 1 & 2: 0
SUM OF ALL RESPONSES TO PHQ QUESTIONS 1-9: 0

## 2023-05-18 NOTE — PROGRESS NOTES
valve disease unspecified        PLAN:    Massiel Greenwood was seen today for hypothyroidism. Diagnoses and all orders for this visit:    Coronary artery disease involving native coronary artery of native heart without angina pectoris- WE'LL TRY INCR ZOCOR FROM 20-40MG TO SEE IF WE CAN GET LDL CONSISTENTLY <70, CONT MEDS AND F/U CARDIOLOGY DR Ole Jurado  -     Comprehensive Metabolic Panel; Future  -     Lipid Panel; Future  -     simvastatin (ZOCOR) 40 MG tablet; Take 1 tablet by mouth nightly  -     Comprehensive Metabolic Panel; Future  -     CBC; Future    Hypothyroidism due to acquired atrophy of thyroid - Clinically hypothyroidism appears stable and will continue current dosing, also periodic monitoring of TFTs.    -     TSH; Future  -     T4, Free; Future    Essential hypertension - Blood pressure stable and will continue current regimen. Will plan periodic monitoring of renal function, electrolytes, lipid profile. -     Comprehensive Metabolic Panel; Future  -     CBC; Future    Vertebral artery stenosis, left- INCR ZOCOR, F/U LAB AND MONITOR  -     Comprehensive Metabolic Panel; Future  -     Lipid Panel; Future  -     Comprehensive Metabolic Panel; Future  -     Lipid Panel; Future    Hyperlipemia, mixed - Pt will continue to work on a low fat diet and also exercise, wt loss as appropriate. Will continue periodic monitoring of fasting lipid profile, glucose, liver function. -     Comprehensive Metabolic Panel; Future  -     Lipid Panel; Future  -     simvastatin (ZOCOR) 40 MG tablet; Take 1 tablet by mouth nightly  -     Comprehensive Metabolic Panel; Future  -     Lipid Panel;  Future    Aortic valve insufficiency, etiology of cardiac valve disease unspecified- NOTED ON ECHO AND CONT F/U DR Ole Jurado

## 2023-06-20 ENCOUNTER — OFFICE VISIT (OUTPATIENT)
Dept: CARDIOLOGY CLINIC | Age: 78
End: 2023-06-20
Payer: COMMERCIAL

## 2023-06-20 VITALS
HEART RATE: 62 BPM | HEIGHT: 59 IN | DIASTOLIC BLOOD PRESSURE: 60 MMHG | OXYGEN SATURATION: 98 % | BODY MASS INDEX: 30.04 KG/M2 | SYSTOLIC BLOOD PRESSURE: 118 MMHG | WEIGHT: 149 LBS

## 2023-06-20 DIAGNOSIS — I10 PRIMARY HYPERTENSION: Primary | ICD-10-CM

## 2023-06-20 DIAGNOSIS — R07.2 PRECORDIAL CHEST PAIN: ICD-10-CM

## 2023-06-20 PROCEDURE — 99214 OFFICE O/P EST MOD 30 MIN: CPT | Performed by: INTERNAL MEDICINE

## 2023-06-20 PROCEDURE — 3074F SYST BP LT 130 MM HG: CPT | Performed by: INTERNAL MEDICINE

## 2023-06-20 PROCEDURE — 1123F ACP DISCUSS/DSCN MKR DOCD: CPT | Performed by: INTERNAL MEDICINE

## 2023-06-20 PROCEDURE — 3078F DIAST BP <80 MM HG: CPT | Performed by: INTERNAL MEDICINE

## 2023-06-20 PROCEDURE — 93000 ELECTROCARDIOGRAM COMPLETE: CPT | Performed by: INTERNAL MEDICINE

## 2023-06-20 NOTE — PATIENT INSTRUCTIONS
**It is YOUR responsibilty to bring medication bottles and/or updated medication list to [address] [place] [address]. This will allow us to better serve you and all your healthcare needs**  [facility] Laboratory Locations - No appointment necessary. Sites open Monday to Friday. Call your preferred location for test preparation, business   hours and other information you need. [employer] accepts [employer]. [address] [place], [address] [facility]  Phone: [phone]     Please be informed that if you contact our office outside of normal business hours the physician on call cannot help with any scheduling or rescheduling issues, procedure instruction questions or any type of medication issue. We advise you for any urgent/emergency that you go to the nearest emergency room! PLEASE CALL OUR OFFICE DURING NORMAL BUSINESS HOURS    Monday - Friday   8 am to 5 pm    [place]: [place]: [phone]    [place]:  [phone]  Thank you for allowing us to care for you today! We want to ensure we can follow your treatment plan and we strive to give you the best outcomes and experience possible. If you ever have a life threatening emergency and call 911 - for an ambulance (EMS)   Our providers can only care for you at:   [facility] or [facility]. Even if you have someone take you or you drive yourself we can only care for you in a [facility]. Our providers are not setup at the other healthcare locations!

## 2023-06-20 NOTE — PROGRESS NOTES
CARDIOLOGY NOTE      6/20/2023    RE: Bernadette Counter  ([id number])                               TO:  Dr. Michelle Moyer MD            CHIEF COMPLAINT   Fran Manrique is a 65 y.o. female who was seen today for management of  cad                                  Fu  visit  HPI:                   Pt has h/o cad, htn, hyperlipidimnea, cea, seen today for  fu.  Pt has   cardiac complainsof jaw pain to LUE  onn and off      Bernadette Counter has the following history recorded in care path:  Patient Active Problem List    Diagnosis Date Noted    Aortic regurgitation 11/18/2022    Spinal stenosis of lumbar region without neurogenic claudication 07/2022    Stenosis of left carotid artery 01/09/2017    Nevus of multiple sites 08/26/2015    Hypertension     CAD (coronary artery disease)     Menopause     Osteopenia     Insomnia     Stenosis of left subclavian artery (HCC) 05/18/2023    Vertebral artery stenosis, left 05/18/2023    IFG (impaired fasting glucose) 05/12/2023    Leg pain, lateral, right 04/18/2022    Hypothyroidism due to acquired atrophy of thyroid     Chronic kidney disease, stage I 10/16/2019    Hyponatremia 12/19/2018    Anxiety 05/26/2017     Current Outpatient Medications   Medication Sig Dispense Refill    simvastatin (ZOCOR) 40 MG tablet Take 1 tablet by mouth nightly 90 tablet 1    sertraline (ZOLOFT) 50 MG tablet TAKE 1 TABLET BY MOUTH DAILY 90 tablet 1    clopidogrel (PLAVIX) 75 MG tablet TAKE 1 TABLET BY MOUTH DAILY 90 tablet 1    levothyroxine (SYNTHROID) 25 MCG tablet TAKE 1 TABLET BY MOUTH DAILY 90 tablet 1    metoprolol succinate (TOPROL XL) 25 MG extended release tablet TAKE 1 TABLET BY MOUTH DAILY 90 tablet 1    losartan (COZAAR) 100 MG tablet TAKE 1 TABLET BY MOUTH DAILY 90 tablet 1    hydroCHLOROthiazide (MICROZIDE) 12.5 MG capsule Take 1 capsule by mouth every morning 90 capsule 3    Multiple Vitamins-Minerals (THERAPEUTIC MULTIVITAMIN-MINERALS) tablet Take 1 tablet by mouth daily

## 2023-06-27 ENCOUNTER — OFFICE VISIT (OUTPATIENT)
Dept: INTERNAL MEDICINE CLINIC | Age: 78
End: 2023-06-27
Payer: COMMERCIAL

## 2023-06-27 VITALS
HEART RATE: 76 BPM | OXYGEN SATURATION: 98 % | DIASTOLIC BLOOD PRESSURE: 78 MMHG | RESPIRATION RATE: 14 BRPM | SYSTOLIC BLOOD PRESSURE: 128 MMHG

## 2023-06-27 DIAGNOSIS — H65.03 NON-RECURRENT ACUTE SEROUS OTITIS MEDIA OF BOTH EARS: Primary | ICD-10-CM

## 2023-06-27 PROBLEM — I77.1 STENOSIS OF LEFT SUBCLAVIAN ARTERY (HCC): Status: RESOLVED | Noted: 2023-05-18 | Resolved: 2023-06-27

## 2023-06-27 PROCEDURE — 3078F DIAST BP <80 MM HG: CPT | Performed by: INTERNAL MEDICINE

## 2023-06-27 PROCEDURE — 99213 OFFICE O/P EST LOW 20 MIN: CPT | Performed by: INTERNAL MEDICINE

## 2023-06-27 PROCEDURE — 3074F SYST BP LT 130 MM HG: CPT | Performed by: INTERNAL MEDICINE

## 2023-06-27 PROCEDURE — 1123F ACP DISCUSS/DSCN MKR DOCD: CPT | Performed by: INTERNAL MEDICINE

## 2023-06-27 RX ORDER — AMOXICILLIN 500 MG/1
500 CAPSULE ORAL 2 TIMES DAILY
Qty: 20 CAPSULE | Refills: 0 | Status: SHIPPED | OUTPATIENT
Start: 2023-06-27 | End: 2023-07-07

## 2023-06-27 RX ORDER — PREDNISONE 5 MG/1
TABLET ORAL
Qty: 21 TABLET | Refills: 0 | Status: SHIPPED | OUTPATIENT
Start: 2023-06-27

## 2023-06-28 ENCOUNTER — HOSPITAL ENCOUNTER (OUTPATIENT)
Dept: ULTRASOUND IMAGING | Age: 78
Discharge: HOME | End: 2023-06-28
Attending: INTERNAL MEDICINE
Payer: COMMERCIAL

## 2023-06-28 DIAGNOSIS — R92.8 ABNORMAL MAMMOGRAM OF LEFT BREAST: ICD-10-CM

## 2023-06-28 PROCEDURE — 76642 ULTRASOUND BREAST LIMITED: CPT

## 2023-07-06 ENCOUNTER — PROCEDURE VISIT (OUTPATIENT)
Dept: CARDIOLOGY CLINIC | Age: 78
End: 2023-07-06

## 2023-07-06 DIAGNOSIS — R06.09 DOE (DYSPNEA ON EXERTION): Primary | ICD-10-CM

## 2023-07-06 DIAGNOSIS — I10 PRIMARY HYPERTENSION: ICD-10-CM

## 2023-07-06 DIAGNOSIS — I25.118 CORONARY ARTERY DISEASE INVOLVING NATIVE CORONARY ARTERY OF NATIVE HEART WITH OTHER FORM OF ANGINA PECTORIS (HCC): ICD-10-CM

## 2023-07-06 DIAGNOSIS — R07.2 PRECORDIAL CHEST PAIN: ICD-10-CM

## 2023-07-06 LAB
LV EF: 60 %
LVEF MODALITY: NORMAL

## 2023-07-07 ENCOUNTER — TELEPHONE (OUTPATIENT)
Dept: CARDIOLOGY CLINIC | Age: 78
End: 2023-07-07

## 2023-07-07 NOTE — TELEPHONE ENCOUNTER
Advised pt to keep f/u 7/17/2023 for results, Patient advised and voices understanding. Small sized defect of mild severity which is persistent involving anterior   wall of myocardium. Abnormal Stress nuclear scintigraphic study suggestive   of abnormal myocardial perfusion. A fixed perfusion defect of anterior wall   of left ventricle with normal wall motion noted suggestive of breast   artifact however ischemis cant be ruled out Gated images demonstrate normal   left ventricular systolic function with EF of 60 %.       Recommendation   Suggest office visit to discuss results

## 2023-07-17 ENCOUNTER — OFFICE VISIT (OUTPATIENT)
Dept: CARDIOLOGY CLINIC | Age: 78
End: 2023-07-17
Payer: COMMERCIAL

## 2023-07-17 ENCOUNTER — TELEPHONE (OUTPATIENT)
Dept: CARDIOLOGY CLINIC | Age: 78
End: 2023-07-17

## 2023-07-17 VITALS
HEART RATE: 60 BPM | DIASTOLIC BLOOD PRESSURE: 60 MMHG | WEIGHT: 152 LBS | SYSTOLIC BLOOD PRESSURE: 118 MMHG | HEIGHT: 59 IN | BODY MASS INDEX: 30.64 KG/M2

## 2023-07-17 DIAGNOSIS — I25.10 CORONARY ARTERY DISEASE INVOLVING NATIVE CORONARY ARTERY OF NATIVE HEART WITHOUT ANGINA PECTORIS: Primary | ICD-10-CM

## 2023-07-17 DIAGNOSIS — Z01.810 PRE-OPERATIVE CARDIOVASCULAR EXAMINATION: Primary | ICD-10-CM

## 2023-07-17 LAB
ALBUMIN/GLOBULIN RATIO: 1.7 RATIO (ref 0.8–2.6)
ALBUMIN: 4.6 G/DL (ref 3.5–5.2)
ALP BLD-CCNC: 92 U/L (ref 23–144)
ALT SERPL-CCNC: 35 U/L (ref 0–60)
AST SERPL-CCNC: 26 U/L (ref 0–55)
BILIRUB SERPL-MCNC: 0.4 MG/DL (ref 0–1.2)
BUN BLDV-MCNC: 16 MG/DL (ref 3–29)
BUN/CREAT BLD: 20 (ref 7–25)
CALCIUM SERPL-MCNC: 10.6 MG/DL (ref 8.5–10.5)
CHLORIDE BLD-SCNC: 97 MEQ/L (ref 96–110)
CHOLESTEROL: 145 MG/DL
CO2: 26 MEQ/L (ref 19–32)
CREAT SERPL-MCNC: 0.8 MG/DL (ref 0.5–1.2)
GLOBULIN: 2.7 G/DL (ref 1.9–3.6)
GLOMERULAR FILTRATION RATE: 75 MLS/MIN/1.73M2
GLUCOSE BLD-MCNC: 108 MG/DL (ref 70–99)
HDLC SERPL-MCNC: 57 MG/DL
LDL CHOLESTEROL CALCULATED: 63 MG/DL
POTASSIUM SERPL-SCNC: 4.2 MEQ/L (ref 3.4–5.3)
SODIUM BLD-SCNC: 133 MEQ/L (ref 135–148)
STATUS: ABNORMAL
TOTAL PROTEIN: 7.3 G/DL (ref 6–8.3)
TRIGL SERPL-MCNC: 125 MG/DL
VLDLC SERPL CALC-MCNC: 25 MG/DL (ref 4–38)

## 2023-07-17 PROCEDURE — 1123F ACP DISCUSS/DSCN MKR DOCD: CPT | Performed by: INTERNAL MEDICINE

## 2023-07-17 PROCEDURE — 3078F DIAST BP <80 MM HG: CPT | Performed by: INTERNAL MEDICINE

## 2023-07-17 PROCEDURE — 3074F SYST BP LT 130 MM HG: CPT | Performed by: INTERNAL MEDICINE

## 2023-07-17 PROCEDURE — 99214 OFFICE O/P EST MOD 30 MIN: CPT | Performed by: INTERNAL MEDICINE

## 2023-07-17 RX ORDER — NITROGLYCERIN 0.4 MG/1
0.4 TABLET SUBLINGUAL EVERY 5 MIN PRN
Qty: 25 TABLET | Refills: 3 | Status: SHIPPED | OUTPATIENT
Start: 2023-07-17

## 2023-07-17 NOTE — TELEPHONE ENCOUNTER
[address] [facility] WITH POSSIBLE PERCUTANEOUS CORONARY INTERVENTION    Patient Name: Madison Walker   : 1945  MRN# [id number]    Date of Procedure: 23 Time: 10 AM Arrival Time: 8 AM    The catheterization and angiogram are usually outpatient procedures, however if stenting is needed you may need to stay overnight. You will need to arrive at the hospital two hours before the procedure. You will go to registration in the main lobby. You will need to arrange for someone to drive you home. HOSPITAL:  [place]      X   If you have received orders for blood work and or a chest x-ray, please have         them done on assigned date at [facility],           [facility], or [facility].     X Please do not have anything by mouth after midnight prior to or 8 hours before   the procedure. X You may take your medications with a sip of water in the morning of your               procedure or take them with you to the hospital                    X If you are taking HCTZ (Hydrochlorothiazide)   please do not take it the morning of your procedure.

## 2023-07-17 NOTE — PROGRESS NOTES
which showed the stents were patent    [facility] dw pt wants to wait    Conclusions      Summary   Supervising physician Dr. Carmen German .   Small sized defect of mild severity which is persistent involving anterior   wall of myocardium. Abnormal Stress nuclear scintigraphic study suggestive   of abnormal myocardial perfusion. A fixed perfusion defect of anterior wall   of left ventricle with normal wall motion noted suggestive of breast   artifact however ischemis cant be ruled out Gated images demonstrate normal   left ventricular systolic function with EF of 60 %. Recommendation   Suggest office visit to discuss results . Signatures  -  Hypertension: Patients blood pressure is normal. Patient is advised about low sodium diet. Present medical regimen will not be changed. On hctz stable we will continue        - cea  stable we will check a carotid ultrasound which annually DW pt in detail  Medical therapy    -  LIPID MANAGEMENT:  Importance of lipid levels discussed with patient   and patient was given dietary advice. NCEP- ATP III guidelines reviewed with patient. -   Changes  in medicines made:  No                       On zocor compliant we will check the LDL    - had COVID         -Valvular heart disease has aortic stenosis mild to moderate we will recheck with an echo      Bc Schultz MD    [facility]

## 2023-07-24 ENCOUNTER — HOSPITAL ENCOUNTER (OUTPATIENT)
Age: 78
Discharge: HOME | End: 2023-07-24
Payer: COMMERCIAL

## 2023-07-24 ENCOUNTER — APPOINTMENT (OUTPATIENT)
Dept: GENERAL RADIOLOGY | Age: 78
End: 2023-07-24
Payer: COMMERCIAL

## 2023-07-24 ENCOUNTER — HOSPITAL ENCOUNTER (OUTPATIENT)
Dept: GENERAL RADIOLOGY | Age: 78
Discharge: HOME | End: 2023-07-24
Payer: COMMERCIAL

## 2023-07-24 DIAGNOSIS — Z01.810 PRE-OPERATIVE CARDIOVASCULAR EXAMINATION: ICD-10-CM

## 2023-07-24 LAB
ABO/RH: NORMAL
ANION GAP SERPL CALCULATED.3IONS-SCNC: 10 MMOL/L (ref 4–16)
ANTIBODY SCREEN: NEGATIVE
BUN SERPL-MCNC: 13 MG/DL (ref 6–23)
CALCIUM SERPL-MCNC: 9.7 MG/DL (ref 8.3–10.6)
CHLORIDE BLD-SCNC: 94 MMOL/L (ref 99–110)
CO2: 26 MMOL/L (ref 21–32)
COMMENT: NORMAL
CREAT SERPL-MCNC: 0.7 MG/DL (ref 0.6–1.1)
GFR SERPL CREATININE-BSD FRML MDRD: >60 ML/MIN/1.73M2
GLUCOSE SERPL-MCNC: 92 MG/DL (ref 70–99)
HCT VFR BLD CALC: 38.6 % (ref 37–47)
HEMOGLOBIN: 12.5 GM/DL (ref 12.5–16)
MCH RBC QN AUTO: 30.1 PG (ref 27–31)
MCHC RBC AUTO-ENTMCNC: 32.4 % (ref 32–36)
MCV RBC AUTO: 93 FL (ref 78–100)
PDW BLD-RTO: 14.4 % (ref 11.7–14.9)
PLATELET # BLD: 296 K/CU MM (ref 140–440)
PMV BLD AUTO: 11 FL (ref 7.5–11.1)
POTASSIUM SERPL-SCNC: 4.1 MMOL/L (ref 3.5–5.1)
RBC # BLD: 4.15 M/CU MM (ref 4.2–5.4)
SODIUM BLD-SCNC: 130 MMOL/L (ref 135–145)
WBC # BLD: 8.9 K/CU MM (ref 4–10.5)

## 2023-07-24 PROCEDURE — 80048 BASIC METABOLIC PNL TOTAL CA: CPT

## 2023-07-24 PROCEDURE — 85027 COMPLETE CBC AUTOMATED: CPT

## 2023-07-24 PROCEDURE — 86900 BLOOD TYPING SEROLOGIC ABO: CPT

## 2023-07-24 PROCEDURE — 86850 RBC ANTIBODY SCREEN: CPT

## 2023-07-24 PROCEDURE — 86901 BLOOD TYPING SEROLOGIC RH(D): CPT

## 2023-07-24 PROCEDURE — 71046 X-RAY EXAM CHEST 2 VIEWS: CPT

## 2023-07-24 PROCEDURE — 36415 COLL VENOUS BLD VENIPUNCTURE: CPT

## 2023-07-27 ENCOUNTER — HOSPITAL ENCOUNTER (OUTPATIENT)
Dept: CARDIAC CATH/INVASIVE PROCEDURES | Age: 78
Discharge: HOME | End: 2023-07-27
Attending: INTERNAL MEDICINE | Admitting: INTERNAL MEDICINE
Payer: COMMERCIAL

## 2023-07-27 VITALS
RESPIRATION RATE: 19 BRPM | DIASTOLIC BLOOD PRESSURE: 55 MMHG | HEIGHT: 59 IN | WEIGHT: 152 LBS | BODY MASS INDEX: 30.64 KG/M2 | TEMPERATURE: 96.5 F | SYSTOLIC BLOOD PRESSURE: 148 MMHG | HEART RATE: 55 BPM | OXYGEN SATURATION: 94 %

## 2023-07-27 PROCEDURE — C1769 GUIDE WIRE: HCPCS

## 2023-07-27 PROCEDURE — 6360000004 HC RX CONTRAST MEDICATION

## 2023-07-27 PROCEDURE — 2709999900 HC NON-CHARGEABLE SUPPLY

## 2023-07-27 PROCEDURE — 6360000002 HC RX W HCPCS

## 2023-07-27 PROCEDURE — 93458 L HRT ARTERY/VENTRICLE ANGIO: CPT

## 2023-07-27 PROCEDURE — 2500000003 HC RX 250 WO HCPCS

## 2023-07-27 PROCEDURE — C1894 INTRO/SHEATH, NON-LASER: HCPCS

## 2023-07-27 PROCEDURE — 2580000003 HC RX 258: Performed by: INTERNAL MEDICINE

## 2023-07-27 PROCEDURE — 93458 L HRT ARTERY/VENTRICLE ANGIO: CPT | Performed by: INTERNAL MEDICINE

## 2023-07-27 PROCEDURE — 6370000000 HC RX 637 (ALT 250 FOR IP): Performed by: INTERNAL MEDICINE

## 2023-07-27 RX ORDER — DIPHENHYDRAMINE HCL 25 MG
25 TABLET ORAL ONCE
Status: COMPLETED | OUTPATIENT
Start: 2023-07-27 | End: 2023-07-27

## 2023-07-27 RX ORDER — SODIUM CHLORIDE 0.9 % (FLUSH) 0.9 %
5-40 SYRINGE (ML) INJECTION PRN
Status: DISCONTINUED | OUTPATIENT
Start: 2023-07-27 | End: 2023-07-27 | Stop reason: HOSPADM

## 2023-07-27 RX ORDER — ACETAMINOPHEN 325 MG/1
650 TABLET ORAL EVERY 4 HOURS PRN
Status: DISCONTINUED | OUTPATIENT
Start: 2023-07-27 | End: 2023-07-27 | Stop reason: HOSPADM

## 2023-07-27 RX ORDER — SODIUM CHLORIDE 0.9 % (FLUSH) 0.9 %
5-40 SYRINGE (ML) INJECTION EVERY 12 HOURS SCHEDULED
Status: DISCONTINUED | OUTPATIENT
Start: 2023-07-27 | End: 2023-07-27 | Stop reason: HOSPADM

## 2023-07-27 RX ORDER — SODIUM CHLORIDE 9 MG/ML
INJECTION, SOLUTION INTRAVENOUS CONTINUOUS
Status: DISCONTINUED | OUTPATIENT
Start: 2023-07-27 | End: 2023-07-27 | Stop reason: HOSPADM

## 2023-07-27 RX ORDER — SODIUM CHLORIDE 9 MG/ML
INJECTION, SOLUTION INTRAVENOUS PRN
Status: DISCONTINUED | OUTPATIENT
Start: 2023-07-27 | End: 2023-07-27 | Stop reason: HOSPADM

## 2023-07-27 RX ADMIN — DIPHENHYDRAMINE HYDROCHLORIDE 25 MG: 25 TABLET ORAL at 08:18

## 2023-07-27 RX ADMIN — SODIUM CHLORIDE: 9 INJECTION, SOLUTION INTRAVENOUS at 08:14

## 2023-07-27 NOTE — FLOWSHEET NOTE
Pt to pt  in wheelchair per volunteer for d/c home in private vehicle with family.  Right radial site free of bleeding/hematoma at time of d/c

## 2023-07-27 NOTE — FLOWSHEET NOTE
Discharge instructions reviewed with patient per Jose Ramon Phillipss understanding. Ambulated without difficulty.

## 2023-07-27 NOTE — H&P
Naresh Foster is a 65 y.o. female who was seen today for management of  cad                                  here for [facility]  HPI:                    Pt has h/o cad, htn, hyperlipidimnea, cea, seen today for  fu. Pt has   cardiac complainsof jaw pain to LUE  onn and off.  [facility]  dw pt        Marquise Charles has the following history recorded in care path:       Patient Active Problem List     Diagnosis Date Noted    Aortic regurgitation 11/18/2022    Spinal stenosis of lumbar region without neurogenic claudication 07/2022    Stenosis of left carotid artery 01/09/2017    Nevus of multiple sites 08/26/2015    Hypertension      CAD (coronary artery disease)      Menopause      Osteopenia      Insomnia      Vertebral artery stenosis, left 05/18/2023    IFG (impaired fasting glucose) 05/12/2023    Leg pain, lateral, right 04/18/2022    Hypothyroidism due to acquired atrophy of thyroid      Chronic kidney disease, stage I 10/16/2019    Hyponatremia 12/19/2018    Anxiety 05/26/2017      Current Facility-Administered Medications          Current Outpatient Medications   Medication Sig Dispense Refill    predniSONE (DELTASONE) 5 MG tablet Take 6 tablets by mouth on day 1, 5 on day 2, 4 on day 3, 3 on day 4, 2 on day 5, 1 on day 6. 21 tablet 0    simvastatin (ZOCOR) 40 MG tablet Take 1 tablet by mouth nightly 90 tablet 1    sertraline (ZOLOFT) 50 MG tablet TAKE 1 TABLET BY MOUTH DAILY 90 tablet 1    clopidogrel (PLAVIX) 75 MG tablet TAKE 1 TABLET BY MOUTH DAILY 90 tablet 1    levothyroxine (SYNTHROID) 25 MCG tablet TAKE 1 TABLET BY MOUTH DAILY 90 tablet 1    metoprolol succinate (TOPROL XL) 25 MG extended release tablet TAKE 1 TABLET BY MOUTH DAILY 90 tablet 1    losartan (COZAAR) 100 MG tablet TAKE 1 TABLET BY MOUTH DAILY 90 tablet 1    hydrALAZINE (APRESOLINE) 50 MG tablet TAKE 1/2 TABLET IN THE MORNING AND 1 TABLET IN THE EVENING 135 tablet 1    hydroCHLOROthiazide (MICROZIDE) 12.5 MG capsule Take 1 capsule by mouth every morning 90 capsule 3    Multiple Vitamins-Minerals (THERAPEUTIC MULTIVITAMIN-MINERALS) tablet Take 1 tablet by mouth daily        Acetaminophen (TYLENOL ARTHRITIS PAIN PO) Take by mouth as needed        Melatonin 5 MG CAPS Take 5 mg by mouth nightly         aspirin 81 MG tablet Take 1 tablet by mouth nightly          No current facility-administered medications for this visit. Allergies: Celexa [citalopram], Poison ivy extract [poison ivy extract], Septra [sulfamethoxazole-trimethoprim], Ultram [tramadol], Valsartan, and Xanax xr [alprazolam er]  Past Medical History        Past Medical History:   Diagnosis Date    Aortic regurgitation       MILD MOD/ MILD STENOSIS NOTED ON ECHO 10/22- DR PERAZA    Arthritis       \"Hands\"    CAD (coronary artery disease) 2006     Dr Nkechi Hart    Chronic kidney disease, stage I 10/16/2019    Chronic ulcer of right leg with fat layer exposed ([address]) 04/18/2022    COVID-19 09/28/2021    H/O cardiovascular stress test 02/15/2018     EF60% mod ischemia ant wall    H/O Doppler ultrasound 01/09/2017     carotid doppler - severe degree stenosis LICA    H/O echocardiogram 06/06/2016     ef 56% mild to mod. mr and tr    H/O echocardiogram 10/16/2018     EF55-60% mild AS, mild AR, mild-mod MR, mild TR, mild phtn    H/O echocardiogram 12/29/2020     EF 55-60% mild aortic stenosis mild mitral aortic and tricuspid regurg  no evidence of pericardial effusion    History of cardiac cath 02/19/2018     patent stents, nml EF, abn stress sec to jailed diag    History of echocardiogram 07/08/2019     s/p Left CEA, Mild 0-49% disease of the bilateral ICA, dampened left vertebral flow, normal left subclavian flow. Hx of Carotid Doppler ultrasound 05/06/2020     Mild 0-49% disease of the bilateral ICA, normal vertebral flow    Hypercalcemia       ? possible hyperparathyroidism/saw Dr Carmelo Felty previously    Hyperlipidemia      Hypertension 1978    Hyponatremia       chronic ~130-131 range.

## 2023-08-21 ENCOUNTER — OFFICE VISIT (OUTPATIENT)
Dept: CARDIOLOGY CLINIC | Age: 78
End: 2023-08-21
Payer: COMMERCIAL

## 2023-08-21 VITALS
SYSTOLIC BLOOD PRESSURE: 120 MMHG | DIASTOLIC BLOOD PRESSURE: 64 MMHG | HEART RATE: 65 BPM | OXYGEN SATURATION: 96 % | HEIGHT: 59 IN | BODY MASS INDEX: 31 KG/M2 | WEIGHT: 153.8 LBS

## 2023-08-21 DIAGNOSIS — I10 ESSENTIAL HYPERTENSION: ICD-10-CM

## 2023-08-21 DIAGNOSIS — E03.4 HYPOTHYROIDISM DUE TO ACQUIRED ATROPHY OF THYROID: ICD-10-CM

## 2023-08-21 DIAGNOSIS — I25.10 ASCVD (ARTERIOSCLEROTIC CARDIOVASCULAR DISEASE): Primary | ICD-10-CM

## 2023-08-21 DIAGNOSIS — F41.9 ANXIETY: ICD-10-CM

## 2023-08-21 DIAGNOSIS — I25.10 CORONARY ARTERY DISEASE INVOLVING NATIVE CORONARY ARTERY OF NATIVE HEART WITHOUT ANGINA PECTORIS: ICD-10-CM

## 2023-08-21 PROCEDURE — 3074F SYST BP LT 130 MM HG: CPT | Performed by: INTERNAL MEDICINE

## 2023-08-21 PROCEDURE — 1123F ACP DISCUSS/DSCN MKR DOCD: CPT | Performed by: INTERNAL MEDICINE

## 2023-08-21 PROCEDURE — 3078F DIAST BP <80 MM HG: CPT | Performed by: INTERNAL MEDICINE

## 2023-08-21 PROCEDURE — 99214 OFFICE O/P EST MOD 30 MIN: CPT | Performed by: INTERNAL MEDICINE

## 2023-08-21 RX ORDER — LEVOTHYROXINE SODIUM 0.03 MG/1
25 TABLET ORAL DAILY
Qty: 90 TABLET | Refills: 1 | Status: SHIPPED | OUTPATIENT
Start: 2023-08-21

## 2023-08-21 RX ORDER — METOPROLOL SUCCINATE 25 MG/1
25 TABLET, EXTENDED RELEASE ORAL DAILY
Qty: 90 TABLET | Refills: 1 | Status: SHIPPED | OUTPATIENT
Start: 2023-08-21

## 2023-08-21 RX ORDER — CLOPIDOGREL BISULFATE 75 MG/1
75 TABLET ORAL DAILY
Qty: 90 TABLET | Refills: 1 | Status: SHIPPED | OUTPATIENT
Start: 2023-08-21

## 2023-08-21 RX ORDER — HYDROCHLOROTHIAZIDE 12.5 MG/1
12.5 CAPSULE, GELATIN COATED ORAL EVERY MORNING
Qty: 90 CAPSULE | Refills: 1 | Status: SHIPPED | OUTPATIENT
Start: 2023-08-21

## 2023-08-21 RX ORDER — LOSARTAN POTASSIUM 100 MG/1
100 TABLET ORAL DAILY
Qty: 90 TABLET | Refills: 1 | Status: SHIPPED | OUTPATIENT
Start: 2023-08-21 | End: 2024-02-17

## 2023-08-21 NOTE — PATIENT INSTRUCTIONS
Please be informed that if you contact our office outside of normal business hours the physician on call cannot help with any scheduling or rescheduling issues, procedure instruction questions or any type of medication issue. We advise you for any urgent/emergency that you go to the nearest emergency room! PLEASE CALL OUR OFFICE DURING NORMAL BUSINESS HOURS    Monday - Friday   8 am to 5 pm    [place]: [address]: [phone]    [place]:  [phone]    **It is YOUR responsibilty to bring medication bottles and/or updated medication list to [address] [place] [address]. This will allow us to better serve you and all your healthcare needs**    Thank you for allowing us to care for you today! We want to ensure we can follow your treatment plan and we strive to give you the best outcomes and experience possible. If you ever have a life threatening emergency and call 911 - for an ambulance (EMS)   Our providers can only care for you at:   [facility] or [facility]. Even if you have someone take you or you drive yourself we can only care for you in a [facility] facility. Our providers are not setup at the other healthcare locations!

## 2023-08-21 NOTE — PROGRESS NOTES
significant edema. Pulmonary - No respiratory distress. No wheezes or rales. Abdomen - No masses, tenderness, or organomegaly. Musculoskeletal - No significant edema. No joint deformities. No muscle wasting. Neurologic - Cranial nerves II through XII are grossly intact. There were no gross focal neurologic abnormalities. Lab Review   Lab Results   Component Value Date/Time    CKTOTAL 71 04/02/2017 11:31 AM    TROPONINT <0.010 04/02/2017 07:32 PM     BNP:  No results found for: BNP  PT/INR:    Lab Results   Component Value Date    INR 0.97 02/16/2018     Lab Results   Component Value Date    LABA1C 5.7 05/11/2023    LABA1C 5.7 04/06/2022     Lab Results   Component Value Date    WBC 8.9 07/24/2023    HCT 38.6 07/24/2023    MCV 93.0 07/24/2023     07/24/2023     Lab Results   Component Value Date    CHOL 145 07/17/2023    TRIG 125 07/17/2023    HDL 57 07/17/2023    LDLCALC 63 07/17/2023    LDLDIRECT 72 11/03/2017     Lab Results   Component Value Date    ALT 35 07/17/2023    AST 26 07/17/2023     BMP:    Lab Results   Component Value Date/Time     07/24/2023 01:52 PM    K 4.1 07/24/2023 01:52 PM    CL 94 07/24/2023 01:52 PM    CO2 26 07/24/2023 01:52 PM    BUN 13 07/24/2023 01:52 PM    CREATININE 0.7 07/24/2023 01:52 PM     CMP:   Lab Results   Component Value Date/Time     07/24/2023 01:52 PM    K 4.1 07/24/2023 01:52 PM    CL 94 07/24/2023 01:52 PM    CO2 26 07/24/2023 01:52 PM    BUN 13 07/24/2023 01:52 PM    PROT 7.3 07/17/2023 08:59 AM    PROT 7.4 01/04/2013 09:20 AM     TSH:    Lab Results   Component Value Date/Time    TSH 2.600 05/11/2023 09:27 AM    TSHHS 2.620 04/06/2022 11:08 AM           Assessment & Plan:               -     CORONARY ARTERY DISEASE:  symptomatic     All available  tests in chart reviewed. Management discussed .   Testing ordered  no              On asa     compliant we will continue with present medical therapy           LHC rev with pt  Procedure Summary

## 2023-08-28 RX ORDER — HYDRALAZINE HYDROCHLORIDE 50 MG/1
TABLET, FILM COATED ORAL
Qty: 135 TABLET | Refills: 1 | OUTPATIENT
Start: 2023-08-28

## 2023-09-07 ENCOUNTER — OFFICE VISIT (OUTPATIENT)
Dept: INTERNAL MEDICINE CLINIC | Age: 78
End: 2023-09-07
Payer: COMMERCIAL

## 2023-09-07 VITALS
HEART RATE: 80 BPM | SYSTOLIC BLOOD PRESSURE: 190 MMHG | WEIGHT: 152.4 LBS | DIASTOLIC BLOOD PRESSURE: 74 MMHG | OXYGEN SATURATION: 96 % | BODY MASS INDEX: 30.78 KG/M2

## 2023-09-07 DIAGNOSIS — M25.511 ACUTE PAIN OF RIGHT SHOULDER: ICD-10-CM

## 2023-09-07 DIAGNOSIS — S29.011A PECTORALIS MUSCLE STRAIN, INITIAL ENCOUNTER: Primary | ICD-10-CM

## 2023-09-07 PROCEDURE — 1123F ACP DISCUSS/DSCN MKR DOCD: CPT | Performed by: INTERNAL MEDICINE

## 2023-09-07 PROCEDURE — 3077F SYST BP >= 140 MM HG: CPT | Performed by: INTERNAL MEDICINE

## 2023-09-07 PROCEDURE — 3078F DIAST BP <80 MM HG: CPT | Performed by: INTERNAL MEDICINE

## 2023-09-07 PROCEDURE — 99213 OFFICE O/P EST LOW 20 MIN: CPT | Performed by: INTERNAL MEDICINE

## 2023-09-07 RX ORDER — PREDNISONE 5 MG/1
TABLET ORAL
Qty: 21 TABLET | Refills: 0 | Status: SHIPPED | OUTPATIENT
Start: 2023-09-07

## 2023-09-07 RX ORDER — HYDROCODONE BITARTRATE AND ACETAMINOPHEN 5; 325 MG/1; MG/1
1 TABLET ORAL EVERY 8 HOURS PRN
Qty: 21 TABLET | Refills: 0 | Status: SHIPPED | OUTPATIENT
Start: 2023-09-07 | End: 2023-09-14

## 2023-09-18 DIAGNOSIS — I25.10 CORONARY ARTERY DISEASE INVOLVING NATIVE CORONARY ARTERY OF NATIVE HEART WITHOUT ANGINA PECTORIS: ICD-10-CM

## 2023-09-18 DIAGNOSIS — E78.2 HYPERLIPEMIA, MIXED: ICD-10-CM

## 2023-09-18 RX ORDER — SIMVASTATIN 40 MG
40 TABLET ORAL NIGHTLY
Qty: 90 TABLET | Refills: 1 | OUTPATIENT
Start: 2023-09-18

## 2023-09-26 ENCOUNTER — OFFICE VISIT (OUTPATIENT)
Dept: ORTHOPEDIC SURGERY | Age: 78
End: 2023-09-26
Payer: COMMERCIAL

## 2023-09-26 VITALS
RESPIRATION RATE: 15 BRPM | BODY MASS INDEX: 30.64 KG/M2 | WEIGHT: 152 LBS | OXYGEN SATURATION: 100 % | HEART RATE: 74 BPM | HEIGHT: 59 IN

## 2023-09-26 DIAGNOSIS — M19.011 OSTEOARTHRITIS, LOCALIZED, SHOULDER, RIGHT: Primary | ICD-10-CM

## 2023-09-26 DIAGNOSIS — S43.421A SPRAIN OF RIGHT ROTATOR CUFF CAPSULE, INITIAL ENCOUNTER: ICD-10-CM

## 2023-09-26 PROCEDURE — 1123F ACP DISCUSS/DSCN MKR DOCD: CPT | Performed by: ORTHOPAEDIC SURGERY

## 2023-09-26 PROCEDURE — 99203 OFFICE O/P NEW LOW 30 MIN: CPT | Performed by: ORTHOPAEDIC SURGERY

## 2023-09-26 NOTE — PROGRESS NOTES
Patient presents to the office today for evaluation of the right shoulder. Pt does have a Hx of right rotator cuff repair. Pt states about 3 months ago she was sweeping her floors and notice increase pain in the shoulders.  Pt states she does have loss of ROM and have tried an oral steroid with some relief

## 2023-09-26 NOTE — PATIENT INSTRUCTIONS
Continue weight-bearing as tolerated. Continue range of motion exercises as instructed. Ice and elevate as needed. Tylenol or Motrin for pain. Central scheduling [phone] will be calling you to schedule your MRI. If you do not hear from them with in a week give them a call.     Follow up once Mri is completed

## 2023-09-26 NOTE — PROGRESS NOTES
9/26/2023   Chief Complaint   Patient presents with    Shoulder Pain     Right shoulder         History of Present Illness:                             Kiko Palmer is a 77 y.o. female who presents today for evaluation of her right shoulder pain. She had an injury to the right shoulder 3 weeks ago while mopping after repetitive use with her right shoulder. She felt sharp pains in the shoulder and was then unable to move elevate or lift her arm overhead. Pain has been severe and constant since this injury. She has pain while laying on that side at night. She feels a profound weakness and difficulty with any lifting pushing or pulling activities. She has a history of previous injury to the right shoulder and underwent rotator cuff repair about 20 years ago. She went on to heal well following this procedure but has had some minor aches and pains intermittently over the last few years. Symptoms have severely progressed after her recent injury and now she is having dysfunction and problems on a daily basis even with simple activities of daily living    She has a history of previous right shoulder rotator cuff surgery performed by Dr. Sly Hirsch about 20 years ago                Medical History  Patient's medications, allergies, past medical, surgical, social and family histories were reviewed and updated as appropriate. Past Medical History:   Diagnosis Date    Aortic regurgitation     MILD MOD/ MILD STENOSIS NOTED ON ECHO 10/22- DR PERAZA    Arthritis     \"Hands\"    CAD (coronary artery disease) 2006    Dr Corey Fuentes    Chronic kidney disease, stage I 10/16/2019    Chronic ulcer of right leg with fat layer exposed ([address]) 04/18/2022    COVID-19 09/28/2021    H/O cardiovascular stress test 02/15/2018    EF60% mod ischemia ant wall    H/O Doppler ultrasound 01/09/2017    carotid doppler - severe degree stenosis LICA    H/O echocardiogram 06/06/2016    ef 56% mild to mod.  mr and tr    H/O echocardiogram

## 2023-09-29 ASSESSMENT — ENCOUNTER SYMPTOMS
EYE REDNESS: 0
EYE PAIN: 0
VOMITING: 0
COLOR CHANGE: 0
CHEST TIGHTNESS: 0
SHORTNESS OF BREATH: 0
WHEEZING: 0

## 2023-10-25 ENCOUNTER — HOSPITAL ENCOUNTER (OUTPATIENT)
Dept: MRI IMAGING | Age: 78
Discharge: HOME | End: 2023-10-25
Attending: ORTHOPAEDIC SURGERY
Payer: COMMERCIAL

## 2023-10-25 DIAGNOSIS — M19.011 OSTEOARTHRITIS, LOCALIZED, SHOULDER, RIGHT: ICD-10-CM

## 2023-10-25 DIAGNOSIS — S43.421A SPRAIN OF RIGHT ROTATOR CUFF CAPSULE, INITIAL ENCOUNTER: ICD-10-CM

## 2023-10-25 PROCEDURE — 73221 MRI JOINT UPR EXTREM W/O DYE: CPT

## 2023-10-31 ENCOUNTER — OFFICE VISIT (OUTPATIENT)
Dept: ORTHOPEDIC SURGERY | Age: 78
End: 2023-10-31

## 2023-10-31 VITALS — TEMPERATURE: 99 F | HEART RATE: 78 BPM | RESPIRATION RATE: 17 BRPM | OXYGEN SATURATION: 98 %

## 2023-10-31 DIAGNOSIS — M19.011 OSTEOARTHRITIS, LOCALIZED, SHOULDER, RIGHT: ICD-10-CM

## 2023-10-31 DIAGNOSIS — S46.211A TRAUMATIC PARTIAL TEAR OF BICEPS TENDON, RIGHT, INITIAL ENCOUNTER: ICD-10-CM

## 2023-10-31 DIAGNOSIS — S43.421A SPRAIN OF RIGHT ROTATOR CUFF CAPSULE, INITIAL ENCOUNTER: Primary | ICD-10-CM

## 2023-10-31 RX ORDER — TRIAMCINOLONE ACETONIDE 40 MG/ML
80 INJECTION, SUSPENSION INTRA-ARTICULAR; INTRAMUSCULAR ONCE
Status: COMPLETED | OUTPATIENT
Start: 2023-10-31 | End: 2023-10-31

## 2023-10-31 RX ADMIN — TRIAMCINOLONE ACETONIDE 80 MG: 40 INJECTION, SUSPENSION INTRA-ARTICULAR; INTRAMUSCULAR at 11:07

## 2023-10-31 ASSESSMENT — ENCOUNTER SYMPTOMS
SHORTNESS OF BREATH: 0
EYE PAIN: 0
COLOR CHANGE: 0
CHEST TIGHTNESS: 0
EYE REDNESS: 0
VOMITING: 0
WHEEZING: 0

## 2023-10-31 NOTE — PATIENT INSTRUCTIONS
Continue weight-bearing as tolerated. Continue range of motion exercises as instructed. Ice and elevate as needed. Tylenol or Motrin for pain. Injection in right shoulder  Follow up in 6 weeks    Out patient Physical Therapy has been ordered by your provider. [facility]) Physical Therapy will call you to set up therapy. If you have not heard from them within 24-48 hours of today's appointment, please reach out to them at [phone].

## 2023-10-31 NOTE — PROGRESS NOTES
Patient seen in office today for:  Right shoulder pain     DOS: Rotator cuff repair approx 20 years ago, Fx shoulder as well  Date of last injection:     Patient reports 8/10 pain. RICE and medication are not effective to alleviate pain and reduce swelling. Pain worsened by: Patient reports painful ROM, pushing, pulling and lifting. MRI performed 10/25/2023, impression below. IMPRESSION:  1. Suspect prior rotator cuff repair. There appears to be a 1 cm anterior to  posterior high-grade partial if not complete tear involving the anterolateral  fibers of the supraspinatus tendon. There is underlying moderate to severe  supraspinatus and infraspinatus tendinosis and partial articular and bursal  surface tearing in several areas. Surgical correlation may be helpful. 2. Degeneration of the [facility] joint. 3. Small joint effusion and subacromial subdeltoid bursitis. 4. Poorly visualized intra-articular long head biceps tendon consistent with  severe tendinosis/longitudinal tearing versus tenodesis. No discrete labral  tear or detachment.     Right handed
Extremity:     There is moderate to severe tenderness diffusely throughout the shoulder. Tenderness to palpation is maximal over the anterior and lateral aspects of the shoulder specifically along the greater tuberosity and proximal biceps tendon. Active range of motion is severely limited due to pain. Forward elevation present to 40 degrees, abduction present to 20 degrees, external rotation 15 degrees. Passive range of motion is moderately restricted and limited due to pain and apprehension. Forward elevation 120 degrees, abduction 100 degrees. Rotator cuff testing is difficult due to pain. Strength 0/5 forward elevation and abduction of the shoulder. There is breakaway to strength testing due to pain. Positive empty can test.  Positive drop arm sign. Positive Barrera and Neer signs. Moderate pain at the acromioclavicular joint with crossarm test.  Well-healed scars from previous rotator cuff repair     Skin is intact. Sensation is intact in the upper extremity. 2+ radial pulse. No edema. No muscle atrophy. Diagnostic testing:    MRI images of the right shoulder were reviewed by myself and discussed with the patient today:    MRI performed 10/25/2023, impression below. IMPRESSION:  1. Suspect prior rotator cuff repair. There appears to be a 1 cm anterior to  posterior high-grade partial if not complete tear involving the anterolateral  fibers of the supraspinatus tendon. There is underlying moderate to severe  supraspinatus and infraspinatus tendinosis and partial articular and bursal  surface tearing in several areas. Surgical correlation may be helpful. 2. Degeneration of the [facility] joint. 3. Small joint effusion and subacromial subdeltoid bursitis. 4. Poorly visualized intra-articular long head biceps tendon consistent with  severe tendinosis/longitudinal tearing versus tenodesis. No discrete labral  tear or detachment.       Office Procedures:  No orders of the defined types were

## 2023-11-09 ENCOUNTER — HOSPITAL ENCOUNTER (OUTPATIENT)
Dept: PHYSICAL THERAPY | Age: 78
Setting detail: THERAPIES SERIES
Discharge: HOME | End: 2023-11-09
Payer: COMMERCIAL

## 2023-11-09 LAB
HCT VFR BLD CALC: 38.1 % (ref 34–49)
HEMOGLOBIN: 12.9 G/DL (ref 11.2–15.7)
MCH RBC QN AUTO: 29.7 PG (ref 26–34)
MCHC RBC AUTO-ENTMCNC: 33.9 G/DL (ref 30.7–35.5)
MCV RBC AUTO: 87.8 FL (ref 80–100)
PDW BLD-RTO: 13 %
PLATELET # BLD: 330 K/UL (ref 140–400)
PMV BLD AUTO: 10.1 FL (ref 7.2–11.7)
RBC # BLD: 4.34 M/UL (ref 3.95–5.26)
WBC: 11.5 K/UL (ref 3.5–10.9)

## 2023-11-09 PROCEDURE — 97110 THERAPEUTIC EXERCISES: CPT

## 2023-11-09 PROCEDURE — 97161 PT EVAL LOW COMPLEX 20 MIN: CPT

## 2023-11-09 ASSESSMENT — PAIN SCALES - GENERAL: PAINLEVEL_OUTOF10: 4

## 2023-11-09 ASSESSMENT — PAIN DESCRIPTION - PAIN TYPE: TYPE: ACUTE PAIN

## 2023-11-09 ASSESSMENT — PAIN DESCRIPTION - ORIENTATION: ORIENTATION: RIGHT

## 2023-11-09 ASSESSMENT — PAIN DESCRIPTION - DESCRIPTORS: DESCRIPTORS: DULL;ACHING;SHARP;SHOOTING

## 2023-11-09 ASSESSMENT — PAIN DESCRIPTION - LOCATION: LOCATION: SHOULDER

## 2023-11-09 NOTE — FLOWSHEET NOTE
Outpatient Physical Therapy  [place]           [x] Phone: [phone]   Fax: [phone]  [facility]           [] Phone: [phone]   Fax: [phone]        Physical Therapy Daily Treatment Note  Date:  2023    Patient Name:  Forest Metzger    :  1945  MRN: [id number]  Restrictions/Precautions: No data recorded      Diagnosis:   Sprain of right rotator cuff capsule, initial encounter [S43.421A]  Osteoarthritis, localized, shoulder, right [M19.011]  Traumatic partial tear of biceps tendon, right, initial encounter [S46211A] Diagnosis: R shoulder pain, RC strain  Date of Injury/Surgery:   Treatment Diagnosis:  R shouler pain , stiffness, weakness, RC tendinosis  Insurance/Certification information: [facility]  Referring Physician:  Miguel A Rai MD     PCP: Kaylin Browne MD  Next Doctor Visit:    Plan of care signed (Y/N):  no  Outcome Measure: Q Dash  32 pts  Visit# / total visits:  1 /  15-  Pain level: 4-8 /10   Goals:     Patient goals: lower shoudler pain levels with ADL, haircare and lifting and carrying her lap top and groceries  Short term goals  Time Frame for Short term goals: 4 weeks  1. ind with HEP for R sholder ROM and strength  2. avg R shoudler pain with ADLs 4/10 or less  3. reports 50% less tender to palp in the R shoulder biceps tendon and distal RC area  4. PROM R shoulder 150 flex and scap     Long Term Goals  Time Frame for Long Term Goals: 6-8 weeks  1.  pt to reprots 75% better overall and able to carry her laptop, groceries and reach her cabinets comfortably  2. MMT R shoulder 4/5 flex , scap, ER and IR for better activity tolerance  3. AROM R shoulder flex and scap 150  4. Q Dash improved to 18 or less         Summary of Evaluation:  Assessment: Pt appears today with R shoulder pain , she did have a RC repair 20 years ago and was doing well , she started to have her pain about a month ago while doing housework, she recalls no specific cause.  She had an MRI

## 2023-11-09 NOTE — PLAN OF CARE
Outpatient Physical Therapy           [place]           [x] Phone: [phone]   Fax: [phone]  [facility]           [] Phone: [phone]   Fax: [phone]     To: Cecille Hernandez MD     From: Reuben Rodgers, PT, DPT     Patient: Arelis Sanchez       : 1945  Diagnosis: Sprain of right rotator cuff capsule, initial encounter [Z49.560I]  Osteoarthritis, localized, shoulder, right [M19.011]  Traumatic partial tear of biceps tendon, right, initial encounter [S46.211A] Diagnosis: R shoulder pain, RC strain  Treatment Diagnosis: R shouler pain , stiffness, weakness, RC tendinosis  Date: 2023    Physical Therapy Certification/Re-Certification Form  Dear Richmond Shrestha MD  The following patient has been evaluated for physical therapy services and for therapy to continue, insurance requires physician review of the treatment plan initially and every 90 days. Please review the attached evaluation and/or summary of the patient's plan of care, and verify that you agree therapy should continue by signing the attached document and sending it back to our office. Assessment:    Assessment: Pt appears today with R shoulder pain , she did have a RC repair 20 years ago and was doing well , she started to have her pain about a month ago while doing housework, she recalls no specific cause. She had an MRI and it demonstrated RC and biceps tendinosis and OA. She has difficulty with her ROM and strength to complete her ADL and carry items like groceries and her laptop. She also had a recent injection that has helped with her pain. Pt would benefit from skilled therapy interventions as needed to address listed impairments, progress toward goal completion and improve ADL/IADL status.   PT also warranted to reduce risk for further injury or decline    Plan of Care/Treatment to date:  [x] Therapeutic Exercise  [x] Modalities: PRN  [x] Therapeutic Activity     [] Ultrasound  [x] Electrical Stimulation  []

## 2023-11-09 NOTE — PROGRESS NOTES
Physical Therapy: Initial Evaluation    Patient: Madison Walker (70 y.o. female)   Examination Date:   Plan of Care Certification Period: 2023 to        :  1945 ;    Confirmed: Yes MRN: [id number]  CSN: [id number]   Insurance: Payor: Rosy Rodriguez / Plan: Bartlett Risen / Product Type: *No Product type* /   Insurance ID: [id number] - (Medicare Managed) Secondary Insurance (if applicable):    Referring Physician: Bert Kunz MD     PCP: Suzie Walker MD Visits to Date/Visits Approved:   /      No Show/Cancelled Appts:   /       Medical Diagnosis: Sprain of right rotator cuff capsule, initial encounter [Y15.021B]  Osteoarthritis, localized, shoulder, right [M19.011]  Traumatic partial tear of biceps tendon, right, initial encounter [S46.211A] R shoulder pain, RC strain  Treatment Diagnosis: R shouler pain , stiffness, weakness, RC tendinosis     PERTINENT MEDICAL HISTORY   Patient Assessed for Rehabilitation Services: Yes  Self reported health status[de-identified] Good    Medical History: Chart Reviewed: Yes   Past Medical History:   Diagnosis Date    Aortic regurgitation     MILD MOD/ MILD STENOSIS NOTED ON ECHO 10/22- DR PERAZA    Arthritis     \"Hands\"    CAD (coronary artery disease)     Dr Iron Christy    Chronic kidney disease, stage I 10/16/2019    Chronic ulcer of right leg with fat layer exposed ([address]) 2022    COVID-19 2021    H/O cardiovascular stress test 02/15/2018    EF60% mod ischemia ant wall    H/O Doppler ultrasound 2017    carotid doppler - severe degree stenosis LICA    H/O echocardiogram 2016    ef 56% mild to mod.  mr and tr    H/O echocardiogram 10/16/2018    EF55-60% mild AS, mild AR, mild-mod MR, mild TR, mild phtn    H/O echocardiogram 2020    EF 55-60% mild aortic stenosis mild mitral aortic and tricuspid regurg  no evidence of pericardial effusion    History of cardiac cath 2018    patent stents, nml EF, abn stress sec to jailed diag    History of

## 2023-11-10 LAB
ALBUMIN/GLOBULIN RATIO: 1.7 RATIO (ref 0.8–2.6)
ALBUMIN: 4.3 G/DL (ref 3.5–5.2)
ALP BLD-CCNC: 97 U/L (ref 23–144)
ALT SERPL-CCNC: 24 U/L (ref 0–60)
AST SERPL-CCNC: 19 U/L (ref 0–55)
BILIRUB SERPL-MCNC: 0.4 MG/DL (ref 0–1.2)
BUN BLDV-MCNC: 20 MG/DL (ref 3–29)
BUN/CREAT BLD: 22 (ref 7–25)
CALCIUM SERPL-MCNC: 9.8 MG/DL (ref 8.5–10.5)
CHLORIDE BLD-SCNC: 95 MEQ/L (ref 96–110)
CHOLESTEROL: 147 MG/DL
CO2: 26 MEQ/L (ref 19–32)
CREAT SERPL-MCNC: 0.9 MG/DL (ref 0.5–1.2)
GLOBULIN: 2.5 G/DL (ref 1.9–3.6)
GLOMERULAR FILTRATION RATE: 65 MLS/MIN/1.73M2
GLUCOSE BLD-MCNC: 80 MG/DL (ref 70–99)
HDLC SERPL-MCNC: 68 MG/DL
LDL CHOLESTEROL CALCULATED: 66 MG/DL
POTASSIUM SERPL-SCNC: 4.4 MEQ/L (ref 3.4–5.3)
SODIUM BLD-SCNC: 132 MEQ/L (ref 135–148)
STATUS: ABNORMAL
T4 FREE: 1.45 NG/DL (ref 0.8–1.8)
TOTAL PROTEIN: 6.8 G/DL (ref 6–8.3)
TRIGL SERPL-MCNC: 63 MG/DL
TSH SERPL DL<=0.05 MIU/L-ACNC: 2.86 MCIU/ML (ref 0.4–4.5)
VLDLC SERPL CALC-MCNC: 13 MG/DL (ref 4–38)

## 2023-11-13 SDOH — ECONOMIC STABILITY: INCOME INSECURITY: HOW HARD IS IT FOR YOU TO PAY FOR THE VERY BASICS LIKE FOOD, HOUSING, MEDICAL CARE, AND HEATING?: NOT HARD AT ALL

## 2023-11-13 SDOH — ECONOMIC STABILITY: FOOD INSECURITY: WITHIN THE PAST 12 MONTHS, YOU WORRIED THAT YOUR FOOD WOULD RUN OUT BEFORE YOU GOT MONEY TO BUY MORE.: NEVER TRUE

## 2023-11-13 SDOH — ECONOMIC STABILITY: FOOD INSECURITY: WITHIN THE PAST 12 MONTHS, THE FOOD YOU BOUGHT JUST DIDN'T LAST AND YOU DIDN'T HAVE MONEY TO GET MORE.: NEVER TRUE

## 2023-11-16 ENCOUNTER — OFFICE VISIT (OUTPATIENT)
Dept: INTERNAL MEDICINE CLINIC | Age: 78
End: 2023-11-16

## 2023-11-16 VITALS
OXYGEN SATURATION: 97 % | RESPIRATION RATE: 16 BRPM | HEART RATE: 68 BPM | BODY MASS INDEX: 30.44 KG/M2 | WEIGHT: 151 LBS | HEIGHT: 59 IN | DIASTOLIC BLOOD PRESSURE: 68 MMHG | SYSTOLIC BLOOD PRESSURE: 126 MMHG

## 2023-11-16 DIAGNOSIS — I35.1 AORTIC VALVE INSUFFICIENCY, ETIOLOGY OF CARDIAC VALVE DISEASE UNSPECIFIED: ICD-10-CM

## 2023-11-16 DIAGNOSIS — I25.10 CORONARY ARTERY DISEASE INVOLVING NATIVE CORONARY ARTERY OF NATIVE HEART WITHOUT ANGINA PECTORIS: ICD-10-CM

## 2023-11-16 DIAGNOSIS — E78.2 HYPERLIPEMIA, MIXED: ICD-10-CM

## 2023-11-16 DIAGNOSIS — I10 PRIMARY HYPERTENSION: Primary | ICD-10-CM

## 2023-11-16 DIAGNOSIS — R73.01 IFG (IMPAIRED FASTING GLUCOSE): ICD-10-CM

## 2023-11-16 DIAGNOSIS — E03.4 HYPOTHYROIDISM DUE TO ACQUIRED ATROPHY OF THYROID: ICD-10-CM

## 2023-11-16 NOTE — PROGRESS NOTES
(impaired fasting glucose) - will continue to monitor fasting labs/glc for any progression to DM. Patient will continue to try to work on diet modification.    -     Hemoglobin A1C; Future    Hyperlipemia, mixed - Pt will continue to work on a low fat diet and also exercise, wt loss as appropriate. Will continue periodic monitoring of fasting lipid profile, glucose, liver function.       Aortic valve insufficiency, etiology of cardiac valve disease unspecified- Dr Abbie Naik monitoring, echo done last yr    Other orders  -     Influenza, FLUAD, (age 72 y+), IM, Preservative Free, 0.5 mL

## 2023-11-17 ENCOUNTER — HOSPITAL ENCOUNTER (OUTPATIENT)
Dept: PHYSICAL THERAPY | Age: 78
Setting detail: THERAPIES SERIES
Discharge: HOME | End: 2023-11-17
Payer: COMMERCIAL

## 2023-11-17 PROCEDURE — 97110 THERAPEUTIC EXERCISES: CPT

## 2023-11-17 PROCEDURE — 97140 MANUAL THERAPY 1/> REGIONS: CPT

## 2023-11-17 NOTE — FLOWSHEET NOTE
Outpatient Physical Therapy  [place]           [x] Phone: [phone]   Fax: [phone]  [facility]           [] Phone: [phone]   Fax: [phone]        Physical Therapy Daily Treatment Note  Date:  2023    Patient Name:  Joao Faith    :  1945  MRN: [id number]  Restrictions/Precautions: No data recorded   Diagnosis:   Sprain of right rotator cuff capsule, initial encounter [S43.421A]  Osteoarthritis, localized, shoulder, right [M19.011]  Traumatic partial tear of biceps tendon, right, initial encounter [S46.551Z] Diagnosis: R shoulder pain, RC strain  Date of Injury/Surgery:   Treatment Diagnosis:  R shouler pain , stiffness, weakness, RC tendinosis  Insurance/Certification information: [facility]  Referring Physician:  Antonio Lang MD     PCP: Karis Jaimes MD  Next Doctor Visit:    Plan of care signed (Y/N):  no  Outcome Measure: Q Dash  32 pts  Visit# / total visits:  2 /  15-  Pain level:      1 /10       Goals:     Patient goals: lower shoudler pain levels with ADL, haircare and lifting and carrying her lap top and groceries  Short term goals  Time Frame for Short term goals: 4 weeks  1. ind with HEP for R sholder ROM and strength  2. avg R shoudler pain with ADLs 4/10 or less  3. reports 50% less tender to palp in the R shoulder biceps tendon and distal RC area  4. PROM R shoulder 150 flex and scap  Long Term Goals  Time Frame for Long Term Goals: 6-8 weeks  1.  pt to reprots 75% better overall and able to carry her laptop, groceries and reach her cabinets comfortably  2. MMT R shoulder 4/5 flex , scap, ER and IR for better activity tolerance  3. AROM R shoulder flex and scap 150  4. Q Dash improved to 18 or less        Summary of Evaluation:  Assessment: Pt appears today with R shoulder pain , she did have a RC repair 20 years ago and was doing well , she started to have her pain about a month ago while doing housework, she recalls no specific cause.  She had an MRI

## 2023-11-21 ENCOUNTER — HOSPITAL ENCOUNTER (OUTPATIENT)
Dept: PHYSICAL THERAPY | Age: 78
Setting detail: THERAPIES SERIES
Discharge: HOME | End: 2023-11-21
Payer: COMMERCIAL

## 2023-11-21 PROCEDURE — 97110 THERAPEUTIC EXERCISES: CPT

## 2023-11-21 PROCEDURE — 97140 MANUAL THERAPY 1/> REGIONS: CPT

## 2023-11-21 NOTE — FLOWSHEET NOTE
Outpatient Physical Therapy  [place]           [x] Phone: [phone]   Fax: [phone]  Steffi Vuong           [] Phone: [phone]   Fax: [phone]        Physical Therapy Daily Treatment Note  Date:  2023    Patient Name:  Rolando Durand    :  1945  MRN: [id number]  Restrictions/Precautions: No data recorded   Diagnosis:   Sprain of right rotator cuff capsule, initial encounter [S43.421A]  Osteoarthritis, localized, shoulder, right [M19.011]  Traumatic partial tear of biceps tendon, right, initial encounter [S46211A] Diagnosis: R shoulder pain, RC strain  Date of Injury/Surgery:   Treatment Diagnosis:  R shouler pain , stiffness, weakness, RC tendinosis  Insurance/Certification information: [facility]  Referring Physician:  Lisseth Horton MD     PCP: Mercedes Hampton MD  Next Doctor Visit:    Plan of care signed (Y/N):  no  Outcome Measure: Q Dash  32 pts  Visit# / total visits:  4 /  15-  Pain level:      0 /10       Goals:     Patient goals: lower shoudler pain levels with ADL, haircare and lifting and carrying her lap top and groceries  Short term goals  Time Frame for Short term goals: 4 weeks  1. ind with HEP for R sholder ROM and strength  2. avg R shoudler pain with ADLs 4/10 or less  3. reports 50% less tender to palp in the R shoulder biceps tendon and distal RC area  4. PROM R shoulder 150 flex and scap  Long Term Goals  Time Frame for Long Term Goals: 6-8 weeks  1.  pt to reprots 75% better overall and able to carry her laptop, groceries and reach her cabinets comfortably  2. MMT R shoulder 4/5 flex , scap, ER and IR for better activity tolerance  3. AROM R shoulder flex and scap 150  4. Q Dash improved to 18 or less        Summary of Evaluation:  Assessment: Pt appears today with R shoulder pain , she did have a RC repair 20 years ago and was doing well , she started to have her pain about a month ago while doing housework, she recalls no specific cause.  She had an MRI

## 2023-11-28 ENCOUNTER — HOSPITAL ENCOUNTER (OUTPATIENT)
Dept: PHYSICAL THERAPY | Age: 78
Setting detail: THERAPIES SERIES
Discharge: HOME | End: 2023-11-28
Payer: COMMERCIAL

## 2023-11-28 PROCEDURE — 97140 MANUAL THERAPY 1/> REGIONS: CPT

## 2023-11-28 PROCEDURE — 97110 THERAPEUTIC EXERCISES: CPT

## 2023-11-28 NOTE — FLOWSHEET NOTE
housework, she recalls no specific cause. She had an MRI and it demonstrated RC and biceps tendinosis and OA. She has difficulty with her ROM and strength to complete her ADL and carry items like groceries and her laptop. She also had a recent injection that has helped with her pain. Pt would benefit from skilled therapy interventions as needed to address listed impairments, progress toward goal completion and improve ADL/IADL status. PT also warranted to reduce risk for further injury or decline        Subjective:   Liz Reyna arrived to therapy today stating that she has not been experiencing any pain which can be partly because of the shot that she had recently. She mentions that it doesn't hurt when she is performing activities such as cooking and lifting [place] but explained that it just is sore and feels weak. Any changes in Ambulatory Summary Sheet? None      Objective:  (11/28/23 Rounded shoulder posture. Tightness within the shoulder joint when performing PROM. PROM: 154 F, 138 Scap)        Exercises: (No more than 4 columns)  R shoulder RC tendinosis, OA  Exercise/Equipment 2# 11/17/23 11/21/23 11/28/23 #4           WARM UP      UBC  2/1' Taken  2'/2'   Pulley 20* flex X20 flexion 20*          TABLE      Manual PROM as seen below      SL ER    10*                         STANDING      rows 10* RTB 2x10 RTB 2x10 RTB    High Rows 10* YTB 2x10 RTB 2x10 RTB    ext  2x10 RTB 2x10 RTB   IR   2x10 RTB 2x10 RTB   ER  2x10 RTB 2x10 RTB    Wall slide       Taffy Pulls 10* YTB 2x10 YTB    Scap Punches    10* RTB          PROPRIOCEPTION                                    MODALITIES                      Other Therapeutic Activities/Education:  Patient received education on their current pathology and how their condition effects them with their functional activities. Patient understood discussion and questions were answered.  Patient understands their activity limitations and understands rational for treatment

## 2023-12-01 ENCOUNTER — HOSPITAL ENCOUNTER (OUTPATIENT)
Dept: PHYSICAL THERAPY | Age: 78
Setting detail: THERAPIES SERIES
Discharge: HOME | End: 2023-12-01
Payer: COMMERCIAL

## 2023-12-01 PROCEDURE — 97110 THERAPEUTIC EXERCISES: CPT

## 2023-12-01 PROCEDURE — 97140 MANUAL THERAPY 1/> REGIONS: CPT

## 2023-12-01 NOTE — FLOWSHEET NOTE
continued therapy. Home Exercise Program:    Access Code: [email or web address]  URL: [email or web address]  Date: 11/09/2023  Prepared by: Bradly Muhammad    Exercises  - Shoulder Flexion Wall Slide with Towel  - 2 x daily - 7 x weekly - 1 sets - 10 reps  - Standing Shoulder Row with Anchored Resistance  - 2 x daily - 7 x weekly - 1 sets - 10 reps  - Shoulder extension with resistance - Neutral  - 2 x daily - 7 x weekly - 1 sets - 10 reps  - Shoulder External Rotation with Anchored Resistance  - 2 x daily - 7 x weekly - 1 sets - 10 reps  - Shoulder Internal Rotation with Resistance  - 2 x daily - 7 x weekly - 1 sets - 10 reps    Manual Treatments:  STM, joint mobilizations and oscillations for the R shoulder, PROM      Modalities:        Communication with other providers:  cert sent to Malorie Chew MD 11/9/23      Assessment:  pt appears to be making good overall functional and ROM progress, she is still with  some limited functional AROM and strength. She appears to feel she will be ready to stop therapy 12/8/23 and hold until after her dr visit 12/12/23. Collette Eve would continue to benefit from skilled PT intervention to increase her strength and endurance while also decreasing her overall pain so that she can better participate in her ADLs.   End pain: 1/10    Plan for Next Session:   Specific Instructions for Next Treatment: progress as tolerated R shoulder , conditioning , ROM, modalities prn , manual as needed  Progress as tolerated R shoulder RC strain, PROM, manual, strength, stretch and modalities PRN        Time In / Time Out:    1025 -  1109         Timed Code/Total Treatment Minutes:   1 Man ( 10)  2 TE  (34 )       Next Progress Note due:   12/8/23 , expect to hold post this visit and allow for follow up with her ortho Dr. Adam Mcneal of Care Interventions:  [x] Therapeutic Exercise  [x] Modalities: PRN  [x] Therapeutic Activity     [] Ultrasound  [x] Estim  [] Gait Training      [] Cervical Traction []

## 2023-12-04 ENCOUNTER — HOSPITAL ENCOUNTER (OUTPATIENT)
Dept: PHYSICAL THERAPY | Age: 78
Setting detail: THERAPIES SERIES
Discharge: HOME | End: 2023-12-04
Payer: COMMERCIAL

## 2023-12-04 PROCEDURE — 97140 MANUAL THERAPY 1/> REGIONS: CPT

## 2023-12-04 PROCEDURE — 97110 THERAPEUTIC EXERCISES: CPT

## 2023-12-04 NOTE — FLOWSHEET NOTE
Outpatient Physical Therapy  [place]           [x] Phone: [phone]   Fax: [phone]  [facility]           [] Phone: [phone]   Fax: [phone]        Physical Therapy Daily Treatment Note  Date:  2023    Patient Name:  Marquise Charles    :  1945  MRN: [id number]  Restrictions/Precautions: No data recorded   Diagnosis:   Sprain of right rotator cuff capsule, initial encounter [S43.421A]  Osteoarthritis, localized, shoulder, right [M19.011]  Traumatic partial tear of biceps tendon, right, initial encounter [S46.211A] Diagnosis: R shoulder pain, RC strain  Date of Injury/Surgery:   Treatment Diagnosis:  R shouler pain , stiffness, weakness, RC tendinosis  Insurance/Certification information: [facility]  Referring Physician:  Bradford Hicks MD     PCP: Nadia Izquierdo MD  Next Doctor Visit:    Plan of care signed (Y/N):  Y  Outcome Measure: Q Dash  32 pts  Visit# / total visits:  6 /  15-  Pain level:     0 /10       Goals:     Patient goals: lower shoudler pain levels with ADL, haircare and lifting and carrying her lap top and groceries  Short term goals  Time Frame for Short term goals: 4 weeks  1. ind with HEP for R sholder ROM and strength  2. avg R shoudler pain with ADLs 4/10 or less  3. reports 50% less tender to palp in the R shoulder biceps tendon and distal RC area  4. PROM R shoulder 150 flex and scap (23 PROM: 154 F, 138 Scap)  Long Term Goals  Time Frame for Long Term Goals: 6-8 weeks  1.  pt to reprots 75% better overall and able to carry her laptop, groceries and reach her cabinets comfortably  2. MMT R shoulder 4/5 flex , scap, ER and IR for better activity tolerance  3. AROM R shoulder flex and scap 150  4.  Q Dash improved to 18 or less        Summary of Evaluation:  Assessment: Pt appears today with R shoulder pain , she did have a RC repair 20 years ago and was doing well , she started to have her pain about a month ago while doing housework, she recalls no

## 2023-12-08 ENCOUNTER — HOSPITAL ENCOUNTER (OUTPATIENT)
Dept: PHYSICAL THERAPY | Age: 78
Setting detail: THERAPIES SERIES
Discharge: HOME | End: 2023-12-08
Payer: COMMERCIAL

## 2023-12-08 PROCEDURE — 97110 THERAPEUTIC EXERCISES: CPT

## 2023-12-08 PROCEDURE — 97140 MANUAL THERAPY 1/> REGIONS: CPT

## 2023-12-08 NOTE — FLOWSHEET NOTE
Outpatient Physical Therapy  [place]           [x] Phone: [phone]   Fax: [phone]  [facility]           [] Phone: [phone]   Fax: [phone]        Physical Therapy Daily Treatment Note  Date:  2023    Patient Name:  Emiliana Greenberg    :  1945  MRN: [id number]  Restrictions/Precautions: No data recorded   Diagnosis:   Sprain of right rotator cuff capsule, initial encounter [S43.421A]  Osteoarthritis, localized, shoulder, right [M19.011]  Traumatic partial tear of biceps tendon, right, initial encounter [S46211A] Diagnosis: R shoulder pain, RC strain  Date of Injury/Surgery:   Treatment Diagnosis:  R shouler pain , stiffness, weakness, RC tendinosis  Insurance/Certification information: [facility]  Referring Physician:  Zee Javed MD     PCP: Gordo Aldridge MD  Next Doctor Visit:    Plan of care signed (Y/N):  Y  Outcome Measure: Q Dash  32 pts  Visit# / total visits:   7 /  15-  Pain level:     0 /10       Goals:     Patient goals: lower shoudler pain levels with ADL, haircare and lifting and carrying her lap top and groceries  Short term goals  Time Frame for Short term goals: 4 weeks  1. ind with HEP for R shoulder ROM and strength     met  2. avg R shoudler pain with ADLs 4/10 or less          met  3. reports 50% less tender to palp in the R shoulder biceps tendon and distal RC area     met  4. PROM R shoulder 150 flex and scap met  Long Term Goals  Time Frame for Long Term Goals: 6-8 weeks  1.  pt to reprots 75% better overall and able to carry her laptop, groceries and reach her cabinets comfortably   met  2. MMT R shoulder 4/5 flex , scap, ER and IR for better activity tolerance           met  3.  AROM R shoulder flex and scap 150   met  4. Q Dash improved to 18 or less      not met        Summary of Evaluation:  Assessment: Pt appears today with R shoulder pain , she did have a RC repair 20 years ago and was doing well , she started to have her pain about a month

## 2023-12-08 NOTE — PROGRESS NOTES
Outpatient Physical Therapy           [place]           [x] Phone: [phone]   Fax: [phone]  [facility]           [] Phone: [phone]   Fax: [phone]      To: Isaiah Holley MD     From: Phillip Chavez, PT, DPT     Patient: Tosin Watson                    : 1945  Diagnosis:  Sprain of right rotator cuff capsule, initial encounter [S14.215Q]  Osteoarthritis, localized, shoulder, right [M19.011]  Traumatic partial tear of biceps tendon, right, initial encounter [K52.415N]        Treatment Diagnosis:      R shouler pain , stiffness, weakness, RC tendinosis   Date: 2023  []  Progress Note                [x]  Discharge Note    Evaluation Date:   23  Total Visits to date:   7 Cancels/No-shows to date:  0    Subjective:  Deandre Myles arrived to therapy today stating that she has no pain in her shoulder. she note sits just have a little muscle soreness,  she states that she is ready to d/c today and is doing well with her HEP. She is back to [employer] al hr typical activity with no limitations. She does not that she did hire a cleaning person once a month to do the hard cleaning. She reports that she is 80 % better overall.        Plan of Care/Treatment to date:  [x] Therapeutic Exercise    [] Modalities:  [x] Therapeutic Activity     [] Ultrasound  [] Electrical Stimulation  [] Gait Training      [] Cervical Traction   [] Lumbar Traction  [x] Neuromuscular Re-education  [] Cold/hotpack [] Iontophoresis  [x] Instruction in HEP      Other:  [x] Manual Therapy       []  Vasopneumatic  [] Aquatic Therapy       []   Dry Needle Therapy                      Objective/Significant Findings At Last Visit/Comments:  80 % better overall , ind with HEP,  R shoulder pain  0-3/10with typical activity  including carrying her lap top and and reaching over head to the cabinets , PROM R shoulder flex 170 scap 158 IR 80 ER 83 ,  MMT flex 4/5 scap 4/5 ER 4/5 IR 4/5 , AROM R shoulder flex 156 scap 158 , Q Dash : 30

## 2023-12-12 ENCOUNTER — OFFICE VISIT (OUTPATIENT)
Dept: ORTHOPEDIC SURGERY | Age: 78
End: 2023-12-12
Payer: COMMERCIAL

## 2023-12-12 VITALS — BODY MASS INDEX: 30.5 KG/M2 | RESPIRATION RATE: 13 BRPM | OXYGEN SATURATION: 97 % | HEIGHT: 59 IN | HEART RATE: 60 BPM

## 2023-12-12 DIAGNOSIS — M19.011 OSTEOARTHRITIS, LOCALIZED, SHOULDER, RIGHT: Primary | ICD-10-CM

## 2023-12-12 DIAGNOSIS — S43.421A SPRAIN OF RIGHT ROTATOR CUFF CAPSULE, INITIAL ENCOUNTER: ICD-10-CM

## 2023-12-12 DIAGNOSIS — S46.211A TRAUMATIC PARTIAL TEAR OF BICEPS TENDON, RIGHT, INITIAL ENCOUNTER: ICD-10-CM

## 2023-12-12 PROCEDURE — 1123F ACP DISCUSS/DSCN MKR DOCD: CPT | Performed by: ORTHOPAEDIC SURGERY

## 2023-12-12 PROCEDURE — 99212 OFFICE O/P EST SF 10 MIN: CPT | Performed by: ORTHOPAEDIC SURGERY

## 2023-12-12 ASSESSMENT — ENCOUNTER SYMPTOMS
COLOR CHANGE: 0
CHEST TIGHTNESS: 0
SHORTNESS OF BREATH: 0

## 2023-12-12 NOTE — PROGRESS NOTES
Patient returns to the office following a right shoulder injection. Pt stated with the steroid injection and physical therapy she has noticed her symptoms reduce since the last visit. . Pt stated that her pain today is minimal and has seen more mobility since the last visit.

## 2023-12-18 DIAGNOSIS — I25.10 CORONARY ARTERY DISEASE INVOLVING NATIVE CORONARY ARTERY OF NATIVE HEART WITHOUT ANGINA PECTORIS: ICD-10-CM

## 2023-12-18 DIAGNOSIS — E78.2 HYPERLIPEMIA, MIXED: ICD-10-CM

## 2023-12-18 RX ORDER — SIMVASTATIN 40 MG
40 TABLET ORAL NIGHTLY
Qty: 90 TABLET | Refills: 3 | OUTPATIENT
Start: 2023-12-18

## 2024-01-10 ENCOUNTER — HOSPITAL ENCOUNTER (OUTPATIENT)
Dept: WOUND CARE | Age: 79
Discharge: HOME | End: 2024-01-10
Payer: MEDICARE

## 2024-01-10 VITALS
SYSTOLIC BLOOD PRESSURE: 141 MMHG | HEART RATE: 64 BPM | DIASTOLIC BLOOD PRESSURE: 74 MMHG | RESPIRATION RATE: 16 BRPM | TEMPERATURE: 97.6 F

## 2024-01-10 DIAGNOSIS — L97.312 VENOUS STASIS ULCER OF RIGHT ANKLE WITH FAT LAYER EXPOSED, UNSPECIFIED WHETHER VARICOSE VEINS PRESENT (HCC): Primary | ICD-10-CM

## 2024-01-10 DIAGNOSIS — I83.013 VENOUS STASIS ULCER OF RIGHT ANKLE WITH FAT LAYER EXPOSED, UNSPECIFIED WHETHER VARICOSE VEINS PRESENT (HCC): Primary | ICD-10-CM

## 2024-01-10 PROCEDURE — 11042 DBRDMT SUBQ TIS 1ST 20SQCM/<: CPT | Performed by: NURSE PRACTITIONER

## 2024-01-10 PROCEDURE — 11042 DBRDMT SUBQ TIS 1ST 20SQCM/<: CPT

## 2024-01-10 PROCEDURE — 6370000000 HC RX 637 (ALT 250 FOR IP): Performed by: NURSE PRACTITIONER

## 2024-01-10 PROCEDURE — 99213 OFFICE O/P EST LOW 20 MIN: CPT | Performed by: NURSE PRACTITIONER

## 2024-01-10 RX ORDER — BETAMETHASONE DIPROPIONATE 0.5 MG/G
CREAM TOPICAL ONCE
Status: CANCELLED | OUTPATIENT
Start: 2024-01-10 | End: 2024-01-10

## 2024-01-10 RX ORDER — LIDOCAINE 50 MG/G
OINTMENT TOPICAL ONCE
Status: CANCELLED | OUTPATIENT
Start: 2024-01-10 | End: 2024-01-10

## 2024-01-10 RX ORDER — BETAMETHASONE DIPROPIONATE 0.5 MG/G
CREAM TOPICAL ONCE
OUTPATIENT
Start: 2024-01-10 | End: 2024-01-10

## 2024-01-10 RX ORDER — LIDOCAINE 40 MG/G
CREAM TOPICAL ONCE
OUTPATIENT
Start: 2024-01-10 | End: 2024-01-10

## 2024-01-10 RX ORDER — LIDOCAINE 50 MG/G
OINTMENT TOPICAL ONCE
OUTPATIENT
Start: 2024-01-10 | End: 2024-01-10

## 2024-01-10 RX ORDER — SODIUM CHLOR/HYPOCHLOROUS ACID 0.033 %
SOLUTION, IRRIGATION IRRIGATION ONCE
OUTPATIENT
Start: 2024-01-10 | End: 2024-01-10

## 2024-01-10 RX ORDER — LIDOCAINE HYDROCHLORIDE 20 MG/ML
JELLY TOPICAL ONCE
Status: CANCELLED | OUTPATIENT
Start: 2024-01-10 | End: 2024-01-10

## 2024-01-10 RX ORDER — IBUPROFEN 200 MG
TABLET ORAL ONCE
Status: CANCELLED | OUTPATIENT
Start: 2024-01-10 | End: 2024-01-10

## 2024-01-10 RX ORDER — LIDOCAINE HYDROCHLORIDE 40 MG/ML
SOLUTION TOPICAL ONCE
Status: CANCELLED | OUTPATIENT
Start: 2024-01-10 | End: 2024-01-10

## 2024-01-10 RX ORDER — LIDOCAINE 40 MG/G
CREAM TOPICAL ONCE
Status: CANCELLED | OUTPATIENT
Start: 2024-01-10 | End: 2024-01-10

## 2024-01-10 RX ORDER — BACITRACIN ZINC AND POLYMYXIN B SULFATE 500; 1000 [USP'U]/G; [USP'U]/G
OINTMENT TOPICAL ONCE
Status: CANCELLED | OUTPATIENT
Start: 2024-01-10 | End: 2024-01-10

## 2024-01-10 RX ORDER — LIDOCAINE HYDROCHLORIDE 40 MG/ML
SOLUTION TOPICAL ONCE
OUTPATIENT
Start: 2024-01-10 | End: 2024-01-10

## 2024-01-10 RX ORDER — SODIUM CHLOR/HYPOCHLOROUS ACID 0.033 %
SOLUTION, IRRIGATION IRRIGATION ONCE
Status: CANCELLED | OUTPATIENT
Start: 2024-01-10 | End: 2024-01-10

## 2024-01-10 RX ORDER — BACITRACIN ZINC 500 [USP'U]/G
OINTMENT TOPICAL ONCE
Status: CANCELLED | OUTPATIENT
Start: 2024-01-10 | End: 2024-01-10

## 2024-01-10 RX ORDER — BACITRACIN ZINC 500 [USP'U]/G
OINTMENT TOPICAL ONCE
OUTPATIENT
Start: 2024-01-10 | End: 2024-01-10

## 2024-01-10 RX ORDER — TRIAMCINOLONE ACETONIDE 1 MG/G
OINTMENT TOPICAL ONCE
OUTPATIENT
Start: 2024-01-10 | End: 2024-01-10

## 2024-01-10 RX ORDER — IBUPROFEN 200 MG
TABLET ORAL ONCE
OUTPATIENT
Start: 2024-01-10 | End: 2024-01-10

## 2024-01-10 RX ORDER — CLOBETASOL PROPIONATE 0.5 MG/G
OINTMENT TOPICAL ONCE
Status: CANCELLED | OUTPATIENT
Start: 2024-01-10 | End: 2024-01-10

## 2024-01-10 RX ORDER — TRIAMCINOLONE ACETONIDE 1 MG/G
OINTMENT TOPICAL ONCE
Status: CANCELLED | OUTPATIENT
Start: 2024-01-10 | End: 2024-01-10

## 2024-01-10 RX ORDER — LIDOCAINE HYDROCHLORIDE 20 MG/ML
JELLY TOPICAL ONCE
OUTPATIENT
Start: 2024-01-10 | End: 2024-01-10

## 2024-01-10 RX ORDER — CLOBETASOL PROPIONATE 0.5 MG/G
OINTMENT TOPICAL ONCE
OUTPATIENT
Start: 2024-01-10 | End: 2024-01-10

## 2024-01-10 RX ORDER — GENTAMICIN SULFATE 1 MG/G
OINTMENT TOPICAL ONCE
Status: COMPLETED | OUTPATIENT
Start: 2024-01-10 | End: 2024-01-10

## 2024-01-10 RX ORDER — DOXYCYCLINE HYCLATE 100 MG
100 TABLET ORAL 2 TIMES DAILY
Qty: 20 TABLET | Refills: 0 | Status: SHIPPED | OUTPATIENT
Start: 2024-01-10 | End: 2024-01-20

## 2024-01-10 RX ORDER — GENTAMICIN SULFATE 1 MG/G
OINTMENT TOPICAL ONCE
OUTPATIENT
Start: 2024-01-10 | End: 2024-01-10

## 2024-01-10 RX ORDER — BACITRACIN ZINC AND POLYMYXIN B SULFATE 500; 1000 [USP'U]/G; [USP'U]/G
OINTMENT TOPICAL ONCE
OUTPATIENT
Start: 2024-01-10 | End: 2024-01-10

## 2024-01-10 RX ADMIN — GENTAMICIN SULFATE: 1 OINTMENT TOPICAL at 14:38

## 2024-01-10 ASSESSMENT — PAIN SCALES - GENERAL: PAINLEVEL_OUTOF10: 5

## 2024-01-10 ASSESSMENT — PAIN DESCRIPTION - FREQUENCY: FREQUENCY: INTERMITTENT

## 2024-01-10 ASSESSMENT — PAIN - FUNCTIONAL ASSESSMENT: PAIN_FUNCTIONAL_ASSESSMENT: PREVENTS OR INTERFERES SOME ACTIVE ACTIVITIES AND ADLS

## 2024-01-10 ASSESSMENT — PAIN DESCRIPTION - ORIENTATION: ORIENTATION: RIGHT

## 2024-01-10 ASSESSMENT — PAIN DESCRIPTION - LOCATION: LOCATION: ANKLE

## 2024-01-10 ASSESSMENT — PAIN DESCRIPTION - DESCRIPTORS: DESCRIPTORS: BURNING

## 2024-01-10 ASSESSMENT — PAIN DESCRIPTION - PAIN TYPE: TYPE: CHRONIC PAIN

## 2024-01-10 NOTE — PROGRESS NOTES
cm^3 01/10/24 1414   Distance Tunneling (cm) 0 cm 01/10/24 1313   Tunneling Position ___ O'Clock 0 01/10/24 1313   Undermining Starts ___ O'Clock 0 01/10/24 1313   Undermining Ends___ O'Clock 0 01/10/24 1313   Undermining Maxium Distance (cm) 0 01/10/24 1313   Wound Assessment Slough 01/10/24 1313   Drainage Amount Scant (moist but unmeasurable) 01/10/24 1313   Drainage Description Yellow 01/10/24 1313   Odor None 01/10/24 1313   Gabriella-wound Assessment Intact 01/10/24 1313   Margins Defined edges 01/10/24 1313   Number of days: 0       Percent of Wound(s) Debrided: 100%    Total  Area  Debrided:  0.04 sq cm     Bleeding:  Minimal    Hemostasis Achieved:  by pressure    Procedural Pain:  2  / 10     Post Procedural Pain:  0 / 10     Response to treatment:  Well tolerated by patient.       Plan:     Patient Instructions   PHYSICIAN ORDERS AND DISCHARGE INSTRUCTIONS    Wound cleansing:     Do not scrub or use excessive force.   Wash hands with soap and water before and after dressing changes.   Prior to applying a clean dressing, cleanse wound with normal saline,               wound cleanser, or mild soap and water.    Ask the physician or nurse before getting the wound(s) wet in a shower      Wound Care Notes:  Imaging:   Cultures:             PAWAN:  Wound Care Supplies:   [facility]:   Rx:          Orders for this week:  1/10/2024    []  Paint toes with betadine       Right Lateral Ankle Wounds:Wash with soap and water. Pat dry.   Apply gentamicin and homero  powder to wound bed.  Cover with ca alginate   Secure with conform and coban   Tubi F on in am and off at night   Change Daily       []   Nurse Visit:   Follow Up Instructions: At the Wound Care Center in 1 week   Primary Wound Care Provider: Wallace Avila   Call  for any questions or concerns.  Central [phone] for imaging and lab work      Treatment Note      Written Patient Dismissal Instructions Given          Electronically signed by

## 2024-01-10 NOTE — PATIENT INSTRUCTIONS
PHYSICIAN ORDERS AND DISCHARGE INSTRUCTIONS    Wound cleansing:     Do not scrub or use excessive force.   Wash hands with soap and water before and after dressing changes.   Prior to applying a clean dressing, cleanse wound with normal saline,               wound cleanser, or mild soap and water.    Ask the physician or nurse before getting the wound(s) wet in a shower      Wound Care Notes:  Imaging:   Cultures:             PAWAN:  Wound Care Supplies:   [facility]:   Rx:          Orders for this week:  1/10/2024    []  Paint toes with betadine       Right Lateral Ankle Wounds:Wash with soap and water. Pat dry.   Apply gentamicin and homero  powder to wound bed.  Cover with ca alginate   Secure with conform and coban   Tubi F on in am and off at night   Change Daily       []   Nurse Visit:   Follow Up Instructions: At the Wound Care Center in 1 week   Primary Wound Care Provider: Wallace Avila   Call  for any questions or concerns.  Central [phone] for imaging and lab work

## 2024-01-17 ENCOUNTER — HOSPITAL ENCOUNTER (OUTPATIENT)
Dept: WOUND CARE | Age: 79
Discharge: HOME | End: 2024-01-17
Payer: MEDICARE

## 2024-01-17 DIAGNOSIS — L97.312 VENOUS STASIS ULCER OF RIGHT ANKLE WITH FAT LAYER EXPOSED, UNSPECIFIED WHETHER VARICOSE VEINS PRESENT (HCC): Primary | ICD-10-CM

## 2024-01-17 DIAGNOSIS — I83.013 VENOUS STASIS ULCER OF RIGHT ANKLE WITH FAT LAYER EXPOSED, UNSPECIFIED WHETHER VARICOSE VEINS PRESENT (HCC): Primary | ICD-10-CM

## 2024-01-17 PROCEDURE — 6370000000 HC RX 637 (ALT 250 FOR IP): Performed by: NURSE PRACTITIONER

## 2024-01-17 PROCEDURE — 11042 DBRDMT SUBQ TIS 1ST 20SQCM/<: CPT

## 2024-01-17 PROCEDURE — 11042 DBRDMT SUBQ TIS 1ST 20SQCM/<: CPT | Performed by: NURSE PRACTITIONER

## 2024-01-17 RX ORDER — BACITRACIN ZINC 500 [USP'U]/G
OINTMENT TOPICAL ONCE
OUTPATIENT
Start: 2024-01-17 | End: 2024-01-17

## 2024-01-17 RX ORDER — CLOBETASOL PROPIONATE 0.5 MG/G
OINTMENT TOPICAL ONCE
Status: COMPLETED | OUTPATIENT
Start: 2024-01-17 | End: 2024-01-17

## 2024-01-17 RX ORDER — SODIUM CHLOR/HYPOCHLOROUS ACID 0.033 %
SOLUTION, IRRIGATION IRRIGATION ONCE
OUTPATIENT
Start: 2024-01-17 | End: 2024-01-17

## 2024-01-17 RX ORDER — LIDOCAINE HYDROCHLORIDE 20 MG/ML
JELLY TOPICAL ONCE
OUTPATIENT
Start: 2024-01-17 | End: 2024-01-17

## 2024-01-17 RX ORDER — CLOBETASOL PROPIONATE 0.5 MG/G
OINTMENT TOPICAL ONCE
OUTPATIENT
Start: 2024-01-17 | End: 2024-01-17

## 2024-01-17 RX ORDER — LIDOCAINE 50 MG/G
OINTMENT TOPICAL ONCE
OUTPATIENT
Start: 2024-01-17 | End: 2024-01-17

## 2024-01-17 RX ORDER — IBUPROFEN 200 MG
TABLET ORAL ONCE
OUTPATIENT
Start: 2024-01-17 | End: 2024-01-17

## 2024-01-17 RX ORDER — BETAMETHASONE DIPROPIONATE 0.5 MG/G
CREAM TOPICAL ONCE
OUTPATIENT
Start: 2024-01-17 | End: 2024-01-17

## 2024-01-17 RX ORDER — GENTAMICIN SULFATE 1 MG/G
OINTMENT TOPICAL ONCE
Status: COMPLETED | OUTPATIENT
Start: 2024-01-17 | End: 2024-01-17

## 2024-01-17 RX ORDER — LIDOCAINE 40 MG/G
CREAM TOPICAL ONCE
OUTPATIENT
Start: 2024-01-17 | End: 2024-01-17

## 2024-01-17 RX ORDER — HYDROCODONE BITARTRATE AND ACETAMINOPHEN 5; 325 MG/1; MG/1
1 TABLET ORAL EVERY 12 HOURS PRN
Qty: 10 TABLET | Refills: 0 | Status: SHIPPED | OUTPATIENT
Start: 2024-01-17 | End: 2024-01-24

## 2024-01-17 RX ORDER — TRIAMCINOLONE ACETONIDE 1 MG/G
OINTMENT TOPICAL ONCE
OUTPATIENT
Start: 2024-01-17 | End: 2024-01-17

## 2024-01-17 RX ORDER — BACITRACIN ZINC AND POLYMYXIN B SULFATE 500; 1000 [USP'U]/G; [USP'U]/G
OINTMENT TOPICAL ONCE
OUTPATIENT
Start: 2024-01-17 | End: 2024-01-17

## 2024-01-17 RX ORDER — LIDOCAINE HYDROCHLORIDE 40 MG/ML
SOLUTION TOPICAL ONCE
OUTPATIENT
Start: 2024-01-17 | End: 2024-01-17

## 2024-01-17 RX ORDER — GENTAMICIN SULFATE 1 MG/G
OINTMENT TOPICAL ONCE
OUTPATIENT
Start: 2024-01-17 | End: 2024-01-17

## 2024-01-17 RX ADMIN — GENTAMICIN SULFATE: 1 OINTMENT TOPICAL at 16:20

## 2024-01-17 RX ADMIN — CLOBETASOL PROPIONATE: 0.5 OINTMENT TOPICAL at 16:20

## 2024-01-17 ASSESSMENT — PAIN - FUNCTIONAL ASSESSMENT: PAIN_FUNCTIONAL_ASSESSMENT: PREVENTS OR INTERFERES SOME ACTIVE ACTIVITIES AND ADLS

## 2024-01-17 ASSESSMENT — PAIN DESCRIPTION - ONSET: ONSET: ON-GOING

## 2024-01-17 ASSESSMENT — PAIN DESCRIPTION - PAIN TYPE: TYPE: CHRONIC PAIN

## 2024-01-17 ASSESSMENT — PAIN DESCRIPTION - DESCRIPTORS: DESCRIPTORS: BURNING

## 2024-01-17 ASSESSMENT — PAIN DESCRIPTION - FREQUENCY: FREQUENCY: INTERMITTENT

## 2024-01-17 ASSESSMENT — PAIN DESCRIPTION - LOCATION: LOCATION: ANKLE

## 2024-01-17 ASSESSMENT — PAIN SCALES - GENERAL: PAINLEVEL_OUTOF10: 7

## 2024-01-17 ASSESSMENT — PAIN DESCRIPTION - ORIENTATION: ORIENTATION: RIGHT

## 2024-01-17 NOTE — PATIENT INSTRUCTIONS
PHYSICIAN ORDERS AND DISCHARGE INSTRUCTIONS    Wound cleansing:     Do not scrub or use excessive force.   Wash hands with soap and water before and after dressing changes.   Prior to applying a clean dressing, cleanse wound with normal saline,               wound cleanser, or mild soap and water.    Ask the physician or nurse before getting the wound(s) wet in a shower      Compression Wrap Education   When slippage occurs, the wrap should be removed immediately and rewrapped or a different wrap should be applied. If removal is necessary, cover wound with clean bandage and contact the wound care clinic.   Monitor for impaired circulation including pale, cool or numb extremities distal to the wrap or garment.   If pain, numbness, tingling, discolouration or swelling of the toes occurs, the compression wrap or stocking must be removed immediately  Report to provider, such as pain, numbness in toes, heavy drainage, and slippage of dressing.  Keep compression wraps clean and dry. May use cast cover to take a shower or take sponge baths.   Do not stick objects inside wraps to scratch. This may create more wounds.   Speak to the provider about any issues or questions you may have about your compression wraps.   Rest and Elevate lower extremities throughout the day.   Avoid prolonged sitting with legs dangling or prolonged standing   If supplies are not available please DO NOT remove wraps until next visit to Wound Care Center    Wound Care Notes:         Orders for this week:  1/17/2024    Right Lateral Ankle Wounds:Wash with soap and water. Pat dry.   Apply stoma ring to periwound ( Apply ring outside of irritated area)   Apply clobetasol to intact skin   Apply gentamicin and stimulen powder to wound bed.  Cover with ca alginate and ABD   Wrap with coban 2 lite   Tubi F on in am and off at night   Leave in place until next visit to wound care center       []   Nurse Visit:   Follow Up Instructions: At the Wound Care

## 2024-01-17 NOTE — PROGRESS NOTES
Multilayer Compression Wrap   (Not Unna) Below the Knee    NAME:  Aria Kelly  YOB: 1945  MEDICAL RECORD NUMBER:  [id number]  DATE:  1/17/2024    Multilayer compression wrap: Removed old Multilayer wrap if indicated and wash leg with mild soap/water.  Applied moisturizing agent to dry skin as needed.   Applied primary and secondary dressing as ordered.  Applied multilayered dressing below the knee to right lower leg.  Instructed patient/caregiver not to remove dressing and to keep it clean and dry.   Instructed patient/caregiver on complications to report to provider, such as pain, numbness in toes, heavy drainage, and slippage of dressing.  Instructed patient on purpose of compression dressing and on activity and exercise recommendations.      Electronically signed by Tessie Loredo LPN on 1/17/2024 at 4:19 PM  
aspirin 81 MG tablet Take 1 tablet by mouth nightly       No current facility-administered medications on file prior to encounter.       REVIEW OF SYSTEMS    Pertinent items are noted in HPI.    Constitutional: Negative for systemic symptoms including fever, chills and malaise.      Objective:      LMP  (LMP Unknown)     PHYSICAL EXAM      General: The patient is in no acute distress.    Mental status:  Patient is appropriate, is  oriented to place and plan of care.  Dermatologic exam: Visual inspection of the periwound reveals the skin to be normal in turgor and texture, dry, and edematous  Wound exam: see wound description below in procedure note      Assessment:     Problem List Items Addressed This Visit          Other    WD-Venous stasis ulcer of right ankle with fat layer exposed (HCC) - Primary    Relevant Orders    Initiate Outpatient Wound Care Protocol     Procedure Note    Indications:  Based on my examination of this patient's wound(s) today, sharp excision into necrotic subcutaneous tissue is required to promote healing and evaluate the extent of previous healing.    Performed by: WANDY Hartley - CNP    Consent obtained: Yes    Time out taken:  Yes    Pain Control: N/A      Debridement:Excisional Debridement    Using curette, #15 blade scalpel, and forceps the wound(s) was/were sharply debrided down through and including the removal of subcutaneous tissue.        Devitalized Tissue Debrided:  fibrin, biofilm, slough, necrotic/eschar, and exudate    Pre Debridement Measurements:  Are located in the Wound Documentation Flow Sheet    All active wounds listed below with today's date are evaluated  Wound(s)    debrided this date include # : 1     Post  Debridement Measurements:  Wound 01/10/24 Ankle Right;Lateral #1 (Active)   Wound Image   01/10/24 1313   Dressing Status New dressing applied 01/10/24 1435   Wound Cleansed Wound cleanser 01/17/24 1601   Offloading for Diabetic Foot Ulcers Offloading

## 2024-01-22 RX ORDER — HYDRALAZINE HYDROCHLORIDE 50 MG/1
TABLET, FILM COATED ORAL
Qty: 135 TABLET | Refills: 5 | Status: SHIPPED | OUTPATIENT
Start: 2024-01-22

## 2024-01-24 ENCOUNTER — HOSPITAL ENCOUNTER (OUTPATIENT)
Dept: WOUND CARE | Age: 79
Discharge: HOME | End: 2024-01-24
Payer: MEDICARE

## 2024-01-24 VITALS — DIASTOLIC BLOOD PRESSURE: 76 MMHG | SYSTOLIC BLOOD PRESSURE: 145 MMHG | HEART RATE: 67 BPM | TEMPERATURE: 98.4 F

## 2024-01-24 DIAGNOSIS — L97.312 VENOUS STASIS ULCER OF RIGHT ANKLE WITH FAT LAYER EXPOSED, UNSPECIFIED WHETHER VARICOSE VEINS PRESENT (HCC): Primary | ICD-10-CM

## 2024-01-24 DIAGNOSIS — I83.013 VENOUS STASIS ULCER OF RIGHT ANKLE WITH FAT LAYER EXPOSED, UNSPECIFIED WHETHER VARICOSE VEINS PRESENT (HCC): Primary | ICD-10-CM

## 2024-01-24 PROCEDURE — 11042 DBRDMT SUBQ TIS 1ST 20SQCM/<: CPT

## 2024-01-24 RX ORDER — BACITRACIN ZINC AND POLYMYXIN B SULFATE 500; 1000 [USP'U]/G; [USP'U]/G
OINTMENT TOPICAL ONCE
OUTPATIENT
Start: 2024-01-24 | End: 2024-01-24

## 2024-01-24 RX ORDER — LIDOCAINE HYDROCHLORIDE 40 MG/ML
SOLUTION TOPICAL ONCE
OUTPATIENT
Start: 2024-01-24 | End: 2024-01-24

## 2024-01-24 RX ORDER — GENTAMICIN SULFATE 1 MG/G
OINTMENT TOPICAL ONCE
OUTPATIENT
Start: 2024-01-24 | End: 2024-01-24

## 2024-01-24 RX ORDER — LIDOCAINE 40 MG/G
CREAM TOPICAL ONCE
OUTPATIENT
Start: 2024-01-24 | End: 2024-01-24

## 2024-01-24 RX ORDER — IBUPROFEN 200 MG
TABLET ORAL ONCE
OUTPATIENT
Start: 2024-01-24 | End: 2024-01-24

## 2024-01-24 RX ORDER — BACITRACIN ZINC 500 [USP'U]/G
OINTMENT TOPICAL ONCE
OUTPATIENT
Start: 2024-01-24 | End: 2024-01-24

## 2024-01-24 RX ORDER — SODIUM CHLOR/HYPOCHLOROUS ACID 0.033 %
SOLUTION, IRRIGATION IRRIGATION ONCE
OUTPATIENT
Start: 2024-01-24 | End: 2024-01-24

## 2024-01-24 RX ORDER — CLOBETASOL PROPIONATE 0.5 MG/G
OINTMENT TOPICAL ONCE
OUTPATIENT
Start: 2024-01-24 | End: 2024-01-24

## 2024-01-24 RX ORDER — LIDOCAINE HYDROCHLORIDE 20 MG/ML
JELLY TOPICAL ONCE
OUTPATIENT
Start: 2024-01-24 | End: 2024-01-24

## 2024-01-24 RX ORDER — TRIAMCINOLONE ACETONIDE 1 MG/G
OINTMENT TOPICAL ONCE
OUTPATIENT
Start: 2024-01-24 | End: 2024-01-24

## 2024-01-24 RX ORDER — BETAMETHASONE DIPROPIONATE 0.5 MG/G
CREAM TOPICAL ONCE
OUTPATIENT
Start: 2024-01-24 | End: 2024-01-24

## 2024-01-24 RX ORDER — LIDOCAINE 50 MG/G
OINTMENT TOPICAL ONCE
OUTPATIENT
Start: 2024-01-24 | End: 2024-01-24

## 2024-01-24 ASSESSMENT — PAIN DESCRIPTION - ORIENTATION: ORIENTATION: RIGHT

## 2024-01-24 ASSESSMENT — PAIN SCALES - GENERAL: PAINLEVEL_OUTOF10: 2

## 2024-01-24 ASSESSMENT — PAIN DESCRIPTION - DESCRIPTORS: DESCRIPTORS: BURNING;NAGGING

## 2024-01-24 ASSESSMENT — PAIN DESCRIPTION - LOCATION: LOCATION: ANKLE

## 2024-01-24 ASSESSMENT — PAIN DESCRIPTION - FREQUENCY: FREQUENCY: INTERMITTENT

## 2024-01-24 NOTE — PATIENT INSTRUCTIONS
PHYSICIAN ORDERS AND DISCHARGE INSTRUCTIONS    Wound cleansing:     Do not scrub or use excessive force.   Wash hands with soap and water before and after dressing changes.   Prior to applying a clean dressing, cleanse wound with normal saline,               wound cleanser, or mild soap and water.    Ask the physician or nurse before getting the wound(s) wet in a shower      Compression Wrap Education   When slippage occurs, the wrap should be removed immediately and rewrapped or a different wrap should be applied. If removal is necessary, cover wound with clean bandage and contact the wound care clinic.   Monitor for impaired circulation including pale, cool or numb extremities distal to the wrap or garment.   If pain, numbness, tingling, discolouration or swelling of the toes occurs, the compression wrap or stocking must be removed immediately  Report to provider, such as pain, numbness in toes, heavy drainage, and slippage of dressing.  Keep compression wraps clean and dry. May use cast cover to take a shower or take sponge baths.   Do not stick objects inside wraps to scratch. This may create more wounds.   Speak to the provider about any issues or questions you may have about your compression wraps.   Rest and Elevate lower extremities throughout the day.   Avoid prolonged sitting with legs dangling or prolonged standing   If supplies are not available please DO NOT remove wraps until next visit to Wound Care Center    Wound Care Notes:         Orders for this week:  1/24/2024    Right Lateral Ankle Wounds:Wash with soap and water. Pat dry.   Apply qwick to periwound secured by steristrips    Apply regenecare, colactive powder plus Ag and sorbact to wound bed.  Cover with qwick   Wrap with coban 2 lite   Leave in place until next visit to wound care center       []   Nurse Visit:   Follow Up Instructions: At the Wound Care Center in 1 week   Primary Wound Care Provider: Wallace Avila   Call  for any

## 2024-01-31 ENCOUNTER — HOSPITAL ENCOUNTER (OUTPATIENT)
Dept: WOUND CARE | Age: 79
Discharge: HOME | End: 2024-01-31
Payer: MEDICARE

## 2024-01-31 VITALS
HEART RATE: 68 BPM | TEMPERATURE: 97.8 F | RESPIRATION RATE: 16 BRPM | SYSTOLIC BLOOD PRESSURE: 159 MMHG | DIASTOLIC BLOOD PRESSURE: 67 MMHG

## 2024-01-31 DIAGNOSIS — L97.312 VENOUS STASIS ULCER OF RIGHT ANKLE WITH FAT LAYER EXPOSED, UNSPECIFIED WHETHER VARICOSE VEINS PRESENT (HCC): Primary | ICD-10-CM

## 2024-01-31 DIAGNOSIS — I83.013 VENOUS STASIS ULCER OF RIGHT ANKLE WITH FAT LAYER EXPOSED, UNSPECIFIED WHETHER VARICOSE VEINS PRESENT (HCC): Primary | ICD-10-CM

## 2024-01-31 PROCEDURE — 11042 DBRDMT SUBQ TIS 1ST 20SQCM/<: CPT | Performed by: NURSE PRACTITIONER

## 2024-01-31 PROCEDURE — 11042 DBRDMT SUBQ TIS 1ST 20SQCM/<: CPT

## 2024-01-31 RX ORDER — BETAMETHASONE DIPROPIONATE 0.5 MG/G
CREAM TOPICAL ONCE
OUTPATIENT
Start: 2024-01-31 | End: 2024-01-31

## 2024-01-31 RX ORDER — GENTAMICIN SULFATE 1 MG/G
OINTMENT TOPICAL ONCE
OUTPATIENT
Start: 2024-01-31 | End: 2024-01-31

## 2024-01-31 RX ORDER — LIDOCAINE 40 MG/G
CREAM TOPICAL ONCE
OUTPATIENT
Start: 2024-01-31 | End: 2024-01-31

## 2024-01-31 RX ORDER — LIDOCAINE 50 MG/G
OINTMENT TOPICAL ONCE
OUTPATIENT
Start: 2024-01-31 | End: 2024-01-31

## 2024-01-31 RX ORDER — LIDOCAINE HYDROCHLORIDE 20 MG/ML
JELLY TOPICAL ONCE
OUTPATIENT
Start: 2024-01-31 | End: 2024-01-31

## 2024-01-31 RX ORDER — SODIUM CHLOR/HYPOCHLOROUS ACID 0.033 %
SOLUTION, IRRIGATION IRRIGATION ONCE
OUTPATIENT
Start: 2024-01-31 | End: 2024-01-31

## 2024-01-31 RX ORDER — BACITRACIN ZINC AND POLYMYXIN B SULFATE 500; 1000 [USP'U]/G; [USP'U]/G
OINTMENT TOPICAL ONCE
OUTPATIENT
Start: 2024-01-31 | End: 2024-01-31

## 2024-01-31 RX ORDER — BACITRACIN ZINC 500 [USP'U]/G
OINTMENT TOPICAL ONCE
OUTPATIENT
Start: 2024-01-31 | End: 2024-01-31

## 2024-01-31 RX ORDER — CLOBETASOL PROPIONATE 0.5 MG/G
OINTMENT TOPICAL ONCE
OUTPATIENT
Start: 2024-01-31 | End: 2024-01-31

## 2024-01-31 RX ORDER — LIDOCAINE HYDROCHLORIDE 40 MG/ML
SOLUTION TOPICAL ONCE
OUTPATIENT
Start: 2024-01-31 | End: 2024-01-31

## 2024-01-31 RX ORDER — TRIAMCINOLONE ACETONIDE 1 MG/G
OINTMENT TOPICAL ONCE
OUTPATIENT
Start: 2024-01-31 | End: 2024-01-31

## 2024-01-31 RX ORDER — IBUPROFEN 200 MG
TABLET ORAL ONCE
OUTPATIENT
Start: 2024-01-31 | End: 2024-01-31

## 2024-01-31 ASSESSMENT — PAIN DESCRIPTION - ONSET: ONSET: ON-GOING

## 2024-01-31 ASSESSMENT — PAIN DESCRIPTION - FREQUENCY: FREQUENCY: INTERMITTENT

## 2024-01-31 ASSESSMENT — PAIN DESCRIPTION - LOCATION: LOCATION: LEG

## 2024-01-31 ASSESSMENT — PAIN DESCRIPTION - ORIENTATION: ORIENTATION: RIGHT

## 2024-01-31 ASSESSMENT — PAIN SCALES - GENERAL: PAINLEVEL_OUTOF10: 7

## 2024-01-31 ASSESSMENT — PAIN DESCRIPTION - PAIN TYPE: TYPE: ACUTE PAIN

## 2024-01-31 ASSESSMENT — PAIN DESCRIPTION - DESCRIPTORS: DESCRIPTORS: SORE

## 2024-01-31 ASSESSMENT — PAIN - FUNCTIONAL ASSESSMENT: PAIN_FUNCTIONAL_ASSESSMENT: PREVENTS OR INTERFERES SOME ACTIVE ACTIVITIES AND ADLS

## 2024-01-31 NOTE — WOUND CARE
Multilayer Compression Wrap   (Not Unna) Below the Knee    NAME:  Aria Kelly  YOB: 1945  MEDICAL RECORD NUMBER:  [id number]  DATE:  1/31/2024    Multilayer compression wrap: Removed old Multilayer wrap if indicated and wash leg with mild soap/water.  Applied moisturizing agent to dry skin as needed.   Applied primary and secondary dressing as ordered.  Applied multilayered dressing below the knee to right lower leg.  Instructed patient/caregiver not to remove dressing and to keep it clean and dry.   Instructed patient/caregiver on complications to report to provider, such as pain, numbness in toes, heavy drainage, and slippage of dressing.  Instructed patient on purpose of compression dressing and on activity and exercise recommendations.      Electronically signed by Lucille Roa LPN on 1/31/2024 at 3:21 PM

## 2024-02-09 ENCOUNTER — HOSPITAL ENCOUNTER (OUTPATIENT)
Dept: WOUND CARE | Age: 79
Discharge: HOME | End: 2024-02-09
Payer: MEDICARE

## 2024-02-09 VITALS
TEMPERATURE: 97.9 F | SYSTOLIC BLOOD PRESSURE: 140 MMHG | DIASTOLIC BLOOD PRESSURE: 75 MMHG | HEART RATE: 66 BPM | RESPIRATION RATE: 17 BRPM

## 2024-02-09 DIAGNOSIS — I83.013 VENOUS STASIS ULCER OF RIGHT ANKLE WITH FAT LAYER EXPOSED, UNSPECIFIED WHETHER VARICOSE VEINS PRESENT (HCC): Primary | ICD-10-CM

## 2024-02-09 DIAGNOSIS — L97.312 VENOUS STASIS ULCER OF RIGHT ANKLE WITH FAT LAYER EXPOSED, UNSPECIFIED WHETHER VARICOSE VEINS PRESENT (HCC): Primary | ICD-10-CM

## 2024-02-09 PROCEDURE — 11042 DBRDMT SUBQ TIS 1ST 20SQCM/<: CPT

## 2024-02-09 PROCEDURE — 87075 CULTR BACTERIA EXCEPT BLOOD: CPT

## 2024-02-09 PROCEDURE — 87070 CULTURE OTHR SPECIMN AEROBIC: CPT

## 2024-02-09 RX ORDER — BETAMETHASONE DIPROPIONATE 0.5 MG/G
CREAM TOPICAL ONCE
OUTPATIENT
Start: 2024-02-09 | End: 2024-02-09

## 2024-02-09 RX ORDER — LIDOCAINE HYDROCHLORIDE 20 MG/ML
JELLY TOPICAL ONCE
OUTPATIENT
Start: 2024-02-09 | End: 2024-02-09

## 2024-02-09 RX ORDER — LIDOCAINE 50 MG/G
OINTMENT TOPICAL ONCE
OUTPATIENT
Start: 2024-02-09 | End: 2024-02-09

## 2024-02-09 RX ORDER — LIDOCAINE HYDROCHLORIDE 40 MG/ML
SOLUTION TOPICAL ONCE
OUTPATIENT
Start: 2024-02-09 | End: 2024-02-09

## 2024-02-09 RX ORDER — CLOBETASOL PROPIONATE 0.5 MG/G
OINTMENT TOPICAL ONCE
OUTPATIENT
Start: 2024-02-09 | End: 2024-02-09

## 2024-02-09 RX ORDER — IBUPROFEN 200 MG
TABLET ORAL ONCE
OUTPATIENT
Start: 2024-02-09 | End: 2024-02-09

## 2024-02-09 RX ORDER — LIDOCAINE 40 MG/G
CREAM TOPICAL ONCE
OUTPATIENT
Start: 2024-02-09 | End: 2024-02-09

## 2024-02-09 RX ORDER — BACITRACIN ZINC 500 [USP'U]/G
OINTMENT TOPICAL ONCE
OUTPATIENT
Start: 2024-02-09 | End: 2024-02-09

## 2024-02-09 RX ORDER — BACITRACIN ZINC AND POLYMYXIN B SULFATE 500; 1000 [USP'U]/G; [USP'U]/G
OINTMENT TOPICAL ONCE
OUTPATIENT
Start: 2024-02-09 | End: 2024-02-09

## 2024-02-09 RX ORDER — SODIUM CHLOR/HYPOCHLOROUS ACID 0.033 %
SOLUTION, IRRIGATION IRRIGATION ONCE
OUTPATIENT
Start: 2024-02-09 | End: 2024-02-09

## 2024-02-09 RX ORDER — TRIAMCINOLONE ACETONIDE 1 MG/G
OINTMENT TOPICAL ONCE
OUTPATIENT
Start: 2024-02-09 | End: 2024-02-09

## 2024-02-09 RX ORDER — GENTAMICIN SULFATE 1 MG/G
OINTMENT TOPICAL ONCE
OUTPATIENT
Start: 2024-02-09 | End: 2024-02-09

## 2024-02-09 ASSESSMENT — PAIN SCALES - GENERAL: PAINLEVEL_OUTOF10: 2

## 2024-02-09 ASSESSMENT — PAIN - FUNCTIONAL ASSESSMENT: PAIN_FUNCTIONAL_ASSESSMENT: PREVENTS OR INTERFERES SOME ACTIVE ACTIVITIES AND ADLS

## 2024-02-09 ASSESSMENT — PAIN DESCRIPTION - ORIENTATION: ORIENTATION: RIGHT

## 2024-02-09 ASSESSMENT — PAIN DESCRIPTION - FREQUENCY: FREQUENCY: INTERMITTENT

## 2024-02-09 ASSESSMENT — PAIN DESCRIPTION - ONSET: ONSET: ON-GOING

## 2024-02-09 ASSESSMENT — PAIN DESCRIPTION - PAIN TYPE: TYPE: ACUTE PAIN

## 2024-02-09 ASSESSMENT — PAIN DESCRIPTION - DESCRIPTORS: DESCRIPTORS: SORE

## 2024-02-09 ASSESSMENT — PAIN DESCRIPTION - LOCATION: LOCATION: LEG

## 2024-02-09 NOTE — PROGRESS NOTES
Multilayer Compression Wrap   (Not Unna) Below the Knee    NAME:  Aria Kelly  YOB: 1945  MEDICAL RECORD NUMBER:  [id number]  DATE:  2/9/2024    Multilayer compression wrap: Removed old Multilayer wrap if indicated and wash leg with mild soap/water.  Applied moisturizing agent to dry skin as needed.   Applied primary and secondary dressing as ordered.  Applied multilayered dressing below the knee to right lower leg.  Instructed patient/caregiver not to remove dressing and to keep it clean and dry.   Instructed patient/caregiver on complications to report to provider, such as pain, numbness in toes, heavy drainage, and slippage of dressing.  Instructed patient on purpose of compression dressing and on activity and exercise recommendations.      Electronically signed by Karen Brewer LPN on 2/9/2024 at 10:33 AM  
Multilayer Compression Wrap   (Not Unna) Below the Knee    NAME:  Aria Kelly  YOB: 1945  MEDICAL RECORD NUMBER:  [id number]  DATE:  2/9/2024    Multilayer compression wrap: Removed old Multilayer wrap if indicated and wash leg with mild soap/water.  Applied moisturizing agent to dry skin as needed.   Applied primary and secondary dressing as ordered.  Applied multilayered dressing below the knee to right lower leg.  Instructed patient/caregiver not to remove dressing and to keep it clean and dry.   Instructed patient/caregiver on complications to report to provider, such as pain, numbness in toes, heavy drainage, and slippage of dressing.  Instructed patient on purpose of compression dressing and on activity and exercise recommendations.      Electronically signed by Cheryl Shi RN on 2/9/2024 at 10:29 AM  
Well tolerated by patient.     Status of wound progress and description from last visit:   improved.      Plan:       Patient Instructions   PHYSICIAN ORDERS AND DISCHARGE INSTRUCTIONS    Wound cleansing:     Do not scrub or use excessive force.   Wash hands with soap and water before and after dressing changes.   Prior to applying a clean dressing, cleanse wound with normal saline,               wound cleanser, or mild soap and water.    Ask the physician or nurse before getting the wound(s) wet in a shower      Compression Wrap Education   When slippage occurs, the wrap should be removed immediately and rewrapped or a different wrap should be applied. If removal is necessary, cover wound with clean bandage and contact the wound care clinic.   Monitor for impaired circulation including pale, cool or numb extremities distal to the wrap or garment.   If pain, numbness, tingling, discolouration or swelling of the toes occurs, the compression wrap or stocking must be removed immediately  Report to provider, such as pain, numbness in toes, heavy drainage, and slippage of dressing.  Keep compression wraps clean and dry. May use cast cover to take a shower or take sponge baths.   Do not stick objects inside wraps to scratch. This may create more wounds.   Speak to the provider about any issues or questions you may have about your compression wraps.   Rest and Elevate lower extremities throughout the day.   Avoid prolonged sitting with legs dangling or prolonged standing   If supplies are not available please DO NOT remove wraps until next visit to Wound Care Center    Wound Care Notes:         Orders for this week:  2/9/2024    Culture of right lateral ankle taken 2/9/24    Right Lateral Ankle Wounds - Wash with soap and water, pat dry.   Apply qwick to periwound secured by steristrips    Apply regenecare, colactive powder plus Ag and sorbact to wound bed.  Cover with qwick   Wrap with coban 2 lite   Leave in place until

## 2024-02-09 NOTE — PLAN OF CARE
Problem: Pain  Goal: Verbalizes/displays adequate comfort level or baseline comfort level  2/9/2024 1033 by Karen Brewer LPN  Outcome: Progressing  2/9/2024 1028 by Cheryl Shi RN  Outcome: Progressing

## 2024-02-09 NOTE — PATIENT INSTRUCTIONS
PHYSICIAN ORDERS AND DISCHARGE INSTRUCTIONS    Wound cleansing:     Do not scrub or use excessive force.   Wash hands with soap and water before and after dressing changes.   Prior to applying a clean dressing, cleanse wound with normal saline,               wound cleanser, or mild soap and water.    Ask the physician or nurse before getting the wound(s) wet in a shower      Compression Wrap Education   When slippage occurs, the wrap should be removed immediately and rewrapped or a different wrap should be applied. If removal is necessary, cover wound with clean bandage and contact the wound care clinic.   Monitor for impaired circulation including pale, cool or numb extremities distal to the wrap or garment.   If pain, numbness, tingling, discolouration or swelling of the toes occurs, the compression wrap or stocking must be removed immediately  Report to provider, such as pain, numbness in toes, heavy drainage, and slippage of dressing.  Keep compression wraps clean and dry. May use cast cover to take a shower or take sponge baths.   Do not stick objects inside wraps to scratch. This may create more wounds.   Speak to the provider about any issues or questions you may have about your compression wraps.   Rest and Elevate lower extremities throughout the day.   Avoid prolonged sitting with legs dangling or prolonged standing   If supplies are not available please DO NOT remove wraps until next visit to Wound Care Center    Wound Care Notes:         Orders for this week:  2/9/2024    Culture of right lateral ankle taken 2/9/24    Right Lateral Ankle Wounds - Wash with soap and water, pat dry.   Apply qwick to periwound secured by steristrips    Apply regenecare, colactive powder plus Ag and sorbact to wound bed.  Cover with qwick   Wrap with coban 2 lite   Leave in place until next visit to wound care center.      []   Nurse Visit:   Follow Up Instructions: At the Wound Care Center in 1 week   Primary Wound Care

## 2024-02-11 LAB
CULTURE: NORMAL
Lab: NORMAL
SPECIMEN: NORMAL

## 2024-02-14 ENCOUNTER — TELEMEDICINE (OUTPATIENT)
Dept: INTERNAL MEDICINE CLINIC | Age: 79
End: 2024-02-14
Payer: MEDICARE

## 2024-02-14 DIAGNOSIS — J01.00 ACUTE NON-RECURRENT MAXILLARY SINUSITIS: Primary | ICD-10-CM

## 2024-02-14 LAB
CULTURE: NORMAL
Lab: NORMAL
SPECIMEN: NORMAL

## 2024-02-14 PROCEDURE — 99213 OFFICE O/P EST LOW 20 MIN: CPT | Performed by: NURSE PRACTITIONER

## 2024-02-14 PROCEDURE — 1123F ACP DISCUSS/DSCN MKR DOCD: CPT | Performed by: NURSE PRACTITIONER

## 2024-02-14 RX ORDER — PREDNISONE 10 MG/1
TABLET ORAL
Qty: 20 TABLET | Refills: 0 | Status: SHIPPED | OUTPATIENT
Start: 2024-02-14

## 2024-02-14 RX ORDER — CODEINE PHOSPHATE/GUAIFENESIN 10-100MG/5
10 LIQUID (ML) ORAL 2 TIMES DAILY PRN
Qty: 120 ML | Refills: 0 | Status: SHIPPED | OUTPATIENT
Start: 2024-02-14 | End: 2024-02-20

## 2024-02-14 RX ORDER — AZITHROMYCIN 250 MG/1
250 TABLET, FILM COATED ORAL SEE ADMIN INSTRUCTIONS
Qty: 6 TABLET | Refills: 0 | Status: SHIPPED | OUTPATIENT
Start: 2024-02-14 | End: 2024-02-19

## 2024-02-14 NOTE — PROGRESS NOTES
2024    TELEHEALTH EVALUATION -- Audio/Visual    HPI:    Aria Kelly (:  1945) has requested an audio/video evaluation for the following concern(s):    Aria is a current patient of Dr Parsons who presents this afternoon via VV for c/o worsening chest congestion, nasal congestion, PND over the last few days. Denies any fevers, but does endorse chills. Denies any wheezing or SOB. She has not been taking any OTC medications for her symptoms.     Review of Systems   Constitutional:  Negative for activity change, appetite change, fatigue and fever.   HENT:  Positive for congestion, sinus pressure and sinus pain. Negative for nosebleeds.    Respiratory:  Positive for cough. Negative for apnea, chest tightness and shortness of breath.    Cardiovascular:  Negative for chest pain and palpitations.   Gastrointestinal:  Negative for abdominal pain, diarrhea, nausea and vomiting.   Genitourinary:  Negative for difficulty urinating, flank pain and hematuria.   Musculoskeletal:  Negative for arthralgias, joint swelling and myalgias.   Skin:  Negative for color change and rash.   Neurological:  Negative for dizziness, light-headedness and headaches.   Psychiatric/Behavioral: Negative.  Negative for behavioral problems.        Prior to Visit Medications    Medication Sig Taking? Authorizing Provider   predniSONE (DELTASONE) 10 MG tablet Take 4 tablets daily for 2 days, then 3 tablets for 2 days, 2 tablets for 2 days, then 1 tablet for 2 days Yes Thiago Salas APRN - CNP   azithromycin (ZITHROMAX) 250 MG tablet Take 1 tablet by mouth See Admin Instructions for 5 days 500mg on day 1 followed by 250mg on days 2 - 5 Yes Thiago Salas APRN - CNP   guaiFENesin-codeine (TUSSI-ORGANIDIN NR) 100-10 MG/5ML syrup Take 10 mLs by mouth 2 times daily as needed for Cough for up to 6 days. Max Daily Amount: 20 mLs Yes Thiago Salas APRN - CNP   hydrALAZINE (APRESOLINE) 50 MG tablet TAKE 1/2 TABLET IN THE MORNING AND 1

## 2024-02-16 ENCOUNTER — HOSPITAL ENCOUNTER (OUTPATIENT)
Dept: WOUND CARE | Age: 79
Discharge: HOME | End: 2024-02-16
Attending: NURSE PRACTITIONER
Payer: MEDICARE

## 2024-02-16 VITALS — SYSTOLIC BLOOD PRESSURE: 130 MMHG | RESPIRATION RATE: 17 BRPM | HEART RATE: 67 BPM | DIASTOLIC BLOOD PRESSURE: 67 MMHG

## 2024-02-16 DIAGNOSIS — I83.013 VENOUS STASIS ULCER OF RIGHT ANKLE WITH FAT LAYER EXPOSED, UNSPECIFIED WHETHER VARICOSE VEINS PRESENT (HCC): Primary | ICD-10-CM

## 2024-02-16 DIAGNOSIS — L97.312 VENOUS STASIS ULCER OF RIGHT ANKLE WITH FAT LAYER EXPOSED, UNSPECIFIED WHETHER VARICOSE VEINS PRESENT (HCC): Primary | ICD-10-CM

## 2024-02-16 PROCEDURE — 11042 DBRDMT SUBQ TIS 1ST 20SQCM/<: CPT | Performed by: NURSE PRACTITIONER

## 2024-02-16 PROCEDURE — 11042 DBRDMT SUBQ TIS 1ST 20SQCM/<: CPT

## 2024-02-16 RX ORDER — TRIAMCINOLONE ACETONIDE 1 MG/G
OINTMENT TOPICAL ONCE
OUTPATIENT
Start: 2024-02-16 | End: 2024-02-16

## 2024-02-16 RX ORDER — LIDOCAINE HYDROCHLORIDE 20 MG/ML
JELLY TOPICAL ONCE
OUTPATIENT
Start: 2024-02-16 | End: 2024-02-16

## 2024-02-16 RX ORDER — GENTAMICIN SULFATE 1 MG/G
OINTMENT TOPICAL ONCE
OUTPATIENT
Start: 2024-02-16 | End: 2024-02-16

## 2024-02-16 RX ORDER — LIDOCAINE HYDROCHLORIDE 40 MG/ML
SOLUTION TOPICAL ONCE
OUTPATIENT
Start: 2024-02-16 | End: 2024-02-16

## 2024-02-16 RX ORDER — SODIUM CHLOR/HYPOCHLOROUS ACID 0.033 %
SOLUTION, IRRIGATION IRRIGATION ONCE
OUTPATIENT
Start: 2024-02-16 | End: 2024-02-16

## 2024-02-16 RX ORDER — BACITRACIN ZINC AND POLYMYXIN B SULFATE 500; 1000 [USP'U]/G; [USP'U]/G
OINTMENT TOPICAL ONCE
OUTPATIENT
Start: 2024-02-16 | End: 2024-02-16

## 2024-02-16 RX ORDER — LIDOCAINE 50 MG/G
OINTMENT TOPICAL ONCE
OUTPATIENT
Start: 2024-02-16 | End: 2024-02-16

## 2024-02-16 RX ORDER — IBUPROFEN 200 MG
TABLET ORAL ONCE
OUTPATIENT
Start: 2024-02-16 | End: 2024-02-16

## 2024-02-16 RX ORDER — BACITRACIN ZINC 500 [USP'U]/G
OINTMENT TOPICAL ONCE
OUTPATIENT
Start: 2024-02-16 | End: 2024-02-16

## 2024-02-16 RX ORDER — BETAMETHASONE DIPROPIONATE 0.5 MG/G
CREAM TOPICAL ONCE
OUTPATIENT
Start: 2024-02-16 | End: 2024-02-16

## 2024-02-16 RX ORDER — CLOBETASOL PROPIONATE 0.5 MG/G
OINTMENT TOPICAL ONCE
OUTPATIENT
Start: 2024-02-16 | End: 2024-02-16

## 2024-02-16 RX ORDER — LIDOCAINE 40 MG/G
CREAM TOPICAL ONCE
OUTPATIENT
Start: 2024-02-16 | End: 2024-02-16

## 2024-02-16 NOTE — PROGRESS NOTES
Multilayer Compression Wrap   (Not Unna) Below the Knee    NAME:  Aria Kelly  YOB: 1945  MEDICAL RECORD NUMBER:  [id number]  DATE:  2/16/2024    Multilayer compression wrap: Removed old Multilayer wrap if indicated and wash leg with mild soap/water.  Applied moisturizing agent to dry skin as needed.   Applied primary and secondary dressing as ordered.  Applied multilayered dressing below the knee to right lower leg.  Instructed patient/caregiver not to remove dressing and to keep it clean and dry.   Instructed patient/caregiver on complications to report to provider, such as pain, numbness in toes, heavy drainage, and slippage of dressing.  Instructed patient on purpose of compression dressing and on activity and exercise recommendations.      Electronically signed by Tessie Loredo LPN on 2/16/2024 at 11:06 AM  
day.   Avoid prolonged sitting with legs dangling or prolonged standing   If supplies are not available please DO NOT remove wraps until next visit to Wound Care Center    Wound Care Notes:         Orders for this week:  2024    Right Lateral Ankle Wounds - Wash with soap and water, pat dry.   Apply qwick to periwound secured by steristrips    Apply regenecare colactive powder plus Ag and sorbact contact layer to wound bed secured by steristrips .  Wrap with coban 2 lite   Leave in place until next visit to wound care center.      []   Nurse Visit:   Follow Up Instructions: At the Wound Care Center in 1 week   Primary Wound Care Provider: Wallace Avila   Call  for any questions or concerns.  Central [phone] for imaging and lab work    Treatment Note Wound 01/10/24 Ankle Right;Lateral #1-Dressing/Treatment:  (Apply qwick to periwound secured by steristrips    Apply regenecare, colactive powder plus Ag and sorbact to wound bed.  Cover with qwick   Wrap with coban 2 lite)    Written Patient Dismissal Instructions Given            Electronically signed by WANDY Hartley CNP on 2024 at 11:09 AM

## 2024-02-16 NOTE — PATIENT INSTRUCTIONS
PHYSICIAN ORDERS AND DISCHARGE INSTRUCTIONS    Wound cleansing:     Do not scrub or use excessive force.   Wash hands with soap and water before and after dressing changes.   Prior to applying a clean dressing, cleanse wound with normal saline,               wound cleanser, or mild soap and water.    Ask the physician or nurse before getting the wound(s) wet in a shower      Compression Wrap Education   When slippage occurs, the wrap should be removed immediately and rewrapped or a different wrap should be applied. If removal is necessary, cover wound with clean bandage and contact the wound care clinic.   Monitor for impaired circulation including pale, cool or numb extremities distal to the wrap or garment.   If pain, numbness, tingling, discolouration or swelling of the toes occurs, the compression wrap or stocking must be removed immediately  Report to provider, such as pain, numbness in toes, heavy drainage, and slippage of dressing.  Keep compression wraps clean and dry. May use cast cover to take a shower or take sponge baths.   Do not stick objects inside wraps to scratch. This may create more wounds.   Speak to the provider about any issues or questions you may have about your compression wraps.   Rest and Elevate lower extremities throughout the day.   Avoid prolonged sitting with legs dangling or prolonged standing   If supplies are not available please DO NOT remove wraps until next visit to Wound Care Center    Wound Care Notes:         Orders for this week:  2/16/2024    Right Lateral Ankle Wounds - Wash with soap and water, pat dry.   Apply qwick to periwound secured by steristrips    Apply regenecare colactive powder plus Ag and sorbact contact layer to wound bed secured by steristrips .  Wrap with coban 2 lite   Leave in place until next visit to wound care center.      []   Nurse Visit:   Follow Up Instructions: At the Wound Care Center in 1 week   Primary Wound Care Provider: Wallace Avila   Call

## 2024-02-19 DIAGNOSIS — E03.4 HYPOTHYROIDISM DUE TO ACQUIRED ATROPHY OF THYROID: ICD-10-CM

## 2024-02-19 DIAGNOSIS — I25.10 CORONARY ARTERY DISEASE INVOLVING NATIVE CORONARY ARTERY OF NATIVE HEART WITHOUT ANGINA PECTORIS: ICD-10-CM

## 2024-02-19 DIAGNOSIS — I10 ESSENTIAL HYPERTENSION: ICD-10-CM

## 2024-02-19 DIAGNOSIS — F41.9 ANXIETY: ICD-10-CM

## 2024-02-19 LAB
CULTURE: NORMAL
Lab: NORMAL
SPECIMEN: NORMAL

## 2024-02-19 RX ORDER — CLOPIDOGREL BISULFATE 75 MG/1
75 TABLET ORAL DAILY
Qty: 90 TABLET | Refills: 1 | Status: SHIPPED | OUTPATIENT
Start: 2024-02-19

## 2024-02-19 RX ORDER — HYDROCHLOROTHIAZIDE 12.5 MG/1
12.5 CAPSULE, GELATIN COATED ORAL EVERY MORNING
Qty: 90 CAPSULE | Refills: 1 | Status: SHIPPED | OUTPATIENT
Start: 2024-02-19

## 2024-02-19 RX ORDER — METOPROLOL SUCCINATE 25 MG/1
25 TABLET, EXTENDED RELEASE ORAL DAILY
Qty: 90 TABLET | Refills: 1 | Status: SHIPPED | OUTPATIENT
Start: 2024-02-19

## 2024-02-19 RX ORDER — LEVOTHYROXINE SODIUM 0.03 MG/1
25 TABLET ORAL DAILY
Qty: 90 TABLET | Refills: 1 | Status: SHIPPED | OUTPATIENT
Start: 2024-02-19

## 2024-02-23 ENCOUNTER — HOSPITAL ENCOUNTER (OUTPATIENT)
Dept: WOUND CARE | Age: 79
Discharge: HOME | End: 2024-02-23
Attending: NURSE PRACTITIONER
Payer: MEDICARE

## 2024-02-23 VITALS
SYSTOLIC BLOOD PRESSURE: 141 MMHG | HEART RATE: 61 BPM | DIASTOLIC BLOOD PRESSURE: 63 MMHG | RESPIRATION RATE: 16 BRPM | TEMPERATURE: 97.8 F

## 2024-02-23 DIAGNOSIS — I83.013 VENOUS STASIS ULCER OF RIGHT ANKLE WITH FAT LAYER EXPOSED, UNSPECIFIED WHETHER VARICOSE VEINS PRESENT (HCC): Primary | ICD-10-CM

## 2024-02-23 DIAGNOSIS — L97.312 VENOUS STASIS ULCER OF RIGHT ANKLE WITH FAT LAYER EXPOSED, UNSPECIFIED WHETHER VARICOSE VEINS PRESENT (HCC): Primary | ICD-10-CM

## 2024-02-23 PROCEDURE — 11042 DBRDMT SUBQ TIS 1ST 20SQCM/<: CPT

## 2024-02-23 RX ORDER — BACITRACIN ZINC 500 [USP'U]/G
OINTMENT TOPICAL ONCE
OUTPATIENT
Start: 2024-02-23 | End: 2024-02-23

## 2024-02-23 RX ORDER — BETAMETHASONE DIPROPIONATE 0.5 MG/G
CREAM TOPICAL ONCE
OUTPATIENT
Start: 2024-02-23 | End: 2024-02-23

## 2024-02-23 RX ORDER — LIDOCAINE 50 MG/G
OINTMENT TOPICAL ONCE
OUTPATIENT
Start: 2024-02-23 | End: 2024-02-23

## 2024-02-23 RX ORDER — LIDOCAINE HYDROCHLORIDE 20 MG/ML
JELLY TOPICAL ONCE
OUTPATIENT
Start: 2024-02-23 | End: 2024-02-23

## 2024-02-23 RX ORDER — LIDOCAINE 40 MG/G
CREAM TOPICAL ONCE
OUTPATIENT
Start: 2024-02-23 | End: 2024-02-23

## 2024-02-23 RX ORDER — IBUPROFEN 200 MG
TABLET ORAL ONCE
OUTPATIENT
Start: 2024-02-23 | End: 2024-02-23

## 2024-02-23 RX ORDER — CLOBETASOL PROPIONATE 0.5 MG/G
OINTMENT TOPICAL ONCE
OUTPATIENT
Start: 2024-02-23 | End: 2024-02-23

## 2024-02-23 RX ORDER — BACITRACIN ZINC AND POLYMYXIN B SULFATE 500; 1000 [USP'U]/G; [USP'U]/G
OINTMENT TOPICAL ONCE
OUTPATIENT
Start: 2024-02-23 | End: 2024-02-23

## 2024-02-23 RX ORDER — LIDOCAINE HYDROCHLORIDE 40 MG/ML
SOLUTION TOPICAL ONCE
OUTPATIENT
Start: 2024-02-23 | End: 2024-02-23

## 2024-02-23 RX ORDER — SODIUM CHLOR/HYPOCHLOROUS ACID 0.033 %
SOLUTION, IRRIGATION IRRIGATION ONCE
OUTPATIENT
Start: 2024-02-23 | End: 2024-02-23

## 2024-02-23 RX ORDER — TRIAMCINOLONE ACETONIDE 1 MG/G
OINTMENT TOPICAL ONCE
OUTPATIENT
Start: 2024-02-23 | End: 2024-02-23

## 2024-02-23 RX ORDER — GENTAMICIN SULFATE 1 MG/G
OINTMENT TOPICAL ONCE
OUTPATIENT
Start: 2024-02-23 | End: 2024-02-23

## 2024-02-23 ASSESSMENT — PAIN DESCRIPTION - FREQUENCY: FREQUENCY: INTERMITTENT

## 2024-02-23 ASSESSMENT — PAIN SCALES - GENERAL: PAINLEVEL_OUTOF10: 0

## 2024-02-23 ASSESSMENT — PAIN DESCRIPTION - PAIN TYPE: TYPE: ACUTE PAIN

## 2024-02-23 ASSESSMENT — PAIN DESCRIPTION - LOCATION: LOCATION: LEG;ANKLE

## 2024-02-23 ASSESSMENT — PAIN - FUNCTIONAL ASSESSMENT: PAIN_FUNCTIONAL_ASSESSMENT: ACTIVITIES ARE NOT PREVENTED

## 2024-02-23 ASSESSMENT — PAIN DESCRIPTION - ORIENTATION: ORIENTATION: RIGHT

## 2024-02-23 NOTE — PATIENT INSTRUCTIONS
PHYSICIAN ORDERS AND DISCHARGE INSTRUCTIONS    Wound cleansing:     Do not scrub or use excessive force.   Wash hands with soap and water before and after dressing changes.   Prior to applying a clean dressing, cleanse wound with normal saline,               wound cleanser, or mild soap and water.    Ask the physician or nurse before getting the wound(s) wet in a shower      Compression Wrap Education   When slippage occurs, the wrap should be removed immediately and rewrapped or a different wrap should be applied. If removal is necessary, cover wound with clean bandage and contact the wound care clinic.   Monitor for impaired circulation including pale, cool or numb extremities distal to the wrap or garment.   If pain, numbness, tingling, discolouration or swelling of the toes occurs, the compression wrap or stocking must be removed immediately  Report to provider, such as pain, numbness in toes, heavy drainage, and slippage of dressing.  Keep compression wraps clean and dry. May use cast cover to take a shower or take sponge baths.   Do not stick objects inside wraps to scratch. This may create more wounds.   Speak to the provider about any issues or questions you may have about your compression wraps.   Rest and Elevate lower extremities throughout the day.   Avoid prolonged sitting with legs dangling or prolonged standing   If supplies are not available please DO NOT remove wraps until next visit to Wound Care Center    Wound Care Notes:         Orders for this week:  2/23/2024    Right Lateral Ankle Wounds - Wash with soap and water, pat dry.   Apply qwick to periwound secured by steristrips    Apply regenecare, colactive powder plus Ag and Urgo contact layer (until Sorbact is available) to wound bed secured by steristrips.  Wrap with coban 2 lite   Leave in place until next visit to wound care center.      []   Nurse Visit:   Follow Up Instructions: At the Wound Care Center in 1 week   Primary Wound Care

## 2024-02-23 NOTE — PROGRESS NOTES
Wound Care Center Progress Note With Procedure    Aria Kelly  AGE: 78 y.o.   GENDER: female  : 1945  EPISODE DATE:  2024     Subjective:     Chief Complaint   Patient presents with    Wound Check     RLE         HISTORY of PRESENT ILLNESS     Aria Kelly is a 78 y.o. female who presents to the Wound Clinic for a visit for evaluation and treatment of Chronic venous  ulcer(s) of  R ankle lateral.  The condition is of moderate severity. The ulcer has been present for several years.  The underlying cause is thought to be venous stasis.  The patients care to date has included care here and care at home. She was here for care previously. The patient has significant underlying medical conditions as below.     2024: Patient down in size this week. Still has some product for us to use     2024: Patient improved again this week. Will debride cap off and continue plan of care most likely     2024: Patient is looking much better. Wound is somewhat larger after debridement. Wound bed looks great and now is beefy red and granulating. Also tolerated debridement, which has never been the case in the past. Will continue to use new product.  Culture today to ensure she is clean     2024: Patient somewhat frustrated but her wound is about half the size  She was started on the silver collagen powder and seems promising so far  Ok with compression wrap again this week.  Still painful but not developing overly fibrous cap     2024: Patient looks ok. After debridement wound is slough free and pink and clean  Yet to be seen how wound will heal  Was in a lot of pain from heavy debridement last week and will send over some pain meds today.  Will try to offload with stoma ring  Will talk patient into a compression wrap today     Patient is not a diabetic or a smoker  She is on plavix     She needs this cleaned out thoroughly. She did not let us debride previously and we will push this today.

## 2024-02-23 NOTE — PROGRESS NOTES
Multilayer Compression Wrap   (Not Unna) Below the Knee    NAME:  Aria Kelly  YOB: 1945  MEDICAL RECORD NUMBER:  [id number]  DATE:  2/23/2024    Multilayer compression wrap: Removed old Multilayer wrap if indicated and wash leg with mild soap/water.  Applied moisturizing agent to dry skin as needed.   Applied primary and secondary dressing as ordered.  Applied multilayered dressing below the knee to right lower leg.  Instructed patient/caregiver not to remove dressing and to keep it clean and dry.   Instructed patient/caregiver on complications to report to provider, such as pain, numbness in toes, heavy drainage, and slippage of dressing.  Instructed patient on purpose of compression dressing and on activity and exercise recommendations.      Electronically signed by Nia Lemos LPN on 2/23/2024 at 11:52 AM

## 2024-02-26 ENCOUNTER — OFFICE VISIT (OUTPATIENT)
Dept: CARDIOLOGY CLINIC | Age: 79
End: 2024-02-26
Payer: MEDICARE

## 2024-02-26 VITALS
SYSTOLIC BLOOD PRESSURE: 124 MMHG | OXYGEN SATURATION: 97 % | HEIGHT: 59 IN | HEART RATE: 74 BPM | WEIGHT: 149.6 LBS | BODY MASS INDEX: 30.16 KG/M2 | DIASTOLIC BLOOD PRESSURE: 68 MMHG

## 2024-02-26 DIAGNOSIS — I25.118 ATHEROSCLEROTIC HEART DISEASE OF NATIVE CORONARY ARTERY WITH OTHER FORMS OF ANGINA PECTORIS (HCC): ICD-10-CM

## 2024-02-26 DIAGNOSIS — I25.10 ASCVD (ARTERIOSCLEROTIC CARDIOVASCULAR DISEASE): Primary | ICD-10-CM

## 2024-02-26 LAB
CULTURE: NORMAL
Lab: NORMAL
SPECIMEN: NORMAL

## 2024-02-26 PROCEDURE — 99214 OFFICE O/P EST MOD 30 MIN: CPT | Performed by: INTERNAL MEDICINE

## 2024-02-26 PROCEDURE — 3078F DIAST BP <80 MM HG: CPT | Performed by: INTERNAL MEDICINE

## 2024-02-26 PROCEDURE — 3074F SYST BP LT 130 MM HG: CPT | Performed by: INTERNAL MEDICINE

## 2024-02-26 PROCEDURE — 1123F ACP DISCUSS/DSCN MKR DOCD: CPT | Performed by: INTERNAL MEDICINE

## 2024-02-26 NOTE — PROGRESS NOTES
CARDIOLOGY NOTE      2/26/2024    RE: Aria Kelly  (1945)                               TO:  Erick Lao MD            CHIEF COMPLAINT   Aria is a 78 y.o. female who was seen today for management of  cad                                  Here for fu  HPI:                   Pt has h/o cad, htn, hyperlipidimnea, cea, seen today for  fu.   Aria Kelly has the following history recorded in care path:  Patient Active Problem List    Diagnosis Date Noted    Aortic regurgitation 11/18/2022    Spinal stenosis of lumbar region without neurogenic claudication 07/2022    Stenosis of left carotid artery 01/09/2017    Nevus of multiple sites 08/26/2015    Hypertension     ASCVD (arteriosclerotic cardiovascular disease)     Menopause     Osteopenia     Insomnia     Atherosclerotic heart disease of native coronary artery with other forms of angina pectoris (HCC) 02/26/2024    WD-Venous stasis ulcer of right ankle with fat layer exposed (HCC) 01/10/2024    Traumatic partial tear of biceps tendon, right, initial encounter 10/31/2023    Osteoarthritis, localized, shoulder, right 09/26/2023    Sprain of right rotator cuff capsule 09/26/2023    Vertebral artery stenosis, left 05/18/2023    IFG (impaired fasting glucose) 05/12/2023    Leg pain, lateral, right 04/18/2022    Hypothyroidism due to acquired atrophy of thyroid     Chronic kidney disease, stage I 10/16/2019    Hyponatremia 12/19/2018    Anxiety 05/26/2017     Current Outpatient Medications   Medication Sig Dispense Refill    sertraline (ZOLOFT) 50 MG tablet TAKE 1 TABLET BY MOUTH DAILY 90 tablet 1    metoprolol succinate (TOPROL XL) 25 MG extended release tablet TAKE 1 TABLET BY MOUTH DAILY 90 tablet 1    hydroCHLOROthiazide 12.5 MG capsule TAKE 1 CAPSULE BY MOUTH EVERY MORNING 90 capsule 1    levothyroxine (SYNTHROID) 25 MCG tablet TAKE 1 TABLET BY MOUTH DAILY 90 tablet 1    clopidogrel (PLAVIX) 75 MG tablet TAKE 1 TABLET BY MOUTH DAILY 90

## 2024-02-26 NOTE — PATIENT INSTRUCTIONS
**It is YOUR responsibilty to bring medication bottles and/or updated medication list to EACH APPOINTMENT. This will allow us to better serve you and all your healthcare needs**   Thank you for allowing us to care for you today!   We want to ensure we can follow your treatment plan and we strive to give you the best outcomes and experience possible.   If you ever have a life threatening emergency and call 911 - for an ambulance (EMS)   Our providers can only care for you at:   [facility] or [facility].   Even if you have someone take you or you drive yourself we can only care for you in a [facility]. Our providers are not setup at the other healthcare locations!   Please be informed that if you contact our office outside of normal business hours the physician on call cannot help with any scheduling or rescheduling issues, procedure instruction questions or any type of medication issue.    We advise you for any urgent/emergency that you go to the nearest emergency room!    PLEASE CALL OUR OFFICE DURING NORMAL BUSINESS HOURS    Monday - Friday   8 am to 5 pm    [place]: [phone]    [place]: [phone]    [place]:  [phone]  We are committed to providing you the best care possible.    If you receive a survey after visiting one of our offices, please take time to share your experience concerning your physician office visit.  These surveys are confidential and no health information about you is shared.    We are eager to improve for you and we are counting on your feedback to help make that happen.

## 2024-02-28 DIAGNOSIS — I10 ESSENTIAL HYPERTENSION: ICD-10-CM

## 2024-02-28 RX ORDER — LOSARTAN POTASSIUM 100 MG/1
100 TABLET ORAL DAILY
Qty: 90 TABLET | Refills: 1 | Status: SHIPPED | OUTPATIENT
Start: 2024-02-28 | End: 2024-08-26

## 2024-03-01 ENCOUNTER — HOSPITAL ENCOUNTER (OUTPATIENT)
Dept: WOUND CARE | Age: 79
Discharge: HOME | End: 2024-03-01
Attending: NURSE PRACTITIONER
Payer: MEDICARE

## 2024-03-01 VITALS
RESPIRATION RATE: 16 BRPM | TEMPERATURE: 97.6 F | DIASTOLIC BLOOD PRESSURE: 70 MMHG | HEART RATE: 64 BPM | SYSTOLIC BLOOD PRESSURE: 119 MMHG

## 2024-03-01 DIAGNOSIS — I83.013 VENOUS STASIS ULCER OF RIGHT ANKLE WITH FAT LAYER EXPOSED, UNSPECIFIED WHETHER VARICOSE VEINS PRESENT (HCC): Primary | ICD-10-CM

## 2024-03-01 DIAGNOSIS — L97.312 VENOUS STASIS ULCER OF RIGHT ANKLE WITH FAT LAYER EXPOSED, UNSPECIFIED WHETHER VARICOSE VEINS PRESENT (HCC): Primary | ICD-10-CM

## 2024-03-01 PROCEDURE — 11042 DBRDMT SUBQ TIS 1ST 20SQCM/<: CPT | Performed by: NURSE PRACTITIONER

## 2024-03-01 PROCEDURE — 11042 DBRDMT SUBQ TIS 1ST 20SQCM/<: CPT

## 2024-03-01 RX ORDER — CLOBETASOL PROPIONATE 0.5 MG/G
OINTMENT TOPICAL ONCE
OUTPATIENT
Start: 2024-03-01 | End: 2024-03-01

## 2024-03-01 RX ORDER — LIDOCAINE HYDROCHLORIDE 40 MG/ML
SOLUTION TOPICAL ONCE
OUTPATIENT
Start: 2024-03-01 | End: 2024-03-01

## 2024-03-01 RX ORDER — BACITRACIN ZINC 500 [USP'U]/G
OINTMENT TOPICAL ONCE
OUTPATIENT
Start: 2024-03-01 | End: 2024-03-01

## 2024-03-01 RX ORDER — LIDOCAINE 50 MG/G
OINTMENT TOPICAL ONCE
OUTPATIENT
Start: 2024-03-01 | End: 2024-03-01

## 2024-03-01 RX ORDER — GENTAMICIN SULFATE 1 MG/G
OINTMENT TOPICAL ONCE
OUTPATIENT
Start: 2024-03-01 | End: 2024-03-01

## 2024-03-01 RX ORDER — LIDOCAINE HYDROCHLORIDE 20 MG/ML
JELLY TOPICAL ONCE
OUTPATIENT
Start: 2024-03-01 | End: 2024-03-01

## 2024-03-01 RX ORDER — LIDOCAINE 40 MG/G
CREAM TOPICAL ONCE
OUTPATIENT
Start: 2024-03-01 | End: 2024-03-01

## 2024-03-01 RX ORDER — BACITRACIN ZINC AND POLYMYXIN B SULFATE 500; 1000 [USP'U]/G; [USP'U]/G
OINTMENT TOPICAL ONCE
OUTPATIENT
Start: 2024-03-01 | End: 2024-03-01

## 2024-03-01 RX ORDER — IBUPROFEN 200 MG
TABLET ORAL ONCE
OUTPATIENT
Start: 2024-03-01 | End: 2024-03-01

## 2024-03-01 RX ORDER — SODIUM CHLOR/HYPOCHLOROUS ACID 0.033 %
SOLUTION, IRRIGATION IRRIGATION ONCE
OUTPATIENT
Start: 2024-03-01 | End: 2024-03-01

## 2024-03-01 RX ORDER — TRIAMCINOLONE ACETONIDE 1 MG/G
OINTMENT TOPICAL ONCE
OUTPATIENT
Start: 2024-03-01 | End: 2024-03-01

## 2024-03-01 RX ORDER — BETAMETHASONE DIPROPIONATE 0.5 MG/G
CREAM TOPICAL ONCE
OUTPATIENT
Start: 2024-03-01 | End: 2024-03-01

## 2024-03-01 NOTE — PROGRESS NOTES
Multilayer Compression Wrap   (Not Unna) Below the Knee    NAME:  Aria Kelly  YOB: 1945  MEDICAL RECORD NUMBER:  [id number]  DATE:  3/1/2024    Multilayer compression wrap: Removed old Multilayer wrap if indicated and wash leg with mild soap/water.  Applied moisturizing agent to dry skin as needed.   Applied primary and secondary dressing as ordered.  Applied multilayered dressing below the knee to right lower leg.  Instructed patient/caregiver not to remove dressing and to keep it clean and dry.   Instructed patient/caregiver on complications to report to provider, such as pain, numbness in toes, heavy drainage, and slippage of dressing.  Instructed patient on purpose of compression dressing and on activity and exercise recommendations.      Electronically signed by Tessie Loredo LPN on 3/1/2024 at 11:58 AM  
  Margins Defined edges 03/01/24 1127   Wound Thickness Description not for Pressure Injury Full thickness 03/01/24 1127   Number of days: 50       Percent of Wound(s) Debrided: approximately 100%    Total  Area  Debrided:  0.06 sq cm     Bleeding:  Minimal    Hemostasis Achieved:  by pressure    Procedural Pain:  1  / 10     Post Procedural Pain:  0 / 10     Response to treatment:  Well tolerated by patient.     Status of wound progress and description from last visit:   improved.      Plan:       Patient Instructions   PHYSICIAN ORDERS AND DISCHARGE INSTRUCTIONS    Wound cleansing:     Do not scrub or use excessive force.   Wash hands with soap and water before and after dressing changes.   Prior to applying a clean dressing, cleanse wound with normal saline,               wound cleanser, or mild soap and water.    Ask the physician or nurse before getting the wound(s) wet in a shower      Compression Wrap Education   When slippage occurs, the wrap should be removed immediately and rewrapped or a different wrap should be applied. If removal is necessary, cover wound with clean bandage and contact the wound care clinic.   Monitor for impaired circulation including pale, cool or numb extremities distal to the wrap or garment.   If pain, numbness, tingling, discolouration or swelling of the toes occurs, the compression wrap or stocking must be removed immediately  Report to provider, such as pain, numbness in toes, heavy drainage, and slippage of dressing.  Keep compression wraps clean and dry. May use cast cover to take a shower or take sponge baths.   Do not stick objects inside wraps to scratch. This may create more wounds.   Speak to the provider about any issues or questions you may have about your compression wraps.   Rest and Elevate lower extremities throughout the day.   Avoid prolonged sitting with legs dangling or prolonged standing   If supplies are not available please DO NOT remove wraps until next

## 2024-03-01 NOTE — PATIENT INSTRUCTIONS
PHYSICIAN ORDERS AND DISCHARGE INSTRUCTIONS    Wound cleansing:     Do not scrub or use excessive force.   Wash hands with soap and water before and after dressing changes.   Prior to applying a clean dressing, cleanse wound with normal saline,               wound cleanser, or mild soap and water.    Ask the physician or nurse before getting the wound(s) wet in a shower      Compression Wrap Education   When slippage occurs, the wrap should be removed immediately and rewrapped or a different wrap should be applied. If removal is necessary, cover wound with clean bandage and contact the wound care clinic.   Monitor for impaired circulation including pale, cool or numb extremities distal to the wrap or garment.   If pain, numbness, tingling, discolouration or swelling of the toes occurs, the compression wrap or stocking must be removed immediately  Report to provider, such as pain, numbness in toes, heavy drainage, and slippage of dressing.  Keep compression wraps clean and dry. May use cast cover to take a shower or take sponge baths.   Do not stick objects inside wraps to scratch. This may create more wounds.   Speak to the provider about any issues or questions you may have about your compression wraps.   Rest and Elevate lower extremities throughout the day.   Avoid prolonged sitting with legs dangling or prolonged standing   If supplies are not available please DO NOT remove wraps until next visit to Wound Care Center    Wound Care Notes:         Orders for this week:  3/1/2024    Right Lateral Ankle Wounds - Wash with soap and water, pat dry.   Apply qwick to periwound secured by steristrips    Apply regenecare, colactive powder plus Ag and Sorbact contact layer to wound bed secured by steri strips.  Wrap with coban 2 lite   Leave in place until next visit to wound care center.      []   Nurse Visit:   Follow Up Instructions: At the Wound Care Center in 1 week   Primary Wound Care Provider: Wallace Avila   Call

## 2024-03-04 LAB
CULTURE: NORMAL
Lab: NORMAL
SPECIMEN: NORMAL

## 2024-03-08 ENCOUNTER — TELEPHONE (OUTPATIENT)
Dept: CARDIOLOGY CLINIC | Age: 79
End: 2024-03-08

## 2024-03-08 ENCOUNTER — HOSPITAL ENCOUNTER (OUTPATIENT)
Dept: WOUND CARE | Age: 79
Discharge: HOME | End: 2024-03-08
Attending: NURSE PRACTITIONER
Payer: MEDICARE

## 2024-03-08 VITALS
TEMPERATURE: 97.5 F | RESPIRATION RATE: 16 BRPM | HEART RATE: 68 BPM | SYSTOLIC BLOOD PRESSURE: 140 MMHG | DIASTOLIC BLOOD PRESSURE: 70 MMHG

## 2024-03-08 DIAGNOSIS — I83.013 VENOUS STASIS ULCER OF RIGHT ANKLE WITH FAT LAYER EXPOSED, UNSPECIFIED WHETHER VARICOSE VEINS PRESENT (HCC): Primary | ICD-10-CM

## 2024-03-08 DIAGNOSIS — L97.312 VENOUS STASIS ULCER OF RIGHT ANKLE WITH FAT LAYER EXPOSED, UNSPECIFIED WHETHER VARICOSE VEINS PRESENT (HCC): Primary | ICD-10-CM

## 2024-03-08 PROCEDURE — 11042 DBRDMT SUBQ TIS 1ST 20SQCM/<: CPT

## 2024-03-08 PROCEDURE — 11042 DBRDMT SUBQ TIS 1ST 20SQCM/<: CPT | Performed by: NURSE PRACTITIONER

## 2024-03-08 RX ORDER — CLOBETASOL PROPIONATE 0.5 MG/G
OINTMENT TOPICAL ONCE
OUTPATIENT
Start: 2024-03-08 | End: 2024-03-08

## 2024-03-08 RX ORDER — BETAMETHASONE DIPROPIONATE 0.5 MG/G
CREAM TOPICAL ONCE
OUTPATIENT
Start: 2024-03-08 | End: 2024-03-08

## 2024-03-08 RX ORDER — BACITRACIN ZINC 500 [USP'U]/G
OINTMENT TOPICAL ONCE
OUTPATIENT
Start: 2024-03-08 | End: 2024-03-08

## 2024-03-08 RX ORDER — LIDOCAINE 40 MG/G
CREAM TOPICAL ONCE
OUTPATIENT
Start: 2024-03-08 | End: 2024-03-08

## 2024-03-08 RX ORDER — SODIUM CHLOR/HYPOCHLOROUS ACID 0.033 %
SOLUTION, IRRIGATION IRRIGATION ONCE
OUTPATIENT
Start: 2024-03-08 | End: 2024-03-08

## 2024-03-08 RX ORDER — TRIAMCINOLONE ACETONIDE 1 MG/G
OINTMENT TOPICAL ONCE
OUTPATIENT
Start: 2024-03-08 | End: 2024-03-08

## 2024-03-08 RX ORDER — LIDOCAINE 50 MG/G
OINTMENT TOPICAL ONCE
OUTPATIENT
Start: 2024-03-08 | End: 2024-03-08

## 2024-03-08 RX ORDER — LIDOCAINE HYDROCHLORIDE 40 MG/ML
SOLUTION TOPICAL ONCE
OUTPATIENT
Start: 2024-03-08 | End: 2024-03-08

## 2024-03-08 RX ORDER — BACITRACIN ZINC AND POLYMYXIN B SULFATE 500; 1000 [USP'U]/G; [USP'U]/G
OINTMENT TOPICAL ONCE
OUTPATIENT
Start: 2024-03-08 | End: 2024-03-08

## 2024-03-08 RX ORDER — IBUPROFEN 200 MG
TABLET ORAL ONCE
OUTPATIENT
Start: 2024-03-08 | End: 2024-03-08

## 2024-03-08 RX ORDER — LIDOCAINE HYDROCHLORIDE 20 MG/ML
JELLY TOPICAL ONCE
OUTPATIENT
Start: 2024-03-08 | End: 2024-03-08

## 2024-03-08 RX ORDER — GENTAMICIN SULFATE 1 MG/G
OINTMENT TOPICAL ONCE
OUTPATIENT
Start: 2024-03-08 | End: 2024-03-08

## 2024-03-08 NOTE — PROGRESS NOTES
Multilayer Compression Wrap   (Not Unna) Below the Knee    NAME:  Aria Kelly  YOB: 1945  MEDICAL RECORD NUMBER:  [id number]  DATE:  3/8/2024    Multilayer compression wrap: Removed old Multilayer wrap if indicated and wash leg with mild soap/water.  Applied moisturizing agent to dry skin as needed.   Applied primary and secondary dressing as ordered.  Applied multilayered dressing below the knee to right lower leg.  Instructed patient/caregiver not to remove dressing and to keep it clean and dry.   Instructed patient/caregiver on complications to report to provider, such as pain, numbness in toes, heavy drainage, and slippage of dressing.  Instructed patient on purpose of compression dressing and on activity and exercise recommendations.      Electronically signed by Vinny Gupta RN on 3/8/2024 at 11:13 AM

## 2024-03-08 NOTE — TELEPHONE ENCOUNTER
Results given to the pt, advised to keep f/u 3/27/2024.      Left Ventricle: Hyperdynamic left ventricular systolic function with a visually estimated EF of 60 - 65%. Left ventricle size is normal. Mildly increased wall thickness. Normal wall motion. Grade I diastolic dysfunction with normal LAP.    Aortic Valve: Moderately calcified aortic valve. Moderate regurgitation. AV PHT is 436.0 ms. Vena contracta measures 0.3 cm. Moderate stenosis of the aortic valve. AV mean gradient is 20 mmHg. AV area by continuity VTI is 1.3 cm2.    Mitral Valve: Mild to moderate regurgitation.    Tricuspid Valve: Mild regurgitation. The estimated RVSP is 32 mmHg.    Left Atrium: Left atrium is mildly dilated.    Pericardium: No pericardial effusion.    Image quality is good.    O)V TO DISCUSS AR AND AS

## 2024-03-08 NOTE — PROGRESS NOTES
Wound Care Center Progress Note With Procedure    Aria Kelly  AGE: 78 y.o.   GENDER: female  : 1945  EPISODE DATE:  3/8/2024     Subjective:     Chief Complaint   Patient presents with    Wound Check     Rt leg         HISTORY of PRESENT ILLNESS     Aria Kelly is a 78 y.o. female who presents to the Wound Clinic for a visit for evaluation and treatment of Chronic venous  ulcer(s) of  R ankle lateral.  The condition is of moderate severity. The ulcer has been present for several years.  The underlying cause is thought to be venous stasis.  The patients care to date has included care here and care at home. She was here for care previously. The patient has significant underlying medical conditions as below.      **Patient down in size this week. Still has some product for us to use     2024: Patient improved again this week. Will debride cap off and continue plan of care most likely     2024: Patient is looking much better. Wound is somewhat larger after debridement. Wound bed looks great and now is beefy red and granulating. Also tolerated debridement, which has never been the case in the past. Will continue to use new product.  Culture today to ensure she is clean     2024: Patient somewhat frustrated but her wound is about half the size  She was started on the silver collagen powder and seems promising so far  Ok with compression wrap again this week.  Still painful but not developing overly fibrous cap     2024: Patient looks ok. After debridement wound is slough free and pink and clean  Yet to be seen how wound will heal  Was in a lot of pain from heavy debridement last week and will send over some pain meds today.  Will try to offload with stoma ring  Will talk patient into a compression wrap today     Patient is not a diabetic or a smoker  She is on plavix     She needs this cleaned out thoroughly. She did not let us debride previously and we will push this today. Will numb

## 2024-03-08 NOTE — PATIENT INSTRUCTIONS
PHYSICIAN ORDERS AND DISCHARGE INSTRUCTIONS    Wound cleansing:     Do not scrub or use excessive force.   Wash hands with soap and water before and after dressing changes.   Prior to applying a clean dressing, cleanse wound with normal saline,               wound cleanser, or mild soap and water.    Ask the physician or nurse before getting the wound(s) wet in a shower      Compression Wrap Education   When slippage occurs, the wrap should be removed immediately and rewrapped or a different wrap should be applied. If removal is necessary, cover wound with clean bandage and contact the wound care clinic.   Monitor for impaired circulation including pale, cool or numb extremities distal to the wrap or garment.   If pain, numbness, tingling, discolouration or swelling of the toes occurs, the compression wrap or stocking must be removed immediately  Report to provider, such as pain, numbness in toes, heavy drainage, and slippage of dressing.  Keep compression wraps clean and dry. May use cast cover to take a shower or take sponge baths.   Do not stick objects inside wraps to scratch. This may create more wounds.   Speak to the provider about any issues or questions you may have about your compression wraps.   Rest and Elevate lower extremities throughout the day.   Avoid prolonged sitting with legs dangling or prolonged standing   If supplies are not available please DO NOT remove wraps until next visit to Wound Care Center    Wound Care Notes:         Orders for this week:  3/8/2024    Right Lateral Ankle Wounds - Wash with soap and water, pat dry.   Apply qwick to periwound secured by steristrips    Apply regenecare, colactive powder plus Ag and Sorbact contact layer to wound bed secured by steri strips.  Wrap with coban 2 lite   Leave in place until next visit to wound care center.      Follow Up Instructions: At the Wound Care Center in 1 week   Primary Wound Care Provider: Wallace Avila   Call  for any

## 2024-03-15 ENCOUNTER — HOSPITAL ENCOUNTER (OUTPATIENT)
Dept: WOUND CARE | Age: 79
Discharge: HOME | End: 2024-03-15
Attending: NURSE PRACTITIONER
Payer: MEDICARE

## 2024-03-15 VITALS
HEART RATE: 65 BPM | SYSTOLIC BLOOD PRESSURE: 139 MMHG | TEMPERATURE: 97.8 F | RESPIRATION RATE: 18 BRPM | DIASTOLIC BLOOD PRESSURE: 71 MMHG

## 2024-03-15 DIAGNOSIS — I25.10 CORONARY ARTERY DISEASE INVOLVING NATIVE CORONARY ARTERY OF NATIVE HEART WITHOUT ANGINA PECTORIS: ICD-10-CM

## 2024-03-15 DIAGNOSIS — L97.312 VENOUS STASIS ULCER OF RIGHT ANKLE WITH FAT LAYER EXPOSED, UNSPECIFIED WHETHER VARICOSE VEINS PRESENT (HCC): Primary | ICD-10-CM

## 2024-03-15 DIAGNOSIS — E78.2 HYPERLIPEMIA, MIXED: ICD-10-CM

## 2024-03-15 DIAGNOSIS — I83.013 VENOUS STASIS ULCER OF RIGHT ANKLE WITH FAT LAYER EXPOSED, UNSPECIFIED WHETHER VARICOSE VEINS PRESENT (HCC): Primary | ICD-10-CM

## 2024-03-15 PROCEDURE — 11042 DBRDMT SUBQ TIS 1ST 20SQCM/<: CPT

## 2024-03-15 PROCEDURE — 11042 DBRDMT SUBQ TIS 1ST 20SQCM/<: CPT | Performed by: NURSE PRACTITIONER

## 2024-03-15 RX ORDER — LIDOCAINE HYDROCHLORIDE 20 MG/ML
JELLY TOPICAL ONCE
OUTPATIENT
Start: 2024-03-15 | End: 2024-03-15

## 2024-03-15 RX ORDER — BETAMETHASONE DIPROPIONATE 0.5 MG/G
CREAM TOPICAL ONCE
OUTPATIENT
Start: 2024-03-15 | End: 2024-03-15

## 2024-03-15 RX ORDER — BACITRACIN ZINC 500 [USP'U]/G
OINTMENT TOPICAL ONCE
OUTPATIENT
Start: 2024-03-15 | End: 2024-03-15

## 2024-03-15 RX ORDER — GENTAMICIN SULFATE 1 MG/G
OINTMENT TOPICAL ONCE
OUTPATIENT
Start: 2024-03-15 | End: 2024-03-15

## 2024-03-15 RX ORDER — SODIUM CHLOR/HYPOCHLOROUS ACID 0.033 %
SOLUTION, IRRIGATION IRRIGATION ONCE
OUTPATIENT
Start: 2024-03-15 | End: 2024-03-15

## 2024-03-15 RX ORDER — LIDOCAINE 40 MG/G
CREAM TOPICAL ONCE
OUTPATIENT
Start: 2024-03-15 | End: 2024-03-15

## 2024-03-15 RX ORDER — BACITRACIN ZINC AND POLYMYXIN B SULFATE 500; 1000 [USP'U]/G; [USP'U]/G
OINTMENT TOPICAL ONCE
OUTPATIENT
Start: 2024-03-15 | End: 2024-03-15

## 2024-03-15 RX ORDER — CLOBETASOL PROPIONATE 0.5 MG/G
OINTMENT TOPICAL ONCE
OUTPATIENT
Start: 2024-03-15 | End: 2024-03-15

## 2024-03-15 RX ORDER — LIDOCAINE HYDROCHLORIDE 40 MG/ML
SOLUTION TOPICAL ONCE
OUTPATIENT
Start: 2024-03-15 | End: 2024-03-15

## 2024-03-15 RX ORDER — LIDOCAINE 50 MG/G
OINTMENT TOPICAL ONCE
OUTPATIENT
Start: 2024-03-15 | End: 2024-03-15

## 2024-03-15 RX ORDER — IBUPROFEN 200 MG
TABLET ORAL ONCE
OUTPATIENT
Start: 2024-03-15 | End: 2024-03-15

## 2024-03-15 RX ORDER — SIMVASTATIN 40 MG
40 TABLET ORAL NIGHTLY
Qty: 90 TABLET | Refills: 1 | Status: SHIPPED | OUTPATIENT
Start: 2024-03-15

## 2024-03-15 RX ORDER — TRIAMCINOLONE ACETONIDE 1 MG/G
OINTMENT TOPICAL ONCE
OUTPATIENT
Start: 2024-03-15 | End: 2024-03-15

## 2024-03-15 ASSESSMENT — PAIN SCALES - GENERAL: PAINLEVEL_OUTOF10: 0

## 2024-03-15 NOTE — PROGRESS NOTES
Multilayer Compression Wrap   (Not Unna) Below the Knee    NAME:  Aria Kelly  YOB: 1945  MEDICAL RECORD NUMBER:  [id number]  DATE:  3/15/2024    Multilayer compression wrap: Removed old Multilayer wrap if indicated and wash leg with mild soap/water.  Applied moisturizing agent to dry skin as needed.   Applied primary and secondary dressing as ordered.  Applied multilayered dressing below the knee to right lower leg.  Instructed patient/caregiver not to remove dressing and to keep it clean and dry.   Instructed patient/caregiver on complications to report to provider, such as pain, numbness in toes, heavy drainage, and slippage of dressing.  Instructed patient on purpose of compression dressing and on activity and exercise recommendations.      Electronically signed by Vinny Gupta RN on 3/15/2024 at 11:26 AM  
  Rest and Elevate lower extremities throughout the day.   Avoid prolonged sitting with legs dangling or prolonged standing   If supplies are not available please DO NOT remove wraps until next visit to Wound Care Center    Wound Care Notes:         Orders for this week:  3/15/2024    Right Lateral Ankle Wounds - Wash with soap and water, pat dry.   Apply qwick to periwound secured by steristrips    Apply stimulen and ca alginate to wound bed.  Wrap with coban 2 lite   Leave in place until next visit to wound care center.      Follow Up Instructions: At the Wound Care Center in 1 week   Primary Wound Care Provider: Wallace Avila   Call  for any questions or concerns.  Central [phone] for imaging and lab work    Treatment Note      Written Patient Dismissal Instructions Given            Electronically signed by WANDY Hartley CNP on 3/15/2024 at 11:16 AM

## 2024-03-22 ENCOUNTER — HOSPITAL ENCOUNTER (OUTPATIENT)
Dept: WOUND CARE | Age: 79
Discharge: HOME | End: 2024-03-22
Attending: NURSE PRACTITIONER
Payer: MEDICARE

## 2024-03-22 VITALS
DIASTOLIC BLOOD PRESSURE: 63 MMHG | RESPIRATION RATE: 18 BRPM | TEMPERATURE: 98.2 F | HEART RATE: 69 BPM | SYSTOLIC BLOOD PRESSURE: 133 MMHG

## 2024-03-22 DIAGNOSIS — I83.013 VENOUS STASIS ULCER OF RIGHT ANKLE WITH FAT LAYER EXPOSED, UNSPECIFIED WHETHER VARICOSE VEINS PRESENT (HCC): Primary | ICD-10-CM

## 2024-03-22 DIAGNOSIS — L97.312 VENOUS STASIS ULCER OF RIGHT ANKLE WITH FAT LAYER EXPOSED, UNSPECIFIED WHETHER VARICOSE VEINS PRESENT (HCC): Primary | ICD-10-CM

## 2024-03-22 PROCEDURE — 11042 DBRDMT SUBQ TIS 1ST 20SQCM/<: CPT

## 2024-03-22 RX ORDER — GENTAMICIN SULFATE 1 MG/G
OINTMENT TOPICAL ONCE
OUTPATIENT
Start: 2024-03-22 | End: 2024-03-22

## 2024-03-22 RX ORDER — LIDOCAINE HYDROCHLORIDE 40 MG/ML
SOLUTION TOPICAL ONCE
OUTPATIENT
Start: 2024-03-22 | End: 2024-03-22

## 2024-03-22 RX ORDER — LIDOCAINE 40 MG/G
CREAM TOPICAL ONCE
OUTPATIENT
Start: 2024-03-22 | End: 2024-03-22

## 2024-03-22 RX ORDER — LIDOCAINE 50 MG/G
OINTMENT TOPICAL ONCE
OUTPATIENT
Start: 2024-03-22 | End: 2024-03-22

## 2024-03-22 RX ORDER — CLOBETASOL PROPIONATE 0.5 MG/G
OINTMENT TOPICAL ONCE
OUTPATIENT
Start: 2024-03-22 | End: 2024-03-22

## 2024-03-22 RX ORDER — SODIUM CHLOR/HYPOCHLOROUS ACID 0.033 %
SOLUTION, IRRIGATION IRRIGATION ONCE
OUTPATIENT
Start: 2024-03-22 | End: 2024-03-22

## 2024-03-22 RX ORDER — BETAMETHASONE DIPROPIONATE 0.5 MG/G
CREAM TOPICAL ONCE
OUTPATIENT
Start: 2024-03-22 | End: 2024-03-22

## 2024-03-22 RX ORDER — TRIAMCINOLONE ACETONIDE 1 MG/G
OINTMENT TOPICAL ONCE
OUTPATIENT
Start: 2024-03-22 | End: 2024-03-22

## 2024-03-22 RX ORDER — BACITRACIN ZINC 500 [USP'U]/G
OINTMENT TOPICAL ONCE
OUTPATIENT
Start: 2024-03-22 | End: 2024-03-22

## 2024-03-22 RX ORDER — LIDOCAINE HYDROCHLORIDE 20 MG/ML
JELLY TOPICAL ONCE
OUTPATIENT
Start: 2024-03-22 | End: 2024-03-22

## 2024-03-22 RX ORDER — IBUPROFEN 200 MG
TABLET ORAL ONCE
OUTPATIENT
Start: 2024-03-22 | End: 2024-03-22

## 2024-03-22 RX ORDER — BACITRACIN ZINC AND POLYMYXIN B SULFATE 500; 1000 [USP'U]/G; [USP'U]/G
OINTMENT TOPICAL ONCE
OUTPATIENT
Start: 2024-03-22 | End: 2024-03-22

## 2024-03-22 ASSESSMENT — PAIN SCALES - GENERAL: PAINLEVEL_OUTOF10: 0

## 2024-03-22 NOTE — PATIENT INSTRUCTIONS
PHYSICIAN ORDERS AND DISCHARGE INSTRUCTIONS    Wound cleansing:     Do not scrub or use excessive force.   Wash hands with soap and water before and after dressing changes.   Prior to applying a clean dressing, cleanse wound with normal saline,               wound cleanser, or mild soap and water.    Ask the physician or nurse before getting the wound(s) wet in a shower      Compression Wrap Education   When slippage occurs, the wrap should be removed immediately and rewrapped or a different wrap should be applied. If removal is necessary, cover wound with clean bandage and contact the wound care clinic.   Monitor for impaired circulation including pale, cool or numb extremities distal to the wrap or garment.   If pain, numbness, tingling, discolouration or swelling of the toes occurs, the compression wrap or stocking must be removed immediately  Report to provider, such as pain, numbness in toes, heavy drainage, and slippage of dressing.  Keep compression wraps clean and dry. May use cast cover to take a shower or take sponge baths.   Do not stick objects inside wraps to scratch. This may create more wounds.   Speak to the provider about any issues or questions you may have about your compression wraps.   Rest and Elevate lower extremities throughout the day.   Avoid prolonged sitting with legs dangling or prolonged standing   If supplies are not available please DO NOT remove wraps until next visit to Wound Care Center    Wound Care Notes:         Orders for this week:  3/22/2024    Right Lateral Ankle Wounds - Wash with soap and water, pat dry.   Apply stimulen  to wound bed.  Apply foam to periwound secured by steristrips    Cover with ag alginate   Wrap with coban 2 lite   Leave in place until next visit to wound care center.      Follow Up Instructions: At the Wound Care Center in 1 week   Primary Wound Care Provider: Wallace Avila   Call  for any questions or concerns.  Central Scheduling:

## 2024-03-27 ENCOUNTER — OFFICE VISIT (OUTPATIENT)
Dept: CARDIOLOGY CLINIC | Age: 79
End: 2024-03-27
Payer: MEDICARE

## 2024-03-27 VITALS
OXYGEN SATURATION: 97 % | BODY MASS INDEX: 30.04 KG/M2 | SYSTOLIC BLOOD PRESSURE: 124 MMHG | DIASTOLIC BLOOD PRESSURE: 64 MMHG | HEART RATE: 60 BPM | WEIGHT: 149 LBS | HEIGHT: 59 IN

## 2024-03-27 DIAGNOSIS — I25.10 ASCVD (ARTERIOSCLEROTIC CARDIOVASCULAR DISEASE): Primary | ICD-10-CM

## 2024-03-27 PROCEDURE — 99214 OFFICE O/P EST MOD 30 MIN: CPT | Performed by: INTERNAL MEDICINE

## 2024-03-27 PROCEDURE — 3074F SYST BP LT 130 MM HG: CPT | Performed by: INTERNAL MEDICINE

## 2024-03-27 PROCEDURE — 1123F ACP DISCUSS/DSCN MKR DOCD: CPT | Performed by: INTERNAL MEDICINE

## 2024-03-27 PROCEDURE — 3078F DIAST BP <80 MM HG: CPT | Performed by: INTERNAL MEDICINE

## 2024-03-27 NOTE — PATIENT INSTRUCTIONS
Please be informed that if you contact our office outside of normal business hours the physician on call cannot help with any scheduling or rescheduling issues, procedure instruction questions or any type of medication issue.    We advise you for any urgent/emergency that you go to the nearest emergency room!    PLEASE CALL OUR OFFICE DURING NORMAL BUSINESS HOURS    Monday - Friday   8 am to 5 pm    [place]: [phone]    [place]: [phone]    [place]:  [phone]  **It is YOUR responsibilty to bring medication bottles and/or updated medication list to EACH APPOINTMENT. This will allow us to better serve you and all your healthcare needs**  Thank you for allowing us to care for you today!   We want to ensure we can follow your treatment plan and we strive to give you the best outcomes and experience possible.   If you ever have a life threatening emergency and call 911 - for an ambulance (EMS)   Our providers can only care for you at:   [facility] or [facility].   Even if you have someone take you or you drive yourself we can only care for you in a [facility] facility. Our providers are not setup at the other healthcare locations!   We are committed to providing you the best care possible.    If you receive a survey after visiting one of our offices, please take time to share your experience concerning your physician office visit.  These surveys are confidential and no health information about you is shared.    We are eager to improve for you and we are counting on your feedback to help make that happen.

## 2024-03-29 ENCOUNTER — HOSPITAL ENCOUNTER (OUTPATIENT)
Dept: WOUND CARE | Age: 79
Discharge: HOME | End: 2024-03-29
Attending: NURSE PRACTITIONER
Payer: MEDICARE

## 2024-03-29 VITALS
SYSTOLIC BLOOD PRESSURE: 133 MMHG | HEART RATE: 68 BPM | DIASTOLIC BLOOD PRESSURE: 67 MMHG | RESPIRATION RATE: 20 BRPM | TEMPERATURE: 98.7 F

## 2024-03-29 DIAGNOSIS — I83.013 VENOUS STASIS ULCER OF RIGHT ANKLE WITH FAT LAYER EXPOSED, UNSPECIFIED WHETHER VARICOSE VEINS PRESENT (HCC): Primary | ICD-10-CM

## 2024-03-29 DIAGNOSIS — L97.312 VENOUS STASIS ULCER OF RIGHT ANKLE WITH FAT LAYER EXPOSED, UNSPECIFIED WHETHER VARICOSE VEINS PRESENT (HCC): Primary | ICD-10-CM

## 2024-03-29 PROCEDURE — 6370000000 HC RX 637 (ALT 250 FOR IP): Performed by: NURSE PRACTITIONER

## 2024-03-29 PROCEDURE — 11042 DBRDMT SUBQ TIS 1ST 20SQCM/<: CPT

## 2024-03-29 PROCEDURE — 11042 DBRDMT SUBQ TIS 1ST 20SQCM/<: CPT | Performed by: NURSE PRACTITIONER

## 2024-03-29 RX ORDER — LIDOCAINE 40 MG/G
CREAM TOPICAL ONCE
OUTPATIENT
Start: 2024-03-29 | End: 2024-03-29

## 2024-03-29 RX ORDER — LIDOCAINE HYDROCHLORIDE 40 MG/ML
SOLUTION TOPICAL ONCE
OUTPATIENT
Start: 2024-03-29 | End: 2024-03-29

## 2024-03-29 RX ORDER — BACITRACIN ZINC AND POLYMYXIN B SULFATE 500; 1000 [USP'U]/G; [USP'U]/G
OINTMENT TOPICAL ONCE
OUTPATIENT
Start: 2024-03-29 | End: 2024-03-29

## 2024-03-29 RX ORDER — SODIUM CHLOR/HYPOCHLOROUS ACID 0.033 %
SOLUTION, IRRIGATION IRRIGATION ONCE
OUTPATIENT
Start: 2024-03-29 | End: 2024-03-29

## 2024-03-29 RX ORDER — LIDOCAINE HYDROCHLORIDE 20 MG/ML
JELLY TOPICAL ONCE
OUTPATIENT
Start: 2024-03-29 | End: 2024-03-29

## 2024-03-29 RX ORDER — CLOBETASOL PROPIONATE 0.5 MG/G
OINTMENT TOPICAL ONCE
Status: COMPLETED | OUTPATIENT
Start: 2024-03-29 | End: 2024-03-29

## 2024-03-29 RX ORDER — BACITRACIN ZINC 500 [USP'U]/G
OINTMENT TOPICAL ONCE
OUTPATIENT
Start: 2024-03-29 | End: 2024-03-29

## 2024-03-29 RX ORDER — IBUPROFEN 200 MG
TABLET ORAL ONCE
OUTPATIENT
Start: 2024-03-29 | End: 2024-03-29

## 2024-03-29 RX ORDER — LIDOCAINE 50 MG/G
OINTMENT TOPICAL ONCE
OUTPATIENT
Start: 2024-03-29 | End: 2024-03-29

## 2024-03-29 RX ORDER — TRIAMCINOLONE ACETONIDE 1 MG/G
OINTMENT TOPICAL ONCE
OUTPATIENT
Start: 2024-03-29 | End: 2024-03-29

## 2024-03-29 RX ORDER — BETAMETHASONE DIPROPIONATE 0.5 MG/G
CREAM TOPICAL ONCE
OUTPATIENT
Start: 2024-03-29 | End: 2024-03-29

## 2024-03-29 RX ORDER — GENTAMICIN SULFATE 1 MG/G
OINTMENT TOPICAL ONCE
OUTPATIENT
Start: 2024-03-29 | End: 2024-03-29

## 2024-03-29 RX ORDER — CLOBETASOL PROPIONATE 0.5 MG/G
OINTMENT TOPICAL ONCE
OUTPATIENT
Start: 2024-03-29 | End: 2024-03-29

## 2024-03-29 RX ADMIN — CLOBETASOL PROPIONATE: 0.5 OINTMENT TOPICAL at 11:13

## 2024-03-29 NOTE — PATIENT INSTRUCTIONS
PHYSICIAN ORDERS AND DISCHARGE INSTRUCTIONS    Wound cleansing:     Do not scrub or use excessive force.   Wash hands with soap and water before and after dressing changes.   Prior to applying a clean dressing, cleanse wound with normal saline,               wound cleanser, or mild soap and water.    Ask the physician or nurse before getting the wound(s) wet in a shower      Compression Wrap Education   When slippage occurs, the wrap should be removed immediately and rewrapped or a different wrap should be applied. If removal is necessary, cover wound with clean bandage and contact the wound care clinic.   Monitor for impaired circulation including pale, cool or numb extremities distal to the wrap or garment.   If pain, numbness, tingling, discolouration or swelling of the toes occurs, the compression wrap or stocking must be removed immediately  Report to provider, such as pain, numbness in toes, heavy drainage, and slippage of dressing.  Keep compression wraps clean and dry. May use cast cover to take a shower or take sponge baths.   Do not stick objects inside wraps to scratch. This may create more wounds.   Speak to the provider about any issues or questions you may have about your compression wraps.   Rest and Elevate lower extremities throughout the day.   Avoid prolonged sitting with legs dangling or prolonged standing   If supplies are not available please DO NOT remove wraps until next visit to Wound Care Center    Wound Care Notes:         Orders for this week:  3/29/2024    Right Lateral Ankle Wounds - Wash with soap and water, pat dry.   Apply stimulen  clobetasol to wound bed.  Apply double foam to offload entire ankle   Cover with ag alginate    Wrap with coban 2 lite   Leave in place until next visit to wound care center.      Follow Up Instructions: At the Wound Care Center in 1 week   Primary Wound Care Provider: Wallace Avila   Call  for any questions or concerns.  Central Scheduling:

## 2024-03-29 NOTE — WOUND CARE
.Multilayer Compression Wrap   (Not Unna) Below the Knee    NAME:  Aria Kelly  YOB: 1945  MEDICAL RECORD NUMBER:  [id number]  DATE:  3/29/2024    Multilayer compression wrap: Removed old Multilayer wrap if indicated and wash leg with mild soap/water.  Applied moisturizing agent to dry skin as needed.   Applied primary and secondary dressing as ordered.  Applied multilayered dressing below the knee to left lower leg.  Instructed patient/caregiver not to remove dressing and to keep it clean and dry.   Instructed patient/caregiver on complications to report to provider, such as pain, numbness in toes, heavy drainage, and slippage of dressing.  Instructed patient on purpose of compression dressing and on activity and exercise recommendations.      Electronically signed by Lucero Catherine RN on 3/29/2024 at 11:09 AM

## 2024-03-29 NOTE — PROGRESS NOTES
Wound Care Center Progress Note With Procedure    Aria Kelly  AGE: 78 y.o.   GENDER: female  : 1945  EPISODE DATE:  3/29/2024     Subjective:     Chief Complaint   Patient presents with    Wound Check     Rt lateral ankle         HISTORY of PRESENT ILLNESS     Aria Kelly is a 78 y.o. female who presents to the Wound Clinic for a visit for evaluation and treatment of Chronic venous  ulcer(s) of  R ankle lateral.  The condition is of moderate severity. The ulcer has been present for several years.  The underlying cause is thought to be venous stasis.  The patients care to date has included care here and care at home. She was here for care previously. The patient has significant underlying medical conditions as below.     3-: Patient about the same. Will try to offload a bit better this week and will add clobetasol. She appears to have a large callus formation this week around wound. Will continue plan of care otherwise     3-: Patient needs new plan of care today. Likely has stopped responding to her current treatment. Will try a different collagen today  May switch to a foam halo next week     **Patient down in size this week. Still has some product for us to use     2024: Patient improved again this week. Will debride cap off and continue plan of care most likely     2024: Patient is looking much better. Wound is somewhat larger after debridement. Wound bed looks great and now is beefy red and granulating. Also tolerated debridement, which has never been the case in the past. Will continue to use new product.  Culture today to ensure she is clean     2024: Patient somewhat frustrated but her wound is about half the size  She was started on the silver collagen powder and seems promising so far  Ok with compression wrap again this week.  Still painful but not developing overly fibrous cap     2024: Patient looks ok. After debridement wound is slough free and pink

## 2024-04-05 ENCOUNTER — HOSPITAL ENCOUNTER (OUTPATIENT)
Dept: WOUND CARE | Age: 79
Discharge: HOME | End: 2024-04-05
Attending: NURSE PRACTITIONER
Payer: MEDICARE

## 2024-04-05 VITALS — RESPIRATION RATE: 20 BRPM

## 2024-04-05 DIAGNOSIS — I83.013 VENOUS STASIS ULCER OF RIGHT ANKLE WITH FAT LAYER EXPOSED, UNSPECIFIED WHETHER VARICOSE VEINS PRESENT (HCC): Primary | ICD-10-CM

## 2024-04-05 DIAGNOSIS — L97.312 VENOUS STASIS ULCER OF RIGHT ANKLE WITH FAT LAYER EXPOSED, UNSPECIFIED WHETHER VARICOSE VEINS PRESENT (HCC): Primary | ICD-10-CM

## 2024-04-05 PROCEDURE — 11042 DBRDMT SUBQ TIS 1ST 20SQCM/<: CPT | Performed by: NURSE PRACTITIONER

## 2024-04-05 PROCEDURE — 11042 DBRDMT SUBQ TIS 1ST 20SQCM/<: CPT

## 2024-04-05 RX ORDER — LIDOCAINE HYDROCHLORIDE 20 MG/ML
JELLY TOPICAL ONCE
OUTPATIENT
Start: 2024-04-05 | End: 2024-04-05

## 2024-04-05 RX ORDER — BACITRACIN ZINC AND POLYMYXIN B SULFATE 500; 1000 [USP'U]/G; [USP'U]/G
OINTMENT TOPICAL ONCE
OUTPATIENT
Start: 2024-04-05 | End: 2024-04-05

## 2024-04-05 RX ORDER — TRIAMCINOLONE ACETONIDE 1 MG/G
OINTMENT TOPICAL ONCE
OUTPATIENT
Start: 2024-04-05 | End: 2024-04-05

## 2024-04-05 RX ORDER — LIDOCAINE 50 MG/G
OINTMENT TOPICAL ONCE
OUTPATIENT
Start: 2024-04-05 | End: 2024-04-05

## 2024-04-05 RX ORDER — IBUPROFEN 200 MG
TABLET ORAL ONCE
OUTPATIENT
Start: 2024-04-05 | End: 2024-04-05

## 2024-04-05 RX ORDER — CLOBETASOL PROPIONATE 0.5 MG/G
OINTMENT TOPICAL ONCE
Status: COMPLETED | OUTPATIENT
Start: 2024-04-05 | End: 2024-04-05

## 2024-04-05 RX ORDER — BETAMETHASONE DIPROPIONATE 0.5 MG/G
CREAM TOPICAL ONCE
OUTPATIENT
Start: 2024-04-05 | End: 2024-04-05

## 2024-04-05 RX ORDER — LIDOCAINE 40 MG/G
CREAM TOPICAL ONCE
OUTPATIENT
Start: 2024-04-05 | End: 2024-04-05

## 2024-04-05 RX ORDER — GENTAMICIN SULFATE 1 MG/G
OINTMENT TOPICAL ONCE
OUTPATIENT
Start: 2024-04-05 | End: 2024-04-05

## 2024-04-05 RX ORDER — CLOBETASOL PROPIONATE 0.5 MG/G
OINTMENT TOPICAL ONCE
OUTPATIENT
Start: 2024-04-05 | End: 2024-04-05

## 2024-04-05 RX ORDER — LIDOCAINE HYDROCHLORIDE 40 MG/ML
SOLUTION TOPICAL ONCE
OUTPATIENT
Start: 2024-04-05 | End: 2024-04-05

## 2024-04-05 RX ORDER — BACITRACIN ZINC 500 [USP'U]/G
OINTMENT TOPICAL ONCE
OUTPATIENT
Start: 2024-04-05 | End: 2024-04-05

## 2024-04-05 RX ORDER — SODIUM CHLOR/HYPOCHLOROUS ACID 0.033 %
SOLUTION, IRRIGATION IRRIGATION ONCE
OUTPATIENT
Start: 2024-04-05 | End: 2024-04-05

## 2024-04-05 RX ADMIN — CLOBETASOL PROPIONATE: 0.5 OINTMENT TOPICAL at 11:31

## 2024-04-05 ASSESSMENT — PAIN SCALES - GENERAL: PAINLEVEL_OUTOF10: 0

## 2024-04-05 NOTE — PROGRESS NOTES
Wound Care Center Progress Note With Procedure    Aria Kelly  AGE: 78 y.o.   GENDER: female  : 1945  EPISODE DATE:  2024     Subjective:     Chief Complaint   Patient presents with    Wound Check     Right le         HISTORY of PRESENT ILLNESS     Aria Kelly is a 78 y.o. female who presents to the Wound Clinic for a visit for evaluation and treatment of Chronic venous  ulcer(s) of  R ankle lateral.  The condition is of moderate severity. The ulcer has been present for several years.  The underlying cause is thought to be venous stasis.  The patients care to date has included care here and care at home. She was here for care previously. The patient has significant underlying medical conditions as below.     2024: Patient looking good and is really close to healing. Double foam is helping a lot. Worried about cost      3-: Patient about the same. Will try to offload a bit better this week and will add clobetasol. She appears to have a large callus formation this week around wound. Will continue plan of care otherwise     3-: Patient needs new plan of care today. Likely has stopped responding to her current treatment. Will try a different collagen today  May switch to a foam halo next week     **Patient down in size this week. Still has some product for us to use     2024: Patient improved again this week. Will debride cap off and continue plan of care most likely     2024: Patient is looking much better. Wound is somewhat larger after debridement. Wound bed looks great and now is beefy red and granulating. Also tolerated debridement, which has never been the case in the past. Will continue to use new product.  Culture today to ensure she is clean     2024: Patient somewhat frustrated but her wound is about half the size  She was started on the silver collagen powder and seems promising so far  Ok with compression wrap again this week.  Still painful but not

## 2024-04-05 NOTE — PROGRESS NOTES
Multilayer Compression Wrap   (Not Unna) Below the Knee    NAME:  Aria Kelly  YOB: 1945  MEDICAL RECORD NUMBER:  [id number]  DATE:  4/5/2024    Multilayer compression wrap: Removed old Multilayer wrap if indicated and wash leg with mild soap/water.  Applied moisturizing agent to dry skin as needed.   Applied primary and secondary dressing as ordered.  Applied multilayered dressing below the knee to right lower leg.  Instructed patient/caregiver not to remove dressing and to keep it clean and dry.   Instructed patient/caregiver on complications to report to provider, such as pain, numbness in toes, heavy drainage, and slippage of dressing.  Instructed patient on purpose of compression dressing and on activity and exercise recommendations.      Electronically signed by Digna Sam RN on 4/5/2024 at 11:29 AM

## 2024-04-12 ENCOUNTER — HOSPITAL ENCOUNTER (OUTPATIENT)
Dept: WOUND CARE | Age: 79
Discharge: HOME | End: 2024-04-12
Attending: NURSE PRACTITIONER
Payer: MEDICARE

## 2024-04-12 VITALS
HEART RATE: 68 BPM | SYSTOLIC BLOOD PRESSURE: 148 MMHG | RESPIRATION RATE: 18 BRPM | DIASTOLIC BLOOD PRESSURE: 65 MMHG | TEMPERATURE: 98 F

## 2024-04-12 DIAGNOSIS — L97.312 VENOUS STASIS ULCER OF RIGHT ANKLE WITH FAT LAYER EXPOSED, UNSPECIFIED WHETHER VARICOSE VEINS PRESENT (HCC): Primary | ICD-10-CM

## 2024-04-12 DIAGNOSIS — I83.013 VENOUS STASIS ULCER OF RIGHT ANKLE WITH FAT LAYER EXPOSED, UNSPECIFIED WHETHER VARICOSE VEINS PRESENT (HCC): Primary | ICD-10-CM

## 2024-04-12 PROCEDURE — 97597 DBRDMT OPN WND 1ST 20 CM/<: CPT

## 2024-04-12 PROCEDURE — 29581 APPL MULTLAYER CMPRN SYS LEG: CPT

## 2024-04-12 RX ORDER — BETAMETHASONE DIPROPIONATE 0.5 MG/G
CREAM TOPICAL ONCE
OUTPATIENT
Start: 2024-04-12 | End: 2024-04-12

## 2024-04-12 RX ORDER — TRIAMCINOLONE ACETONIDE 1 MG/G
OINTMENT TOPICAL ONCE
OUTPATIENT
Start: 2024-04-12 | End: 2024-04-12

## 2024-04-12 RX ORDER — GENTAMICIN SULFATE 1 MG/G
OINTMENT TOPICAL ONCE
OUTPATIENT
Start: 2024-04-12 | End: 2024-04-12

## 2024-04-12 RX ORDER — LIDOCAINE 40 MG/G
CREAM TOPICAL ONCE
OUTPATIENT
Start: 2024-04-12 | End: 2024-04-12

## 2024-04-12 RX ORDER — IBUPROFEN 200 MG
TABLET ORAL ONCE
OUTPATIENT
Start: 2024-04-12 | End: 2024-04-12

## 2024-04-12 RX ORDER — BACITRACIN ZINC 500 [USP'U]/G
OINTMENT TOPICAL ONCE
OUTPATIENT
Start: 2024-04-12 | End: 2024-04-12

## 2024-04-12 RX ORDER — SODIUM CHLOR/HYPOCHLOROUS ACID 0.033 %
SOLUTION, IRRIGATION IRRIGATION ONCE
OUTPATIENT
Start: 2024-04-12 | End: 2024-04-12

## 2024-04-12 RX ORDER — LIDOCAINE 50 MG/G
OINTMENT TOPICAL ONCE
OUTPATIENT
Start: 2024-04-12 | End: 2024-04-12

## 2024-04-12 RX ORDER — CLOBETASOL PROPIONATE 0.5 MG/G
OINTMENT TOPICAL ONCE
OUTPATIENT
Start: 2024-04-12 | End: 2024-04-12

## 2024-04-12 RX ORDER — CLOBETASOL PROPIONATE 0.5 MG/G
OINTMENT TOPICAL ONCE
Status: COMPLETED | OUTPATIENT
Start: 2024-04-12 | End: 2024-04-12

## 2024-04-12 RX ORDER — LIDOCAINE HYDROCHLORIDE 20 MG/ML
JELLY TOPICAL ONCE
OUTPATIENT
Start: 2024-04-12 | End: 2024-04-12

## 2024-04-12 RX ORDER — BACITRACIN ZINC AND POLYMYXIN B SULFATE 500; 1000 [USP'U]/G; [USP'U]/G
OINTMENT TOPICAL ONCE
OUTPATIENT
Start: 2024-04-12 | End: 2024-04-12

## 2024-04-12 RX ORDER — LIDOCAINE HYDROCHLORIDE 40 MG/ML
SOLUTION TOPICAL ONCE
OUTPATIENT
Start: 2024-04-12 | End: 2024-04-12

## 2024-04-12 RX ADMIN — CLOBETASOL PROPIONATE: 0.5 OINTMENT TOPICAL at 11:22

## 2024-04-12 ASSESSMENT — PAIN SCALES - GENERAL: PAINLEVEL_OUTOF10: 0

## 2024-04-12 NOTE — PROGRESS NOTES
Wound Care Center Progress Note With Procedure    Aria Kelly  AGE: 78 y.o.   GENDER: female  : 1945  EPISODE DATE:  2024     Subjective:     Chief Complaint   Patient presents with    Wound Check     Right ankle          HISTORY of PRESENT ILLNESS     Aria Kelly is a 78 y.o. female who presents to the Wound Clinic for a visit for evaluation and treatment of Chronic venous  ulcer(s) of  R ankle lateral.  The condition is of moderate severity. The ulcer has been present for several years.  The underlying cause is thought to be venous stasis.  The patients care to date has included care here and care at home. She was here for care previously. The patient has significant underlying medical conditions as below.     2024: Patient appears healed. Would like to wrap again for 1 week and then have patient care for it at home for a week, then return for a final visit. This is a sensitive area and she will need to pay special attention to it     2024: Patient looking good and is really close to healing. Double foam is helping a lot. Worried about cost      3-: Patient about the same. Will try to offload a bit better this week and will add clobetasol. She appears to have a large callus formation this week around wound. Will continue plan of care otherwise     3-: Patient needs new plan of care today. Likely has stopped responding to her current treatment. Will try a different collagen today  May switch to a foam halo next week     **Patient down in size this week. Still has some product for us to use     2024: Patient improved again this week. Will debride cap off and continue plan of care most likely     2024: Patient is looking much better. Wound is somewhat larger after debridement. Wound bed looks great and now is beefy red and granulating. Also tolerated debridement, which has never been the case in the past. Will continue to use new product.  Culture today to ensure

## 2024-04-12 NOTE — PROGRESS NOTES
Multilayer Compression Wrap   (Not Unna) Below the Knee    NAME:  Aria Kelly  YOB: 1945  MEDICAL RECORD NUMBER:  [id number]  DATE:  4/12/2024    Multilayer compression wrap: Removed old Multilayer wrap if indicated and wash leg with mild soap/water.  Applied moisturizing agent to dry skin as needed.   Applied primary and secondary dressing as ordered.  Applied multilayered dressing below the knee to right lower leg.  Instructed patient/caregiver not to remove dressing and to keep it clean and dry.   Instructed patient/caregiver on complications to report to provider, such as pain, numbness in toes, heavy drainage, and slippage of dressing.  Instructed patient on purpose of compression dressing and on activity and exercise recommendations.      Electronically signed by Tessie Loredo LPN on 4/12/2024 at 11:21 AM

## 2024-04-12 NOTE — PATIENT INSTRUCTIONS
PHYSICIAN ORDERS AND DISCHARGE INSTRUCTIONS    Wound cleansing:     Do not scrub or use excessive force.   Wash hands with soap and water before and after dressing changes.   Prior to applying a clean dressing, cleanse wound with normal saline,               wound cleanser, or mild soap and water.    Ask the physician or nurse before getting the wound(s) wet in a shower      Compression Wrap Education   When slippage occurs, the wrap should be removed immediately and rewrapped or a different wrap should be applied. If removal is necessary, cover wound with clean bandage and contact the wound care clinic.   Monitor for impaired circulation including pale, cool or numb extremities distal to the wrap or garment.   If pain, numbness, tingling, discolouration or swelling of the toes occurs, the compression wrap or stocking must be removed immediately  Report to provider, such as pain, numbness in toes, heavy drainage, and slippage of dressing.  Keep compression wraps clean and dry. May use cast cover to take a shower or take sponge baths.   Do not stick objects inside wraps to scratch. This may create more wounds.   Speak to the provider about any issues or questions you may have about your compression wraps.   Rest and Elevate lower extremities throughout the day.   Avoid prolonged sitting with legs dangling or prolonged standing   If supplies are not available please DO NOT remove wraps until next visit to Wound Care Center    Wound Care Notes:         Orders for this week:  4/12/2024    Right Lateral Ankle Wounds - Wash with soap and water, pat dry.   Apply stimulen  and clobetasol to wound bed.  Apply double foam to offload entire ankle   Cover with ag alginate    Wrap with coban 2 lite   Remove Friday and cover with foam and silicone island dressing       Follow Up Instructions: At the Wound Care Center in 2 week   Primary Wound Care Provider: Wallace Avila   Call  for any questions or concerns.  Central

## 2024-04-24 ENCOUNTER — TELEPHONE (OUTPATIENT)
Dept: CARDIOLOGY CLINIC | Age: 79
End: 2024-04-24

## 2024-04-26 ENCOUNTER — HOSPITAL ENCOUNTER (OUTPATIENT)
Dept: WOUND CARE | Age: 79
Discharge: HOME | End: 2024-04-26
Attending: NURSE PRACTITIONER
Payer: MEDICARE

## 2024-04-26 VITALS
RESPIRATION RATE: 14 BRPM | TEMPERATURE: 98.9 F | DIASTOLIC BLOOD PRESSURE: 68 MMHG | SYSTOLIC BLOOD PRESSURE: 116 MMHG | HEART RATE: 60 BPM

## 2024-04-26 DIAGNOSIS — I83.013 VENOUS STASIS ULCER OF RIGHT ANKLE WITH FAT LAYER EXPOSED, UNSPECIFIED WHETHER VARICOSE VEINS PRESENT (HCC): Primary | ICD-10-CM

## 2024-04-26 DIAGNOSIS — L97.312 VENOUS STASIS ULCER OF RIGHT ANKLE WITH FAT LAYER EXPOSED, UNSPECIFIED WHETHER VARICOSE VEINS PRESENT (HCC): Primary | ICD-10-CM

## 2024-04-26 PROCEDURE — 99213 OFFICE O/P EST LOW 20 MIN: CPT | Performed by: NURSE PRACTITIONER

## 2024-04-26 PROCEDURE — 99212 OFFICE O/P EST SF 10 MIN: CPT

## 2024-04-26 RX ORDER — BACITRACIN ZINC 500 [USP'U]/G
OINTMENT TOPICAL ONCE
Status: CANCELLED | OUTPATIENT
Start: 2024-04-26 | End: 2024-04-26

## 2024-04-26 RX ORDER — GENTAMICIN SULFATE 1 MG/G
OINTMENT TOPICAL ONCE
Status: CANCELLED | OUTPATIENT
Start: 2024-04-26 | End: 2024-04-26

## 2024-04-26 RX ORDER — TRIAMCINOLONE ACETONIDE 1 MG/G
OINTMENT TOPICAL ONCE
Status: CANCELLED | OUTPATIENT
Start: 2024-04-26 | End: 2024-04-26

## 2024-04-26 RX ORDER — CLOBETASOL PROPIONATE 0.5 MG/G
OINTMENT TOPICAL ONCE
Status: CANCELLED | OUTPATIENT
Start: 2024-04-26 | End: 2024-04-26

## 2024-04-26 RX ORDER — LIDOCAINE HYDROCHLORIDE 20 MG/ML
JELLY TOPICAL ONCE
Status: CANCELLED | OUTPATIENT
Start: 2024-04-26 | End: 2024-04-26

## 2024-04-26 RX ORDER — LIDOCAINE 50 MG/G
OINTMENT TOPICAL ONCE
Status: CANCELLED | OUTPATIENT
Start: 2024-04-26 | End: 2024-04-26

## 2024-04-26 RX ORDER — IBUPROFEN 200 MG
TABLET ORAL ONCE
Status: CANCELLED | OUTPATIENT
Start: 2024-04-26 | End: 2024-04-26

## 2024-04-26 RX ORDER — BACITRACIN ZINC AND POLYMYXIN B SULFATE 500; 1000 [USP'U]/G; [USP'U]/G
OINTMENT TOPICAL ONCE
Status: CANCELLED | OUTPATIENT
Start: 2024-04-26 | End: 2024-04-26

## 2024-04-26 RX ORDER — LIDOCAINE 40 MG/G
CREAM TOPICAL ONCE
Status: CANCELLED | OUTPATIENT
Start: 2024-04-26 | End: 2024-04-26

## 2024-04-26 RX ORDER — LIDOCAINE HYDROCHLORIDE 40 MG/ML
SOLUTION TOPICAL ONCE
Status: CANCELLED | OUTPATIENT
Start: 2024-04-26 | End: 2024-04-26

## 2024-04-26 RX ORDER — BETAMETHASONE DIPROPIONATE 0.5 MG/G
CREAM TOPICAL ONCE
Status: CANCELLED | OUTPATIENT
Start: 2024-04-26 | End: 2024-04-26

## 2024-04-26 RX ORDER — SODIUM CHLOR/HYPOCHLOROUS ACID 0.033 %
SOLUTION, IRRIGATION IRRIGATION ONCE
Status: CANCELLED | OUTPATIENT
Start: 2024-04-26 | End: 2024-04-26

## 2024-04-26 ASSESSMENT — PAIN DESCRIPTION - FREQUENCY: FREQUENCY: INTERMITTENT

## 2024-04-26 ASSESSMENT — PAIN - FUNCTIONAL ASSESSMENT: PAIN_FUNCTIONAL_ASSESSMENT: ACTIVITIES ARE NOT PREVENTED

## 2024-04-26 ASSESSMENT — PAIN DESCRIPTION - LOCATION: LOCATION: ANKLE

## 2024-04-26 ASSESSMENT — PAIN DESCRIPTION - ONSET: ONSET: ON-GOING

## 2024-04-26 ASSESSMENT — PAIN DESCRIPTION - DESCRIPTORS: DESCRIPTORS: TENDER;ITCHING

## 2024-04-26 ASSESSMENT — PAIN DESCRIPTION - PAIN TYPE: TYPE: ACUTE PAIN

## 2024-04-26 ASSESSMENT — PAIN SCALES - GENERAL: PAINLEVEL_OUTOF10: 0

## 2024-04-26 ASSESSMENT — PAIN DESCRIPTION - ORIENTATION: ORIENTATION: RIGHT

## 2024-04-26 NOTE — PATIENT INSTRUCTIONS
PHYSICIAN ORDERS AND DISCHARGE INSTRUCTIONS    Wound cleansing:     Do not scrub or use excessive force.   Wash hands with soap and water before and after dressing changes.   Prior to applying a clean dressing, cleanse wound with normal saline,               wound cleanser, or mild soap and water.    Ask the physician or nurse before getting the wound(s) wet in a shower      Compression Wrap Education   When slippage occurs, the wrap should be removed immediately and rewrapped or a different wrap should be applied. If removal is necessary, cover wound with clean bandage and contact the wound care clinic.   Monitor for impaired circulation including pale, cool or numb extremities distal to the wrap or garment.   If pain, numbness, tingling, discolouration or swelling of the toes occurs, the compression wrap or stocking must be removed immediately  Report to provider, such as pain, numbness in toes, heavy drainage, and slippage of dressing.  Keep compression wraps clean and dry. May use cast cover to take a shower or take sponge baths.   Do not stick objects inside wraps to scratch. This may create more wounds.   Speak to the provider about any issues or questions you may have about your compression wraps.   Rest and Elevate lower extremities throughout the day.   Avoid prolonged sitting with legs dangling or prolonged standing   If supplies are not available please DO NOT remove wraps until next visit to Wound Care Center    Wound Care Notes:         Orders for this week:  2024    Right Lateral Ankle Wounds - Wash with soap and water, pat dry.   Remove Friday and cover with foam and silicone island dressing       Follow Up Instructions: At the Wound Care Center Discharged    Primary Wound Care Provider: Wallace Avila   Call  for any questions or concerns.  Central [phone] for imaging and lab work  
<-- Click to add NO significant Past Surgical History

## 2024-04-26 NOTE — PROGRESS NOTES
Wound Care Center Progress Note       Aria Kelly  AGE: 78 y.o.   GENDER: female  : 1945  TODAY'S DATE:  2024        Subjective:     Chief Complaint   Patient presents with    Wound Check     Right Ankle         HISTORY of PRESENT ILLNESS    Aria Kelly is a 78 y.o. female who presents to the Wound Clinic for a visit for evaluation and treatment of Chronic venous  ulcer(s) of  R ankle lateral.  The condition is of moderate severity. The ulcer has been present for several years.  The underlying cause is thought to be venous stasis.  The patients care to date has included care here and care at home. She was here for care previously. The patient has significant underlying medical conditions as below.      Healed today     Wound Pain Timing/Severity: waxing and waning  Quality of pain: tender, pressure  Severity of pain:  3 / 10   Modifying Factors: venous stasis  Associated Signs/Symptoms: edema, erythema, drainage, and pain        PAST MEDICAL HISTORY        Diagnosis Date    Aortic regurgitation     MILD MOD/ MILD STENOSIS NOTED ON ECHO 10/22- DR PERAZA    Arthritis     \"Hands\"    CAD (coronary artery disease)     Dr Peraza    Chronic kidney disease, stage I 10/16/2019    COVID-19 2021    H/O echocardiogram 2016    ef 56% mild to mod. mr and tr    H/O echocardiogram 2020    EF 55-60% mild aortic stenosis mild mitral aortic and tricuspid regurg  no evidence of pericardial effusion    History of echocardiogram 2019    s/p Left CEA, Mild 0-49% disease of the bilateral ICA, dampened left vertebral flow, normal left subclavian flow.    Hx of Carotid Doppler ultrasound 2020    Mild 0-49% disease of the bilateral ICA, normal vertebral flow    Hypercalcemia     ?possible hyperparathyroidism/saw Dr Arredondo previously    Hyperlipidemia     Hypertension     Hyponatremia     chronic ~130-131 range.    Hypothyroidism due to acquired atrophy of thyroid     antiperoxidase ab

## 2024-05-09 LAB
HBA1C MFR BLD: 5.8 % (ref 4–6)
HCT VFR BLD CALC: 36 % (ref 34–49)
HEMOGLOBIN: 12.1 G/DL (ref 11.2–15.7)
MCH RBC QN AUTO: 30.4 PG (ref 26–34)
MCHC RBC AUTO-ENTMCNC: 33.6 G/DL (ref 30.7–35.5)
MCV RBC AUTO: 90.5 FL (ref 80–100)
PDW BLD-RTO: 12.9 %
PLATELET # BLD: 281 K/UL (ref 140–400)
PMV BLD AUTO: 10.7 FL (ref 7.2–11.7)
RBC # BLD: 3.98 M/UL (ref 3.95–5.26)
WBC # BLD: 8.9 K/UL (ref 3.5–10.9)

## 2024-05-10 LAB
A/G RATIO: 1.6 RATIO (ref 0.8–2.6)
ALBUMIN: 4.4 G/DL (ref 3.5–5.2)
ALP BLD-CCNC: 91 U/L (ref 23–144)
ALT SERPL-CCNC: 29 U/L (ref 0–60)
AST SERPL-CCNC: 28 U/L (ref 0–55)
BILIRUB SERPL-MCNC: 0.4 MG/DL (ref 0–1.2)
BUN / CREAT RATIO: 17 (ref 7–25)
BUN BLDV-MCNC: 12 MG/DL (ref 3–29)
CALCIUM SERPL-MCNC: 10.3 MG/DL (ref 8.5–10.5)
CHLORIDE BLD-SCNC: 98 MEQ/L (ref 96–110)
CHOLESTEROL, TOTAL: 137 MG/DL
CO2: 25 MEQ/L (ref 19–32)
CREAT SERPL-MCNC: 0.7 MG/DL (ref 0.5–1.2)
ESTIMATED GLOMERULAR FILTRATION RATE CREATININE EQUATION: 88 MLS/MIN/1.73M2
FASTING STATUS: ABNORMAL
GLOBULIN: 2.7 G/DL (ref 1.9–3.6)
GLUCOSE BLD-MCNC: 108 MG/DL (ref 70–99)
HDLC SERPL-MCNC: 48 MG/DL
LDL CHOLESTEROL: 62 MG/DL
POTASSIUM SERPL-SCNC: 3.8 MEQ/L (ref 3.4–5.3)
SODIUM BLD-SCNC: 134 MEQ/L (ref 135–148)
T4 FREE: 1.24 NG/DL (ref 0.8–1.8)
TOTAL PROTEIN: 7.1 G/DL (ref 6–8.3)
TRIGL SERPL-MCNC: 136 MG/DL
TSH ULTRASENSITIVE: 2.8 MCIU/ML (ref 0.4–4.5)
VLDLC SERPL CALC-MCNC: 27 MG/DL (ref 4–38)

## 2024-05-10 NOTE — RESULT ENCOUNTER NOTE
Please call pt, lab ok incl chol level, thyroid fxn and sugar, also sodium level is stable at 134, nearly normal which would be 135.  .

## 2024-05-15 ENCOUNTER — OFFICE VISIT (OUTPATIENT)
Dept: INTERNAL MEDICINE CLINIC | Age: 79
End: 2024-05-15
Payer: MEDICARE

## 2024-05-15 VITALS
HEIGHT: 59 IN | DIASTOLIC BLOOD PRESSURE: 62 MMHG | BODY MASS INDEX: 30.84 KG/M2 | OXYGEN SATURATION: 97 % | WEIGHT: 153 LBS | SYSTOLIC BLOOD PRESSURE: 120 MMHG | HEART RATE: 67 BPM

## 2024-05-15 DIAGNOSIS — E03.4 HYPOTHYROIDISM DUE TO ACQUIRED ATROPHY OF THYROID: ICD-10-CM

## 2024-05-15 DIAGNOSIS — R73.01 IFG (IMPAIRED FASTING GLUCOSE): ICD-10-CM

## 2024-05-15 DIAGNOSIS — I10 ESSENTIAL HYPERTENSION: Primary | ICD-10-CM

## 2024-05-15 DIAGNOSIS — I25.10 CORONARY ARTERY DISEASE INVOLVING NATIVE CORONARY ARTERY OF NATIVE HEART WITHOUT ANGINA PECTORIS: ICD-10-CM

## 2024-05-15 DIAGNOSIS — N18.1 CHRONIC KIDNEY DISEASE, STAGE I: ICD-10-CM

## 2024-05-15 DIAGNOSIS — I35.1 AORTIC VALVE INSUFFICIENCY, ETIOLOGY OF CARDIAC VALVE DISEASE UNSPECIFIED: ICD-10-CM

## 2024-05-15 PROCEDURE — 3078F DIAST BP <80 MM HG: CPT | Performed by: INTERNAL MEDICINE

## 2024-05-15 PROCEDURE — 3074F SYST BP LT 130 MM HG: CPT | Performed by: INTERNAL MEDICINE

## 2024-05-15 PROCEDURE — 99397 PER PM REEVAL EST PAT 65+ YR: CPT | Performed by: INTERNAL MEDICINE

## 2024-05-15 NOTE — PROGRESS NOTES
COVID-19 Vaccine (4 - 2023-24 season) 09/01/2023    Annual Wellness Visit (Medicare Advantage)  01/01/2024    Depression Screen  02/14/2025    Lipids  05/09/2025    DTaP/Tdap/Td vaccine (2 - Td or Tdap) 08/28/2029    DEXA (modify frequency per FRAX score)  Completed    Flu vaccine  Completed    Pneumococcal 65+ years Vaccine  Completed    Respiratory Syncytial Virus (RSV) Pregnant or age 60 yrs+  Completed    Hepatitis C screen  Completed    Hepatitis A vaccine  Aged Out    Hepatitis B vaccine  Aged Out    Hib vaccine  Aged Out    Polio vaccine  Aged Out    Meningococcal (ACWY) vaccine  Aged Out    Colonoscopy  Discontinued        Lipid panel:   Lab Results   Component Value Date/Time    TRIG 136 05/09/2024 09:35 AM    HDL 48 05/09/2024 09:35 AM    LDLDIRECT 72 11/03/2017 11:01 AM        Immunization History   Administered Date(s) Administered    COVID-19, PFIZER PURPLE top, DILUTE for use, (age 12 y+), 30mcg/0.3mL 01/26/2021, 02/16/2021, 03/15/2022    Influenza Virus Vaccine 08/01/2016    Influenza, AFLURIA (age 3 yrs+), FLUZONE, (age 6 mo+), MDV, 0.5mL 10/25/2017, 09/09/2020    Influenza, FLUAD, (age 65 y+), Adjuvanted, 0.5mL 11/16/2023    Influenza, High Dose (Fluzone 65 yrs and older) 10/17/2018, 10/04/2019, 10/25/2022    Pneumococcal, PCV-13, PREVNAR 13, (age 6w+), IM, 0.5mL 10/25/2017    Pneumococcal, PPSV23, PNEUMOVAX 23, (age 2y+), SC/IM, 0.5mL 05/15/2019    TDaP, ADACEL (age 10y-64y), BOOSTRIX (age 10y+), IM, 0.5mL 08/28/2019    Tetanus 09/23/2015    Zoster Live (Zostavax) 08/10/2015       Allergies   Allergen Reactions    Celexa [Citalopram] Nausea And Vomiting    Poison Ivy Extract [Poison Renee Extract] Rash    Septra [Sulfamethoxazole-Trimethoprim] Nausea And Vomiting    Ultram [Tramadol] Nausea And Vomiting    Valsartan Other (See Comments)     Increased K    Xanax Xr [Alprazolam Er]      NOT ALLERGY, FAMILY STRONGLY OPPOSED TO BENZODIAZEPINES     Current Outpatient Medications   Medication Sig Dispense

## 2024-06-18 NOTE — PROGRESS NOTES
CARDIOLOGY NOTE      1/12/2022    RE: Franklin Zepeda  ([id number])                               TO:  Dr. Jim Simpson MD            CHIEF COMPLAINT   Graciela Marcus is a 67 y.o. female who was seen today for management of  cad                                    HPI:                   Pt has h/o cad, htn, hyperlipidimnea, cea, seen today for  fu.  Pt has  No cardiac complains  Was having episodes of lethargy, had low BP    Franklin Katelyn has the following history recorded in care path:  Patient Active Problem List    Diagnosis Date Noted    Stenosis of left carotid artery 01/09/2017    Nevus of multiple sites 08/26/2015    Hypertension     CAD (coronary artery disease)     Menopause     Osteopenia     Insomnia     Hypothyroidism due to acquired atrophy of thyroid     Chronic kidney disease, stage I 10/16/2019    Hyponatremia 12/19/2018    Anxiety 05/26/2017     Current Outpatient Medications   Medication Sig Dispense Refill    predniSONE (DELTASONE) 5 MG tablet Take 6 tablets by mouth on day 1, 5 on day 2, 4 on day 3, 3 on day 4, 2 on day 5, 1 on day 6. 21 tablet 0    levothyroxine (SYNTHROID) 25 MCG tablet Take 1 tablet by mouth daily 90 tablet 1    clopidogrel (PLAVIX) 75 MG tablet Take 1 tablet by mouth daily 90 tablet 1    sertraline (ZOLOFT) 50 MG tablet Take 1 tablet by mouth daily 90 tablet 1    simvastatin (ZOCOR) 20 MG tablet Take 1 tablet by mouth nightly 90 tablet 1    metoprolol succinate (TOPROL XL) 25 MG extended release tablet Take 1 tablet by mouth daily 90 tablet 1    hydrALAZINE (APRESOLINE) 50 MG tablet TAKE 1/2 TABLET BY MOUTH IN THE MORNING AND TAKE 1 TABLET BY MOUTH IN THE EVENING 135 tablet 1    hydroCHLOROthiazide (MICROZIDE) 12.5 MG capsule Take 1 capsule by mouth every morning 90 capsule 1    losartan (COZAAR) 100 MG tablet Take 1 tablet by mouth daily 90 tablet 1    Multiple Vitamins-Minerals (THERAPEUTIC MULTIVITAMIN-MINERALS) tablet Take 1 tablet by mouth How Severe Are Your Spot(S)?: mild daily      Acetaminophen (TYLENOL ARTHRITIS PAIN PO) Take by mouth as needed      melatonin 3 MG TABS tablet Take 10 mg by mouth nightly       aspirin 81 MG tablet Take 81 mg by mouth nightly        No current facility-administered medications for this visit. Allergies: Celexa [citalopram], Poison ivy extract [poison ivy extract], Septra [sulfamethoxazole-trimethoprim], Ultram [tramadol], Valsartan, and Xanax xr [alprazolam er]  Past Medical History:   Diagnosis Date    Arthritis     \"Hands\"    CAD (coronary artery disease) 2006    Dr Vidya Cortez kidney disease, stage I 10/16/2019    COVID-19 09/28/2021    H/O cardiovascular stress test 02/15/2018    EF60% mod ischemia ant wall    H/O Doppler ultrasound 01/09/2017    carotid doppler - severe degree stenosis LICA    H/O echocardiogram 06/06/2016    ef 56% mild to mod. mr and tr    H/O echocardiogram 10/16/2018    EF55-60% mild AS, mild AR, mild-mod MR, mild TR, mild phtn    H/O echocardiogram 12/29/2020    EF 55-60% mild aortic stenosis mild mitral aortic and tricuspid regurg  no evidence of pericardial effusion    History of cardiac cath 02/19/2018    patent stents, nml EF, abn stress sec to jailed diag    History of echocardiogram 07/08/2019    s/p Left CEA, Mild 0-49% disease of the bilateral ICA, dampened left vertebral flow, normal left subclavian flow.  Hx of Carotid Doppler ultrasound 05/06/2020    Mild 0-49% disease of the bilateral ICA, normal vertebral flow    Hypercalcemia     ? possible hyperparathyroidism/saw Dr Carlito Wilson previously    Hyperlipidemia     Hypertension 1978    Hyponatremia     chronic ~130-131 range.     Hypothyroidism due to acquired atrophy of thyroid     antiperoxidase ab tested negative    Insomnia     Menopause     s/p JAMIE    Osteopenia     has hx of rib fractures after a fall    Osteopenia     S/P colonoscopic polypectomy 07/19/2018    Dr Bret Nath, recheck 5 yrs    Shortness of breath on exertion     Vitamin D deficiency      Past Surgical History:   Procedure Laterality Date    APPENDECTOMY      CARDIAC SURGERY      stents x 3 Feb 2007    CAROTID ENDARTERECTOMY Left 02/01/2017    with patch     COLONOSCOPY      CORONARY ANGIOPLASTY WITH STENT PLACEMENT  2006, 2007    X 2 in LAD and one in ? CIRC w Dr East Staff Bilateral 2000's    Cataracts With Lens Implants    HYSTERECTOMY, TOTAL ABDOMINAL  1981    ROTATOR CUFF REPAIR Right 2003    TONSILLECTOMY  1954      As reviewed   Family History   Problem Relation Age of Onset    High Blood Pressure Mother     Migraines Mother     Heart Disease Father     High Blood Pressure Father     Other Father         Ulcer    Cancer Paternal Aunt     Diabetes Brother     Cancer Paternal Aunt     Diabetes Grandchild     Asthma Grandchild     Heart Disease Maternal Aunt     Colon Cancer Maternal Aunt     Diabetes Paternal Grandfather      Social History     Tobacco Use    Smoking status: Never Smoker    Smokeless tobacco: Never Used   Substance Use Topics    Alcohol use: No     Comment: caffeine 1-2 cups of coffee a day 2 pops a day      Review of Systems:    Constitutional: Negative for diaphoresis and fatigue  Psychological:Negative for anxiety or depression  HENT: Negative for headaches, nasal congestion, sinus pain or vertigo  Eyes: Negative for visual disturbance.    Endocrine: Negative for polydipsia/polyuria  Respiratory: Negative for shortness of breath  Cardiovascular: Negative for chest pain, dyspnea on exertion, claudication, edema, irregular heartbeat, murmur, palpitations or shortness of breath  Gastrointestinal: Negative for abdominal pain or heartburn  Genito-Urinary: Negative for urinary frequency/urgency  Musculoskeletal: Negative for muscle pain, muscular weakness, negative for pain in arm and leg or swelling in foot and leg  Neurological: Negative for dizziness, headaches, memory loss, numbness/tingling, visual changes, syncope  Dermatological: Negative for rash    Objective:    Vitals:    01/12/22 1009   BP: 128/80   Pulse: 62   Weight: 155 lb 6.4 oz (70.5 kg)   Height: 4' 11\" (1.499 m)     /80   Pulse 62   Ht 4' 11\" (1.499 m)   Wt 155 lb 6.4 oz (70.5 kg)   LMP  (LMP Unknown)   BMI 31.39 kg/m²     Patient-Reported Vitals 9/28/2021   Patient-Reported Weight 154   Patient-Reported Height 4'11\"   Patient-Reported Systolic 265   Patient-Reported Diastolic 75   Patient-Reported Pulse 90   Patient-Reported Temperature 100.5   Patient-Reported SpO2 94        Wt Readings from Last 3 Encounters:   01/12/22 155 lb 6.4 oz (70.5 kg)   11/22/21 154 lb (69.9 kg)   06/25/21 155 lb 3.2 oz (70.4 kg)     Body mass index is 31.39 kg/m². GENERAL - Alert, oriented, pleasant, in no apparent distress. EYES: No jaundice, no conjunctival pallor. SKIN: It is warm & dry. No rashes. No Echhymosis    HEENT  No clinically significant abnormalities seen. Neck - Supple. No jugular venous distention noted. No carotid bruits. Cardiovascular  Normal S1 and S2 without obvious murmur or gallop. Extremities - No cyanosis, clubbing, or significant edema. Pulmonary  No respiratory distress. No wheezes or rales. Abdomen  No masses, tenderness, or organomegaly. Musculoskeletal  No significant edema. No joint deformities. No muscle wasting. Neurologic  Cranial nerves II through XII are grossly intact. There were no gross focal neurologic abnormalities.     Lab Review   Lab Results   Component Value Date    CKTOTAL 71 04/02/2017    TROPONINT <0.010 04/02/2017     BNP:  No results found for: BNP  PT/INR:    Lab Results   Component Value Date    INR 0.97 02/16/2018     No results found for: LABA1C  Lab Results   Component Value Date    WBC 7.7 11/15/2021    HCT 36.5 11/15/2021    MCV 87.5 11/15/2021     11/15/2021     Lab Results   Component Value Date    CHOL 158 11/15/2021    TRIG 140 11/15/2021    HDL 55 (L) 11/15/2021    [employer] 75 11/15/2021    LDLDIRECT 72 11/03/2017     Lab Results   Component Value Date    ALT 30 11/15/2021    AST 23 11/15/2021     BMP:    Lab Results   Component Value Date     11/15/2021    K 4.0 11/15/2021    CL 95 11/15/2021    CO2 28 11/15/2021    BUN 19 11/15/2021    CREATININE 0.9 11/15/2021     CMP:   Lab Results   Component Value Date     11/15/2021    K 4.0 11/15/2021    CL 95 11/15/2021    CO2 28 11/15/2021    BUN 19 11/15/2021    PROT 7.2 11/15/2021    PROT 7.4 01/04/2013     TSH:    Lab Results   Component Value Date    TSH 2.630 11/15/2021           Assessment & Plan:               -     CORONARY ARTERY DISEASE:  asymptomatic     All available  tests in chart reviewed. Management discussed . Testing ordered  no              On asa                   -  Hypertension: Patients blood pressure is normal. Patient is advised about low sodium diet. Present medical regimen will not be changed. On hctz  BP better after adjusting hydrallazine      - cea  stable              -  LIPID MANAGEMENT:  Importance of lipid levels discussed with patient   and patient was given dietary advice. NCEP- ATP III guidelines reviewed with patient. -   Changes  in medicines made:  No                       On zocor    - had Gale Young MD    [facility] [place]

## 2024-08-09 DIAGNOSIS — F41.9 ANXIETY: ICD-10-CM

## 2024-08-09 DIAGNOSIS — I10 ESSENTIAL HYPERTENSION: ICD-10-CM

## 2024-08-09 DIAGNOSIS — E03.4 HYPOTHYROIDISM DUE TO ACQUIRED ATROPHY OF THYROID: ICD-10-CM

## 2024-08-10 RX ORDER — LEVOTHYROXINE SODIUM 0.03 MG/1
25 TABLET ORAL DAILY
Qty: 90 TABLET | Refills: 1 | Status: SHIPPED | OUTPATIENT
Start: 2024-08-10

## 2024-08-10 RX ORDER — HYDROCHLOROTHIAZIDE 12.5 MG/1
12.5 CAPSULE, GELATIN COATED ORAL EVERY MORNING
Qty: 90 CAPSULE | Refills: 1 | Status: SHIPPED | OUTPATIENT
Start: 2024-08-10

## 2024-08-23 DIAGNOSIS — I25.10 CORONARY ARTERY DISEASE INVOLVING NATIVE CORONARY ARTERY OF NATIVE HEART WITHOUT ANGINA PECTORIS: ICD-10-CM

## 2024-08-23 DIAGNOSIS — I10 ESSENTIAL HYPERTENSION: ICD-10-CM

## 2024-08-25 RX ORDER — METOPROLOL SUCCINATE 25 MG/1
25 TABLET, EXTENDED RELEASE ORAL DAILY
Qty: 90 TABLET | Refills: 1 | Status: SHIPPED | OUTPATIENT
Start: 2024-08-25

## 2024-08-25 RX ORDER — CLOPIDOGREL BISULFATE 75 MG/1
75 TABLET ORAL DAILY
Qty: 90 TABLET | Refills: 1 | Status: SHIPPED | OUTPATIENT
Start: 2024-08-25

## 2024-08-25 RX ORDER — LOSARTAN POTASSIUM 100 MG/1
100 TABLET ORAL DAILY
Qty: 90 TABLET | Refills: 1 | Status: SHIPPED | OUTPATIENT
Start: 2024-08-25 | End: 2025-02-21

## 2024-09-16 DIAGNOSIS — I25.10 CORONARY ARTERY DISEASE INVOLVING NATIVE CORONARY ARTERY OF NATIVE HEART WITHOUT ANGINA PECTORIS: ICD-10-CM

## 2024-09-16 DIAGNOSIS — E78.2 HYPERLIPEMIA, MIXED: ICD-10-CM

## 2024-09-16 RX ORDER — SIMVASTATIN 40 MG
40 TABLET ORAL NIGHTLY
Qty: 90 TABLET | Refills: 1 | Status: SHIPPED | OUTPATIENT
Start: 2024-09-16

## 2024-10-01 ENCOUNTER — TELEMEDICINE (OUTPATIENT)
Dept: INTERNAL MEDICINE CLINIC | Age: 79
End: 2024-10-01

## 2024-10-01 DIAGNOSIS — J01.00 ACUTE NON-RECURRENT MAXILLARY SINUSITIS: Primary | ICD-10-CM

## 2024-10-01 RX ORDER — PREDNISONE 5 MG/1
TABLET ORAL
Qty: 36 TABLET | Refills: 0 | Status: SHIPPED | OUTPATIENT
Start: 2024-10-01

## 2024-10-01 RX ORDER — DOXYCYCLINE HYCLATE 100 MG
100 TABLET ORAL 2 TIMES DAILY
Qty: 20 TABLET | Refills: 0 | Status: SHIPPED | OUTPATIENT
Start: 2024-10-01 | End: 2024-10-11

## 2024-10-01 RX ORDER — GUAIFENESIN 600 MG/1
600 TABLET, EXTENDED RELEASE ORAL 2 TIMES DAILY
Qty: 30 TABLET | Refills: 0 | Status: SHIPPED | OUTPATIENT
Start: 2024-10-01 | End: 2024-10-16

## 2024-10-01 NOTE — PROGRESS NOTES
predniSONE (DELTASONE) 5 MG tablet; Take 8 pills, then 7,6,5,4,3,2,1  Dispense: 36 tablet; Refill: 0  - guaiFENesin (MUCINEX) 600 MG extended release tablet; Take 1 tablet by mouth 2 times daily for 15 days  Dispense: 30 tablet; Refill: 0      Return for keep next as scheduled.    Aria CARL Kelly, was evaluated through a synchronous (real-time) audio-video encounter. The patient (or guardian if applicable) is aware that this is a billable service, which includes applicable co-pays. This Virtual Visit was conducted with patient's (and/or legal guardian's) consent. Patient identification was verified, and a caregiver was present when appropriate.   The patient was located at Home: [address] [place] [address]  Provider was located at Facility (Appt Dept): [address] [place] [address]  Confirm you are appropriately licensed, registered, or certified to deliver care in the state where the patient is located as indicated above. If you are not or unsure, please re-schedule the visit: Yes, I confirm.       Total time spent on this encounter: Not billed by time    --Erick Parsons MD on 10/1/2024 at 9:00 AM    An electronic signature was used to authenticate this note.

## 2024-10-07 ENCOUNTER — OFFICE VISIT (OUTPATIENT)
Dept: CARDIOLOGY CLINIC | Age: 79
End: 2024-10-07
Payer: MEDICARE

## 2024-10-07 VITALS
HEART RATE: 64 BPM | SYSTOLIC BLOOD PRESSURE: 142 MMHG | DIASTOLIC BLOOD PRESSURE: 62 MMHG | OXYGEN SATURATION: 97 % | WEIGHT: 151 LBS | HEIGHT: 59 IN | BODY MASS INDEX: 30.44 KG/M2

## 2024-10-07 DIAGNOSIS — R01.1 HEART MURMUR: ICD-10-CM

## 2024-10-07 DIAGNOSIS — I10 PRIMARY HYPERTENSION: Primary | ICD-10-CM

## 2024-10-07 PROCEDURE — 99214 OFFICE O/P EST MOD 30 MIN: CPT | Performed by: INTERNAL MEDICINE

## 2024-10-07 PROCEDURE — 3078F DIAST BP <80 MM HG: CPT | Performed by: INTERNAL MEDICINE

## 2024-10-07 PROCEDURE — 1123F ACP DISCUSS/DSCN MKR DOCD: CPT | Performed by: INTERNAL MEDICINE

## 2024-10-07 PROCEDURE — 93000 ELECTROCARDIOGRAM COMPLETE: CPT | Performed by: INTERNAL MEDICINE

## 2024-10-07 PROCEDURE — 3077F SYST BP >= 140 MM HG: CPT | Performed by: INTERNAL MEDICINE

## 2024-10-07 NOTE — PROGRESS NOTES
Pulmonary - No respiratory distress.  No wheezes or rales.    Abdomen - No masses, tenderness, or organomegaly.  Musculoskeletal - No significant edema. No joint deformities. No muscle wasting.  Neurologic - Cranial nerves II through XII are grossly intact.  There were no gross focal neurologic abnormalities.    Lab Review   Lab Results   Component Value Date/Time    CKTOTAL 71 04/02/2017 11:31 AM    TROPONINT <0.010 04/02/2017 07:32 PM     BNP:  No results found for: \"BNP\"  PT/INR:    Lab Results   Component Value Date    INR 0.97 02/16/2018     Lab Results   Component Value Date    LABA1C 5.8 05/09/2024    LABA1C 5.7 05/11/2023     Lab Results   Component Value Date    WBC 8.9 05/09/2024    HGB 12.1 05/09/2024    HCT 36.0 05/09/2024    MCV 90.5 05/09/2024     05/09/2024     Lab Results   Component Value Date    CHOL 137 05/09/2024    TRIG 136 05/09/2024    HDL 48 (L) 05/09/2024    LDLDIRECT 72 11/03/2017     Lab Results   Component Value Date    ALT 29 05/09/2024    AST 28 05/09/2024     BMP:    Lab Results   Component Value Date/Time     05/09/2024 09:35 AM    K 3.8 05/09/2024 09:35 AM    CL 98 05/09/2024 09:35 AM    CO2 25 05/09/2024 09:35 AM    BUN 12 05/09/2024 09:35 AM    CREATININE 0.7 05/09/2024 09:35 AM     CMP:   Lab Results   Component Value Date/Time     05/09/2024 09:35 AM    K 3.8 05/09/2024 09:35 AM    CL 98 05/09/2024 09:35 AM    CO2 25 05/09/2024 09:35 AM    BUN 12 05/09/2024 09:35 AM     TSH:    Lab Results   Component Value Date/Time    TSH 2.800 05/09/2024 09:35 AM    TSH 2.860 11/09/2023 09:49 AM    TSHHS 2.620 04/06/2022 11:08 AM           Assessment & Plan:               -     CORONARY ARTERY DISEASE:  symptomatic     All available  tests in chart reviewed. Management discussed .  Testing ordered  no              On asa     compliant we will continue with present medical therapy           LHC rev with pt  Procedure Summary   LM NORMAL   LAD PATENT STENTS , CHR OCCLUDED

## 2024-10-29 ENCOUNTER — TELEPHONE (OUTPATIENT)
Dept: CARDIOLOGY CLINIC | Age: 79
End: 2024-10-29

## 2024-10-29 NOTE — TELEPHONE ENCOUNTER
Test preformed 10/28, Next OV 11/11    Notified       Echo (TTE) complete     Left Ventricle: Normal left ventricular systolic function with a visually estimated EF of 55 - 60%. Left ventricle size is normal. Mildly increased wall thickness. Normal wall motion. Grade I diastolic dysfunction with normal LAP.    Aortic Valve: Severely calcified aortic valve. Moderate regurgitation. AV PHT is 335.0 ms. Vena contracta measures 0.3 cm. Moderate stenosis of the aortic valve. AV mean gradient is 36 mmHg. AV peak gradient is 61 mmHg. AV area by continuity VTI is 1.2 cm2. AV Stroke Volume index is 64.3 mL/m2.DVI 0.4    Mitral Valve: Annular calcification. Mild regurgitation.    Tricuspid Valve: Mild regurgitation. The estimated RVSP is 33 mmHg.    Pericardium: No pericardial effusion.    Image quality is good.    Compared to previous study in 3/2924, the mean gradient of the aortic valve has increased from 20mmHg to 36mmHg.

## 2024-11-07 LAB
A/G RATIO: 1.6 RATIO (ref 0.8–2.6)
ALBUMIN: 4.5 G/DL (ref 3.5–5.2)
ALP BLD-CCNC: 87 U/L (ref 23–144)
ALT SERPL-CCNC: 27 U/L (ref 0–60)
AST SERPL-CCNC: 26 U/L (ref 0–55)
BACTERIA, URINE: ABNORMAL /HPF
BILIRUB SERPL-MCNC: 0.3 MG/DL (ref 0–1.2)
BILIRUBIN, URINE: NEGATIVE MG/DL
BUN / CREAT RATIO: 14 (ref 7–25)
BUN BLDV-MCNC: 13 MG/DL (ref 3–29)
CALCIUM SERPL-MCNC: 10.6 MG/DL (ref 8.5–10.5)
CHLORIDE BLD-SCNC: 99 MEQ/L (ref 96–110)
CHOLESTEROL, TOTAL: 147 MG/DL
CLARITY, UA: CLEAR
CO2: 25 MEQ/L (ref 19–32)
COLOR, UA: YELLOW
CREAT SERPL-MCNC: 0.9 MG/DL (ref 0.5–1.2)
CREATININE URINE: 73.8 MG/DL
EPITHELIAL CELLS, UA: ABNORMAL /HPF (ref 0–5)
EPITHELIAL CELLS, UA: ABNORMAL /HPF (ref 0–5)
ERYTHROCYTES URINE: ABNORMAL /HPF (ref 0–2)
ESTIMATED GLOMERULAR FILTRATION RATE CREATININE EQUATION: 65 MLS/MIN/1.73M2
FASTING STATUS: ABNORMAL
GLOBULIN: 2.8 G/DL (ref 1.9–3.6)
GLUCOSE BLD-MCNC: 108 MG/DL (ref 70–99)
GLUCOSE URINE: NEGATIVE MG/DL
HCT VFR BLD CALC: 39.4 % (ref 34–49)
HDLC SERPL-MCNC: 45 MG/DL
HEMOGLOBIN: 13 G/DL (ref 11.2–15.7)
KETONES, URINE: NEGATIVE MG/DL
LDL CHOLESTEROL: 72 MG/DL
LEUKOCYTE ESTERASE, URINE: ABNORMAL
LEUKOCYTES, UA: ABNORMAL /HPF (ref 0–5)
MCH RBC QN AUTO: 30.1 PG (ref 26–34)
MCHC RBC AUTO-ENTMCNC: 33 G/DL (ref 30.7–35.5)
MCV RBC AUTO: 91.2 FL (ref 80–100)
MICROALBUMIN/CREAT 24H UR: ABNORMAL MCG/DL
MICROALBUMIN/CREAT UR-RTO: 189 MCG/MG CREAT.
MUCUS: PRESENT
NITRITE, URINE: NEGATIVE
PDW BLD-RTO: 13.4 %
PH, URINE: 6.5 (ref 4.5–8)
PLATELET # BLD: 304 K/UL (ref 140–400)
PMV BLD AUTO: 11 FL (ref 7.2–11.7)
POTASSIUM SERPL-SCNC: 4.3 MEQ/L (ref 3.4–5.3)
PROTEIN, URINE: 30 MG/DL
RBC # BLD: 4.32 M/UL (ref 3.95–5.26)
SODIUM BLD-SCNC: 135 MEQ/L (ref 135–148)
SPECIFIC GRAVITY UA: 1.02 (ref 1–1.03)
T4 FREE: 1.29 NG/DL (ref 0.8–1.8)
TOTAL PROTEIN: 7.3 G/DL (ref 6–8.3)
TRIGL SERPL-MCNC: 148 MG/DL
TSH ULTRASENSITIVE: 3.67 MCIU/ML (ref 0.4–4.5)
URINE HGB: NEGATIVE MG/DL
UROBILINOGEN, URINE: <2 MG/DL (ref 0–2)
VLDLC SERPL CALC-MCNC: 30 MG/DL (ref 4–38)
WBC # BLD: 9.2 K/UL (ref 3.5–10.9)

## 2024-11-08 LAB — HBA1C MFR BLD: 5.8 %

## 2024-11-11 ENCOUNTER — OFFICE VISIT (OUTPATIENT)
Dept: CARDIOLOGY CLINIC | Age: 79
End: 2024-11-11

## 2024-11-11 VITALS
HEART RATE: 64 BPM | HEIGHT: 59 IN | BODY MASS INDEX: 30.44 KG/M2 | WEIGHT: 151 LBS | SYSTOLIC BLOOD PRESSURE: 124 MMHG | DIASTOLIC BLOOD PRESSURE: 60 MMHG

## 2024-11-11 DIAGNOSIS — I38 VHD (VALVULAR HEART DISEASE): Primary | ICD-10-CM

## 2024-11-11 DIAGNOSIS — I34.0 NONRHEUMATIC MITRAL VALVE REGURGITATION: ICD-10-CM

## 2024-11-11 DIAGNOSIS — I35.1 NONRHEUMATIC AORTIC VALVE INSUFFICIENCY: Primary | ICD-10-CM

## 2024-11-11 DIAGNOSIS — I35.0 NONRHEUMATIC AORTIC VALVE STENOSIS: ICD-10-CM

## 2024-11-11 NOTE — PROGRESS NOTES
CARDIOLOGY NOTE      11/11/2024    RE: Aria Kelly  (1945)                               TO:  Erick Lao MD            CHIEF COMPLAINT   Aria is a 79 y.o. female who was seen today for management of  cad                                  Here for fu on echo    HPI:                   Pt has h/o cad, htn, hyperlipidimnea, cea, seen today for  fu.  Has worsening SOB  Aria Kelly has the following history recorded in care path:  Patient Active Problem List    Diagnosis Date Noted    Aortic regurgitation 11/18/2022    Spinal stenosis of lumbar region without neurogenic claudication 07/2022    Stenosis of left carotid artery 01/09/2017    Nevus of multiple sites 08/26/2015    Hypertension     Coronary artery disease involving native coronary artery of native heart without angina pectoris     Menopause     Osteopenia     Insomnia     Atherosclerotic heart disease of native coronary artery with other forms of angina pectoris (HCC) 02/26/2024    WD-Venous stasis ulcer of right ankle with fat layer exposed (HCC) 01/10/2024    Traumatic partial tear of biceps tendon, right, initial encounter 10/31/2023    Osteoarthritis, localized, shoulder, right 09/26/2023    Sprain of right rotator cuff capsule 09/26/2023    Vertebral artery stenosis, left 05/18/2023    IFG (impaired fasting glucose) 05/12/2023    Leg pain, lateral, right 04/18/2022    Hypothyroidism due to acquired atrophy of thyroid     Chronic kidney disease, stage I 10/16/2019    Hyponatremia 12/19/2018    Anxiety 05/26/2017     Current Outpatient Medications   Medication Sig Dispense Refill    simvastatin (ZOCOR) 40 MG tablet TAKE 1 TABLET BY MOUTH NIGHTLY 90 tablet 1    losartan (COZAAR) 100 MG tablet TAKE 1 TABLET BY MOUTH DAILY 90 tablet 1    clopidogrel (PLAVIX) 75 MG tablet TAKE 1 TABLET BY MOUTH DAILY 90 tablet 1    metoprolol succinate (TOPROL XL) 25 MG extended release tablet TAKE 1 TABLET BY MOUTH DAILY 90 tablet 1

## 2024-11-12 ENCOUNTER — TELEPHONE (OUTPATIENT)
Dept: CARDIOLOGY CLINIC | Age: 79
End: 2024-11-12

## 2024-11-12 NOTE — TELEPHONE ENCOUNTER
Patient given instructions over telephone on 11/12/24.  Procedure is scheduled for 11/13/24 @ 9am, w/arrival @ 8am, @ [facility]. Medication/Education Letter gone over with patient. Procedure and risks were explained to patient. Questions answered, Patient voiced understanding.        Patient was notified that surgery date or time could be changed due to an emergency. Patient voiced understanding.

## 2024-11-13 ENCOUNTER — HOSPITAL ENCOUNTER (OUTPATIENT)
Dept: NON INVASIVE DIAGNOSTICS | Age: 79
Discharge: HOME | End: 2024-11-15
Attending: INTERNAL MEDICINE
Payer: MEDICARE

## 2024-11-13 VITALS
BODY MASS INDEX: 30.44 KG/M2 | DIASTOLIC BLOOD PRESSURE: 47 MMHG | OXYGEN SATURATION: 97 % | WEIGHT: 151 LBS | SYSTOLIC BLOOD PRESSURE: 138 MMHG | HEART RATE: 61 BPM | HEIGHT: 59 IN | RESPIRATION RATE: 24 BRPM

## 2024-11-13 DIAGNOSIS — I35.0 NONRHEUMATIC AORTIC VALVE STENOSIS: ICD-10-CM

## 2024-11-13 DIAGNOSIS — I34.0 NONRHEUMATIC MITRAL VALVE REGURGITATION: ICD-10-CM

## 2024-11-13 DIAGNOSIS — I35.1 NONRHEUMATIC AORTIC VALVE INSUFFICIENCY: ICD-10-CM

## 2024-11-13 LAB
ECHO AO ASC DIAM: 3.4 CM
ECHO AO ASCENDING AORTA INDEX: 2.07 CM/M2
ECHO BSA: 1.69 M2

## 2024-11-13 PROCEDURE — 93320 DOPPLER ECHO COMPLETE: CPT

## 2024-11-13 PROCEDURE — 99152 MOD SED SAME PHYS/QHP 5/>YRS: CPT | Performed by: INTERNAL MEDICINE

## 2024-11-13 PROCEDURE — 7100000010 HC PHASE II RECOVERY - FIRST 15 MIN: Performed by: INTERNAL MEDICINE

## 2024-11-13 PROCEDURE — 6360000002 HC RX W HCPCS: Performed by: INTERNAL MEDICINE

## 2024-11-13 PROCEDURE — 6370000000 HC RX 637 (ALT 250 FOR IP): Performed by: INTERNAL MEDICINE

## 2024-11-13 PROCEDURE — 7100000011 HC PHASE II RECOVERY - ADDTL 15 MIN: Performed by: INTERNAL MEDICINE

## 2024-11-13 RX ORDER — MIDAZOLAM HYDROCHLORIDE 1 MG/ML
INJECTION, SOLUTION INTRAMUSCULAR; INTRAVENOUS PRN
Status: COMPLETED | OUTPATIENT
Start: 2024-11-13 | End: 2024-11-13

## 2024-11-13 RX ORDER — LIDOCAINE HYDROCHLORIDE 20 MG/ML
SOLUTION OROPHARYNGEAL PRN
Status: COMPLETED | OUTPATIENT
Start: 2024-11-13 | End: 2024-11-13

## 2024-11-13 RX ADMIN — LIDOCAINE HYDROCHLORIDE 15 ML: 20 SOLUTION ORAL at 08:38

## 2024-11-13 RX ADMIN — MIDAZOLAM 2 MG: 1 INJECTION INTRAMUSCULAR; INTRAVENOUS at 08:42

## 2024-11-13 RX ADMIN — MIDAZOLAM 1 MG: 1 INJECTION INTRAMUSCULAR; INTRAVENOUS at 08:47

## 2024-11-14 ENCOUNTER — OFFICE VISIT (OUTPATIENT)
Dept: INTERNAL MEDICINE CLINIC | Age: 79
End: 2024-11-14

## 2024-11-14 VITALS
HEART RATE: 67 BPM | BODY MASS INDEX: 30.8 KG/M2 | WEIGHT: 152.8 LBS | OXYGEN SATURATION: 96 % | DIASTOLIC BLOOD PRESSURE: 62 MMHG | HEIGHT: 59 IN | SYSTOLIC BLOOD PRESSURE: 128 MMHG

## 2024-11-14 DIAGNOSIS — R73.01 IFG (IMPAIRED FASTING GLUCOSE): ICD-10-CM

## 2024-11-14 DIAGNOSIS — I35.1 AORTIC VALVE INSUFFICIENCY, ETIOLOGY OF CARDIAC VALVE DISEASE UNSPECIFIED: ICD-10-CM

## 2024-11-14 DIAGNOSIS — E03.4 HYPOTHYROIDISM DUE TO ACQUIRED ATROPHY OF THYROID: ICD-10-CM

## 2024-11-14 DIAGNOSIS — R80.9 MICROALBUMINURIA: Primary | ICD-10-CM

## 2024-11-14 DIAGNOSIS — I25.10 CORONARY ARTERY DISEASE INVOLVING NATIVE CORONARY ARTERY OF NATIVE HEART WITHOUT ANGINA PECTORIS: ICD-10-CM

## 2024-11-14 DIAGNOSIS — I10 PRIMARY HYPERTENSION: ICD-10-CM

## 2024-11-14 SDOH — ECONOMIC STABILITY: FOOD INSECURITY: WITHIN THE PAST 12 MONTHS, THE FOOD YOU BOUGHT JUST DIDN'T LAST AND YOU DIDN'T HAVE MONEY TO GET MORE.: NEVER TRUE

## 2024-11-14 SDOH — ECONOMIC STABILITY: INCOME INSECURITY: HOW HARD IS IT FOR YOU TO PAY FOR THE VERY BASICS LIKE FOOD, HOUSING, MEDICAL CARE, AND HEATING?: NOT HARD AT ALL

## 2024-11-14 SDOH — ECONOMIC STABILITY: FOOD INSECURITY: WITHIN THE PAST 12 MONTHS, YOU WORRIED THAT YOUR FOOD WOULD RUN OUT BEFORE YOU GOT MONEY TO BUY MORE.: NEVER TRUE

## 2024-11-14 NOTE — PROGRESS NOTES
tenderness. There is no guarding or rebound.   Musculoskeletal:      Cervical back: Neck supple.      Right lower leg: No edema.      Left lower leg: No edema.   Lymphadenopathy:      Cervical: No cervical adenopathy.   Skin:     General: Skin is warm and dry.   Neurological:      Mental Status: She is alert.   Psychiatric:         Mood and Affect: Mood normal.         ASSESSMENT:    1. Microalbuminuria    2. Coronary artery disease involving native coronary artery of native heart without angina pectoris    3. Aortic valve insufficiency, etiology of cardiac valve disease unspecified    4. Hypothyroidism due to acquired atrophy of thyroid    5. Primary hypertension    6. IFG (impaired fasting glucose)        PLAN:  The primary issue today is her new onset of microalbuminuria, does not appear to have had this in 2018 when she had been seeing nephrology.  Is not on any anti-inflammatory therapy, we will refer back to nephrology for evaluation.  Has no known definite history of diabetes, but does have history of prediabetes and she will keep working on a low-carb diet as well as weight management.    Aortic valvular regurgitation and calcification, is continuing close follow-up with Dr Leiva.    Coronary artery disease asymptomatic and stable, continue beta-blocker therapy, is on Plavix as well as statin therapy.    Hypothyroid, clinically euthyroid and monitor thyroid function tests.    Hypertension, blood pressure stable and continue regimen.  Aria was seen today for 6 month follow-up.    Diagnoses and all orders for this visit:    Microalbuminuria  -     AFL (Epic) - Juve Hallman MD, Nephrology, [place]  -     Microalbumin / Creatinine Urine Ratio; Future    Coronary artery disease involving native coronary artery of native heart without angina pectoris  -     Comprehensive Metabolic Panel; Future  -     Lipid Panel; Future  -     CBC with Auto Differential; Future    Aortic valve insufficiency, etiology of

## 2024-11-18 ENCOUNTER — TELEPHONE (OUTPATIENT)
Dept: INTERNAL MEDICINE CLINIC | Age: 79
End: 2024-11-18

## 2024-11-18 DIAGNOSIS — R80.9 PROTEINURIA, UNSPECIFIED TYPE: Primary | ICD-10-CM

## 2024-11-18 NOTE — TELEPHONE ENCOUNTER
Called and notified patient     Labs added  
Patient called stating  can not get her in until Dec 30th. Patient was wondering if she should see someone sooner or if she can wait till then?  
98

## 2024-12-17 ENCOUNTER — OFFICE VISIT (OUTPATIENT)
Dept: CARDIOLOGY CLINIC | Age: 79
End: 2024-12-17
Payer: MEDICARE

## 2024-12-17 VITALS
HEART RATE: 60 BPM | HEIGHT: 59 IN | BODY MASS INDEX: 30.32 KG/M2 | WEIGHT: 150.4 LBS | OXYGEN SATURATION: 98 % | SYSTOLIC BLOOD PRESSURE: 134 MMHG | DIASTOLIC BLOOD PRESSURE: 62 MMHG

## 2024-12-17 DIAGNOSIS — I38 VHD (VALVULAR HEART DISEASE): Primary | ICD-10-CM

## 2024-12-17 PROCEDURE — 1123F ACP DISCUSS/DSCN MKR DOCD: CPT | Performed by: INTERNAL MEDICINE

## 2024-12-17 PROCEDURE — 1159F MED LIST DOCD IN RCRD: CPT | Performed by: INTERNAL MEDICINE

## 2024-12-17 PROCEDURE — 3078F DIAST BP <80 MM HG: CPT | Performed by: INTERNAL MEDICINE

## 2024-12-17 PROCEDURE — 3075F SYST BP GE 130 - 139MM HG: CPT | Performed by: INTERNAL MEDICINE

## 2024-12-17 PROCEDURE — 99214 OFFICE O/P EST MOD 30 MIN: CPT | Performed by: INTERNAL MEDICINE

## 2024-12-17 NOTE — PROGRESS NOTES
8:33 AM   Patient-Reported Vitals   Patient-Reported Weight 153lbs   Patient-Reported Height 4 11.5        Wt Readings from Last 3 Encounters:   12/17/24 68.2 kg (150 lb 6.4 oz)   11/13/24 68.5 kg (151 lb)   11/14/24 69.3 kg (152 lb 12.8 oz)     Body mass index is 30.38 kg/m².  GENERAL - Alert, oriented, pleasant, in no apparent distress.  EYES: No jaundice, no conjunctival pallor.  SKIN: It is warm & dry. No rashes. No Echhymosis    HEENT - No clinically significant abnormalities seen.  Neck - Supple.  No jugular venous distention noted. No carotid bruits.   Cardiovascular - Normal S1 and S2 without obvious murmur or gallop.    Extremities - No cyanosis, clubbing, or significant edema.    Pulmonary - No respiratory distress.  No wheezes or rales.    Abdomen - No masses, tenderness, or organomegaly.  Musculoskeletal - No significant edema. No joint deformities. No muscle wasting.  Neurologic - Cranial nerves II through XII are grossly intact.  There were no gross focal neurologic abnormalities.    Lab Review   Lab Results   Component Value Date/Time    CKTOTAL 71 04/02/2017 11:31 AM    TROPONINT <0.010 04/02/2017 07:32 PM     BNP:  No results found for: \"BNP\"  PT/INR:    Lab Results   Component Value Date    INR 0.97 02/16/2018     Lab Results   Component Value Date    LABA1C 5.8 (H) 11/07/2024    LABA1C 5.8 05/09/2024     Lab Results   Component Value Date    WBC 9.2 11/07/2024    HGB 13.0 11/07/2024    HCT 39.4 11/07/2024    MCV 91.2 11/07/2024     11/07/2024     Lab Results   Component Value Date    CHOL 147 11/07/2024    TRIG 148 11/07/2024    HDL 45 (L) 11/07/2024    LDLDIRECT 72 11/03/2017     Lab Results   Component Value Date    ALT 27 11/07/2024    AST 26 11/07/2024     BMP:    Lab Results   Component Value Date/Time     11/07/2024 09:05 AM    K 4.3 11/07/2024 09:05 AM    CL 99 11/07/2024 09:05 AM    CO2 25 11/07/2024 09:05 AM    BUN 13 11/07/2024 09:05 AM    CREATININE 0.9 11/07/2024 09:05 AM

## 2024-12-30 PROBLEM — N18.2 CHRONIC KIDNEY DISEASE, STAGE II (MILD): Status: ACTIVE | Noted: 2024-12-30

## 2025-01-09 ENCOUNTER — OFFICE VISIT (OUTPATIENT)
Dept: ORTHOPEDIC SURGERY | Age: 80
End: 2025-01-09

## 2025-01-09 VITALS — BODY MASS INDEX: 29.61 KG/M2 | OXYGEN SATURATION: 97 % | HEIGHT: 59 IN | HEART RATE: 62 BPM

## 2025-01-09 DIAGNOSIS — M25.561 RIGHT KNEE PAIN, UNSPECIFIED CHRONICITY: Primary | ICD-10-CM

## 2025-01-09 RX ORDER — TRIAMCINOLONE ACETONIDE 40 MG/ML
80 INJECTION, SUSPENSION INTRA-ARTICULAR; INTRAMUSCULAR ONCE
Status: COMPLETED | OUTPATIENT
Start: 2025-01-09 | End: 2025-01-09

## 2025-01-09 RX ADMIN — TRIAMCINOLONE ACETONIDE 80 MG: 40 INJECTION, SUSPENSION INTRA-ARTICULAR; INTRAMUSCULAR at 15:22

## 2025-01-09 NOTE — PATIENT INSTRUCTIONS
Continue weight-bearing as tolerated.  Continue range of motion exercises as instructed.  Ice and elevate as needed.  Tylenol or Motrin for pain.  Right knee injection given today.   Follow up in 6 weeks.

## 2025-01-10 ASSESSMENT — ENCOUNTER SYMPTOMS
SHORTNESS OF BREATH: 0
BACK PAIN: 0
COUGH: 0
NAUSEA: 0
RHINORRHEA: 0
FACIAL SWELLING: 0

## 2025-01-10 NOTE — PROGRESS NOTES
1/9/2025   Chief Complaint   Patient presents with    Knee Pain     Right        History of Present Illness:                             Aria Kelly is a 79 y.o. female returning to the office today as an established patient for a new problem of right knee pain.  Patient states she was doing some cleaning at her [employer] and her knee gave out and she has had pain on the medial aspect of the knee since the incident.  She states the knee is sore and the pain radiates down towards her leg.  She does not have any swelling or bruising in the area.  Patient states she has been taking Tylenol arthritis with mild relief.    Patient presents with right knee injury dated 01/04/25. Patient states she was cleaning at [employer] and right knee gave out. Patient stated that she has noticed knee popping at times. States knee is very sore. Describes pain on the inside of knee that radiates down the leg. States she does notice a little swelling. Has been icing area and takes Tylenol Arthritis 2x daily. Patient rates pain a 6/10.        Medical History  Patient's medications, allergies, past medical, surgical, social and family histories were reviewed and updated as appropriate.      Review of Systems   Constitutional:  Negative for fever.   HENT:  Negative for facial swelling and rhinorrhea.    Respiratory:  Negative for cough and shortness of breath.    Cardiovascular:  Negative for chest pain.   Gastrointestinal:  Negative for nausea.   Musculoskeletal:  Positive for arthralgias and gait problem. Negative for back pain, joint swelling, myalgias, neck pain and neck stiffness.   Skin:  Negative for pallor and rash.   Neurological:  Negative for facial asymmetry and speech difficulty.   Psychiatric/Behavioral:  Negative for agitation and confusion.                                                Examination:  General Exam:  Vitals: Pulse 62   Ht 1.499 m (4' 11\")   LMP  (LMP Unknown)   SpO2 97%   BMI 29.61 kg/m²    Physical

## 2025-02-05 LAB
A/G RATIO: 1.5 RATIO (ref 0.8–2.6)
ALBUMIN: 4.5 G/DL (ref 3.5–5.2)
ALP BLD-CCNC: 117 U/L (ref 23–144)
ALT SERPL-CCNC: 27 U/L (ref 0–60)
AST SERPL-CCNC: 25 U/L (ref 0–55)
BASOPHILS ABSOLUTE: 0.1 K/UL (ref 0–0.3)
BASOPHILS RELATIVE PERCENT: 0.7 % (ref 0–2)
BILIRUB SERPL-MCNC: 0.3 MG/DL (ref 0–1.2)
BUN / CREAT RATIO: 21 (ref 7–25)
BUN BLDV-MCNC: 21 MG/DL (ref 3–29)
CALCIUM SERPL-MCNC: 10.3 MG/DL (ref 8.5–10.5)
CHLORIDE BLD-SCNC: 96 MEQ/L (ref 96–110)
CHOLESTEROL, TOTAL: 163 MG/DL
CO2: 26 MEQ/L (ref 19–32)
CREAT SERPL-MCNC: 1 MG/DL (ref 0.5–1.2)
CREATININE URINE: 71.7 MG/DL
DIFFERENTIAL COUNT: ABNORMAL
EOSINOPHILS ABSOLUTE: 0.2 K/UL (ref 0–0.5)
EOSINOPHILS RELATIVE PERCENT: 2.3 % (ref 0–5)
ESTIMATED GLOMERULAR FILTRATION RATE CREATININE EQUATION: 57 MLS/MIN/1.73M2
FASTING STATUS: ABNORMAL
GLOBULIN: 3.1 G/DL (ref 1.9–3.6)
GLUCOSE BLD-MCNC: 86 MG/DL (ref 70–99)
HBA1C MFR BLD: 5.5 %
HCT VFR BLD CALC: 41.1 % (ref 34–49)
HDLC SERPL-MCNC: 79 MG/DL
HEMOGLOBIN: 13.4 G/DL (ref 11.2–15.7)
IMMATURE GRANS (ABS): 0 K/UL (ref 0–0.1)
IMMATURE GRANULOCYTES %: 0.3 %
LDL CHOLESTEROL: 70 MG/DL
LYMPHOCYTES ABSOLUTE: 1.7 K/UL (ref 0.9–4.1)
LYMPHOCYTES RELATIVE PERCENT: 17.4 % (ref 14–51)
MAGNESIUM: 2 MG/DL (ref 1.4–2.5)
MCH RBC QN AUTO: 30 PG (ref 26–34)
MCHC RBC AUTO-ENTMCNC: 32.6 G/DL (ref 30.7–35.5)
MCV RBC AUTO: 91.9 FL (ref 80–100)
MICROALBUMIN/CREAT 24H UR: 3820 MCG/DL
MICROALBUMIN/CREAT UR-RTO: 53 MCG/MG CREAT.
MONOCYTES ABSOLUTE: 1.2 K/UL (ref 0.2–1)
MONOCYTES RELATIVE PERCENT: 12.4 % (ref 4–12)
NEUTROPHILS ABSOLUTE: 6.5 K/UL (ref 1.8–7.5)
NEUTROPHILS RELATIVE PERCENT: 66.9 % (ref 42–80)
PDW BLD-RTO: 12.7 %
PLATELET # BLD: 286 K/UL (ref 140–400)
PMV BLD AUTO: 11 FL (ref 7.2–11.7)
POTASSIUM SERPL-SCNC: 4.9 MEQ/L (ref 3.4–5.3)
RBC # BLD: 4.47 M/UL (ref 3.95–5.26)
RETICULOCYTE ABSOLUTE COUNT: 0 /100 WBC
SODIUM BLD-SCNC: 131 MEQ/L (ref 135–148)
TOTAL PROTEIN: 7.6 G/DL (ref 6–8.3)
TRIGL SERPL-MCNC: 70 MG/DL
TSH ULTRASENSITIVE: 5.01 MCIU/ML (ref 0.4–4.5)
VLDLC SERPL CALC-MCNC: 14 MG/DL (ref 4–38)
WBC # BLD: 9.7 K/UL (ref 3.5–10.9)

## 2025-02-05 NOTE — RESULT ENCOUNTER NOTE
Please call pt, lab ok incl chol level, SODIUM LEVEL IS STABLE, URINE PROTEIN LEVELS ARE ALSO MUCH BETTER.    ONLY ISSUE IS THYROID FXN IS SL LOW, AT NEXT APPT 2/10 WE'LL LIKELY NEED TO ADJUST HER SYNTHROID DOSE.

## 2025-02-07 DIAGNOSIS — F41.9 ANXIETY: ICD-10-CM

## 2025-02-09 NOTE — PROGRESS NOTES
Aria Kelly  1945  02/10/25    SUBJECTIVE:   José Miguel's son and tr in law had miscarriage, getting over this tragedy.    Coronary artery disease has been asymptomatic w/o any ongoing sx of CP, SOB/RODRIGEZ, palpitations, lt headedness, diaphoresis.  Is continuing medications regularly.    Hypertension: Stable. Denies CP, SOB, cough, visual changes, dizziness, palpitations or HA.      Aortic valve dz, cont f/u w cardiology seeing Dr Leiva.  Had tt in oct and juan in nov last yr.    Left Ventricle: Normal left ventricular systolic function with a visually estimated EF of 55 - 60%.    Aortic Valve: Heavily calcified aortic valve that appears to be opening well, KATRIN by planimetry: 2.04cm2. Moderate regurgitation, vena contracta 0.5cm.    Mitral Valve: Mild to moderate regurgitation.    Left Atrium: No left atrial appendage thrombus noted.    Pericardium: Trivial pericardial effusion present.    Hypothyroid, TSH HIGH ~5 SO WE'LL INCR SYNTHR 25--50MCG/DAY    Hypertension, blood pressure stable and continue regimen.- HAS BEEN ON HYDRAL 50MG DAILY, WE'LL SPLIT TO 25MG BID.    OBJECTIVE:    /70 (Site: Right Upper Arm, Position: Sitting, Cuff Size: Medium Adult)   Pulse 61   Ht 1.499 m (4' 11.02\")   Wt 69 kg (152 lb 3.2 oz)   LMP  (LMP Unknown)   SpO2 98%   BMI 30.72 kg/m²     Physical Exam  Constitutional:       General: She is not in acute distress.     Appearance: She is well-developed. She is not diaphoretic.   HENT:      Head: Normocephalic and atraumatic.   Neck:      Thyroid: No thyromegaly.      Trachea: No tracheal deviation.   Cardiovascular:      Rate and Rhythm: Normal rate and regular rhythm.      Heart sounds: Normal heart sounds. No murmur heard.     No friction rub. No gallop.   Pulmonary:      Effort: No respiratory distress.      Breath sounds: Normal breath sounds. No wheezing or rales.   Abdominal:      General: Bowel sounds are normal. There is no distension.      Palpations: Abdomen is soft.

## 2025-02-10 ENCOUNTER — OFFICE VISIT (OUTPATIENT)
Dept: INTERNAL MEDICINE CLINIC | Age: 80
End: 2025-02-10
Payer: COMMERCIAL

## 2025-02-10 VITALS
HEIGHT: 59 IN | BODY MASS INDEX: 30.68 KG/M2 | DIASTOLIC BLOOD PRESSURE: 70 MMHG | WEIGHT: 152.2 LBS | SYSTOLIC BLOOD PRESSURE: 120 MMHG | OXYGEN SATURATION: 98 % | HEART RATE: 61 BPM

## 2025-02-10 DIAGNOSIS — N18.1 CHRONIC KIDNEY DISEASE, STAGE I: ICD-10-CM

## 2025-02-10 DIAGNOSIS — I25.10 CORONARY ARTERY DISEASE INVOLVING NATIVE CORONARY ARTERY OF NATIVE HEART WITHOUT ANGINA PECTORIS: Primary | ICD-10-CM

## 2025-02-10 DIAGNOSIS — E78.2 HYPERLIPEMIA, MIXED: ICD-10-CM

## 2025-02-10 DIAGNOSIS — R80.9 MICROALBUMINURIA: ICD-10-CM

## 2025-02-10 DIAGNOSIS — I35.1 AORTIC VALVE INSUFFICIENCY, ETIOLOGY OF CARDIAC VALVE DISEASE UNSPECIFIED: ICD-10-CM

## 2025-02-10 DIAGNOSIS — I25.10 CORONARY ARTERY DISEASE INVOLVING NATIVE CORONARY ARTERY OF NATIVE HEART WITHOUT ANGINA PECTORIS: ICD-10-CM

## 2025-02-10 DIAGNOSIS — R73.01 IFG (IMPAIRED FASTING GLUCOSE): ICD-10-CM

## 2025-02-10 DIAGNOSIS — F41.9 ANXIETY: ICD-10-CM

## 2025-02-10 DIAGNOSIS — E03.4 HYPOTHYROIDISM DUE TO ACQUIRED ATROPHY OF THYROID: ICD-10-CM

## 2025-02-10 DIAGNOSIS — I10 ESSENTIAL HYPERTENSION: ICD-10-CM

## 2025-02-10 PROBLEM — I83.013 VENOUS STASIS ULCER OF RIGHT ANKLE WITH FAT LAYER EXPOSED (HCC): Status: RESOLVED | Noted: 2024-01-10 | Resolved: 2025-02-10

## 2025-02-10 PROBLEM — L97.312 VENOUS STASIS ULCER OF RIGHT ANKLE WITH FAT LAYER EXPOSED (HCC): Status: RESOLVED | Noted: 2024-01-10 | Resolved: 2025-02-10

## 2025-02-10 PROCEDURE — 3074F SYST BP LT 130 MM HG: CPT | Performed by: INTERNAL MEDICINE

## 2025-02-10 PROCEDURE — 1159F MED LIST DOCD IN RCRD: CPT | Performed by: INTERNAL MEDICINE

## 2025-02-10 PROCEDURE — 3078F DIAST BP <80 MM HG: CPT | Performed by: INTERNAL MEDICINE

## 2025-02-10 PROCEDURE — G2211 COMPLEX E/M VISIT ADD ON: HCPCS | Performed by: INTERNAL MEDICINE

## 2025-02-10 PROCEDURE — 1123F ACP DISCUSS/DSCN MKR DOCD: CPT | Performed by: INTERNAL MEDICINE

## 2025-02-10 PROCEDURE — 99214 OFFICE O/P EST MOD 30 MIN: CPT | Performed by: INTERNAL MEDICINE

## 2025-02-10 PROCEDURE — 1160F RVW MEDS BY RX/DR IN RCRD: CPT | Performed by: INTERNAL MEDICINE

## 2025-02-10 RX ORDER — SIMVASTATIN 40 MG
40 TABLET ORAL NIGHTLY
Qty: 90 TABLET | Refills: 1 | Status: SHIPPED | OUTPATIENT
Start: 2025-02-10

## 2025-02-10 RX ORDER — LOSARTAN POTASSIUM 100 MG/1
100 TABLET ORAL DAILY
Qty: 90 TABLET | Refills: 1 | Status: SHIPPED | OUTPATIENT
Start: 2025-02-10 | End: 2025-08-09

## 2025-02-10 RX ORDER — HYDROCHLOROTHIAZIDE 12.5 MG/1
12.5 CAPSULE ORAL EVERY MORNING
Qty: 90 CAPSULE | Refills: 1 | Status: SHIPPED | OUTPATIENT
Start: 2025-02-10

## 2025-02-10 RX ORDER — CLOPIDOGREL BISULFATE 75 MG/1
75 TABLET ORAL DAILY
Qty: 90 TABLET | Refills: 1 | OUTPATIENT
Start: 2025-02-10

## 2025-02-10 RX ORDER — CLOPIDOGREL BISULFATE 75 MG/1
75 TABLET ORAL DAILY
Qty: 90 TABLET | Refills: 1 | Status: SHIPPED | OUTPATIENT
Start: 2025-02-10

## 2025-02-10 RX ORDER — HYDRALAZINE HYDROCHLORIDE 25 MG/1
25 TABLET, FILM COATED ORAL 2 TIMES DAILY
Qty: 180 TABLET | Refills: 1 | Status: SHIPPED | OUTPATIENT
Start: 2025-02-10

## 2025-02-10 RX ORDER — METOPROLOL SUCCINATE 25 MG/1
25 TABLET, EXTENDED RELEASE ORAL DAILY
Qty: 90 TABLET | Refills: 1 | Status: SHIPPED | OUTPATIENT
Start: 2025-02-10

## 2025-02-10 RX ORDER — LEVOTHYROXINE SODIUM 50 UG/1
50 TABLET ORAL DAILY
Qty: 90 TABLET | Refills: 1 | Status: SHIPPED | OUTPATIENT
Start: 2025-02-10

## 2025-02-10 SDOH — ECONOMIC STABILITY: FOOD INSECURITY: WITHIN THE PAST 12 MONTHS, YOU WORRIED THAT YOUR FOOD WOULD RUN OUT BEFORE YOU GOT MONEY TO BUY MORE.: NEVER TRUE

## 2025-02-10 SDOH — ECONOMIC STABILITY: FOOD INSECURITY: WITHIN THE PAST 12 MONTHS, THE FOOD YOU BOUGHT JUST DIDN'T LAST AND YOU DIDN'T HAVE MONEY TO GET MORE.: NEVER TRUE

## 2025-02-10 ASSESSMENT — PATIENT HEALTH QUESTIONNAIRE - PHQ9
SUM OF ALL RESPONSES TO PHQ QUESTIONS 1-9: 0
SUM OF ALL RESPONSES TO PHQ QUESTIONS 1-9: 0
1. LITTLE INTEREST OR PLEASURE IN DOING THINGS: NOT AT ALL
SUM OF ALL RESPONSES TO PHQ9 QUESTIONS 1 & 2: 0
SUM OF ALL RESPONSES TO PHQ QUESTIONS 1-9: 0
2. FEELING DOWN, DEPRESSED OR HOPELESS: NOT AT ALL
SUM OF ALL RESPONSES TO PHQ QUESTIONS 1-9: 0

## 2025-02-20 ENCOUNTER — OFFICE VISIT (OUTPATIENT)
Dept: ORTHOPEDIC SURGERY | Age: 80
End: 2025-02-20
Payer: COMMERCIAL

## 2025-02-20 VITALS — BODY MASS INDEX: 30.74 KG/M2 | HEIGHT: 59 IN

## 2025-02-20 DIAGNOSIS — M17.11 ARTHRITIS OF KNEE, RIGHT: Primary | ICD-10-CM

## 2025-02-20 PROCEDURE — 1123F ACP DISCUSS/DSCN MKR DOCD: CPT

## 2025-02-20 PROCEDURE — 1126F AMNT PAIN NOTED NONE PRSNT: CPT

## 2025-02-20 PROCEDURE — G8417 CALC BMI ABV UP PARAM F/U: HCPCS

## 2025-02-20 PROCEDURE — G8427 DOCREV CUR MEDS BY ELIG CLIN: HCPCS

## 2025-02-20 PROCEDURE — 1090F PRES/ABSN URINE INCON ASSESS: CPT

## 2025-02-20 PROCEDURE — 1159F MED LIST DOCD IN RCRD: CPT

## 2025-02-20 PROCEDURE — 99213 OFFICE O/P EST LOW 20 MIN: CPT

## 2025-02-20 PROCEDURE — 1036F TOBACCO NON-USER: CPT

## 2025-02-20 PROCEDURE — G8399 PT W/DXA RESULTS DOCUMENT: HCPCS

## 2025-02-20 NOTE — PATIENT INSTRUCTIONS
Continue weight-bearing as tolerated.  Continue range of motion exercises as instructed.  Ice and elevate as needed.  Tylenol or Motrin for pain.    Follow up as needed

## 2025-02-26 ASSESSMENT — ENCOUNTER SYMPTOMS
SHORTNESS OF BREATH: 0
NAUSEA: 0
COUGH: 0
BACK PAIN: 0
RHINORRHEA: 0
FACIAL SWELLING: 0

## 2025-02-26 NOTE — PROGRESS NOTES
2/20/2025   Chief Complaint   Patient presents with    Knee Pain     Right knee        History of Present Illness:            Patient is an 79-year-old female returning to the office today for continued management of right knee pain.  Patient received a right knee injection roughly 6 weeks ago.  She states she is doing well with her healing process.  She denies any major moments of pain.  She does note some positional changes to remind her that her knee has some issues but she is overall happy with her progress.    Previous HPI:                   Aria Kelly is a 79 y.o. female returning to the office today as an established patient for a new problem of right knee pain.  Patient states she was doing some cleaning at her [employer] and her knee gave out and she has had pain on the medial aspect of the knee since the incident.  She states the knee is sore and the pain radiates down towards her leg.  She does not have any swelling or bruising in the area.  Patient states she has been taking Tylenol arthritis with mild relief.    Patient presents with right knee injury dated 01/04/25. Patient states she was cleaning at [employer] and right knee gave out. Patient stated that she has noticed knee popping at times. States knee is very sore. Describes pain on the inside of knee that radiates down the leg. States she does notice a little swelling. Has been icing area and takes Tylenol Arthritis 2x daily. Patient rates pain a 6/10.        Medical History  Patient's medications, allergies, past medical, surgical, social and family histories were reviewed and updated as appropriate.      Review of Systems   Constitutional:  Negative for fever.   HENT:  Negative for facial swelling and rhinorrhea.    Respiratory:  Negative for cough and shortness of breath.    Cardiovascular:  Negative for chest pain.   Gastrointestinal:  Negative for nausea.   Musculoskeletal:  Positive for arthralgias and gait problem. Negative for back pain,

## 2025-05-12 DIAGNOSIS — I25.10 CORONARY ARTERY DISEASE INVOLVING NATIVE CORONARY ARTERY OF NATIVE HEART WITHOUT ANGINA PECTORIS: ICD-10-CM

## 2025-05-12 DIAGNOSIS — I10 ESSENTIAL HYPERTENSION: ICD-10-CM

## 2025-05-12 RX ORDER — CLOPIDOGREL BISULFATE 75 MG/1
75 TABLET ORAL DAILY
Qty: 90 TABLET | Refills: 1 | OUTPATIENT
Start: 2025-05-12

## 2025-05-12 RX ORDER — METOPROLOL SUCCINATE 25 MG/1
25 TABLET, EXTENDED RELEASE ORAL DAILY
Qty: 90 TABLET | Refills: 1 | OUTPATIENT
Start: 2025-05-12

## 2025-05-12 RX ORDER — LOSARTAN POTASSIUM 100 MG/1
100 TABLET ORAL DAILY
Qty: 90 TABLET | Refills: 1 | OUTPATIENT
Start: 2025-05-12 | End: 2025-11-08

## 2025-05-29 NOTE — TELEPHONE ENCOUNTER
[facility]     Dr. Elizabeth Leiva     Transesophageal Echocardiogram    Patient Name: Aria Kelly   : 1945   MRN# [id number]     Date of Procedure:  Time: 9am Arrival Time: 8am   (Arrival time is scheduled for one (1) hour before procedure is scheduled.)     Hospital: [facility] ([facility])     X   If you have received orders for blood work and or a chest x-ray, please have         them done on assigned date at [facility],         [facility], or [facility].    X   Please do not have anything by mouth after midnight prior to or 8 hours before         the procedure.    X   You may take your medication with a sip of water unless advised otherwise below.     X If you are taking HCTZ (Hydrochlorothiazide) please do not take it the morning before your procedure.      126

## 2025-06-18 ENCOUNTER — OFFICE VISIT (OUTPATIENT)
Dept: CARDIOLOGY CLINIC | Age: 80
End: 2025-06-18
Payer: COMMERCIAL

## 2025-06-18 VITALS
HEIGHT: 59 IN | HEART RATE: 68 BPM | DIASTOLIC BLOOD PRESSURE: 60 MMHG | BODY MASS INDEX: 31.04 KG/M2 | WEIGHT: 154 LBS | SYSTOLIC BLOOD PRESSURE: 136 MMHG

## 2025-06-18 DIAGNOSIS — Z98.61 HISTORY OF PTCA: Primary | ICD-10-CM

## 2025-06-18 PROCEDURE — 1159F MED LIST DOCD IN RCRD: CPT | Performed by: INTERNAL MEDICINE

## 2025-06-18 PROCEDURE — 3075F SYST BP GE 130 - 139MM HG: CPT | Performed by: INTERNAL MEDICINE

## 2025-06-18 PROCEDURE — 99214 OFFICE O/P EST MOD 30 MIN: CPT | Performed by: INTERNAL MEDICINE

## 2025-06-18 PROCEDURE — 3078F DIAST BP <80 MM HG: CPT | Performed by: INTERNAL MEDICINE

## 2025-06-18 PROCEDURE — 1123F ACP DISCUSS/DSCN MKR DOCD: CPT | Performed by: INTERNAL MEDICINE

## 2025-06-18 NOTE — PATIENT INSTRUCTIONS
Thank you for allowing us to care for you today!   We want to ensure we can follow your treatment plan and we strive to give you the best outcomes and experience possible.   If you ever have a life threatening emergency and call 911 - for an ambulance (EMS)  REMEMBER  Our providers can only care for you at:   [facility] or [facility]   Even if you have someone take you or you drive yourself we can only care for you in a [facility] facility. Our providers are not setup at the other healthcare locations!    PLEASE CALL OUR OFFICE DURING NORMAL BUSINESS HOURS  Monday through Friday 8 am to 5 pm  AFTER HOURS the physician on-call cannot help with scheduling, rescheduling, procedure instruction questions or any type of medication need or issue.  [facility] P:[phone] - [facility] P:[phone] - [facility] P:[phone]      If you receive a survey:  We would appreciate you taking the time to share your experience concerning your provider visit in the office.    These surveys are confidential!  We are eager to improve and are counting on you to share your feedback so we can ensure you get the best care possible.

## 2025-06-18 NOTE — PROGRESS NOTES
CARDIOLOGY NOTE      6/18/2025    RE: Aria Kelly  (1945)                               TO:  Erick Lao MD            CHIEF COMPLAINT   Aria is a 79 y.o. female who was seen today for management of  cad                                  Here for fu  DANA done on 11/13/2024 results as follows    Left Ventricle: Normal left ventricular systolic function with a visually estimated EF of 55 - 60%.    Aortic Valve: Heavily calcified aortic valve that appears to be opening well, KATRIN by planimetry: 2.04cm2. Moderate regurgitation, vena contracta 0.5cm.    Mitral Valve: Mild to moderate regurgitation.    Left Atrium: No left atrial appendage thrombus noted.    Pericardium: Trivial pericardial effusion present.    HPI:                   Pt has h/o cad, htn, hyperlipidimnea, cea, seen today for  fu.  Has worsening cough  Aria Kelly has the following history recorded in care path:  Patient Active Problem List    Diagnosis Date Noted    Aortic regurgitation 11/18/2022    Spinal stenosis of lumbar region without neurogenic claudication 07/2022    Stenosis of left carotid artery 01/09/2017    Nevus of multiple sites 08/26/2015    Hypercalcemia     Hypertension     Coronary artery disease involving native coronary artery of native heart without angina pectoris     Menopause     Osteopenia     Insomnia     Chronic kidney disease, stage II (mild) 12/30/2024    Microalbuminuria 11/2024    Atherosclerotic heart disease of native coronary artery with other forms of angina pectoris 02/26/2024    Traumatic partial tear of biceps tendon, right, initial encounter 10/31/2023    Osteoarthritis, localized, shoulder, right 09/26/2023    Sprain of right rotator cuff capsule 09/26/2023    Vertebral artery stenosis, left 05/18/2023    IFG (impaired fasting glucose) 05/12/2023    Leg pain, lateral, right 04/18/2022    Hypothyroidism due to acquired atrophy of thyroid     Chronic kidney disease, stage I 10/16/2019

## 2025-06-18 NOTE — PROGRESS NOTES
CLINICAL STAFF DOCUMENTATION    Dr. Elizabeth Kelly  1945  [id number]    Have you had any Chest Pain recently? - No        Have you had any Shortness of Breath - Yes  When did it begin? - ongoing, pt states might be getting worse        Have you had any dizziness - No      Have you had any palpitations recently? - No      Do you have any edema - swelling in No        Is the patient on any of the following medications - no  If Yes DO EKG - Needs done every 3 months    When did you have your last labs drawn 2/2025  What doctor ordered Narcelles   Do we have the labs in their chart Yes      If we do not have these labs, you are retrieve these labs for the provider!    Do you need any prescriptions refilled? - No    Do you have a surgery or procedure scheduled in the near future - No      Do use tobacco products? - No  Do you drink alcohol? - No  Do you use any illicit drugs? - No  Caffeine? - Yes  How much caffeine? .Coffee, tea & diet soda          Check medication list thoroughly!!! AND RECONCILE OUTSIDE MEDICATIONS  If dose has changed change the entire order not just the MG  BE SURE TO ASK PATIENT IF THEY NEED MEDICATION REFILLS  Verify Pharmacy and update if incorrect    Add to every patient's \"wrap up\" the following dot phrase AFTERVISITCARDIOHEARTHOUSE and ensure we explain this to our patients

## 2025-06-24 ENCOUNTER — OFFICE VISIT (OUTPATIENT)
Dept: INTERNAL MEDICINE CLINIC | Age: 80
End: 2025-06-24
Payer: COMMERCIAL

## 2025-06-24 VITALS
DIASTOLIC BLOOD PRESSURE: 80 MMHG | BODY MASS INDEX: 31.12 KG/M2 | SYSTOLIC BLOOD PRESSURE: 124 MMHG | WEIGHT: 154.4 LBS | OXYGEN SATURATION: 97 % | HEIGHT: 59 IN | RESPIRATION RATE: 22 BRPM | HEART RATE: 75 BPM

## 2025-06-24 DIAGNOSIS — R05.1 ACUTE COUGH: ICD-10-CM

## 2025-06-24 DIAGNOSIS — R05.1 ACUTE COUGH: Primary | ICD-10-CM

## 2025-06-24 PROCEDURE — 99213 OFFICE O/P EST LOW 20 MIN: CPT | Performed by: NURSE PRACTITIONER

## 2025-06-24 PROCEDURE — 3074F SYST BP LT 130 MM HG: CPT | Performed by: NURSE PRACTITIONER

## 2025-06-24 PROCEDURE — 3079F DIAST BP 80-89 MM HG: CPT | Performed by: NURSE PRACTITIONER

## 2025-06-24 PROCEDURE — 1160F RVW MEDS BY RX/DR IN RCRD: CPT | Performed by: NURSE PRACTITIONER

## 2025-06-24 PROCEDURE — 1123F ACP DISCUSS/DSCN MKR DOCD: CPT | Performed by: NURSE PRACTITIONER

## 2025-06-24 PROCEDURE — 1159F MED LIST DOCD IN RCRD: CPT | Performed by: NURSE PRACTITIONER

## 2025-06-24 PROCEDURE — G2211 COMPLEX E/M VISIT ADD ON: HCPCS | Performed by: NURSE PRACTITIONER

## 2025-06-24 RX ORDER — PREDNISONE 10 MG/1
TABLET ORAL
Qty: 20 TABLET | Refills: 0 | Status: SHIPPED | OUTPATIENT
Start: 2025-06-24 | End: 2025-06-24 | Stop reason: SDUPTHER

## 2025-06-24 RX ORDER — AZITHROMYCIN 250 MG/1
TABLET, FILM COATED ORAL
Qty: 6 TABLET | Refills: 0 | Status: SHIPPED | OUTPATIENT
Start: 2025-06-24 | End: 2025-07-04

## 2025-06-24 RX ORDER — AZITHROMYCIN 250 MG/1
TABLET, FILM COATED ORAL
Qty: 6 TABLET | Refills: 0 | Status: SHIPPED | OUTPATIENT
Start: 2025-06-24 | End: 2025-06-24 | Stop reason: SDUPTHER

## 2025-06-24 RX ORDER — PREDNISONE 10 MG/1
TABLET ORAL
Qty: 20 TABLET | Refills: 0 | Status: SHIPPED | OUTPATIENT
Start: 2025-06-24

## 2025-06-24 ASSESSMENT — ENCOUNTER SYMPTOMS
SINUS PRESSURE: 1
DIARRHEA: 0
COUGH: 1
VOMITING: 0
CHEST TIGHTNESS: 0
RHINORRHEA: 1
ABDOMINAL PAIN: 0
COLOR CHANGE: 0
APNEA: 0
NAUSEA: 0
SINUS PAIN: 0
SHORTNESS OF BREATH: 0

## 2025-07-14 ENCOUNTER — TELEPHONE (OUTPATIENT)
Dept: INTERNAL MEDICINE CLINIC | Age: 80
End: 2025-07-14

## 2025-07-24 DIAGNOSIS — E03.4 HYPOTHYROIDISM DUE TO ACQUIRED ATROPHY OF THYROID: ICD-10-CM

## 2025-07-24 RX ORDER — LEVOTHYROXINE SODIUM 50 UG/1
50 TABLET ORAL DAILY
Qty: 90 TABLET | Refills: 1 | Status: SHIPPED | OUTPATIENT
Start: 2025-07-24

## 2025-08-06 LAB
A/G RATIO: 1.6 RATIO (ref 0.8–2.6)
ALBUMIN: 4.5 G/DL (ref 3.5–5.2)
ALP BLD-CCNC: 90 U/L (ref 23–144)
ALT SERPL-CCNC: 32 U/L (ref 0–60)
AST SERPL-CCNC: 28 U/L (ref 0–55)
BASOPHILS ABSOLUTE: 0.1 K/UL (ref 0–0.3)
BASOPHILS RELATIVE PERCENT: 1 % (ref 0–2)
BILIRUB SERPL-MCNC: 0.4 MG/DL (ref 0–1.2)
BUN / CREAT RATIO: 19 (ref 7–25)
BUN BLDV-MCNC: 15 MG/DL (ref 3–29)
CALCIUM SERPL-MCNC: 10.8 MG/DL (ref 8.5–10.5)
CHLORIDE BLD-SCNC: 95 MEQ/L (ref 96–110)
CHOLESTEROL, TOTAL: 137 MG/DL
CO2: 23 MEQ/L (ref 19–32)
CREAT SERPL-MCNC: 0.8 MG/DL (ref 0.5–1.2)
CREATININE URINE: 104 MG/DL
DIFFERENTIAL COUNT: ABNORMAL
EOSINOPHILS ABSOLUTE: 0.3 K/UL (ref 0–0.5)
EOSINOPHILS RELATIVE PERCENT: 2.9 % (ref 0–5)
ESTIMATED GLOMERULAR FILTRATION RATE CREATININE EQUATION: 74 MLS/MIN/1.73M2
FASTING STATUS: ABNORMAL
GLOBULIN: 2.9 G/DL (ref 1.9–3.6)
GLUCOSE BLD-MCNC: 93 MG/DL (ref 70–99)
HCT VFR BLD CALC: 38.3 % (ref 34–49)
HDLC SERPL-MCNC: 50 MG/DL
HEMOGLOBIN: 12.9 G/DL (ref 11.2–15.7)
IMMATURE GRANS (ABS): 0.1 K/UL (ref 0–0.1)
IMMATURE GRANULOCYTES %: 0.5 %
LDL CHOLESTEROL: 55 MG/DL
LYMPHOCYTES ABSOLUTE: 1.6 K/UL (ref 0.9–4.1)
LYMPHOCYTES RELATIVE PERCENT: 15.5 % (ref 14–51)
MAGNESIUM: 1.9 MG/DL (ref 1.4–2.5)
MCH RBC QN AUTO: 30.4 PG (ref 26–34)
MCHC RBC AUTO-ENTMCNC: 33.7 G/DL (ref 30.7–35.5)
MCV RBC AUTO: 90.1 FL (ref 80–100)
MICROALBUMIN/CREAT 24H UR: 6580 MCG/DL
MICROALBUMIN/CREAT UR-RTO: 63 MCG/MG CREAT.
MONOCYTES ABSOLUTE: 1.4 K/UL (ref 0.2–1)
MONOCYTES RELATIVE PERCENT: 13.7 % (ref 4–12)
NEUTROPHILS ABSOLUTE: 6.8 K/UL (ref 1.8–7.5)
NEUTROPHILS RELATIVE PERCENT: 66.4 % (ref 42–80)
PDW BLD-RTO: 13 %
PLATELET # BLD: 272 K/UL (ref 140–400)
PMV BLD AUTO: 11.1 FL (ref 7.2–11.7)
POTASSIUM SERPL-SCNC: 4.4 MEQ/L (ref 3.4–5.3)
RBC # BLD: 4.25 M/UL (ref 3.95–5.26)
RETICULOCYTE ABSOLUTE COUNT: 0 /100 WBC
SODIUM BLD-SCNC: 130 MEQ/L (ref 135–148)
T4 FREE: 1.48 NG/DL (ref 0.8–1.8)
TOTAL PROTEIN: 7.4 G/DL (ref 6–8.3)
TRIGL SERPL-MCNC: 160 MG/DL
TSH ULTRASENSITIVE: 1.26 MCIU/ML (ref 0.4–4.5)
VLDLC SERPL CALC-MCNC: 32 MG/DL (ref 4–38)
WBC # BLD: 10.3 K/UL (ref 3.5–10.9)

## 2025-08-13 ENCOUNTER — OFFICE VISIT (OUTPATIENT)
Dept: INTERNAL MEDICINE CLINIC | Age: 80
End: 2025-08-13
Payer: COMMERCIAL

## 2025-08-13 VITALS
WEIGHT: 155.8 LBS | SYSTOLIC BLOOD PRESSURE: 144 MMHG | BODY MASS INDEX: 31.47 KG/M2 | OXYGEN SATURATION: 97 % | HEART RATE: 66 BPM | DIASTOLIC BLOOD PRESSURE: 64 MMHG

## 2025-08-13 DIAGNOSIS — I10 ESSENTIAL HYPERTENSION: ICD-10-CM

## 2025-08-13 DIAGNOSIS — N18.2 CHRONIC KIDNEY DISEASE, STAGE II (MILD): ICD-10-CM

## 2025-08-13 DIAGNOSIS — Z00.00 ROUTINE GENERAL MEDICAL EXAMINATION AT A HEALTH CARE FACILITY: ICD-10-CM

## 2025-08-13 DIAGNOSIS — F41.9 ANXIETY: ICD-10-CM

## 2025-08-13 DIAGNOSIS — R73.01 IFG (IMPAIRED FASTING GLUCOSE): ICD-10-CM

## 2025-08-13 DIAGNOSIS — E03.4 HYPOTHYROIDISM DUE TO ACQUIRED ATROPHY OF THYROID: ICD-10-CM

## 2025-08-13 DIAGNOSIS — I25.10 CORONARY ARTERY DISEASE INVOLVING NATIVE CORONARY ARTERY OF NATIVE HEART WITHOUT ANGINA PECTORIS: ICD-10-CM

## 2025-08-13 DIAGNOSIS — R80.9 MICROALBUMINURIA: ICD-10-CM

## 2025-08-13 DIAGNOSIS — E78.2 HYPERLIPEMIA, MIXED: ICD-10-CM

## 2025-08-13 DIAGNOSIS — Z00.00 MEDICARE ANNUAL WELLNESS VISIT, SUBSEQUENT: Primary | ICD-10-CM

## 2025-08-13 PROCEDURE — 99214 OFFICE O/P EST MOD 30 MIN: CPT | Performed by: INTERNAL MEDICINE

## 2025-08-13 PROCEDURE — 1123F ACP DISCUSS/DSCN MKR DOCD: CPT | Performed by: INTERNAL MEDICINE

## 2025-08-13 PROCEDURE — 3077F SYST BP >= 140 MM HG: CPT | Performed by: INTERNAL MEDICINE

## 2025-08-13 PROCEDURE — 3078F DIAST BP <80 MM HG: CPT | Performed by: INTERNAL MEDICINE

## 2025-08-13 PROCEDURE — 1159F MED LIST DOCD IN RCRD: CPT | Performed by: INTERNAL MEDICINE

## 2025-08-13 PROCEDURE — 1160F RVW MEDS BY RX/DR IN RCRD: CPT | Performed by: INTERNAL MEDICINE

## 2025-08-13 PROCEDURE — G0439 PPPS, SUBSEQ VISIT: HCPCS | Performed by: INTERNAL MEDICINE

## 2025-08-13 RX ORDER — LOSARTAN POTASSIUM 100 MG/1
100 TABLET ORAL DAILY
Qty: 90 TABLET | Refills: 1 | Status: SHIPPED | OUTPATIENT
Start: 2025-08-13 | End: 2026-02-09

## 2025-08-13 RX ORDER — HYDRALAZINE HYDROCHLORIDE 25 MG/1
25 TABLET, FILM COATED ORAL 2 TIMES DAILY
Qty: 180 TABLET | Refills: 1 | Status: SHIPPED | OUTPATIENT
Start: 2025-08-13

## 2025-08-13 RX ORDER — SIMVASTATIN 40 MG
40 TABLET ORAL NIGHTLY
Qty: 90 TABLET | Refills: 1 | Status: SHIPPED | OUTPATIENT
Start: 2025-08-13

## 2025-08-13 RX ORDER — CLOPIDOGREL BISULFATE 75 MG/1
75 TABLET ORAL DAILY
Qty: 90 TABLET | Refills: 1 | Status: SHIPPED | OUTPATIENT
Start: 2025-08-13

## 2025-08-13 RX ORDER — METOPROLOL SUCCINATE 25 MG/1
25 TABLET, EXTENDED RELEASE ORAL DAILY
Qty: 90 TABLET | Refills: 1 | Status: SHIPPED | OUTPATIENT
Start: 2025-08-13

## 2025-08-13 RX ORDER — HYDROCHLOROTHIAZIDE 12.5 MG/1
12.5 CAPSULE ORAL EVERY MORNING
Qty: 90 CAPSULE | Refills: 1 | Status: SHIPPED | OUTPATIENT
Start: 2025-08-13

## 2025-08-13 RX ORDER — LEVOTHYROXINE SODIUM 50 UG/1
50 TABLET ORAL DAILY
Qty: 90 TABLET | Refills: 1 | Status: SHIPPED | OUTPATIENT
Start: 2025-08-13

## 2025-08-13 ASSESSMENT — PATIENT HEALTH QUESTIONNAIRE - PHQ9
SUM OF ALL RESPONSES TO PHQ QUESTIONS 1-9: 0
1. LITTLE INTEREST OR PLEASURE IN DOING THINGS: NOT AT ALL
2. FEELING DOWN, DEPRESSED OR HOPELESS: NOT AT ALL

## 2025-08-13 ASSESSMENT — LIFESTYLE VARIABLES
HOW OFTEN DO YOU HAVE A DRINK CONTAINING ALCOHOL: NEVER
HOW MANY STANDARD DRINKS CONTAINING ALCOHOL DO YOU HAVE ON A TYPICAL DAY: PATIENT DOES NOT DRINK

## 2025-08-15 ENCOUNTER — TELEPHONE (OUTPATIENT)
Dept: INTERNAL MEDICINE CLINIC | Age: 80
End: 2025-08-15

## 2025-08-15 DIAGNOSIS — R73.01 IFG (IMPAIRED FASTING GLUCOSE): ICD-10-CM

## 2025-08-15 DIAGNOSIS — N18.2 CHRONIC KIDNEY DISEASE, STAGE II (MILD): Primary | ICD-10-CM
